# Patient Record
Sex: FEMALE | Race: WHITE | NOT HISPANIC OR LATINO | Employment: OTHER | URBAN - METROPOLITAN AREA
[De-identification: names, ages, dates, MRNs, and addresses within clinical notes are randomized per-mention and may not be internally consistent; named-entity substitution may affect disease eponyms.]

---

## 2017-05-02 ENCOUNTER — HOSPITAL ENCOUNTER (OUTPATIENT)
Dept: CT IMAGING | Facility: CLINIC | Age: 72
Discharge: HOME | End: 2017-05-02
Attending: FAMILY MEDICINE | Admitting: FAMILY MEDICINE
Payer: MEDICARE

## 2017-05-02 DIAGNOSIS — R91.8 LUNG NODULES: ICD-10-CM

## 2017-05-02 PROCEDURE — 71250 CT THORAX DX C-: CPT

## 2020-07-24 ENCOUNTER — OFFICE VISIT (OUTPATIENT)
Dept: FAMILY MEDICINE | Facility: CLINIC | Age: 75
End: 2020-07-24
Payer: COMMERCIAL

## 2020-07-24 VITALS
SYSTOLIC BLOOD PRESSURE: 120 MMHG | TEMPERATURE: 99.1 F | HEART RATE: 109 BPM | BODY MASS INDEX: 30.18 KG/M2 | HEIGHT: 62 IN | WEIGHT: 164 LBS | RESPIRATION RATE: 28 BRPM | DIASTOLIC BLOOD PRESSURE: 74 MMHG | OXYGEN SATURATION: 98 %

## 2020-07-24 DIAGNOSIS — I48.20 CHRONIC ATRIAL FIBRILLATION (H): Primary | ICD-10-CM

## 2020-07-24 DIAGNOSIS — I49.9 IRREGULAR HEART BEAT: ICD-10-CM

## 2020-07-24 DIAGNOSIS — R53.83 FATIGUE, UNSPECIFIED TYPE: ICD-10-CM

## 2020-07-24 PROCEDURE — 99203 OFFICE O/P NEW LOW 30 MIN: CPT | Performed by: NURSE PRACTITIONER

## 2020-07-24 PROCEDURE — 93000 ELECTROCARDIOGRAM COMPLETE: CPT | Performed by: NURSE PRACTITIONER

## 2020-07-24 RX ORDER — NICOTINE POLACRILEX 4 MG/1
20 GUM, CHEWING ORAL DAILY
Status: ON HOLD | COMMUNITY
Start: 2020-05-26 | End: 2020-10-07

## 2020-07-24 ASSESSMENT — MIFFLIN-ST. JEOR: SCORE: 1187.15

## 2020-07-24 NOTE — PATIENT INSTRUCTIONS
EKG looked ok, without acute changes.  Increase the Atenolol to taking that twice daily.  Continue to monitor symptoms.  Push fluids.  Consider cardiology consult and continue with your primary care provider at [facility] to help with that.  If symptoms worsen at all, you NEED to seek emergent care.      Thank you for choosing [facility].  You may be receiving an email and/or telephone survey request from [facility] Customer Experience regarding your visit today.  Please take a few minutes to respond to the survey to let us know how we are doing.      If you have questions or concerns, please contact us via [employer] or you can contact your care team at [phone].    Our Clinic hours are:  Monday 6:40 am  to 7:00 pm  Tuesday -Friday 6:40 am to 5:00 pm    The [place] outpatient lab hours are:  Monday - Friday 6:10 am to 4:45 pm  Saturdays 7:00 am to 11:00 am  Appointments are required, call [phone]    If you have clinical questions after hours or would like to schedule an appointment,  call the clinic at [phone].

## 2020-07-24 NOTE — PROGRESS NOTES
"Subjective     Shaila Triana is a 74 year old female who presents to clinic today for the following health issues:    HPI       Dizziness      Duration: over 1 week    Description   Feeling faint:  YES  Feeling like the surroundings are moving: no   Loss of consciousness or falls: no     Intensity:  moderate    Accompanying signs and symptoms:   Nausea/vomitting: no   Palpitations: YES  Weakness in arms or legs: YES  Vision or speech changes: no   Ringing in ears (Tinnitus): no   Hearing loss related to dizziness: no   Other (fevers/chills/sweating/dyspnea): YES- dyspnea    History (similar episodes/head trauma/previous evaluation/recent bleeding): has had an irregular heart beat for years and is on Atenolol.    Precipitating or alleviating factors (new meds/chemicals): None  Worse with activity/head movement: YES    Therapies tried and outcome: None    She has never been to this clinic. Usually goes to [facility] clinic in [place]. She called there today with her concerns and was told to go to ED, she is refusing to go to ED, states that is way too expensive.  She is vague with her concerns as to what brought her in but basically she shows me the name of a new medication she saw on TV that she would like to try for her chronic atrial fibrillation, she doesn't think Atenolol, which she's been on for \"years\" is working anymore.  She has vague feelings of being tired, feels it's from the heat. Denies chest pain or difficulty breathing. Weight is stable, no increase in edema.  No other acute URI concerns voiced.  Is compliant with all her medications.  Last labs were done in June 2020 and were stable, thyroid was ok.      There is no problem list on file for this patient.    History reviewed. No pertinent surgical history.    Social History     Tobacco Use     Smoking status: Never Smoker     Smokeless tobacco: Never Used   Substance Use Topics     Alcohol use: Yes     Comment: occ     History reviewed. No pertinent " "family history.      Current Outpatient Medications   Medication Sig Dispense Refill     aspirin 81 MG tablet Take by mouth daily       ATENOLOL 50 MG OR TABS 1 TABLET BY MOUTH DAILY       calcium 600 MG tablet Take 1 tablet by mouth 2 times daily       fluticasone (VERAMYST) 27.5 MCG/SPRAY nasal spray Spray 2 sprays into both nostrils daily       Levothyroxine Sodium 100 MCG CAPS        omeprazole 20 MG tablet Take 20 mg by mouth daily       sertraline (ZOLOFT) 100 MG tablet Take by mouth daily       sertraline (ZOLOFT) 50 MG tablet Take by mouth daily       simvastatin (ZOCOR) 80 MG tablet Take by mouth At Bedtime       tamoxifen (NOLVADEX) 10 MG tablet Take 10 mg by mouth 2 times daily       Allergies   Allergen Reactions     Codeine Nausea     Dust Mite Extract Other (See Comments)     Per allergy test     Erythromycin      Penicillin [Penicillins]      No lab results found.   BP Readings from Last 3 Encounters:   07/24/20 120/74   04/01/14 125/80   03/18/14 121/69    Wt Readings from Last 3 Encounters:   07/24/20 74.4 kg (164 lb)   04/01/14 66.2 kg (146 lb)   03/18/14 66.2 kg (146 lb)                    Reviewed and updated as needed this visit by Provider         Review of Systems   Constitutional, HEENT, cardiovascular, pulmonary, gi and gu systems are negative, except as otherwise noted.      Objective    /74 (BP Location: Right arm, Patient Position: Chair, Cuff Size: Adult Regular)   Pulse 109   Temp 99.1  F (37.3  C) (Tympanic)   Resp 28   Ht 1.575 m (5' 2\")   Wt 74.4 kg (164 lb)   LMP  (LMP Unknown)   SpO2 98%   BMI 30.00 kg/m    Body mass index is 30 kg/m .  Physical Exam   GENERAL: healthy, alert and no distress  NECK: no adenopathy, no asymmetry  RESP: lungs clear to auscultation - no rales, rhonchi or wheezes  CV: irregular rhythm, rate controlled, normal S1 S2, no S3 or S4, no murmur  ABDOMEN: soft, nontender, no hepatosplenomegaly, no masses and bowel sounds normal  MS: no gross " "musculoskeletal defects noted      Diagnostic Test Results:  Labs reviewed in Epic  none         Assessment & Plan     1. Chronic atrial fibrillation (H)    I am not comfortable ordering the new medication she requested. EKG was without obvious acute changes, exam was stable.  I suggested she could try and increase Atenolol to twice daily.  Strongly encouraged her to seek emergent care for thorough evaluation if symptoms persist or any new or concerning symptoms arise.  Follow up with PCP for further evaluation as well.      2. Irregular heart beat    - EKG 12-lead complete w/read - Clinics    3. Fatigue, unspecified type         BMI:   Estimated body mass index is 30 kg/m  as calculated from the following:    Height as of this encounter: 1.575 m (5' 2\").    Weight as of this encounter: 74.4 kg (164 lb).           See Patient Instructions  Patient Instructions   EKG looked ok, without acute changes.  Increase the Atenolol to taking that twice daily.  Continue to monitor symptoms.  Push fluids.  Consider cardiology consult and continue with your primary care provider at [facility] to help with that.  If symptoms worsen at all, you NEED to seek emergent care.      Thank you for choosing [facility].  You may be receiving an email and/or telephone survey request from [facility] Customer Experience regarding your visit today.  Please take a few minutes to respond to the survey to let us know how we are doing.      If you have questions or concerns, please contact us via [employer] or you can contact your care team at [phone].    Our Clinic hours are:  Monday 6:40 am  to 7:00 pm  Tuesday -Friday 6:40 am to 5:00 pm    The [place] outpatient lab hours are:  Monday - Friday 6:10 am to 4:45 pm  Saturdays 7:00 am to 11:00 am  Appointments are required, call [phone]    If you have clinical questions after hours or would like to schedule an appointment,  call the clinic at [phone].      Return in about 2 weeks " (around 8/7/2020) for or sooner if symptoms persist or worsen.    NADIR Hernandez [facility]

## 2020-10-05 ENCOUNTER — APPOINTMENT (OUTPATIENT)
Dept: GENERAL RADIOLOGY | Facility: CLINIC | Age: 75
DRG: 812 | End: 2020-10-05
Attending: EMERGENCY MEDICINE
Payer: COMMERCIAL

## 2020-10-05 ENCOUNTER — HOSPITAL ENCOUNTER (INPATIENT)
Facility: CLINIC | Age: 75
LOS: 2 days | Discharge: HOME | DRG: 812 | End: 2020-10-07
Attending: EMERGENCY MEDICINE | Admitting: INTERNAL MEDICINE
Payer: COMMERCIAL

## 2020-10-05 ENCOUNTER — APPOINTMENT (OUTPATIENT)
Dept: CT IMAGING | Facility: CLINIC | Age: 75
DRG: 812 | End: 2020-10-05
Attending: EMERGENCY MEDICINE
Payer: COMMERCIAL

## 2020-10-05 DIAGNOSIS — R42 DIZZINESS: ICD-10-CM

## 2020-10-05 DIAGNOSIS — Z20.828 VIRAL DISEASE EXPOSURE: ICD-10-CM

## 2020-10-05 DIAGNOSIS — D64.9 ANEMIA, UNSPECIFIED TYPE: ICD-10-CM

## 2020-10-05 DIAGNOSIS — D64.9 ANEMIA: ICD-10-CM

## 2020-10-05 DIAGNOSIS — R07.89 OTHER CHEST PAIN: ICD-10-CM

## 2020-10-05 DIAGNOSIS — Q45.3 PANCREATIC ABNORMALITY: ICD-10-CM

## 2020-10-05 DIAGNOSIS — I48.20 CHRONIC ATRIAL FIBRILLATION (H): ICD-10-CM

## 2020-10-05 DIAGNOSIS — R91.8 LUNG NODULES: ICD-10-CM

## 2020-10-05 DIAGNOSIS — D50.0 IRON DEFICIENCY ANEMIA DUE TO CHRONIC BLOOD LOSS: ICD-10-CM

## 2020-10-05 DIAGNOSIS — R07.9 CHEST PAIN, UNSPECIFIED TYPE: ICD-10-CM

## 2020-10-05 DIAGNOSIS — K27.9 PEPTIC ULCER DISEASE: Primary | ICD-10-CM

## 2020-10-05 PROBLEM — I77.810 ASCENDING AORTA DILATATION (H): Status: ACTIVE | Noted: 2020-10-05

## 2020-10-05 PROBLEM — R74.8 ELEVATED LIPASE: Status: ACTIVE | Noted: 2020-10-05

## 2020-10-05 PROBLEM — I48.91 NEW ONSET ATRIAL FIBRILLATION (H): Status: ACTIVE | Noted: 2020-07-31

## 2020-10-05 LAB
ALBUMIN SERPL-MCNC: 3.4 G/DL (ref 3.4–5)
ALP SERPL-CCNC: 60 U/L (ref 40–150)
ALT SERPL W P-5'-P-CCNC: 13 U/L (ref 0–50)
ANION GAP SERPL CALCULATED.3IONS-SCNC: 4 MMOL/L (ref 3–14)
AST SERPL W P-5'-P-CCNC: 13 U/L (ref 0–45)
BASOPHILS # BLD AUTO: 0.1 10E9/L (ref 0–0.2)
BASOPHILS NFR BLD AUTO: 0.8 %
BILIRUB SERPL-MCNC: 0.5 MG/DL (ref 0.2–1.3)
BLD PROD TYP BPU: NORMAL
BLD UNIT ID BPU: 0
BLOOD PRODUCT CODE: NORMAL
BPU ID: NORMAL
BUN SERPL-MCNC: 27 MG/DL (ref 7–30)
CALCIUM SERPL-MCNC: 8.7 MG/DL (ref 8.5–10.1)
CHLORIDE SERPL-SCNC: 104 MMOL/L (ref 94–109)
CO2 SERPL-SCNC: 31 MMOL/L (ref 20–32)
CREAT SERPL-MCNC: 1.16 MG/DL (ref 0.52–1.04)
DIFFERENTIAL METHOD BLD: ABNORMAL
EOSINOPHIL # BLD AUTO: 0.2 10E9/L (ref 0–0.7)
EOSINOPHIL NFR BLD AUTO: 2.5 %
ERYTHROCYTE [DISTWIDTH] IN BLOOD BY AUTOMATED COUNT: 20.2 % (ref 10–15)
FERRITIN SERPL-MCNC: 7 NG/ML (ref 8–252)
GFR SERPL CREATININE-BSD FRML MDRD: 45 ML/MIN/{1.73_M2}
GLUCOSE SERPL-MCNC: 102 MG/DL (ref 70–99)
HCT VFR BLD AUTO: 26.4 % (ref 35–47)
HGB BLD-MCNC: 6.9 G/DL (ref 11.7–15.7)
HGB BLD-MCNC: 7.1 G/DL (ref 11.7–15.7)
HGB BLD-MCNC: 7.6 G/DL (ref 11.7–15.7)
IMM GRANULOCYTES # BLD: 0 10E9/L (ref 0–0.4)
IMM GRANULOCYTES NFR BLD: 0.3 %
IRON SATN MFR SERPL: 4 % (ref 15–46)
IRON SERPL-MCNC: 20 UG/DL (ref 35–180)
LIPASE SERPL-CCNC: 640 U/L (ref 73–393)
LYMPHOCYTES # BLD AUTO: 1 10E9/L (ref 0.8–5.3)
LYMPHOCYTES NFR BLD AUTO: 17.1 %
MCH RBC QN AUTO: 17.5 PG (ref 26.5–33)
MCHC RBC AUTO-ENTMCNC: 26.9 G/DL (ref 31.5–36.5)
MCV RBC AUTO: 65 FL (ref 78–100)
MONOCYTES # BLD AUTO: 0.7 10E9/L (ref 0–1.3)
MONOCYTES NFR BLD AUTO: 12.4 %
NEUTROPHILS # BLD AUTO: 4 10E9/L (ref 1.6–8.3)
NEUTROPHILS NFR BLD AUTO: 66.9 %
NRBC # BLD AUTO: 0 10*3/UL
NRBC BLD AUTO-RTO: 0 /100
NT-PROBNP SERPL-MCNC: 2938 PG/ML (ref 0–1800)
PLATELET # BLD AUTO: 236 10E9/L (ref 150–450)
POTASSIUM SERPL-SCNC: 3 MMOL/L (ref 3.4–5.3)
PROT SERPL-MCNC: 7.5 G/DL (ref 6.8–8.8)
RBC # BLD AUTO: 4.06 10E12/L (ref 3.8–5.2)
RETICS # AUTO: 49.9 10E9/L (ref 25–95)
RETICS/RBC NFR AUTO: 1.2 % (ref 0.5–2)
SODIUM SERPL-SCNC: 139 MMOL/L (ref 133–144)
TIBC SERPL-MCNC: 445 UG/DL (ref 240–430)
TRANSFUSION STATUS PATIENT QL: NORMAL
TRANSFUSION STATUS PATIENT QL: NORMAL
TROPONIN I SERPL-MCNC: <0.015 UG/L (ref 0–0.04)
TROPONIN I SERPL-MCNC: <0.015 UG/L (ref 0–0.04)
WBC # BLD AUTO: 6 10E9/L (ref 4–11)

## 2020-10-05 PROCEDURE — 86850 RBC ANTIBODY SCREEN: CPT | Performed by: EMERGENCY MEDICINE

## 2020-10-05 PROCEDURE — 36430 TRANSFUSION BLD/BLD COMPNT: CPT | Performed by: EMERGENCY MEDICINE

## 2020-10-05 PROCEDURE — 83540 ASSAY OF IRON: CPT | Performed by: PHYSICIAN ASSISTANT

## 2020-10-05 PROCEDURE — 86900 BLOOD TYPING SEROLOGIC ABO: CPT | Performed by: EMERGENCY MEDICINE

## 2020-10-05 PROCEDURE — 85045 AUTOMATED RETICULOCYTE COUNT: CPT | Performed by: PHYSICIAN ASSISTANT

## 2020-10-05 PROCEDURE — 83690 ASSAY OF LIPASE: CPT | Performed by: EMERGENCY MEDICINE

## 2020-10-05 PROCEDURE — C9803 HOPD COVID-19 SPEC COLLECT: HCPCS

## 2020-10-05 PROCEDURE — 258N000003 HC RX IP 258 OP 636: Performed by: PHYSICIAN ASSISTANT

## 2020-10-05 PROCEDURE — 250N000011 HC RX IP 250 OP 636: Performed by: EMERGENCY MEDICINE

## 2020-10-05 PROCEDURE — 80053 COMPREHEN METABOLIC PANEL: CPT | Performed by: EMERGENCY MEDICINE

## 2020-10-05 PROCEDURE — P9016 RBC LEUKOCYTES REDUCED: HCPCS | Performed by: EMERGENCY MEDICINE

## 2020-10-05 PROCEDURE — 83880 ASSAY OF NATRIURETIC PEPTIDE: CPT | Performed by: EMERGENCY MEDICINE

## 2020-10-05 PROCEDURE — 250N000013 HC RX MED GY IP 250 OP 250 PS 637: Performed by: INTERNAL MEDICINE

## 2020-10-05 PROCEDURE — 99285 EMERGENCY DEPT VISIT HI MDM: CPT | Mod: 25 | Performed by: EMERGENCY MEDICINE

## 2020-10-05 PROCEDURE — 84484 ASSAY OF TROPONIN QUANT: CPT | Performed by: EMERGENCY MEDICINE

## 2020-10-05 PROCEDURE — 82728 ASSAY OF FERRITIN: CPT | Performed by: PHYSICIAN ASSISTANT

## 2020-10-05 PROCEDURE — 85018 HEMOGLOBIN: CPT | Performed by: EMERGENCY MEDICINE

## 2020-10-05 PROCEDURE — 71045 X-RAY EXAM CHEST 1 VIEW: CPT

## 2020-10-05 PROCEDURE — 99291 CRITICAL CARE FIRST HOUR: CPT | Mod: 25 | Performed by: EMERGENCY MEDICINE

## 2020-10-05 PROCEDURE — 250N000009 HC RX 250: Performed by: EMERGENCY MEDICINE

## 2020-10-05 PROCEDURE — 86923 COMPATIBILITY TEST ELECTRIC: CPT | Performed by: EMERGENCY MEDICINE

## 2020-10-05 PROCEDURE — 36415 COLL VENOUS BLD VENIPUNCTURE: CPT | Performed by: PHYSICIAN ASSISTANT

## 2020-10-05 PROCEDURE — 250N000013 HC RX MED GY IP 250 OP 250 PS 637: Performed by: PHYSICIAN ASSISTANT

## 2020-10-05 PROCEDURE — 86901 BLOOD TYPING SEROLOGIC RH(D): CPT | Performed by: EMERGENCY MEDICINE

## 2020-10-05 PROCEDURE — 85025 COMPLETE CBC W/AUTO DIFF WBC: CPT | Performed by: EMERGENCY MEDICINE

## 2020-10-05 PROCEDURE — 84484 ASSAY OF TROPONIN QUANT: CPT | Performed by: PHYSICIAN ASSISTANT

## 2020-10-05 PROCEDURE — G0378 HOSPITAL OBSERVATION PER HR: HCPCS

## 2020-10-05 PROCEDURE — 93005 ELECTROCARDIOGRAM TRACING: CPT | Performed by: EMERGENCY MEDICINE

## 2020-10-05 PROCEDURE — 93010 ELECTROCARDIOGRAM REPORT: CPT | Performed by: EMERGENCY MEDICINE

## 2020-10-05 PROCEDURE — U0003 INFECTIOUS AGENT DETECTION BY NUCLEIC ACID (DNA OR RNA); SEVERE ACUTE RESPIRATORY SYNDROME CORONAVIRUS 2 (SARS-COV-2) (CORONAVIRUS DISEASE [COVID-19]), AMPLIFIED PROBE TECHNIQUE, MAKING USE OF HIGH THROUGHPUT TECHNOLOGIES AS DESCRIBED BY CMS-2020-01-R: HCPCS | Performed by: EMERGENCY MEDICINE

## 2020-10-05 PROCEDURE — 96365 THER/PROPH/DIAG IV INF INIT: CPT | Performed by: EMERGENCY MEDICINE

## 2020-10-05 PROCEDURE — C9113 INJ PANTOPRAZOLE SODIUM, VIA: HCPCS | Performed by: PHYSICIAN ASSISTANT

## 2020-10-05 PROCEDURE — 250N000011 HC RX IP 250 OP 636: Performed by: PHYSICIAN ASSISTANT

## 2020-10-05 PROCEDURE — 96375 TX/PRO/DX INJ NEW DRUG ADDON: CPT | Performed by: EMERGENCY MEDICINE

## 2020-10-05 PROCEDURE — 74177 CT ABD & PELVIS W/CONTRAST: CPT

## 2020-10-05 PROCEDURE — 99223 1ST HOSP IP/OBS HIGH 75: CPT | Performed by: PHYSICIAN ASSISTANT

## 2020-10-05 PROCEDURE — 83550 IRON BINDING TEST: CPT | Performed by: PHYSICIAN ASSISTANT

## 2020-10-05 PROCEDURE — 120N000001 HC R&B MED SURG/OB

## 2020-10-05 PROCEDURE — 85018 HEMOGLOBIN: CPT | Performed by: PHYSICIAN ASSISTANT

## 2020-10-05 RX ORDER — DILTIAZEM HYDROCHLORIDE 5 MG/ML
10 INJECTION INTRAVENOUS ONCE
Status: DISCONTINUED | OUTPATIENT
Start: 2020-10-05 | End: 2020-10-05

## 2020-10-05 RX ORDER — HYDROMORPHONE HYDROCHLORIDE 1 MG/ML
.2-.5 INJECTION, SOLUTION INTRAMUSCULAR; INTRAVENOUS; SUBCUTANEOUS
Status: DISCONTINUED | OUTPATIENT
Start: 2020-10-05 | End: 2020-10-07 | Stop reason: HOSPADM

## 2020-10-05 RX ORDER — FUROSEMIDE 40 MG
40 TABLET ORAL DAILY
Status: DISCONTINUED | OUTPATIENT
Start: 2020-10-06 | End: 2020-10-07 | Stop reason: HOSPADM

## 2020-10-05 RX ORDER — POTASSIUM CHLORIDE 7.45 MG/ML
10 INJECTION INTRAVENOUS
Status: DISCONTINUED | OUTPATIENT
Start: 2020-10-05 | End: 2020-10-07 | Stop reason: HOSPADM

## 2020-10-05 RX ORDER — ROSUVASTATIN CALCIUM 20 MG/1
20 TABLET, COATED ORAL AT BEDTIME
Status: DISCONTINUED | OUTPATIENT
Start: 2020-10-05 | End: 2020-10-05 | Stop reason: CLARIF

## 2020-10-05 RX ORDER — ROSUVASTATIN CALCIUM 20 MG/1
20 TABLET, COATED ORAL EVERY EVENING
Status: DISCONTINUED | OUTPATIENT
Start: 2020-10-05 | End: 2020-10-07 | Stop reason: HOSPADM

## 2020-10-05 RX ORDER — OFLOXACIN 3 MG/ML
SOLUTION/ DROPS OPHTHALMIC
COMMUNITY
Start: 2020-09-17 | End: 2023-01-01

## 2020-10-05 RX ORDER — IOPAMIDOL 755 MG/ML
71 INJECTION, SOLUTION INTRAVASCULAR ONCE
Status: COMPLETED | OUTPATIENT
Start: 2020-10-05 | End: 2020-10-05

## 2020-10-05 RX ORDER — ROSUVASTATIN CALCIUM 20 MG/1
20 TABLET, COATED ORAL EVERY EVENING
Status: ON HOLD | COMMUNITY
Start: 2020-08-10 | End: 2023-01-01

## 2020-10-05 RX ORDER — METOPROLOL TARTRATE 1 MG/ML
5 INJECTION, SOLUTION INTRAVENOUS EVERY 5 MIN PRN
Status: DISCONTINUED | OUTPATIENT
Start: 2020-10-05 | End: 2020-10-07 | Stop reason: HOSPADM

## 2020-10-05 RX ORDER — ACETAMINOPHEN 325 MG/1
650 TABLET ORAL EVERY 4 HOURS PRN
Status: DISCONTINUED | OUTPATIENT
Start: 2020-10-05 | End: 2020-10-07 | Stop reason: HOSPADM

## 2020-10-05 RX ORDER — POTASSIUM CHLORIDE 1.5 G/1.58G
20-40 POWDER, FOR SOLUTION ORAL
Status: DISCONTINUED | OUTPATIENT
Start: 2020-10-05 | End: 2020-10-07 | Stop reason: HOSPADM

## 2020-10-05 RX ORDER — ONDANSETRON 4 MG/1
4 TABLET, ORALLY DISINTEGRATING ORAL EVERY 6 HOURS PRN
Status: DISCONTINUED | OUTPATIENT
Start: 2020-10-05 | End: 2020-10-05

## 2020-10-05 RX ORDER — ONDANSETRON 2 MG/ML
4 INJECTION INTRAMUSCULAR; INTRAVENOUS EVERY 6 HOURS PRN
Status: DISCONTINUED | OUTPATIENT
Start: 2020-10-05 | End: 2020-10-07 | Stop reason: HOSPADM

## 2020-10-05 RX ORDER — ONDANSETRON 4 MG/1
4 TABLET, ORALLY DISINTEGRATING ORAL EVERY 6 HOURS PRN
Status: DISCONTINUED | OUTPATIENT
Start: 2020-10-05 | End: 2020-10-07 | Stop reason: HOSPADM

## 2020-10-05 RX ORDER — SERTRALINE HYDROCHLORIDE 100 MG/1
200 TABLET, FILM COATED ORAL EVERY EVENING
Status: DISCONTINUED | OUTPATIENT
Start: 2020-10-05 | End: 2020-10-07 | Stop reason: HOSPADM

## 2020-10-05 RX ORDER — SERTRALINE HYDROCHLORIDE 100 MG/1
200 TABLET, FILM COATED ORAL AT BEDTIME
Status: DISCONTINUED | OUTPATIENT
Start: 2020-10-05 | End: 2020-10-05 | Stop reason: CLARIF

## 2020-10-05 RX ORDER — OXYCODONE HYDROCHLORIDE 5 MG/1
5-10 TABLET ORAL
Status: DISCONTINUED | OUTPATIENT
Start: 2020-10-05 | End: 2020-10-07 | Stop reason: HOSPADM

## 2020-10-05 RX ORDER — PROCHLORPERAZINE MALEATE 5 MG
5 TABLET ORAL EVERY 6 HOURS PRN
Status: DISCONTINUED | OUTPATIENT
Start: 2020-10-05 | End: 2020-10-07 | Stop reason: HOSPADM

## 2020-10-05 RX ORDER — SODIUM CHLORIDE, SODIUM LACTATE, POTASSIUM CHLORIDE, CALCIUM CHLORIDE 600; 310; 30; 20 MG/100ML; MG/100ML; MG/100ML; MG/100ML
INJECTION, SOLUTION INTRAVENOUS CONTINUOUS
Status: DISCONTINUED | OUTPATIENT
Start: 2020-10-05 | End: 2020-10-06

## 2020-10-05 RX ORDER — AMOXICILLIN 250 MG
2 CAPSULE ORAL 2 TIMES DAILY PRN
Status: DISCONTINUED | OUTPATIENT
Start: 2020-10-05 | End: 2020-10-07 | Stop reason: HOSPADM

## 2020-10-05 RX ORDER — NALOXONE HYDROCHLORIDE 0.4 MG/ML
.1-.4 INJECTION, SOLUTION INTRAMUSCULAR; INTRAVENOUS; SUBCUTANEOUS
Status: DISCONTINUED | OUTPATIENT
Start: 2020-10-05 | End: 2020-10-07 | Stop reason: HOSPADM

## 2020-10-05 RX ORDER — POTASSIUM CHLORIDE 29.8 MG/ML
20 INJECTION INTRAVENOUS
Status: DISCONTINUED | OUTPATIENT
Start: 2020-10-05 | End: 2020-10-05 | Stop reason: CLARIF

## 2020-10-05 RX ORDER — OXYCODONE AND ACETAMINOPHEN 5; 325 MG/1; MG/1
1 TABLET ORAL EVERY 4 HOURS PRN
Status: DISCONTINUED | OUTPATIENT
Start: 2020-10-05 | End: 2020-10-05

## 2020-10-05 RX ORDER — FUROSEMIDE 40 MG
40 TABLET ORAL DAILY
Status: ON HOLD | COMMUNITY
Start: 2020-08-10 | End: 2024-01-01

## 2020-10-05 RX ORDER — AMOXICILLIN 250 MG
1 CAPSULE ORAL 2 TIMES DAILY PRN
Status: DISCONTINUED | OUTPATIENT
Start: 2020-10-05 | End: 2020-10-07 | Stop reason: HOSPADM

## 2020-10-05 RX ORDER — NALOXONE HYDROCHLORIDE 0.4 MG/ML
.1-.4 INJECTION, SOLUTION INTRAMUSCULAR; INTRAVENOUS; SUBCUTANEOUS
Status: DISCONTINUED | OUTPATIENT
Start: 2020-10-05 | End: 2020-10-05

## 2020-10-05 RX ORDER — METOPROLOL TARTRATE 1 MG/ML
5 INJECTION, SOLUTION INTRAVENOUS ONCE
Status: COMPLETED | OUTPATIENT
Start: 2020-10-05 | End: 2020-10-05

## 2020-10-05 RX ORDER — ONDANSETRON 2 MG/ML
4 INJECTION INTRAMUSCULAR; INTRAVENOUS EVERY 6 HOURS PRN
Status: DISCONTINUED | OUTPATIENT
Start: 2020-10-05 | End: 2020-10-05

## 2020-10-05 RX ORDER — FERROUS GLUCONATE 324(38)MG
324 TABLET ORAL
Status: DISCONTINUED | OUTPATIENT
Start: 2020-10-06 | End: 2020-10-07 | Stop reason: HOSPADM

## 2020-10-05 RX ORDER — POTASSIUM CL/LIDO/0.9 % NACL 10MEQ/0.1L
10 INTRAVENOUS SOLUTION, PIGGYBACK (ML) INTRAVENOUS ONCE
Status: COMPLETED | OUTPATIENT
Start: 2020-10-05 | End: 2020-10-05

## 2020-10-05 RX ORDER — POTASSIUM CHLORIDE 1500 MG/1
20-40 TABLET, EXTENDED RELEASE ORAL
Status: DISCONTINUED | OUTPATIENT
Start: 2020-10-05 | End: 2020-10-07 | Stop reason: HOSPADM

## 2020-10-05 RX ORDER — PROCHLORPERAZINE 25 MG
12.5 SUPPOSITORY, RECTAL RECTAL EVERY 12 HOURS PRN
Status: DISCONTINUED | OUTPATIENT
Start: 2020-10-05 | End: 2020-10-07 | Stop reason: HOSPADM

## 2020-10-05 RX ORDER — ACETAMINOPHEN 325 MG/1
650 TABLET ORAL EVERY 4 HOURS PRN
Status: DISCONTINUED | OUTPATIENT
Start: 2020-10-05 | End: 2020-10-05

## 2020-10-05 RX ORDER — KETOROLAC TROMETHAMINE 5 MG/ML
SOLUTION OPHTHALMIC
COMMUNITY
Start: 2020-09-17 | End: 2023-01-01

## 2020-10-05 RX ORDER — POTASSIUM CL/LIDO/0.9 % NACL 10MEQ/0.1L
10 INTRAVENOUS SOLUTION, PIGGYBACK (ML) INTRAVENOUS
Status: DISCONTINUED | OUTPATIENT
Start: 2020-10-05 | End: 2020-10-07 | Stop reason: HOSPADM

## 2020-10-05 RX ORDER — LEVOTHYROXINE SODIUM 100 UG/1
100 TABLET ORAL EVERY MORNING
Status: DISCONTINUED | OUTPATIENT
Start: 2020-10-06 | End: 2020-10-07 | Stop reason: HOSPADM

## 2020-10-05 RX ORDER — SODIUM CHLORIDE 9 MG/ML
INJECTION, SOLUTION INTRAVENOUS CONTINUOUS
Status: DISCONTINUED | OUTPATIENT
Start: 2020-10-05 | End: 2020-10-05

## 2020-10-05 RX ORDER — POLYETHYLENE GLYCOL 3350 17 G/17G
17 POWDER, FOR SOLUTION ORAL DAILY PRN
Status: DISCONTINUED | OUTPATIENT
Start: 2020-10-05 | End: 2020-10-07 | Stop reason: HOSPADM

## 2020-10-05 RX ORDER — PREDNISOLONE ACETATE 10 MG/ML
SUSPENSION/ DROPS OPHTHALMIC
COMMUNITY
Start: 2020-09-17 | End: 2023-01-01

## 2020-10-05 RX ORDER — ACETAMINOPHEN 650 MG/1
650 SUPPOSITORY RECTAL EVERY 4 HOURS PRN
Status: DISCONTINUED | OUTPATIENT
Start: 2020-10-05 | End: 2020-10-07 | Stop reason: HOSPADM

## 2020-10-05 RX ORDER — METOPROLOL SUCCINATE 100 MG/1
150 TABLET, EXTENDED RELEASE ORAL 2 TIMES DAILY
Status: ON HOLD | COMMUNITY
Start: 2020-09-14 | End: 2024-01-01

## 2020-10-05 RX ADMIN — POTASSIUM CHLORIDE 40 MEQ: 1500 TABLET, EXTENDED RELEASE ORAL at 21:05

## 2020-10-05 RX ADMIN — IOPAMIDOL 71 ML: 755 INJECTION, SOLUTION INTRAVENOUS at 12:12

## 2020-10-05 RX ADMIN — SERTRALINE HYDROCHLORIDE 200 MG: 100 TABLET ORAL at 21:05

## 2020-10-05 RX ADMIN — SODIUM CHLORIDE, POTASSIUM CHLORIDE, SODIUM LACTATE AND CALCIUM CHLORIDE: 600; 310; 30; 20 INJECTION, SOLUTION INTRAVENOUS at 21:04

## 2020-10-05 RX ADMIN — METOPROLOL SUCCINATE 150 MG: 100 TABLET, EXTENDED RELEASE ORAL at 21:06

## 2020-10-05 RX ADMIN — POTASSIUM CHLORIDE 20 MEQ: 1500 TABLET, EXTENDED RELEASE ORAL at 22:58

## 2020-10-05 RX ADMIN — ROSUVASTATIN CALCIUM 20 MG: 20 TABLET, FILM COATED ORAL at 21:05

## 2020-10-05 RX ADMIN — PANTOPRAZOLE SODIUM 40 MG: 40 INJECTION, POWDER, LYOPHILIZED, FOR SOLUTION INTRAVENOUS at 21:07

## 2020-10-05 RX ADMIN — SODIUM CHLORIDE 97 ML: 9 INJECTION, SOLUTION INTRAVENOUS at 12:12

## 2020-10-05 RX ADMIN — Medication 10 MEQ: at 11:35

## 2020-10-05 RX ADMIN — METOPROLOL TARTRATE 5 MG: 5 INJECTION INTRAVENOUS at 14:19

## 2020-10-05 ASSESSMENT — ACTIVITIES OF DAILY LIVING (ADL)
DIFFICULTY_EATING/SWALLOWING: NO
TOILETING_ISSUES: NO
WEAR_GLASSES_OR_BLIND: YES
DIFFICULTY_COMMUNICATING: NO
DRESSING/BATHING_DIFFICULTY: NO

## 2020-10-05 ASSESSMENT — MIFFLIN-ST. JEOR
SCORE: 1067.25
SCORE: 1159.93

## 2020-10-05 NOTE — ED PROVIDER NOTES
History     Chief Complaint   Patient presents with     Chest Pain     soa with intermittent CP     Shortness of Breath     HPI  Shaila Triana is a 74 year old female with past medical history significant for new onset atrial fibrillation hypertension hypothyroidism sleep apnea patient is on Eliquis who presents the emergency department complaining of left-sided chest pain shortness of breath.  Patient states the chest pain has been present over the past few days.  It is a sharp pain that occurs mostly when she is laying down or sleep on her left side.  It is a stabbing pain that does not radiate.  It lasts about 5 to 10 minutes.  Patient also states she feels a bit short of air but this is been an ongoing problem for a fairly long time.  Patient has not had any recent fevers or chills denies headache or visual changes.  She denies neck pain.  She has a mild chronic cough.  She denies any current chest pain.  She is not having any abdominal pain or back pain.  She denies any focal numbness weakness in extremities she denies any bowel or bladder dysfunction.    Allergies:  Allergies   Allergen Reactions     Codeine Nausea     Dust Mite Extract Other (See Comments)     Per allergy test     Erythromycin      Penicillin [Penicillins]        Problem List:    There are no active problems to display for this patient.       Past Medical History:    No past medical history on file.    Past Surgical History:    No past surgical history on file.    Family History:    No family history on file.    Social History:  Marital Status:   [4]  Social History     Tobacco Use     Smoking status: Never Smoker     Smokeless tobacco: Never Used   Substance Use Topics     Alcohol use: Yes     Comment: occ     Drug use: Never        Medications:         aspirin 81 MG tablet       ATENOLOL 50 MG OR TABS       calcium 600 MG tablet       fluticasone (VERAMYST) 27.5 MCG/SPRAY nasal spray       Levothyroxine Sodium 100 MCG CAPS       " omeprazole 20 MG tablet       sertraline (ZOLOFT) 100 MG tablet       sertraline (ZOLOFT) 50 MG tablet       simvastatin (ZOCOR) 80 MG tablet       tamoxifen (NOLVADEX) 10 MG tablet          Review of Systems  All systems reviewed and other than pertinent positives negatives in HPI all other systems are negative.  Physical Exam   BP: 115/83  Pulse: 88  Temp: 98.7  F (37.1  C)  Resp: 18  Height: 157.5 cm (5' 2\")  Weight: 71.7 kg (158 lb)  SpO2: 98 %      Physical Exam  Vitals signs and nursing note reviewed.   Constitutional:       General: She is not in acute distress.     Appearance: Normal appearance. She is well-developed. She is not ill-appearing or toxic-appearing.   HENT:      Head: Normocephalic.      Nose: Nose normal. No congestion.      Mouth/Throat:      Mouth: Mucous membranes are moist.      Pharynx: Oropharynx is clear.   Eyes:      Conjunctiva/sclera: Conjunctivae normal.   Neck:      Musculoskeletal: Normal range of motion and neck supple.      Comments: No JVD noted.  Cardiovascular:      Rate and Rhythm: Normal rate. Rhythm irregular.      Pulses: Normal pulses.      Heart sounds: Normal heart sounds. No murmur.   Pulmonary:      Effort: Pulmonary effort is normal.      Breath sounds: Normal breath sounds. No wheezing, rhonchi or rales.   Chest:      Chest wall: No tenderness.   Abdominal:      General: Abdomen is flat.      Palpations: Abdomen is soft.      Tenderness: There is no abdominal tenderness. There is no right CVA tenderness or left CVA tenderness.   Genitourinary:     Comments: There are no external lesions of the rectum.  Normal tone.  No significant amount of stool in vault.  Heme-negative.  No masses palpated.  Musculoskeletal: Normal range of motion.         General: No deformity.      Right lower leg: No edema.      Left lower leg: No edema.   Skin:     General: Skin is warm and dry.      Capillary Refill: Capillary refill takes less than 2 seconds.      Coloration: Skin is pale. "      Findings: No bruising or erythema.   Neurological:      General: No focal deficit present.      Mental Status: She is alert and oriented to person, place, and time.      Motor: No weakness.      Coordination: Coordination normal.   Psychiatric:         Mood and Affect: Mood normal.         ED Course     ED Course as of Oct 05 1633   Mon Oct 05, 2020   1632 Iron(!): 20     Procedures               EKG Interpretation:      Interpreted by Jorge Owusu MD  Rhythm: atrial fibrillation - controlled  Rate: Normal  Axis: Normal  Ectopy: premature ventricular contractions (unifocal)  Conduction: normal  ST Segments/ T Waves: Non-specific ST-T wave changes  Q Waves: none  Comparison to prior: Unchanged from 7/24/20    Clinical Impression: atrial fibrillation (chronic)      Critical Care time:  none             Results for orders placed or performed during the hospital encounter of 10/05/20 (from the past 24 hour(s))   CBC with platelets differential   Result Value Ref Range    WBC 6.0 4.0 - 11.0 10e9/L    RBC Count 4.06 3.8 - 5.2 10e12/L    Hemoglobin 7.1 (L) 11.7 - 15.7 g/dL    Hematocrit 26.4 (L) 35.0 - 47.0 %    MCV 65 (L) 78 - 100 fl    MCH 17.5 (L) 26.5 - 33.0 pg    MCHC 26.9 (L) 31.5 - 36.5 g/dL    RDW 20.2 (H) 10.0 - 15.0 %    Platelet Count 236 150 - 450 10e9/L    Diff Method Automated Method     % Neutrophils 66.9 %    % Lymphocytes 17.1 %    % Monocytes 12.4 %    % Eosinophils 2.5 %    % Basophils 0.8 %    % Immature Granulocytes 0.3 %    Nucleated RBCs 0 0 /100    Absolute Neutrophil 4.0 1.6 - 8.3 10e9/L    Absolute Lymphocytes 1.0 0.8 - 5.3 10e9/L    Absolute Monocytes 0.7 0.0 - 1.3 10e9/L    Absolute Eosinophils 0.2 0.0 - 0.7 10e9/L    Absolute Basophils 0.1 0.0 - 0.2 10e9/L    Abs Immature Granulocytes 0.0 0 - 0.4 10e9/L    Absolute Nucleated RBC 0.0    Comprehensive metabolic panel   Result Value Ref Range    Sodium 139 133 - 144 mmol/L    Potassium 3.0 (L) 3.4 - 5.3 mmol/L    Chloride 104 94 - 109  mmol/L    Carbon Dioxide 31 20 - 32 mmol/L    Anion Gap 4 3 - 14 mmol/L    Glucose 102 (H) 70 - 99 mg/dL    Urea Nitrogen 27 7 - 30 mg/dL    Creatinine 1.16 (H) 0.52 - 1.04 mg/dL    GFR Estimate 45 (L) >60 mL/min/[1.73_m2]    GFR Estimate If Black 53 (L) >60 mL/min/[1.73_m2]    Calcium 8.7 8.5 - 10.1 mg/dL    Bilirubin Total 0.5 0.2 - 1.3 mg/dL    Albumin 3.4 3.4 - 5.0 g/dL    Protein Total 7.5 6.8 - 8.8 g/dL    Alkaline Phosphatase 60 40 - 150 U/L    ALT 13 0 - 50 U/L    AST 13 0 - 45 U/L   Lipase   Result Value Ref Range    Lipase 640 (H) 73 - 393 U/L   Troponin I   Result Value Ref Range    Troponin I ES <0.015 0.000 - 0.045 ug/L   Nt probnp inpatient (BNP)   Result Value Ref Range    N-Terminal Pro BNP Inpatient 2,938 (H) 0 - 1,800 pg/mL   Reticulocyte count   Result Value Ref Range    % Retic 1.2 0.5 - 2.0 %    Absolute Retic 49.9 25 - 95 10e9/L   Ferritin   Result Value Ref Range    Ferritin 7 (L) 8 - 252 ng/mL   Iron and iron binding capacity   Result Value Ref Range    Iron 20 (L) 35 - 180 ug/dL    Iron Binding Cap 445 (H) 240 - 430 ug/dL    Iron Saturation Index 4 (L) 15 - 46 %   XR Chest Port 1 View    Narrative    XR CHEST PORT 1 VW 10/5/2020 12:23 PM    HISTORY: Chest pain.    COMPARISON: None.      Impression    IMPRESSION: A single view the chest shows no acute cardiopulmonary  disease. Borderline cardiomegaly is present.     FRANCISCO CATHERINE MD   CT Chest (PE) Abdomen Pelvis w Contrast    Narrative    CT CHEST PE, ABDOMEN AND PELVIS WITH CONTRAST 10/5/2020 12:48 PM    HISTORY: Chest pain and shortness of breath with elevated lipase.    TECHNIQUE: Helical axial scans from the lung apices through pubic  symphysis with 71 mL Isovue 370 IV contrast. Radiation dose for this  scan was reduced using automated exposure control, adjustment of the  mA and/or kV according to patient size, or iterative reconstruction  technique.    COMPARISON: CT chest 5/2/2017 and CT abdomen and pelvis  4/12/2016.    FINDINGS:  Chest: There is no CT evidence for pulmonary embolism or other acute  vascular abnormality of the chest. Mild ectasia ascending thoracic  aorta without change. Vascular calcifications, including coronary  artery calcifications. The thyroid gland is asymmetric with either  removal/hypoplasia of the right lobe or enlargement of the left lobe.  Regardless, this finding is unchanged since 2017. There are a few  borderline size mediastinal lymph nodes which appear unchanged. A  borderline size right hilar lymph node is also present. There is a  large hiatal hernia. Mediastinal and hilar structures are otherwise  normal. An enlarged right axillary lymph node is unchanged since the  prior exam.    Interval development of small bilateral pleural effusions. Two small  pleural-based nodules in the left lower lobe laterally are unchanged  and require no additional follow-up. Interval development of a 1 cm  slightly irregular nodule in the right middle lobe lateral segment  (image 139 of series 7 and image 34 of series 9). The remainder of the  lungs are clear.    Abdomen and pelvis: A few scattered small benign liver cysts are again  noted. The liver is otherwise unremarkable. The spleen is normal.  There is an 8 mm sharply circumscribed lucency at the junction of the  body and tail of the pancreas, new since the prior exam. This is of  uncertain etiology and significance. Since it is new, a follow-up CT  exam in 3 months is recommended. The remainder of the pancreas appears  normal. 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 A few  small benign cysts are seen in the upper pole of the left kidney. No  hydronephrosis bilaterally. The bowel and mesentery in the upper  abdomen are unremarkable.    Scans through the pelvis show no acute abnormality or free fluid. The  appendix is not definitely identified. Multilevel advanced  degenerative disc disease in the lumbar spine.      Impression     IMPRESSION:  1. No evidence for pulmonary embolism.  2. Stable ectasia ascending thoracic aorta.  3. Vascular calcifications, including coronary artery calcifications.  4. Enlarged right axillary lymph node is stable.  5. Interval development of small bilateral pleural effusions.  6. Two small pleural-based nodules left lower lobe laterally are  stable and require no additional follow-up.  7. New indeterminate 1 cm nodule right middle lobe.  Recommendations for an incidental lung nodule > 8mm:    Low risk patients: Consider follow-up CT in 3 months, PET/CT, and/or  tissue sampling.    High risk patients: Same as for low risk patients.    *Low Risk: Minimal or absent history of smoking or other known risk  factors.  *Nonsolid (ground-glass) or partly solid nodules may require longer  follow-up to exclude indolent adenocarcinoma.  *Recommendations based on Guidelines for the Management of Incidental  Pulmonary Nodules Detected at CT: From the Fleischner Society 2017,  Radiology 2017.    8. New 8 mm lucency in the pancreas which is probably benign but  follow-up is recommended. See above discussion.  9. Stable large hiatal hernia.    Hemaglobin   Result Value Ref Range    Hemoglobin 6.9 (LL) 11.7 - 15.7 g/dL   ABO/Rh type and screen   Result Value Ref Range    ABO A     RH(D) Pos     Antibody Screen Neg     Test Valid Only At [facility]        Specimen Expires 10/08/2020    Blood component   Result Value Ref Range    Unit Number [id number]     Blood Component Type Red Blood Cells Leukocyte Reduced     Division Number 00     Status of Unit Released to care unit 10/05/2020 1557     Blood Product Code X1355K93     Unit Status ISS    Blood component   Result Value Ref Range    Unit Number [id number]     Blood Component Type Red Blood Cells LeukoReduced (Part 2)     Division Number 00     Status of Unit Ready for patient 10/05/2020 1547     Blood Product Code [id number]     Unit Status ALESHA    Troponin I    Result Value Ref Range    Troponin I ES <0.015 0.000 - 0.045 ug/L       Medications   sodium chloride 0.9% infusion (has no administration in time range)   acetaminophen (TYLENOL) tablet 650 mg (has no administration in time range)   naloxone (NARCAN) injection 0.1-0.4 mg (has no administration in time range)   oxyCODONE-acetaminophen (PERCOCET) 5-325 MG per tablet 1 tablet (has no administration in time range)   ondansetron (ZOFRAN-ODT) ODT tab 4 mg (has no administration in time range)     Or   ondansetron (ZOFRAN) injection 4 mg (has no administration in time range)   potassium chloride 10 mEq in 100 mL intermittent infusion with 10 mg lidocaine (0 mEq Intravenous Stopped 10/5/20 1250)   iopamidol (ISOVUE-370) solution 71 mL (71 mLs Intravenous Given 10/5/20 1212)   sodium chloride 0.9 % bag 500mL for CT scan flush use (97 mLs Intravenous Given 10/5/20 1212)   metoprolol (LOPRESSOR) injection 5 mg (5 mg Intravenous Given 10/5/20 1419)       Assessments & Plan (with Medical Decision Making) records were reviewed.  Labs were obtained.  EKG was obtained and revealed atrial fibrillation at a controlled rate.  Nonspecific ST-T wave changes.  His hemoglobin was noted to be 7.1.  Repeat hemoglobin was sent.  Platelet count and white count were within normal limits.  Patient was typed and screened.  Comprehensive metabolic panel significant for potassium 3.0 creatinine slightly elevated 1.16.  IV potassium was given to the patient.  Patient's lipase was also elevated at 640.  She is not having epigastric tenderness.  proBNP was elevated at 2938 chest x-ray revealed no obvious infiltrate.  With mild borderline cardiomegaly.  Due to patient's symptoms a CT PE protocol was obtained with abdomen pelvis.  No PE was noted.  There was small bilateral pleural effusions.  No intra-abdominal significant abnormalities noted at this time.  The pancreas is not identified is having inflammation although there is a cyst in it.  On  reexamination patient is not tenderness to the epigastric region.  Patient's heart rate increased into the 120s and I gave her a dose of IV metoprolol which did bring her rate down.  Rectal exam was without lesions the vault was empty of significant stool and was heme-negative no gross blood present.  At this time I felt patient need to be observed with work-up for her anemia superiors by her red blood cell counts to be a possible low iron situation but will need further lab work.  I discussed the case with Rebeca BLAKE with hospitalist service and she is in agreement the admission.  We are going to transfuse her with a unit of blood as she is symptomatic and continue work-up.  Patient is on Eliquis.  Findings and plan discussed throughout the patient's case and she is in agreement with admission.     I have reviewed the nursing notes.    I have reviewed the findings, diagnosis, plan and need for follow up with the patient.       New Prescriptions    No medications on file       Final diagnoses:   Chest pain, unspecified type   Anemia, unspecified type   Dizziness       10/5/2020   [facility] EMERGENCY DEPT     Jorge Owusu MD  10/07/20 1251

## 2020-10-05 NOTE — LETTER
Transition Communication Hand-off for Care Transitions to Next Level of Care Provider    Name: Shaila Triana  : 1945  MRN #: [id number]  Primary Care Provider: Mervin Ibarra     Primary Clinic: [facility] [address] [address]     Reason for Hospitalization:  Dizziness [R42]  Anemia, unspecified type [D64.9]  Chest pain, unspecified type [R07.9]  Admit Date/Time: 10/5/2020 10:28 AM  Discharge Date: 10/7  Payor Source: Payor: [employer] / Plan: [employer] MEDICARE ADVANTAGE / Product Type: Medicare /                Reason for Communication Hand-off Referral: Fragility    Discharge Plan: home w/ family       Concern for non-adherence with plan of care:   Y/N no  Discharge Needs Assessment:  Needs      Most Recent Value   Equipment Currently Used at Home  none   # of Referrals Placed by CTS  External Care Coordination          Follow-up specialty is recommended: Yes    Follow-up plan:  No future appointments.            Key Recommendations:   Home w/ family, will follow up with cardiology, plans to make pcp appointment for follow up  Hayley Clemente RN    AVS/Discharge Summary is the source of truth; this is a helpful guide for improved communication of patient story

## 2020-10-05 NOTE — H&P
[facility]     History and Physical - Hospitalist Service       Date of Admission:  10/5/2020    Assessment & Plan   Shaila Triana is a 74 year old female admitted on 10/5/2020. She presented to the emergency department for evaluation of dizziness and shortness of breath, was found to be anemic and in atrial fibrillation RVR for which she is being admitted for further evaluation and treatment.    Anemia, microcytic - unclear if acute vs chronic  Presented with hemoglobin 7.1, MCV 65, most recent baseline hemoglobin 11-12 (although from 2016). Symptomatic as noted below. Blood pressure stable, but tachycardic on presentation. Patient is on anticoagulation prior to admission. Guaiac negative in the emergency department. No known history GI bleed, no history of gastric bypass. Iron studies suggest deficiency (ferritin 7 / iron 20 /  / iron saturation 4). Transfusion 1U PRBC initiated in the emergency department.   - Conditional transfusion orders for hemoglobin < 7.0  - Initiate oral iron supplementation  - No planned scope at this time, but will make NPO at midnight just in case; have not discussed with surgery  - Hold Eliquis   - Hgb checks again at midnight, 6 am, then day team to determine frequency of additional lab draws    Chronic atrial fibrillation, now with mild RVR  Chronic anticoagulation  Known chronic atrial fibrillation rate controlled prior to admission with metoprolol  mg bid. Anticoagulated with Eliquis 5 mg bid. Admit EKG with atrial fibrillation, rates ranging between 105 - 120. Was given IV metoprolol 5 mg x 1 in the emergency department with improvement in rate. Increased heart rate likely exacerbated by anemia, expect to improve with volume resuscitation.  - Continue metoprolol with holding parameters  - IV metoprolol available for sustained heart rate > 110  - Hold Eliquis  - Monitor on telemetry     SHORTNESS OF BREATH   Chest pain, unspecified  type  Dizziness  Presenting symptoms. Initial troponin negative, no evidence of acute ST changes on EKG. CT PE was negative for PE, no evidence of infection although small bilateral pleural effusions present. No hypoxia. Symptoms are most likely attributed to anemia, expect to improve with blood transfusions.   - Address anemia as above  - Trend troponin     COVID status - symptomatic  Tested as symptomatic screen based on shortness of breath. Afebrile, no hypoxia. Lung CT was negative for groundglass opacities or other evidence of infection. Low suspicion for active COVID infection on admission, but cannot rule out.  - COVID PCR pending  - Contact and aerosol precautions, may remove if negative  - No repeat test indicated    CKD (chronic kidney disease) stage 3, GFR 30-59 ml/min  Admit creatinine 1.16 (baseline 1.05 - 1.44), GFR 45 (baseline 43 - 60+). Stable.  - BMP in am    Elevated lipase  Pancreatic abnormality on CT  Admit lipase 650. CT PE/abdomen with new 8 mm lucency of the pancreas - likely benign but radiology is recommending follow-up.  Non-tender abdomen on admission exam. Patient denies any regular or heavy alcohol use.   - Repeat lipase in am  - Radiology recommending follow-up CT in 3 months to evaluate pancreas abnormality - to be ordered on discharge    Elevated BNP  Admit BNP 2938. Appears euvolemic on exam. Most recent echo Aug 2020 with EF 55%, severe left and right atrial enlargement. Does not appear to carry a formal diagnosis of CHF, although is on lasix 40 mg q am and beta blocker.  - Continue oral lasix, no aggressive IV diuresis at this time  - Continue beta blocker    Lung nodules  Previously noted per [facility] notes, prior nodules stable. However, there is a new 1 cm right middle lobe nodule noted on CT PE.  - Radiology recommends follow-up in 3 months, either PET/CT vs tissue biopsy - needs to be ordered    Essential hypertension  Blood pressure stable on admission. Managed prior to  "admission with metoprolol  mg bid and lasix 40 mg daily.  - Continue metoprolol and lasix with holding parameters    Hypothyroidism (acquired)  Managed prior to admission with levothyroxine 100 mcg daily, continue.    Pure hypercholesterolemia  Managed prior to admission with Crestor 20 mg q hs, continue.    Depressive disorder  Stable mood. Managed prior to admission with Zoloft 200 mg daily, continue.     Malignant neoplasm of female breast  Invasive ductal carcinoma of breast  S/p lumpectomy, biopsy, radiation, and tamoxifen therapy, now in remission.  - Not an active problem    Sleep apnea  Does not tolerate a CPAP     Diet: Clear Liquid Diet Clear liquid, NPO at midnight  DVT Prophylaxis: Pneumatic Compression Devices  Boucher Catheter: not present  Code Status: Full Code Full - discussed with patient on admission  Rule Out COVID-19 Handoff:  Shaila is a LOW SUSPICION PUI.  Follow these instructions:    If COVID test positive -> continue isolation precautions    If COVID test negative -> discontinue COVID-specific isolation precautions       Disposition Plan   Expected discharge: 2 days, recommended to prior living arrangement once work-up completed, stable hemoglobin, stable vitals.  Entered: Domenica Rome PA-C 10/05/2020, 11:55 PM     The patient's care was discussed with the Attending Physician, Dr. Steven Navarro and Patient.    Domenica Rome PA-C  [facility]   Contact information available via [facility] Paging/Directory      ______________________________________________________________________    Chief Complaint   \"Sarah had chest pain for the past 3 days that feels like a knife in my chest.\"    History is obtained from the patient, review of EMR, and emergency department sign out from Dr. Zak Owusu.    History of Present Illness   Shaila Triana is a 74 year old female who presented to the emergency department for evaluation of chest pain.    Intermittent chest " "pain has been occurring the past 3 days, pain present in chest and not epigastrium.   Feels like a \"knife in the chest,\" occurs and resolves at random. Lasts less than 1 hour when present. Pain is worse when she lays on her left side. Cannot identify any aggravating or alleviating factors. No known history of coronary artery disease.     Patient notes 3 month history of progressively worsening shortness of breath / dyspnea on exertion and dizziness. Evaluations in clinic thought it was likely her atrial fibrillation, and her beta blocker dose was recently increased. No cough or wheeze.    She has not been sleeping well lately. She has been noticing an increase in atrial fibrillation palpitations over past 3 months.     Denies abdominal pain or epigastric pain. No nausea and vomiting (except when she takes her pills in the morning she has some nausea). No diarrhea, constipation. No melena or hematochezia. She has a known hemorrhoid but denies any recent problems.    On review of systems she notes:  Denies recent illness, fever, chills, myalgias.   She notes some left lower extremity edema over the past 2 days, no cramping / pain.  A few weeks ago her left great toenail fell off, denies pain or injury; thought to be fungal related.  Occasionally has a headache, none currently.  No numbness / tingling. Is feeling generally weaker than usual, lower energy.   No dysuria.  Is lightheaded but no syncope or falls.  Denies numbness, tingling, focal weakness.  No confusion or slurred speech.      Review of Systems    The 10 point Review of Systems is negative other than noted in the HPI or here.     Past Medical History    I have reviewed this patient's medical history and updated it with pertinent information if needed.   Past Medical History:   Diagnosis Date     Depressive disorder 12/4/2006     Essential hypertension 12/4/2006     Hypothyroidism (acquired) 12/4/2006     Invasive ductal carcinoma of breast (H) 8/6/2013    " Left.  ER(+) PA(+) her-2-hina (-).  Lumpectomy and sentinel biopsy followed by radiation.  Adjuvant therapy with tamoxifen completed.     Lung nodules 2016    LLL on abd/pelvis CT 16.  No change on chest CT May 2017.  No further imaging needed.     Malignant neoplasm of female breast (H) 3/18/2008    Epic     New onset atrial fibrillation (H) 2020     Pure hypercholesterolemia 2006     Sleep apnea 2006       Past Surgical History   I have reviewed this patient's surgical history and updated it with pertinent information if needed.  Past Surgical History:   Procedure Laterality Date     APPENDECTOMY           BLEPHAROPLASTY Bilateral      COLONOSCOPY       HERNIA REPAIR  2013     HYSTERECTOMY, JOSE  1975     LUMPECTOMY BREAST Left 2008     THYROIDECTOMY  Right     Partial     TUBAL LIGATION         Social History   I have reviewed this patient's social history and updated it with pertinent information if needed.  Social History     Tobacco Use     Smoking status: Never Smoker     Smokeless tobacco: Never Used   Substance Use Topics     Alcohol use: Yes     Comment: occ     Drug use: Never       Family History   I have reviewed this patient's family history and updated it with pertinent information if needed.  Family History   Problem Relation Age of Onset     No Known Problems Mother          age 89 after complications from a fall     Alzheimer Disease Father      Other - See Comments Sister         Polio     Colon Cancer Maternal Aunt      Breast Cancer No family hx of      Ovarian Cancer No family hx of      Prostate Cancer No family hx of        Prior to Admission Medications   Prior to Admission Medications   Prescriptions Last Dose Informant Patient Reported? Taking?   Levothyroxine Sodium 100 MCG CAPS 10/5/2020 at am Self Yes Yes   Sig: Take 100 mcg by mouth every morning    apixaban ANTICOAGULANT (ELIQUIS) 5 MG tablet 10/5/2020 at am Self Yes Yes   Sig: Take 5 mg by mouth  2 times daily    calcium 600 MG tablet 10/5/2020 at Unknown time Self Yes Yes   Sig: Take 1 tablet by mouth 2 times daily   fluticasone (VERAMYST) 27.5 MCG/SPRAY nasal spray Past Month at Unknown time Self Yes Yes   Sig: Spray 2 sprays into both nostrils daily   furosemide (LASIX) 40 MG tablet 10/5/2020 at am Self Yes Yes   Sig: Take 40 mg by mouth   ketorolac (ACULAR) 0.5 % ophthalmic solution 10/5/2020 at am Self Yes Yes   metoprolol succinate ER (TOPROL-XL) 100 MG 24 hr tablet 10/5/2020 at am Self Yes Yes   Sig: Take 150 mg by mouth 2 times daily    ofloxacin (OCUFLOX) 0.3 % ophthalmic solution 10/5/2020 at am Self Yes Yes   omeprazole 20 MG tablet 10/5/2020 at am Self Yes Yes   Sig: Take 20 mg by mouth daily   prednisoLONE acetate (PRED FORTE) 1 % ophthalmic suspension 10/5/2020 at am Self Yes Yes   rosuvastatin (CRESTOR) 20 MG tablet 10/4/2020 at hs Self Yes Yes   Sig: Take 20 mg by mouth every evening   sertraline (ZOLOFT) 100 MG tablet 10/4/2020 at hs Self Yes Yes   Sig: Take 200 mg by mouth daily       Facility-Administered Medications: None     Allergies   Allergies   Allergen Reactions     Codeine Nausea     Dust Mite Extract Other (See Comments)     Per allergy test     Erythromycin      Penicillin [Penicillins]        Physical Exam   Vital Signs: Temp: 97  F (36.1  C) Temp src: Oral BP: (!) 147/97 Pulse: 114   Resp: 16 SpO2: 97 % O2 Device: None (Room air)    Weight: 137 lbs 9.07 oz    Constitutional: Alert, oriented, cooperative. No apparent distress, appears nontoxic. Speaking in full sentences.     Eyes: Eyes are clear, pupils are reactive. No scleral icterus. Pale conjunctival palpebrae.    HEENT: Oropharynx is clear and moist, no lesions. Normocephalic, no evidence of cranial trauma.      Cardiovascular: Irregularly irregular rhythm, normal S1 and S2. No murmur, rubs, or gallops. Peripheral pulses intact bilaterally. Trace non-pitting bilateral lower extremity edema.    Respiratory: Lung sounds are  clear to auscultation bilaterally without wheezes, rhonchi, or crackles.    GI: Soft, non-distended. Non-tender, no rebound or guarding. No hepatosplenomegaly or masses appreciated. Normal bowel sounds.     Musculoskeletal: Without obvious deformity, normal range of motion. Normal muscle bulk and tone. Distal CMS intact.      Skin: Warm and dry, no rashes or ecchymoses. No mottling of skin.      Neurologic: Patient moves all extremities.  is symmetric. Gross strength and sensation are equal bilaterally.    Genitourinary: Deferred      Data   Data reviewed today: I reviewed all medications, new labs and imaging results over the last 24 hours. I personally reviewed the chest CT image(s) showing no PE, no groundglass opacities, small bilateral pleural effusions, left > right.    Recent Labs   Lab 10/05/20  2340 10/05/20  1454 10/05/20  1339 10/05/20  1050   WBC  --   --   --  6.0   HGB 7.6*  --  6.9* 7.1*   MCV  --   --   --  65*   PLT  --   --   --  236   NA  --   --   --  139   POTASSIUM  --   --   --  3.0*   CHLORIDE  --   --   --  104   CO2  --   --   --  31   BUN  --   --   --  27   CR  --   --   --  1.16*   ANIONGAP  --   --   --  4   ANIKA  --   --   --  8.7   GLC  --   --   --  102*   ALBUMIN  --   --   --  3.4   PROTTOTAL  --   --   --  7.5   BILITOTAL  --   --   --  0.5   ALKPHOS  --   --   --  60   ALT  --   --   --  13   AST  --   --   --  13   LIPASE  --   --   --  640*   TROPI  --  <0.015  --  <0.015     Recent Results (from the past 24 hour(s))   XR Chest Port 1 View    Narrative    XR CHEST PORT 1 VW 10/5/2020 12:23 PM    HISTORY: Chest pain.    COMPARISON: None.      Impression    IMPRESSION: A single view the chest shows no acute cardiopulmonary  disease. Borderline cardiomegaly is present.     FRANCISCO CATHERINE MD   CT Chest (PE) Abdomen Pelvis w Contrast    Narrative    CT CHEST PE, ABDOMEN AND PELVIS WITH CONTRAST 10/5/2020 12:48 PM    HISTORY: Chest pain and shortness of breath with elevated  lipase.    TECHNIQUE: Helical axial scans from the lung apices through pubic  symphysis with 71 mL Isovue 370 IV contrast. Radiation dose for this  scan was reduced using automated exposure control, adjustment of the  mA and/or kV according to patient size, or iterative reconstruction  technique.    COMPARISON: CT chest 5/2/2017 and CT abdomen and pelvis 4/12/2016.    FINDINGS:  Chest: There is no CT evidence for pulmonary embolism or other acute  vascular abnormality of the chest. Mild ectasia ascending thoracic  aorta without change. Vascular calcifications, including coronary  artery calcifications. The thyroid gland is asymmetric with either  removal/hypoplasia of the right lobe or enlargement of the left lobe.  Regardless, this finding is unchanged since 2017. There are a few  borderline size mediastinal lymph nodes which appear unchanged. A  borderline size right hilar lymph node is also present. There is a  large hiatal hernia. Mediastinal and hilar structures are otherwise  normal. An enlarged right axillary lymph node is unchanged since the  prior exam.    Interval development of small bilateral pleural effusions. Two small  pleural-based nodules in the left lower lobe laterally are unchanged  and require no additional follow-up. Interval development of a 1 cm  slightly irregular nodule in the right middle lobe lateral segment  (image 139 of series 7 and image 34 of series 9). The remainder of the  lungs are clear.    Abdomen and pelvis: A few scattered small benign liver cysts are again  noted. The liver is otherwise unremarkable. The spleen is normal.  There is an 8 mm sharply circumscribed lucency at the junction of the  body and tail of the pancreas, new since the prior exam. This is of  uncertain etiology and significance. Since it is new, a follow-up CT  exam in 3 months is recommended. The remainder of the pancreas appears  normal. 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 A few  small  benign cysts are seen in the upper pole of the left kidney. No  hydronephrosis bilaterally. The bowel and mesentery in the upper  abdomen are unremarkable.    Scans through the pelvis show no acute abnormality or free fluid. The  appendix is not definitely identified. Multilevel advanced  degenerative disc disease in the lumbar spine.      Impression    IMPRESSION:  1. No evidence for pulmonary embolism.  2. Stable ectasia ascending thoracic aorta.  3. Vascular calcifications, including coronary artery calcifications.  4. Enlarged right axillary lymph node is stable.  5. Interval development of small bilateral pleural effusions.  6. Two small pleural-based nodules left lower lobe laterally are  stable and require no additional follow-up.  7. New indeterminate 1 cm nodule right middle lobe.  Recommendations for an incidental lung nodule > 8mm:    Low risk patients: Consider follow-up CT in 3 months, PET/CT, and/or  tissue sampling.    High risk patients: Same as for low risk patients.    *Low Risk: Minimal or absent history of smoking or other known risk  factors.  *Nonsolid (ground-glass) or partly solid nodules may require longer  follow-up to exclude indolent adenocarcinoma.  *Recommendations based on Guidelines for the Management of Incidental  Pulmonary Nodules Detected at CT: From the Fleischner Society 2017,  Radiology 2017.    8. New 8 mm lucency in the pancreas which is probably benign but  follow-up is recommended. See above discussion.  9. Stable large hiatal hernia.     FABRICE ALVARENGA MD

## 2020-10-05 NOTE — ED NOTES
Patient here with complaints of intermittent, sharp, stabbing chest pain that takes her breath away. The patient has recently, over the last 2 months, has been doctoring for her a-fib. She states the pain wakes her up at night, and that there is no pattern to the pain.

## 2020-10-05 NOTE — PLAN OF CARE
"Patient alert and orientated X 3. Up to bathroom with X1 assist with dizziness, weakness, and SOB with exertion. Patient eating and drinking well. Denies chest pain. Tolerated blood transfusion. K 3.0 patient on replacement protocol- replaced per orders. Telemetry on patient. hgb re-check at 2300  Makes needs known. Continue to monitor and plan of care.    BP (!) 142/90   Pulse 103   Temp 97.7  F (36.5  C) (Oral)   Resp 18   Ht 1.575 m (5' 2\")   Wt 62.4 kg (137 lb 9.1 oz)   LMP  (LMP Unknown)   SpO2 94%   BMI 25.16 kg/m         Alejandra Travis RN on 10/5/2020 at 10:23 PM        "

## 2020-10-05 NOTE — PLAN OF CARE
"[facility] [facility] [place]MISS[place]    Patient admitted to room 2315 at approximately 1648 via cart from emergency room. Patient was accompanied by transport tech.     Verbal SBAR report received from Nemesio LUCERO prior to patient arrival.     Patient ambulated to bed with stand-by assist. Patient alert and oriented X 3. The patient is not having any pain. 0-10 Pain Scale: 0. Admission vital signs: Blood pressure (!) 152/105, pulse 115, temperature 98.4  F (36.9  C), temperature source Oral, resp. rate 16, height 1.575 m (5' 2\"), weight 62.4 kg (137 lb 9.1 oz), SpO2 99 %. Patient was oriented to plan of care, call light, bed controls, tv, telephone, bathroom, and visiting hours.     Risk Assessment    The following safety risks were identified during admission: fall and isolation. Yellow risk band applied: YES.     Skin Initial Assessment    This writer admitted this patient and completed a full skin assessment and Fabricio score in the Adult PCS flowsheet. Appropriate interventions initiated as needed.     Secondary skin check completed by was not completed as patient is a PUI.         Education    Patient has a [place] to Observation order: Yes  Observation education completed and documented: Yes in the emergency room.      Alejandra Travis RN      "

## 2020-10-05 NOTE — ED NOTES
Patient has  [place] to Observation  order. Patient has been given Patient Bill of Rights, Observation brochure and  What does Observation mean to me  forms.  Patient has been given the opportunity to ask questions about observation status and their plan of care.      Adelina De Luna RN

## 2020-10-05 NOTE — ED NOTES
DATE:  10/5/2020   TIME OF RECEIPT FROM LAB:  7375  LAB TEST:  hemoglobin  LAB VALUE:  6.89  RESULTS GIVEN WITH READ-BACK TO (PROVIDER):  [place]  TIME LAB VALUE REPORTED TO PROVIDER:   7182

## 2020-10-06 ENCOUNTER — ANESTHESIA (OUTPATIENT)
Dept: GASTROENTEROLOGY | Facility: CLINIC | Age: 75
DRG: 812 | End: 2020-10-06
Payer: COMMERCIAL

## 2020-10-06 ENCOUNTER — ANESTHESIA EVENT (OUTPATIENT)
Dept: GASTROENTEROLOGY | Facility: CLINIC | Age: 75
DRG: 812 | End: 2020-10-06
Payer: COMMERCIAL

## 2020-10-06 LAB
ANION GAP SERPL CALCULATED.3IONS-SCNC: 2 MMOL/L (ref 3–14)
BASOPHILS # BLD AUTO: 0.1 10E9/L (ref 0–0.2)
BASOPHILS NFR BLD AUTO: 1.1 %
BLD PROD TYP BPU: NORMAL
BLD UNIT ID BPU: 0
BLOOD PRODUCT CODE: NORMAL
BPU ID: NORMAL
BUN SERPL-MCNC: 22 MG/DL (ref 7–30)
CALCIUM SERPL-MCNC: 8.2 MG/DL (ref 8.5–10.1)
CHLORIDE SERPL-SCNC: 105 MMOL/L (ref 94–109)
CO2 SERPL-SCNC: 30 MMOL/L (ref 20–32)
CREAT SERPL-MCNC: 1.12 MG/DL (ref 0.52–1.04)
DIFFERENTIAL METHOD BLD: ABNORMAL
EOSINOPHIL # BLD AUTO: 0.2 10E9/L (ref 0–0.7)
EOSINOPHIL NFR BLD AUTO: 3.4 %
ERYTHROCYTE [DISTWIDTH] IN BLOOD BY AUTOMATED COUNT: 20.9 % (ref 10–15)
ERYTHROCYTE [DISTWIDTH] IN BLOOD BY AUTOMATED COUNT: 21.1 % (ref 10–15)
GFR SERPL CREATININE-BSD FRML MDRD: 47 ML/MIN/{1.73_M2}
GLUCOSE SERPL-MCNC: 88 MG/DL (ref 70–99)
HCT VFR BLD AUTO: 27.3 % (ref 35–47)
HCT VFR BLD AUTO: 27.6 % (ref 35–47)
HGB BLD-MCNC: 7.6 G/DL (ref 11.7–15.7)
HGB BLD-MCNC: 7.7 G/DL (ref 11.7–15.7)
IMM GRANULOCYTES # BLD: 0 10E9/L (ref 0–0.4)
IMM GRANULOCYTES NFR BLD: 0.3 %
LIPASE SERPL-CCNC: 533 U/L (ref 73–393)
LYMPHOCYTES # BLD AUTO: 1.3 10E9/L (ref 0.8–5.3)
LYMPHOCYTES NFR BLD AUTO: 21.1 %
MCH RBC QN AUTO: 18.3 PG (ref 26.5–33)
MCH RBC QN AUTO: 18.7 PG (ref 26.5–33)
MCHC RBC AUTO-ENTMCNC: 27.5 G/DL (ref 31.5–36.5)
MCHC RBC AUTO-ENTMCNC: 28.2 G/DL (ref 31.5–36.5)
MCV RBC AUTO: 66 FL (ref 78–100)
MCV RBC AUTO: 67 FL (ref 78–100)
MONOCYTES # BLD AUTO: 0.7 10E9/L (ref 0–1.3)
MONOCYTES NFR BLD AUTO: 11.5 %
NEUTROPHILS # BLD AUTO: 3.8 10E9/L (ref 1.6–8.3)
NEUTROPHILS NFR BLD AUTO: 62.6 %
NRBC # BLD AUTO: 0 10*3/UL
NRBC BLD AUTO-RTO: 0 /100
PLATELET # BLD AUTO: 211 10E9/L (ref 150–450)
PLATELET # BLD AUTO: 212 10E9/L (ref 150–450)
POTASSIUM SERPL-SCNC: 3.8 MMOL/L (ref 3.4–5.3)
RBC # BLD AUTO: 4.11 10E12/L (ref 3.8–5.2)
RBC # BLD AUTO: 4.15 10E12/L (ref 3.8–5.2)
SARS-COV-2 RNA SPEC QL NAA+PROBE: NOT DETECTED
SODIUM SERPL-SCNC: 137 MMOL/L (ref 133–144)
SPECIMEN SOURCE: NORMAL
TRANSFUSION STATUS PATIENT QL: NORMAL
TRANSFUSION STATUS PATIENT QL: NORMAL
TROPONIN I SERPL-MCNC: <0.015 UG/L (ref 0–0.04)
UPPER GI ENDOSCOPY: NORMAL
WBC # BLD AUTO: 5.9 10E9/L (ref 4–11)
WBC # BLD AUTO: 6.2 10E9/L (ref 4–11)

## 2020-10-06 PROCEDURE — 0DB68ZX EXCISION OF STOMACH, VIA NATURAL OR ARTIFICIAL OPENING ENDOSCOPIC, DIAGNOSTIC: ICD-10-PCS | Performed by: SURGERY

## 2020-10-06 PROCEDURE — 83690 ASSAY OF LIPASE: CPT | Performed by: PHYSICIAN ASSISTANT

## 2020-10-06 PROCEDURE — 258N000003 HC RX IP 258 OP 636: Performed by: NURSE ANESTHETIST, CERTIFIED REGISTERED

## 2020-10-06 PROCEDURE — 85025 COMPLETE CBC W/AUTO DIFF WBC: CPT | Performed by: PHYSICIAN ASSISTANT

## 2020-10-06 PROCEDURE — 88305 TISSUE EXAM BY PATHOLOGIST: CPT | Mod: TC | Performed by: SURGERY

## 2020-10-06 PROCEDURE — 43251 EGD REMOVE LESION SNARE: CPT | Performed by: SURGERY

## 2020-10-06 PROCEDURE — 0DB68ZZ EXCISION OF STOMACH, VIA NATURAL OR ARTIFICIAL OPENING ENDOSCOPIC: ICD-10-PCS | Performed by: SURGERY

## 2020-10-06 PROCEDURE — 250N000009 HC RX 250: Performed by: NURSE ANESTHETIST, CERTIFIED REGISTERED

## 2020-10-06 PROCEDURE — 250N000013 HC RX MED GY IP 250 OP 250 PS 637: Performed by: PHYSICIAN ASSISTANT

## 2020-10-06 PROCEDURE — C9113 INJ PANTOPRAZOLE SODIUM, VIA: HCPCS | Performed by: PHYSICIAN ASSISTANT

## 2020-10-06 PROCEDURE — 99233 SBSQ HOSP IP/OBS HIGH 50: CPT | Performed by: INTERNAL MEDICINE

## 2020-10-06 PROCEDURE — 250N000011 HC RX IP 250 OP 636: Performed by: PHYSICIAN ASSISTANT

## 2020-10-06 PROCEDURE — 43239 EGD BIOPSY SINGLE/MULTIPLE: CPT | Performed by: SURGERY

## 2020-10-06 PROCEDURE — 43239 EGD BIOPSY SINGLE/MULTIPLE: CPT | Mod: 59 | Performed by: SURGERY

## 2020-10-06 PROCEDURE — P9016 RBC LEUKOCYTES REDUCED: HCPCS | Performed by: EMERGENCY MEDICINE

## 2020-10-06 PROCEDURE — 88342 IMHCHEM/IMCYTCHM 1ST ANTB: CPT | Mod: TC | Performed by: SURGERY

## 2020-10-06 PROCEDURE — 80048 BASIC METABOLIC PNL TOTAL CA: CPT | Performed by: PHYSICIAN ASSISTANT

## 2020-10-06 PROCEDURE — 88342 IMHCHEM/IMCYTCHM 1ST ANTB: CPT | Mod: 26 | Performed by: PATHOLOGY

## 2020-10-06 PROCEDURE — 85027 COMPLETE CBC AUTOMATED: CPT | Performed by: PHYSICIAN ASSISTANT

## 2020-10-06 PROCEDURE — 258N000003 HC RX IP 258 OP 636: Performed by: PHYSICIAN ASSISTANT

## 2020-10-06 PROCEDURE — 88341 IMHCHEM/IMCYTCHM EA ADD ANTB: CPT | Mod: TC | Performed by: SURGERY

## 2020-10-06 PROCEDURE — 250N000013 HC RX MED GY IP 250 OP 250 PS 637: Performed by: INTERNAL MEDICINE

## 2020-10-06 PROCEDURE — 250N000011 HC RX IP 250 OP 636: Performed by: NURSE ANESTHETIST, CERTIFIED REGISTERED

## 2020-10-06 PROCEDURE — 370N000001 HC ANESTHESIA TECHNICAL FEE, 1ST 30 MIN: Performed by: SURGERY

## 2020-10-06 PROCEDURE — 120N000001 HC R&B MED SURG/OB

## 2020-10-06 PROCEDURE — 36415 COLL VENOUS BLD VENIPUNCTURE: CPT | Performed by: PHYSICIAN ASSISTANT

## 2020-10-06 PROCEDURE — 88305 TISSUE EXAM BY PATHOLOGIST: CPT | Mod: 26 | Performed by: PATHOLOGY

## 2020-10-06 RX ORDER — SODIUM CHLORIDE, SODIUM LACTATE, POTASSIUM CHLORIDE, CALCIUM CHLORIDE 600; 310; 30; 20 MG/100ML; MG/100ML; MG/100ML; MG/100ML
INJECTION, SOLUTION INTRAVENOUS CONTINUOUS
Status: DISCONTINUED | OUTPATIENT
Start: 2020-10-06 | End: 2020-10-06

## 2020-10-06 RX ORDER — LIDOCAINE 40 MG/G
CREAM TOPICAL
Status: DISCONTINUED | OUTPATIENT
Start: 2020-10-06 | End: 2020-10-07 | Stop reason: HOSPADM

## 2020-10-06 RX ORDER — LIDOCAINE HYDROCHLORIDE 10 MG/ML
INJECTION, SOLUTION INFILTRATION; PERINEURAL PRN
Status: DISCONTINUED | OUTPATIENT
Start: 2020-10-06 | End: 2020-10-06

## 2020-10-06 RX ORDER — LIDOCAINE 40 MG/G
CREAM TOPICAL
Status: DISCONTINUED | OUTPATIENT
Start: 2020-10-06 | End: 2020-10-06 | Stop reason: HOSPADM

## 2020-10-06 RX ORDER — PANTOPRAZOLE SODIUM 20 MG/1
40 TABLET, DELAYED RELEASE ORAL
Status: DISCONTINUED | OUTPATIENT
Start: 2020-10-07 | End: 2020-10-07 | Stop reason: HOSPADM

## 2020-10-06 RX ORDER — PROPOFOL 10 MG/ML
INJECTION, EMULSION INTRAVENOUS PRN
Status: DISCONTINUED | OUTPATIENT
Start: 2020-10-06 | End: 2020-10-06

## 2020-10-06 RX ORDER — SODIUM CHLORIDE, SODIUM LACTATE, POTASSIUM CHLORIDE, CALCIUM CHLORIDE 600; 310; 30; 20 MG/100ML; MG/100ML; MG/100ML; MG/100ML
INJECTION, SOLUTION INTRAVENOUS CONTINUOUS PRN
Status: DISCONTINUED | OUTPATIENT
Start: 2020-10-06 | End: 2020-10-06

## 2020-10-06 RX ADMIN — SERTRALINE HYDROCHLORIDE 200 MG: 100 TABLET ORAL at 21:06

## 2020-10-06 RX ADMIN — OXYCODONE HYDROCHLORIDE 5 MG: 5 TABLET ORAL at 21:15

## 2020-10-06 RX ADMIN — ACETAMINOPHEN 650 MG: 325 TABLET, FILM COATED ORAL at 21:15

## 2020-10-06 RX ADMIN — PROPOFOL 50 MG: 10 INJECTION, EMULSION INTRAVENOUS at 15:01

## 2020-10-06 RX ADMIN — LEVOTHYROXINE SODIUM 100 MCG: 100 TABLET ORAL at 07:00

## 2020-10-06 RX ADMIN — OXYCODONE HYDROCHLORIDE 5 MG: 5 TABLET ORAL at 06:59

## 2020-10-06 RX ADMIN — ROSUVASTATIN CALCIUM 20 MG: 20 TABLET, FILM COATED ORAL at 21:06

## 2020-10-06 RX ADMIN — PANTOPRAZOLE SODIUM 40 MG: 40 INJECTION, POWDER, LYOPHILIZED, FOR SOLUTION INTRAVENOUS at 10:09

## 2020-10-06 RX ADMIN — PROPOFOL 50 MG: 10 INJECTION, EMULSION INTRAVENOUS at 14:56

## 2020-10-06 RX ADMIN — SODIUM CHLORIDE, POTASSIUM CHLORIDE, SODIUM LACTATE AND CALCIUM CHLORIDE: 600; 310; 30; 20 INJECTION, SOLUTION INTRAVENOUS at 10:08

## 2020-10-06 RX ADMIN — METOPROLOL SUCCINATE 150 MG: 100 TABLET, EXTENDED RELEASE ORAL at 21:06

## 2020-10-06 RX ADMIN — FERROUS GLUCONATE 324 MG: 324 TABLET ORAL at 07:00

## 2020-10-06 RX ADMIN — FUROSEMIDE 40 MG: 40 TABLET ORAL at 07:00

## 2020-10-06 RX ADMIN — LIDOCAINE HYDROCHLORIDE 50 MG: 10 INJECTION, SOLUTION INFILTRATION; PERINEURAL at 14:50

## 2020-10-06 RX ADMIN — PROPOFOL 100 MG: 10 INJECTION, EMULSION INTRAVENOUS at 14:50

## 2020-10-06 RX ADMIN — TOPICAL ANESTHETIC 1 EACH: 200 SPRAY DENTAL; PERIODONTAL at 14:48

## 2020-10-06 RX ADMIN — METOPROLOL SUCCINATE 150 MG: 100 TABLET, EXTENDED RELEASE ORAL at 07:00

## 2020-10-06 RX ADMIN — PANTOPRAZOLE SODIUM 40 MG: 40 INJECTION, POWDER, LYOPHILIZED, FOR SOLUTION INTRAVENOUS at 21:07

## 2020-10-06 RX ADMIN — SODIUM CHLORIDE, POTASSIUM CHLORIDE, SODIUM LACTATE AND CALCIUM CHLORIDE: 600; 310; 30; 20 INJECTION, SOLUTION INTRAVENOUS at 14:45

## 2020-10-06 ASSESSMENT — ACTIVITIES OF DAILY LIVING (ADL)
ADLS_ACUITY_SCORE: 16
ADLS_ACUITY_SCORE: 18
ADLS_ACUITY_SCORE: 16
ADLS_ACUITY_SCORE: 16

## 2020-10-06 ASSESSMENT — ENCOUNTER SYMPTOMS: DYSRHYTHMIAS: 1

## 2020-10-06 ASSESSMENT — MIFFLIN-ST. JEOR: SCORE: 1094.25

## 2020-10-06 NOTE — PROGRESS NOTES
[facility] [facility] TRANSPORT NOTE  Data:   Reason for Transport:  Return to [facility]/surg    Shaila Triana was transported to [facility]/surg via wheel chair at 1550.  Patient was accompanied by Nursing Assistant. Equipment used for transport: IV pump. Family was aware of reason for transport: yes    Action:  Report: received from [facility] nurse    Response:  Patient's condition upon return was stable.    Naina Araiza RN

## 2020-10-06 NOTE — PROGRESS NOTES
[facility] [facility] TRANSPORT NOTE  Data:   Reason for Transport:  Endo     Shaila Triana was transported to [facility] via wheel chair at 1410.  Patient was accompanied by Nursing Assistant. Equipment used for transport: IV pump. Family was aware of reason for transport: yes    Action:  Report: given to Fany    Response:  Patient's condition when transferred off unit was stable.    Naina Araiza RN

## 2020-10-06 NOTE — BRIEF OP NOTE
[facility]    Brief Operative Note    Pre-operative diagnosis: Anemia, unspecified type [D64.9]   Post-operative diagnosis 5 cm hiatal hernia, pre-pyloric polyps and ulcer     Procedure: Procedure(s):  ESOPHAGOGASTRODUODENOSCOPY (EGD)   Surgeon(s): Surgeon(s) and Role:     * Jorge Ramirez MD - Primary   Estimated blood loss: * No values recorded between 10/6/2020  2:52 PM and 10/6/2020  3:09 PM *    Specimens: ID Type Source Tests Collected by Time Destination   A :  Polyp Stomach, Antrum SURGICAL PATHOLOGY EXAM Jorge Ramirez MD 10/6/2020  3:01 PM    B : eval for H. Pylori Biopsy Stomach, Antrum SURGICAL PATHOLOGY EXAM Jorge Ramirez MD 10/6/2020  3:05 PM       Findings: 1. Five cm sliding hiatal hernia  2. Multiple antral and pre-pyloric polyps of unclear etiology (two removed for pathology).  3. Single shallow ulcer (pre-pyloric)  4. Normal esophagus and duodenum  5. Antrum biopsied for h. pylori

## 2020-10-06 NOTE — ANESTHESIA CARE TRANSFER NOTE
Patient: Shaila Triana    Procedure(s):  ESOPHAGOGASTRODUODENOSCOPY (EGD)    Diagnosis: Anemia, unspecified type [D64.9]  Diagnosis Additional Information: No value filed.    Anesthesia Type:   General, MAC     Note:  Airway :Nasal Cannula  Patient transferred to:Phase II  Handoff Report: Identifed the Patient, Identified the Reponsible Provider, Reviewed the pertinent medical history, Discussed the surgical course, Reviewed Intra-OP anesthesia mangement and issues during anesthesia, Set expectations for post-procedure period and Allowed opportunity for questions and acknowledgement of understanding      Vitals: (Last set prior to Anesthesia Care Transfer)    CRNA VITALS  10/6/2020 1437 - 10/6/2020 1508      10/6/2020             Pulse:  113    SpO2:  96 %                Electronically Signed By: NADIR Barrios CRNA  October 6, 2020  3:08 PM

## 2020-10-06 NOTE — ANESTHESIA POSTPROCEDURE EVALUATION
Patient: Shaila Triana    Procedure(s):  ESOPHAGOGASTRODUODENOSCOPY, WITH BIOPSY and polypectomy    Diagnosis:Anemia, unspecified type [D64.9]  Diagnosis Additional Information: No value filed.    Anesthesia Type:  General, MAC    Note:  Anesthesia Post Evaluation    Patient location during evaluation: Floor  Patient participation: Able to fully participate in evaluation  Level of consciousness: awake and alert  Pain management: adequate  Airway patency: patent  Cardiovascular status: acceptable and hemodynamically stable  Respiratory status: acceptable, room air and spontaneous ventilation  Hydration status: acceptable  PONV: none     Anesthetic complications: None          Last vitals:  Vitals:    10/06/20 1520 10/06/20 1530 10/06/20 1556   BP: 104/85  (!) 129/92   Pulse: 114  121   Resp: 16 16 18   Temp:   36.8  C (98.2  F)   SpO2: 94% 98%          Electronically Signed By: NADIR Lee CRNA  October 6, 2020  4:23 PM

## 2020-10-06 NOTE — ANESTHESIA PREPROCEDURE EVALUATION
Anesthesia Pre-Procedure Evaluation    Patient: Shaila Triana   MRN: [id number] : 1945          Preoperative Diagnosis: Anemia, unspecified type [D64.9]    Procedure(s):  ESOPHAGOGASTRODUODENOSCOPY (EGD)    Past Medical History:   Diagnosis Date     Depressive disorder 2006     Essential hypertension 2006     Hypothyroidism (acquired) 2006     Invasive ductal carcinoma of breast (H) 2013    Left.  ER(+) GA(+) her-2-hina (-).  Lumpectomy and sentinel biopsy followed by radiation.  Adjuvant therapy with tamoxifen completed.     Lung nodules 2016    LLL on abd/pelvis CT 16.  No change on chest CT May 2017.  No further imaging needed.     Malignant neoplasm of female breast (H) 3/18/2008    Epic     New onset atrial fibrillation (H) 2020     Pure hypercholesterolemia 2006     Sleep apnea 2006     Past Surgical History:   Procedure Laterality Date     APPENDECTOMY           BLEPHAROPLASTY Bilateral      COLONOSCOPY       HERNIA REPAIR  2013     HYSTERECTOMY, JOSE  1975     LUMPECTOMY BREAST Left 2008     THYROIDECTOMY  Right     Partial     TUBAL LIGATION         Anesthesia Evaluation     . Pt has had prior anesthetic. Type: General           ROS/MED HX    ENT/Pulmonary:     (+)sleep apnea, , . Other pulmonary disease Lung nodules.    Neurologic:       Cardiovascular: Comment: Ascending aorta dilatation    (+) Dyslipidemia, hypertension--CAD, angina--. Taking blood thinners : . . . :. dysrhythmias a-fib, .       METS/Exercise Tolerance:     Hematologic:     (+) Anemia, -      Musculoskeletal:         GI/Hepatic:         Renal/Genitourinary:     (+) chronic renal disease, type: CRI,       Endo:     (+) thyroid problem hypothyroidism, .      Psychiatric:     (+) psychiatric history depression and anxiety      Infectious Disease:         Malignancy:   (+) Malignancy History of Breast  Breast CA status post Surgery.         Other:                       "    Physical Exam  Normal systems: pulmonary and dental    Airway   Mallampati: III  TM distance: >3 FB  Neck ROM: full    Dental     Cardiovascular       Pulmonary             Lab Results   Component Value Date    WBC 6.2 10/06/2020    HGB 7.6 (L) 10/06/2020    HCT 27.6 (L) 10/06/2020     10/06/2020     10/06/2020    POTASSIUM 3.8 10/06/2020    CHLORIDE 105 10/06/2020    CO2 30 10/06/2020    BUN 22 10/06/2020    CR 1.12 (H) 10/06/2020    GLC 88 10/06/2020    ANIKA 8.2 (L) 10/06/2020    ALBUMIN 3.4 10/05/2020    PROTTOTAL 7.5 10/05/2020    ALT 13 10/05/2020    AST 13 10/05/2020    ALKPHOS 60 10/05/2020    BILITOTAL 0.5 10/05/2020    LIPASE 533 (H) 10/06/2020       Preop Vitals  BP Readings from Last 3 Encounters:   10/06/20 119/84   07/24/20 120/74   04/01/14 125/80    Pulse Readings from Last 3 Encounters:   10/06/20 111   07/24/20 109   04/01/14 61      Resp Readings from Last 3 Encounters:   10/06/20 16   07/24/20 28    SpO2 Readings from Last 3 Encounters:   10/06/20 94%   07/24/20 98%   04/01/14 97%      Temp Readings from Last 1 Encounters:   10/06/20 36.9  C (98.4  F) (Oral)    Ht Readings from Last 1 Encounters:   10/05/20 1.575 m (5' 2\")      Wt Readings from Last 1 Encounters:   10/06/20 65.1 kg (143 lb 8.3 oz)    Estimated body mass index is 26.25 kg/m  as calculated from the following:    Height as of this encounter: 1.575 m (5' 2\").    Weight as of this encounter: 65.1 kg (143 lb 8.3 oz).       Anesthesia Plan      History & Physical Review      ASA Status:  3 .    NPO Status:  > 6 hours    Plan for General and MAC with Propofol and Intravenous induction. Maintenance will be TIVA.  Reason for MAC:  Deep or markedly invasive procedure (G8)  PONV prophylaxis:  Ondansetron (or other 5HT-3) and Dexamethasone or Solumedrol         Postoperative Care  Postoperative pain management:  IV analgesics, Oral pain medications and Multi-modal analgesia.      Consents  Anesthetic plan, risks, benefits and " alternatives discussed with:  Patient..                 NADIR Barrios CRNA

## 2020-10-06 NOTE — PLAN OF CARE
Tolerated regular food after endoscopy, up as desired in room.  Vitals stable, no pain and no signs of bleeding.  Blood transfusion completed around 1600, no further rechecks ordered at this time, but patient reports she thinks the doctor was going to check back in with her this evening.  She is resting comfortably and reports she has no unmet needs.

## 2020-10-06 NOTE — PROGRESS NOTES
"Patient reports \"okay\" sleep overnight.  HBG was greater than 7 so no need transfuse  aditional PRBC's  "

## 2020-10-06 NOTE — PROGRESS NOTES
Discussed with Dr. Fernandez, patient to receive one unit.  One unit of blood started at this time.

## 2020-10-06 NOTE — PROGRESS NOTES
COVID negative, patient sent to Dr. Fernandez to see if precautions can be stopped.     She is requesting a regular diet, request also sent in page.       Verbal order from Dr. Fernandez to make patient NPO for possible EGD.  If unable to complete EGD tonight, patient may go back to clears and then NPO at midnight.     Received call from Dr. Ramirez and patient will be added onto EGD schedule for today.

## 2020-10-07 VITALS
OXYGEN SATURATION: 96 % | TEMPERATURE: 98.3 F | BODY MASS INDEX: 26.41 KG/M2 | HEART RATE: 116 BPM | WEIGHT: 143.52 LBS | SYSTOLIC BLOOD PRESSURE: 122 MMHG | DIASTOLIC BLOOD PRESSURE: 94 MMHG | RESPIRATION RATE: 18 BRPM | HEIGHT: 62 IN

## 2020-10-07 LAB
ANION GAP SERPL CALCULATED.3IONS-SCNC: 4 MMOL/L (ref 3–14)
BUN SERPL-MCNC: 20 MG/DL (ref 7–30)
CALCIUM SERPL-MCNC: 8.3 MG/DL (ref 8.5–10.1)
CHLORIDE SERPL-SCNC: 104 MMOL/L (ref 94–109)
CO2 SERPL-SCNC: 29 MMOL/L (ref 20–32)
CREAT SERPL-MCNC: 1.15 MG/DL (ref 0.52–1.04)
ERYTHROCYTE [DISTWIDTH] IN BLOOD BY AUTOMATED COUNT: 23 % (ref 10–15)
GFR SERPL CREATININE-BSD FRML MDRD: 46 ML/MIN/{1.73_M2}
GLUCOSE SERPL-MCNC: 101 MG/DL (ref 70–99)
HCT VFR BLD AUTO: 31.4 % (ref 35–47)
HGB BLD-MCNC: 8.9 G/DL (ref 11.7–15.7)
MCH RBC QN AUTO: 19.3 PG (ref 26.5–33)
MCHC RBC AUTO-ENTMCNC: 28.3 G/DL (ref 31.5–36.5)
MCV RBC AUTO: 68 FL (ref 78–100)
PLATELET # BLD AUTO: 221 10E9/L (ref 150–450)
POTASSIUM SERPL-SCNC: 3.9 MMOL/L (ref 3.4–5.3)
RBC # BLD AUTO: 4.6 10E12/L (ref 3.8–5.2)
SODIUM SERPL-SCNC: 137 MMOL/L (ref 133–144)
WBC # BLD AUTO: 7.8 10E9/L (ref 4–11)

## 2020-10-07 PROCEDURE — 250N000013 HC RX MED GY IP 250 OP 250 PS 637: Performed by: INTERNAL MEDICINE

## 2020-10-07 PROCEDURE — 99239 HOSP IP/OBS DSCHRG MGMT >30: CPT | Performed by: INTERNAL MEDICINE

## 2020-10-07 PROCEDURE — 85027 COMPLETE CBC AUTOMATED: CPT | Performed by: INTERNAL MEDICINE

## 2020-10-07 PROCEDURE — 80048 BASIC METABOLIC PNL TOTAL CA: CPT | Performed by: INTERNAL MEDICINE

## 2020-10-07 PROCEDURE — 36415 COLL VENOUS BLD VENIPUNCTURE: CPT | Performed by: INTERNAL MEDICINE

## 2020-10-07 PROCEDURE — 250N000013 HC RX MED GY IP 250 OP 250 PS 637: Performed by: PHYSICIAN ASSISTANT

## 2020-10-07 RX ORDER — FERROUS GLUCONATE 324(38)MG
648 TABLET ORAL EVERY OTHER DAY
Qty: 180 TABLET | Refills: 0 | Status: SHIPPED | OUTPATIENT
Start: 2020-10-07 | End: 2023-01-01

## 2020-10-07 RX ORDER — NICOTINE POLACRILEX 4 MG/1
40 GUM, CHEWING ORAL DAILY
Qty: 180 TABLET | Refills: 0 | Status: SHIPPED | OUTPATIENT
Start: 2020-10-07 | End: 2023-01-01

## 2020-10-07 RX ADMIN — FERROUS GLUCONATE 324 MG: 324 TABLET ORAL at 08:16

## 2020-10-07 RX ADMIN — METOPROLOL SUCCINATE 150 MG: 100 TABLET, EXTENDED RELEASE ORAL at 08:15

## 2020-10-07 RX ADMIN — LEVOTHYROXINE SODIUM 100 MCG: 100 TABLET ORAL at 08:16

## 2020-10-07 RX ADMIN — FUROSEMIDE 40 MG: 40 TABLET ORAL at 08:16

## 2020-10-07 RX ADMIN — PANTOPRAZOLE SODIUM 40 MG: 20 TABLET, DELAYED RELEASE ORAL at 05:32

## 2020-10-07 ASSESSMENT — ACTIVITIES OF DAILY LIVING (ADL)
ADLS_ACUITY_SCORE: 16

## 2020-10-07 NOTE — CONSULTS
Care Management Assessment and Discharge Consult    General Information:  Patient's communication style: spoken language (English or Bilingual)  Hearing Difficulty or Deaf: yes  Wear Glasses or Blind: yes  Cognitive/Neuro/Behavioral: WDL                 Advance Care Planning: Advance Care Planning Reviewed: no concerns identified       Living Environment:   People in home: child(no), adult, significant other  Current living Arrangements: house          Family/Social Support:  Care provided by: self  Provides care for: no one  Description of Support System: Supportive, Involved  Description of Support System: Supportive, Involved  Support Assessment: Adequate family and caregiver support    Lifestyle & Psychosocial Needs:        Socioeconomic History     Marital status:      Spouse name: Not on file     Number of children: Not on file     Years of education: Not on file     Highest education level: Not on file     Tobacco Use     Smoking status: Never Smoker     Smokeless tobacco: Never Used   Substance and Sexual Activity     Alcohol use: Yes     Comment: occ     Drug use: Never     Sexual activity: Yes     Partners: Male     Birth control/protection: Female Surgical       Functional Status:  Prior to admission patient needed assistance: NA     Current Resources:   Skilled Home Care Services: (none)  Community Resources:  none  Equipment currently used at home: none  Supplies currently used at home:  none    Employment:  Employment Status: retired,    Financial/Environmental Concerns: (NA),      Values/Beliefs:  Spiritual, Cultural Beliefs, Cheondoism Practices, Values that affect care: no             Discharge Planning:  Expected Discharge Date: 10/07/20  Concerns to be Addressed: all concerns addressed in this encounter       Anticipated Discharge Disposition:  Home w/ family    Referrals Placed by CM/SW: External Care Coordination  Education Provided on the Discharge Plan:  yes  Patient/Family in  Agreement with the Plan: yes      Additional Information:  Spoke with patient via phone, introduced self and role.  Patient currently lives with her significant other, son and son's girlfriend in a home in [place].  She is independent with ADLs at baseline.  She does not use assistive devices at baseline.  She continues to drive and is active in the community.      Discussed discharge plans.  Patient plans to return home with family.  She declines need for home care services.  She is concerned about the intermittent periods of dizziness/shortness of breath that may continue.  She plans to follow up with PCP and cardiologist as outpatient.  Patient will make follow up appointment with PCP for follow up.      Patient plans to drive herself home upon discharge.      Hayley LOPEZ RN  Inpatient Care Coordinator  [facility] [phone]  [facility] [phone]

## 2020-10-07 NOTE — PLAN OF CARE
[facility] DISCHARGE NOTE    Patient discharged to home at 12:34 AM via wheel chair. Accompanied by significant other and staff. Discharge instructions reviewed with patient, opportunity offered to ask questions. Prescriptions sent to patients preferred pharmacy. All belongings sent with patient.    Naina Araiza RN

## 2020-10-07 NOTE — DISCHARGE SUMMARY
[facility]   Discharge Summary  Hospital Medicine       Date of Admission:  10/5/2020  Date of Discharge:  10/7/2020 12:34 PM  Discharging Provider: Mario Fernandez MD      Identification and Chief Compaint: Shaila Triana is a 74 year old female who presented on 10/5/2020 with complaint of dizziness and shortness of breath.    Discharge Diagnoses   Anemia, iron deficiency  Suspect chronic upper GI bleeding  Gastric ulcer without acute bleeding  Gastric polyps  Chronic atrial fibrillation   Chronic anticoagulation  Chronic kidney disease   Elevated lipase, mild, undetermined etiology  Pancreatic abnormality on CT, needs follow up imaging (ordered)  Lung nodule, new, right middle lobe, needs 3-month follow up CT (ordered)  Hypertension   Hypokalemia, mild     Follow-ups Needed After Discharge   Follow-up Appointments     Follow-up and recommended labs and tests       Follow up with primary care provider, Mervin Ibarra, within 7 days for   hospital follow- up.  The following labs/tests are recommended: recheck   iron and hemoglobin in 4-6 weeks with Dr. Ibarra.             Unresulted Labs Ordered in the Past 30 Days of this Admission     Date and Time Order Name Status Description    10/6/2020 1502 Surgical pathology exam In process       These results will be followed up by PCP    Hospital Course   Shaila Triana is a 74 year old female admitted on 10/5/2020. She presented to the emergency department for evaluation of dizziness and shortness of breath, was found to be anemic and in atrial fibrillation RVR for which she is being admitted for further evaluation and treatment.     Anemia, iron deficiency, likely chronic GI bleeding    Presented with dizziness and shortness of breath and was found to have hemoglobin 7.1, MCV 65, most recent baseline hemoglobin 11-12 (although from 2016). Blood pressure stable, but tachycardic on presentation. Patient is on anticoagulation prior to  admission. Guaiac negative in the emergency department. No known history GI bleed, no history of gastric bypass. Iron studies suggest deficiency (ferritin 7 / iron 20 /  / iron saturation 4). MCV in 2016 was drifting down to 80 at that time, so suspect anemia is from chronic blood loss over last few years. She was symptomatic as noted below.     Transfusion 1U PRBC initiated in the emergency department. Hemoglobin 7.6 and stable post transfusion. However, still symptomatic with lightheadedness, dyspnea and tachycardia. Transfused a second nit PRBC and follow up hemoglobin now 8.9. Dyspnea has improved.     Colonoscopy 2018 showed only some diverticulosis. Doubt repeat colonoscopy likely to show source of blood loss. EGD this admission showed multiple gastric polyps and one non-bleeding superficial gastric ulcer with no stigmata of bleeding in the prepyloric region. Suspect iron deficiency is due to chronic, indolent upper GI bleeding, and her hemoglobin should recover with iron replacement.     Patient is started on iron replacement with 2 tabs ferrous gluconate every other day taken with small glass of orange juice. She has been on omeprazole 20 mg and given the superficial ulcer, this is increased to 40 mg daily. Her apixaban is held for another week due to gastric polypectomies and biopsy but then can resume. Follow up hemoglobin and iron studies in a few weeks.     Chronic atrial fibrillation, with mild RVR  Chronic anticoagulation    Known chronic atrial fibrillation rate controlled prior to admission with metoprolol  mg bid. Anticoagulated with Eliquis 5 mg bid. Admit EKG with atrial fibrillation, rates ranging between 105 - 120. Was given IV metoprolol 5 mg x 1 in the emergency department with improvement in rate. Increased heart rate likely exacerbated by anemia, expect to improve with volume resuscitation.    On day of discharge, telemetry reviewed and heart rate 's at rest with increase  to 110's-120's with activity. Patient is being followed by cardiology as outpatient, and metoprolol XL had been increased about 3 weeks ago. No new changes for rate control are made this admission, and she should continue to follow up with cardiology for further management.     As noted above, we are holding her apixaban another week following her gastric polypectomies and can resume next week.     Shortness of breath   Chest pain, unspecified type  Dizziness    Presenting symptoms. Initial troponin negative, no evidence of acute ST changes on EKG. CT PE was negative for PE, no evidence of infection although small bilateral pleural effusions present. No hypoxemia. Symptoms are most likely attributed to anemia, expect to improve with blood transfusions. Troponin I negative x 3.    - Address anemia as above    COVID status - negative 10/5/2020    Tested as symptomatic screen based on shortness of breath. Afebrile, no hypoxia. Lung CT was negative for groundglass opacities or other evidence of infection. Low suspicion for active COVID infection on admission, but cannot rule out.    SARS-CoV-2 PCR negative. Discontinue precautions.      CKD (chronic kidney disease) stage 3, GFR 30-59 ml/min    Admit creatinine 1.16 (baseline 1.05 - 1.44), GFR 45 (baseline 43 - 60+). Stable.     Elevated lipase  Pancreatic abnormality on CT    Admit lipase 650. CT PE/abdomen with new 8 mm lucency of the pancreas - likely benign but radiology is recommending follow-up.  Non-tender abdomen on admission exam. Patient denies any regular or heavy alcohol use.     Repeat lipase 533. Mild elevation. No symptoms specific for pancreatitis. Etiology of the elevation is not clear but does not appear to be pancreatitis.     The CT abdomen was reviewed with radiology today, day of discharge. The ACRI guidelines for follow up of this incidental finding suggest an MRI to follow up the CT and repeat MRI every 2 years x 5 or until age 80. These lesions  are most often benign, and in practice are sometimes followed up with repeat CT scan in 3 months as was suggested by the original reading radiologist. However, given she also has an unexplained small elevation in lipase, I think it would be best to follow up with a MRI pancreas to be done in 3 months. This was discussed with patient and she expressed understanding.     Lung nodules    Previously noted per [facility] notes, prior nodules stable. However, there is a new 1 cm right middle lobe nodule noted on CT PE. Given the size of the nodule, follow-up CT chest in 3 months is recommended despite her low risk (non-smoking history). She requests to schedule at [facility] due to convenience from her home, and this has been ordered. She expressed understanding that while incidental nodules like this are most often benign, they could be a small cancer, and so the follow up CT is to evaluate for that.      Elevated BNP    Admit BNP 2938. Appears euvolemic on exam. Most recent echo Aug 2020 with EF 55%, severe left and right atrial enlargement. Does not appear to carry a formal diagnosis of CHF, although is on lasix 40 mg q am and beta blocker.   - Continue oral lasix, no aggressive IV diuresis at this time   - Continue beta blocker   - Continue follow up with cardiology     Essential hypertension    Blood pressure stable on admission. Managed prior to admission with metoprolol  mg bid and lasix 40 mg daily.  - Continue metoprolol and lasix with holding parameters     Hypothyroidism (acquired)    Managed prior to admission with levothyroxine 100 mcg daily, continue.     Pure hypercholesterolemia    Managed prior to admission with Crestor 20 mg q hs, continue.     Depressive disorder    Stable mood. Managed prior to admission with Zoloft 200 mg daily, continue.      Malignant neoplasm of female breast  Invasive ductal carcinoma of breast    S/p lumpectomy, biopsy, radiation, and tamoxifen therapy, now in remission.  - Not an  active problem     Sleep apnea    Does not tolerate CPAP    Hypokalemia, mild    Presented with potassium of 3.0 and was given potassium replacement in the ED. No further replacement was necessary this admission.       Consultations This Hospital Stay   CARE MANAGEMENT / SOCIAL WORK IP CONSULT    Code Status   Full Code    The discharge plan was discussed with the patient at length, and she expressed understanding.     Time Spent on this Encounter   Total time on this discharge was 45 minutes.       Mario Fernandez MD  [facility]   ______________________________________________________________________    Physical Exam   Vital Signs: Temp: 98.3  F (36.8  C) Temp src: Oral BP: (!) 122/94 Pulse: 116   Resp: 18 SpO2: 96 % O2 Device: None (Room air)    Weight: 143 lbs 8.31 oz  Constitutional: alert and oriented x4, NAD, nontoxic  CV: Irregular, no edema, good wrist pulses, rate about 120 during exam  Respiratory: CTA bilaterally  GI: Soft, non-tender   Skin: Warm and dry       Primary Care Physician   Mervin Ibarra  [facility] [address] [address]     Discharge Disposition   Discharged to home  Condition at discharge: Stable    Significant Results and Procedures   Results for orders placed or performed during the hospital encounter of 10/05/20   XR Chest Port 1 View    Narrative    XR CHEST PORT 1 VW 10/5/2020 12:23 PM    HISTORY: Chest pain.    COMPARISON: None.      Impression    IMPRESSION: A single view the chest shows no acute cardiopulmonary  disease. Borderline cardiomegaly is present.     FRANCISCO CATHERINE MD   CT Chest (PE) Abdomen Pelvis w Contrast    Narrative    CT CHEST PE, ABDOMEN AND PELVIS WITH CONTRAST 10/5/2020 12:48 PM    HISTORY: Chest pain and shortness of breath with elevated lipase.    TECHNIQUE: Helical axial scans from the lung apices through pubic  symphysis with 71 mL Isovue 370 IV contrast. Radiation dose for this  scan was  reduced using automated exposure control, adjustment of the  mA and/or kV according to patient size, or iterative reconstruction  technique.    COMPARISON: CT chest 5/2/2017 and CT abdomen and pelvis 4/12/2016.    FINDINGS:  Chest: There is no CT evidence for pulmonary embolism or other acute  vascular abnormality of the chest. Mild ectasia ascending thoracic  aorta without change. Vascular calcifications, including coronary  artery calcifications. The thyroid gland is asymmetric with either  removal/hypoplasia of the right lobe or enlargement of the left lobe.  Regardless, this finding is unchanged since 2017. There are a few  borderline size mediastinal lymph nodes which appear unchanged. A  borderline size right hilar lymph node is also present. There is a  large hiatal hernia. Mediastinal and hilar structures are otherwise  normal. An enlarged right axillary lymph node is unchanged since the  prior exam.    Interval development of small bilateral pleural effusions. Two small  pleural-based nodules in the left lower lobe laterally are unchanged  and require no additional follow-up. Interval development of a 1 cm  slightly irregular nodule in the right middle lobe lateral segment  (image 139 of series 7 and image 34 of series 9). The remainder of the  lungs are clear.    Abdomen and pelvis: A few scattered small benign liver cysts are again  noted. The liver is otherwise unremarkable. The spleen is normal.  There is an 8 mm sharply circumscribed lucency at the junction of the  body and tail of the pancreas, new since the prior exam. This is of  uncertain etiology and significance. Since it is new, a follow-up CT  exam in 3 months is recommended. The remainder of the pancreas appears  normal. 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 A few  small benign cysts are seen in the upper pole of the left kidney. No  hydronephrosis bilaterally. The bowel and mesentery in the upper  abdomen are  unremarkable.    Scans through the pelvis show no acute abnormality or free fluid. The  appendix is not definitely identified. Multilevel advanced  degenerative disc disease in the lumbar spine.      Impression    IMPRESSION:  1. No evidence for pulmonary embolism.  2. Stable ectasia ascending thoracic aorta.  3. Vascular calcifications, including coronary artery calcifications.  4. Enlarged right axillary lymph node is stable.  5. Interval development of small bilateral pleural effusions.  6. Two small pleural-based nodules left lower lobe laterally are  stable and require no additional follow-up.  7. New indeterminate 1 cm nodule right middle lobe.  Recommendations for an incidental lung nodule > 8mm:    Low risk patients: Consider follow-up CT in 3 months, PET/CT, and/or  tissue sampling.    High risk patients: Same as for low risk patients.    *Low Risk: Minimal or absent history of smoking or other known risk  factors.  *Nonsolid (ground-glass) or partly solid nodules may require longer  follow-up to exclude indolent adenocarcinoma.  *Recommendations based on Guidelines for the Management of Incidental  Pulmonary Nodules Detected at CT: From the Fleischner Society 2017,  Radiology 2017.    8. New 8 mm lucency in the pancreas which is probably benign but  follow-up is recommended. See above discussion.  9. Stable large hiatal hernia.     FABRICE ALVARENGA MD     Procedures    EGD:    Discharge Orders      MR Pancreas wo & w Contrast     CT Chest w/o Contrast     Reason for your hospital stay    Severe iron deficiency anemia, gastric ulcer disease, gastric polyps. HOLD the Eliquis at least a week and follow up with Dr. Ibarra regarding restarting this.     Follow-up and recommended labs and tests     Follow up with primary care provider, Mervin Ibarra, within 7 days for hospital follow- up.  The following labs/tests are recommended: recheck iron and hemoglobin in 4-6 weeks with Dr. Ibarra.     Activity    Your  activity upon discharge: activity as tolerated     Diet    Follow this diet upon discharge:       Regular Diet Adult     Discharge Medications   Current Discharge Medication List      START taking these medications    Details   ferrous gluconate (FERGON) 324 (38 Fe) MG tablet Take 2 tablets (648 mg) by mouth every other day Take with small glass of orange juice  Qty: 180 tablet, Refills: 0    Associated Diagnoses: Iron deficiency anemia due to chronic blood loss         CONTINUE these medications which have CHANGED    Details   apixaban ANTICOAGULANT (ELIQUIS) 5 MG tablet Take 1 tablet (5 mg) by mouth 2 times daily HOLD and follow up with Dr. Ibarra whether and when to restart  Qty:      Associated Diagnoses: Chronic atrial fibrillation (H)      omeprazole 20 MG tablet Take 2 tablets (40 mg) by mouth daily  Qty: 180 tablet, Refills: 0    Associated Diagnoses: Peptic ulcer disease         CONTINUE these medications which have NOT CHANGED    Details   calcium 600 MG tablet Take 1 tablet by mouth 2 times daily      fluticasone (VERAMYST) 27.5 MCG/SPRAY nasal spray Spray 2 sprays into both nostrils daily      furosemide (LASIX) 40 MG tablet Take 40 mg by mouth      ketorolac (ACULAR) 0.5 % ophthalmic solution       Levothyroxine Sodium 100 MCG CAPS Take 100 mcg by mouth every morning       metoprolol succinate ER (TOPROL-XL) 100 MG 24 hr tablet Take 150 mg by mouth 2 times daily       ofloxacin (OCUFLOX) 0.3 % ophthalmic solution       prednisoLONE acetate (PRED FORTE) 1 % ophthalmic suspension       rosuvastatin (CRESTOR) 20 MG tablet Take 20 mg by mouth every evening      sertraline (ZOLOFT) 100 MG tablet Take 200 mg by mouth daily            Allergies   Allergies   Allergen Reactions     Codeine Nausea     Dust Mite Extract Other (See Comments)     Per allergy test     Erythromycin      Penicillin [Penicillins]

## 2020-10-07 NOTE — PROGRESS NOTES
[facility] Medicine Progress Note  Date of Service: 10/06/2020    Assessment & Plan   Shaila Triana is a 74 year old female admitted on 10/5/2020. She presented to the emergency department for evaluation of dizziness and shortness of breath, was found to be anemic and in atrial fibrillation RVR for which she is being admitted for further evaluation and treatment.     Anemia, microcytic, likely chronic    Presented with hemoglobin 7.1, MCV 65, most recent baseline hemoglobin 11-12 (although from 2016). MCV in 2016 was drifting down to 80 at that time, so suspect anemia is from chronic blood loss. Symptomatic as noted below. Blood pressure stable, but tachycardic on presentation. Patient is on anticoagulation prior to admission. Guaiac negative in the emergency department. No known history GI bleed, no history of gastric bypass. Iron studies suggest deficiency (ferritin 7 / iron 20 /  / iron saturation 4). Transfusion 1U PRBC initiated in the emergency department.     Colonoscopy 2018 showed only some diverticulosis. Doubt repeat colonoscopy likely to show source of blood loss.     Hemoglobin 7.6 and stable post transfusion. However, still symptomatic with lightheadedness, dyspnea and tachycardia.     - transfuse second unit PRBC today, recheck in the morning   - Initiate oral iron supplementation   - EGD today or tomorrow pending scheduling, discussed with Dr. Ramirez (appreciated)   - Holding Eliquis, last dose morning 10/5   - PPI, change to oral in the morning  ADDENDUM: EGD showed multiple gastric polyps and 1 small shallow ulcer without bleeding.      Chronic atrial fibrillation, now with mild RVR  Chronic anticoagulation    Known chronic atrial fibrillation rate controlled prior to admission with metoprolol  mg bid. Anticoagulated with Eliquis 5 mg bid. Admit EKG with atrial fibrillation, rates ranging between 105 - 120. Was given IV metoprolol 5 mg x 1 in  the emergency department with improvement in rate. Increased heart rate likely exacerbated by anemia, expect to improve with volume resuscitation.    HR still 110's.   - Continue metoprolol with holding parameters  - IV metoprolol available for sustained heart rate > 110  - Holding Eliquis, likely resume in few days  - Monitor on telemetry    - transfuse second unit PRBC     Shortness of breath   Chest pain, unspecified type  Dizziness    Presenting symptoms. Initial troponin negative, no evidence of acute ST changes on EKG. CT PE was negative for PE, no evidence of infection although small bilateral pleural effusions present. No hypoxia. Symptoms are most likely attributed to anemia, expect to improve with blood transfusions. Troponin I negative x 3.    - Address anemia as above    COVID status - negative 10/5    Tested as symptomatic screen based on shortness of breath. Afebrile, no hypoxia. Lung CT was negative for groundglass opacities or other evidence of infection. Low suspicion for active COVID infection on admission, but cannot rule out.    SARS-CoV-2 PCR negative. Discontinue precautions.      CKD (chronic kidney disease) stage 3, GFR 30-59 ml/min    Admit creatinine 1.16 (baseline 1.05 - 1.44), GFR 45 (baseline 43 - 60+). Stable.     Elevated lipase  Pancreatic abnormality on CT    Admit lipase 650. CT PE/abdomen with new 8 mm lucency of the pancreas - likely benign but radiology is recommending follow-up.  Non-tender abdomen on admission exam. Patient denies any regular or heavy alcohol use.     Repeat lipase 533. Mild elevation. No symptoms specific for pancreatitis.    - outpatient follow-up of lipase and also CT in 3 months to evaluate pancreas abnormality    Lung nodules    Previously noted per [facility] notes, prior nodules stable. However, there is a new 1 cm right middle lobe nodule noted on CT PE.    Follow-up in 3 months repeat CT     Elevated BNP    Admit BNP 2938. Appears euvolemic on exam. Most  recent echo Aug 2020 with EF 55%, severe left and right atrial enlargement. Does not appear to carry a formal diagnosis of CHF, although is on lasix 40 mg q am and beta blocker.   - Continue oral lasix, no aggressive IV diuresis at this time   - Continue beta blocker     Essential hypertension    Blood pressure stable on admission. Managed prior to admission with metoprolol  mg bid and lasix 40 mg daily.  - Continue metoprolol and lasix with holding parameters     Hypothyroidism (acquired)    Managed prior to admission with levothyroxine 100 mcg daily, continue.     Pure hypercholesterolemia    Managed prior to admission with Crestor 20 mg q hs, continue.     Depressive disorder    Stable mood. Managed prior to admission with Zoloft 200 mg daily, continue.      Malignant neoplasm of female breast  Invasive ductal carcinoma of breast    S/p lumpectomy, biopsy, radiation, and tamoxifen therapy, now in remission.  - Not an active problem     Sleep apnea    Does not tolerate a CPAP    DVT Prophylaxis: Low Risk/Ambulatory with no VTE prophylaxis indicated  Code Status: Full Code    Lines: PIV   Boucher catheter: None    Discussion: Still symptomatic. Source of     Disposition: Anticipate discharge tomorrow if stable     Attestation:  Total time: 40 minutes    Mario Fernandez MD       Interval History   Complains of dyspnea on exertion still today.  No chest pain.  No palpitations.  Has intermittent lightheadedness still today.  Feels fatigued.  Feels a little better than when she first came in.  No abdominal pain.    Physical Exam   Temp:  [97  F (36.1  C)-98.9  F (37.2  C)] 97.7  F (36.5  C)  Pulse:  [] 107  Resp:  [16-20] 18  BP: ()/() 100/68  SpO2:  [93 %-98 %] 96 %    Weights:   Vitals:    10/05/20 1025 10/05/20 1649 10/06/20 0631   Weight: 71.7 kg (158 lb) 62.4 kg (137 lb 9.1 oz) 65.1 kg (143 lb 8.3 oz)    Body mass index is 26.25 kg/m .    Constitutional: Alert and oriented x4, mild increased  work of breathing when she is talking, pale complexion otherwise nontoxic  CV: Irregular, no edema  Respiratory: CTA bilaterally  GI: Soft, non-tender, bowel sounds normal  Skin: Warm and dry    Data   Recent Labs   Lab 10/06/20  0516 10/06/20  0322 10/05/20  2340 10/05/20  1454 10/05/20  1050 10/05/20  1050   WBC 6.2 5.9  --   --   --  6.0   HGB 7.6* 7.7* 7.6*  --    < > 7.1*   MCV 67* 66*  --   --   --  65*    211  --   --   --  236   NA  --  137  --   --   --  139   POTASSIUM  --  3.8  --   --   --  3.0*   CHLORIDE  --  105  --   --   --  104   CO2  --  30  --   --   --  31   BUN  --  22  --   --   --  27   CR  --  1.12*  --   --   --  1.16*   ANIONGAP  --  2*  --   --   --  4   ANIKA  --  8.2*  --   --   --  8.7   GLC  --  88  --   --   --  102*   ALBUMIN  --   --   --   --   --  3.4   PROTTOTAL  --   --   --   --   --  7.5   BILITOTAL  --   --   --   --   --  0.5   ALKPHOS  --   --   --   --   --  60   ALT  --   --   --   --   --  13   AST  --   --   --   --   --  13   LIPASE  --  533*  --   --   --  640*   TROPI  --   --  <0.015 <0.015  --  <0.015    < > = values in this interval not displayed.       Recent Labs   Lab 10/06/20  0322 10/05/20  1050   GLC 88 102*        Unresulted Labs Ordered in the Past 30 Days of this Admission     Date and Time Order Name Status Description    10/6/2020 1502 Surgical pathology exam In process            Imaging  No results found for this or any previous visit (from the past 24 hour(s)).     I reviewed all new labs and imaging results over the last 24 hours. I personally reviewed no images or EKG's today.    Medications     lactated ringers         [Held by provider] apixaban ANTICOAGULANT  5 mg Oral BID     ferrous gluconate  324 mg Oral Daily with breakfast     furosemide  40 mg Oral Daily     levothyroxine  100 mcg Oral QAM     metoprolol succinate ER  150 mg Oral BID     pantoprazole (PROTONIX) IV  40 mg Intravenous Q12H     rosuvastatin  20 mg Oral QPM      sertraline  200 mg Oral QPM     sodium chloride (PF)  3 mL Intracatheter Q8H   Reviewed    Mario Fernandez MD

## 2020-10-09 LAB — COPATH REPORT: NORMAL

## 2020-10-15 LAB
ABO + RH BLD: NORMAL
ABO + RH BLD: NORMAL
BLD GP AB SCN SERPL QL: NORMAL
BLD PROD TYP BPU: NORMAL
BLOOD BANK CMNT PATIENT-IMP: NORMAL
NUM BPU REQUESTED: 2
SPECIMEN EXP DATE BLD: NORMAL

## 2021-01-12 ENCOUNTER — HOSPITAL ENCOUNTER (OUTPATIENT)
Dept: MRI IMAGING | Facility: CLINIC | Age: 76
End: 2021-01-12
Attending: INTERNAL MEDICINE
Payer: COMMERCIAL

## 2021-01-12 ENCOUNTER — HOSPITAL ENCOUNTER (OUTPATIENT)
Dept: CT IMAGING | Facility: CLINIC | Age: 76
End: 2021-01-12
Attending: INTERNAL MEDICINE
Payer: COMMERCIAL

## 2021-01-12 DIAGNOSIS — Q45.3 PANCREATIC ABNORMALITY: ICD-10-CM

## 2021-01-12 DIAGNOSIS — R91.8 LUNG NODULES: ICD-10-CM

## 2021-01-12 LAB
CREAT BLD-MCNC: 1 MG/DL (ref 0.52–1.04)
GFR SERPL CREATININE-BSD FRML MDRD: 54 ML/MIN/{1.73_M2}

## 2021-01-12 PROCEDURE — 71250 CT THORAX DX C-: CPT

## 2021-01-12 PROCEDURE — A9585 GADOBUTROL INJECTION: HCPCS | Performed by: INTERNAL MEDICINE

## 2021-01-12 PROCEDURE — 82565 ASSAY OF CREATININE: CPT

## 2021-01-12 PROCEDURE — 74183 MRI ABD W/O CNTR FLWD CNTR: CPT

## 2021-01-12 PROCEDURE — 255N000002 HC RX 255 OP 636: Performed by: INTERNAL MEDICINE

## 2021-01-12 RX ORDER — GADOBUTROL 604.72 MG/ML
6.5 INJECTION INTRAVENOUS ONCE
Status: COMPLETED | OUTPATIENT
Start: 2021-01-12 | End: 2021-01-12

## 2021-01-12 RX ADMIN — GADOBUTROL 6.5 ML: 604.72 INJECTION INTRAVENOUS at 11:13

## 2021-04-17 ENCOUNTER — HOSPITAL ENCOUNTER (EMERGENCY)
Facility: CLINIC | Age: 76
Discharge: HOME | End: 2021-04-17
Attending: EMERGENCY MEDICINE | Admitting: EMERGENCY MEDICINE
Payer: COMMERCIAL

## 2021-04-17 VITALS
SYSTOLIC BLOOD PRESSURE: 142 MMHG | BODY MASS INDEX: 27.44 KG/M2 | HEART RATE: 91 BPM | OXYGEN SATURATION: 96 % | RESPIRATION RATE: 15 BRPM | DIASTOLIC BLOOD PRESSURE: 91 MMHG | TEMPERATURE: 98 F | WEIGHT: 150 LBS

## 2021-04-17 DIAGNOSIS — I16.0 HYPERTENSIVE URGENCY: ICD-10-CM

## 2021-04-17 DIAGNOSIS — R42 DIZZINESS: ICD-10-CM

## 2021-04-17 DIAGNOSIS — Z72.820 SLEEP DEPRIVATION: ICD-10-CM

## 2021-04-17 LAB
ANION GAP SERPL CALCULATED.3IONS-SCNC: 5 MMOL/L (ref 3–14)
BASOPHILS # BLD AUTO: 0.1 10E9/L (ref 0–0.2)
BASOPHILS NFR BLD AUTO: 1 %
BUN SERPL-MCNC: 21 MG/DL (ref 7–30)
CALCIUM SERPL-MCNC: 8.6 MG/DL (ref 8.5–10.1)
CHLORIDE SERPL-SCNC: 105 MMOL/L (ref 94–109)
CO2 SERPL-SCNC: 28 MMOL/L (ref 20–32)
CREAT SERPL-MCNC: 1.07 MG/DL (ref 0.52–1.04)
DIFFERENTIAL METHOD BLD: NORMAL
EOSINOPHIL # BLD AUTO: 0.4 10E9/L (ref 0–0.7)
EOSINOPHIL NFR BLD AUTO: 5.5 %
ERYTHROCYTE [DISTWIDTH] IN BLOOD BY AUTOMATED COUNT: 14.7 % (ref 10–15)
GFR SERPL CREATININE-BSD FRML MDRD: 50 ML/MIN/{1.73_M2}
GLUCOSE SERPL-MCNC: 87 MG/DL (ref 70–99)
HCT VFR BLD AUTO: 37.6 % (ref 35–47)
HGB BLD-MCNC: 12 G/DL (ref 11.7–15.7)
IMM GRANULOCYTES # BLD: 0.1 10E9/L (ref 0–0.4)
IMM GRANULOCYTES NFR BLD: 0.8 %
LYMPHOCYTES # BLD AUTO: 1.5 10E9/L (ref 0.8–5.3)
LYMPHOCYTES NFR BLD AUTO: 21.5 %
MCH RBC QN AUTO: 27.1 PG (ref 26.5–33)
MCHC RBC AUTO-ENTMCNC: 31.9 G/DL (ref 31.5–36.5)
MCV RBC AUTO: 85 FL (ref 78–100)
MONOCYTES # BLD AUTO: 0.8 10E9/L (ref 0–1.3)
MONOCYTES NFR BLD AUTO: 10.7 %
NEUTROPHILS # BLD AUTO: 4.3 10E9/L (ref 1.6–8.3)
NEUTROPHILS NFR BLD AUTO: 60.5 %
NRBC # BLD AUTO: 0 10*3/UL
NRBC BLD AUTO-RTO: 0 /100
PLATELET # BLD AUTO: 186 10E9/L (ref 150–450)
POTASSIUM SERPL-SCNC: 3.8 MMOL/L (ref 3.4–5.3)
RBC # BLD AUTO: 4.42 10E12/L (ref 3.8–5.2)
SODIUM SERPL-SCNC: 138 MMOL/L (ref 133–144)
WBC # BLD AUTO: 7.1 10E9/L (ref 4–11)

## 2021-04-17 PROCEDURE — 93010 ELECTROCARDIOGRAM REPORT: CPT | Performed by: EMERGENCY MEDICINE

## 2021-04-17 PROCEDURE — 85025 COMPLETE CBC W/AUTO DIFF WBC: CPT | Performed by: EMERGENCY MEDICINE

## 2021-04-17 PROCEDURE — 99284 EMERGENCY DEPT VISIT MOD MDM: CPT | Performed by: EMERGENCY MEDICINE

## 2021-04-17 PROCEDURE — 99284 EMERGENCY DEPT VISIT MOD MDM: CPT | Mod: 25 | Performed by: EMERGENCY MEDICINE

## 2021-04-17 PROCEDURE — 93005 ELECTROCARDIOGRAM TRACING: CPT | Performed by: EMERGENCY MEDICINE

## 2021-04-17 PROCEDURE — 80048 BASIC METABOLIC PNL TOTAL CA: CPT | Performed by: EMERGENCY MEDICINE

## 2021-04-17 ASSESSMENT — ENCOUNTER SYMPTOMS
TROUBLE SWALLOWING: 0
SEIZURES: 0
LIGHT-HEADEDNESS: 1
DIARRHEA: 0
FEVER: 0
APPETITE CHANGE: 0
NUMBNESS: 0
BACK PAIN: 0
WEAKNESS: 0
CHEST TIGHTNESS: 0
PHOTOPHOBIA: 0
VOMITING: 0
CONFUSION: 0
FATIGUE: 1
HEADACHES: 0
SORE THROAT: 0
DECREASED CONCENTRATION: 0
NERVOUS/ANXIOUS: 0
SHORTNESS OF BREATH: 0
ABDOMINAL PAIN: 0
SPEECH DIFFICULTY: 0
NAUSEA: 0
COUGH: 0
CHILLS: 0

## 2021-04-17 NOTE — ED TRIAGE NOTES
"hx of vertigo with low hemoglobin in October. Driving home from [facility] and \"driving was off\" pulled over by PD. Admits to one ETOH earlier in the evening.   "

## 2021-04-17 NOTE — ED PROVIDER NOTES
"  History     Chief Complaint   Patient presents with     Dizziness     hx of vertigo with low hemoglobin in October. Driving [facility] and \"driving was off\" pulled over by PD.      HPI  Shaila Triana is a 75 year old female with history of A. fib on Eliquis, hypertension, dizziness, and chronic kidney disease presenting for evaluation of dizziness.  Patient reports she has spent the last few days at the [facility].  She reports she arrived at this [facility] Wednesday around noon and just left this evening driving home around midnight (2-1/2 days at the [facility]).  She reports that while she was at the [facility] she has been awake continuously drinking Pepsi but not eating much food.  She was driving home tonight when she was pulled over due to erratic driving and was brought here due to concern for possible stroke.  Patient ports she had similar symptoms in the past when her hemoglobin became low however she has been taking iron supplements.  She reports that she has not taken any of her medications over the past 2 and half days since being at the [facility].  She admits to feeling unsteady on her feet but denies any numbness, tingling, or weakness.  Denies any vision changes.  Denies any vertigo symptoms.  Patient does admit to significant fatigue and states she would like to go home and sleep.  Patient reports no fevers or chills.  Denies cough.  Denies abdominal pain, nausea, vomiting, or diarrhea.  Reports normal urination.      Allergies:  Allergies   Allergen Reactions     Codeine Nausea     Dust Mite Extract Other (See Comments)     Per allergy test     Erythromycin      Penicillin [Penicillins]        Problem List:    Patient Active Problem List    Diagnosis Date Noted     Anemia 10/05/2020     Priority: Medium     Dizziness 10/05/2020     Priority: Medium     Anemia, unspecified type 10/05/2020     Priority: Medium     Chest pain, unspecified type 10/05/2020     Priority: Medium     CKD (chronic kidney disease) " stage 3, GFR 30-59 ml/min 10/05/2020     Priority: Medium     Elevated lipase 10/05/2020     Priority: Medium     Pancreatic abnormality on CT 10/05/2020     Priority: Medium     Incidental finding on CT 10/5/2020 -  New 8 mm lucency of the pancreas. Likely benign but needs follow-up.       Chronic anticoagulation 10/05/2020     Priority: Medium     Due to atrial fibrillation        Ascending aorta dilatation (H) 10/05/2020     Priority: Medium     Noted on echo August 2020, stable on CT 10/5/2020.       Chronic atrial fibrillation (H) 07/31/2020     Priority: Medium     Lung nodules 04/19/2016     Priority: Medium     LLL on abd/pelvis CT 4/12/16.  No change on chest CT May 2017.  No further imaging needed.       Invasive ductal carcinoma of breast (H) 08/06/2013     Priority: Medium     Left.  ER(+) ND(+) her-2-hina (-).  Lumpectomy and sentinel biopsy followed by radiation.  Adjuvant therapy with tamoxifen completed.       Malignant neoplasm of female breast (H) 03/18/2008     Priority: Medium     Epic       Depressive disorder 12/04/2006     Priority: Medium     Essential hypertension 12/04/2006     Priority: Medium     Hypothyroidism (acquired) 12/04/2006     Priority: Medium     Pure hypercholesterolemia 12/04/2006     Priority: Medium     Sleep apnea 12/04/2006     Priority: Medium        Past Medical History:    Past Medical History:   Diagnosis Date     Depressive disorder 12/4/2006     Essential hypertension 12/4/2006     Hypothyroidism (acquired) 12/4/2006     Invasive ductal carcinoma of breast (H) 8/6/2013     Lung nodules 4/19/2016     Malignant neoplasm of female breast (H) 3/18/2008     New onset atrial fibrillation (H) 7/31/2020     Pure hypercholesterolemia 12/4/2006     Sleep apnea 12/4/2006       Past Surgical History:    Past Surgical History:   Procedure Laterality Date     APPENDECTOMY      1957     BLEPHAROPLASTY Bilateral 2010     COLONOSCOPY  2005     ESOPHAGOSCOPY, GASTROSCOPY, DUODENOSCOPY  (EGD), COMBINED N/A 10/6/2020    Procedure: ESOPHAGOGASTRODUODENOSCOPY, WITH BIOPSY and polypectomy;  Surgeon: Jorge Ramirez MD;  Location: [facility]     HERNIA REPAIR  2013     HYSTERECTOMY, JOSE  1975     LUMPECTOMY BREAST Left 2008     THYROIDECTOMY  Right     Partial     TUBAL LIGATION         Family History:    Family History   Problem Relation Age of Onset     No Known Problems Mother          age 89 after complications from a fall     Alzheimer Disease Father      Other - See Comments Sister         Polio     Colon Cancer Maternal Aunt      Breast Cancer No family hx of      Ovarian Cancer No family hx of      Prostate Cancer No family hx of        Social History:  Marital Status:   [4]  Social History     Tobacco Use     Smoking status: Never Smoker     Smokeless tobacco: Never Used   Substance Use Topics     Alcohol use: Yes     Comment: occ     Drug use: Never        Medications:    apixaban ANTICOAGULANT (ELIQUIS) 5 MG tablet  calcium 600 MG tablet  ferrous gluconate (FERGON) 324 (38 Fe) MG tablet  fluticasone (VERAMYST) 27.5 MCG/SPRAY nasal spray  furosemide (LASIX) 40 MG tablet  ketorolac (ACULAR) 0.5 % ophthalmic solution  Levothyroxine Sodium 100 MCG CAPS  metoprolol succinate ER (TOPROL-XL) 100 MG 24 hr tablet  ofloxacin (OCUFLOX) 0.3 % ophthalmic solution  omeprazole 20 MG tablet  prednisoLONE acetate (PRED FORTE) 1 % ophthalmic suspension  rosuvastatin (CRESTOR) 20 MG tablet  sertraline (ZOLOFT) 100 MG tablet          Review of Systems   Constitutional: Positive for fatigue. Negative for appetite change, chills and fever.   HENT: Negative for congestion, sore throat and trouble swallowing.    Eyes: Negative for photophobia and visual disturbance.   Respiratory: Negative for cough, chest tightness and shortness of breath.    Cardiovascular: Negative for chest pain.   Gastrointestinal: Negative for abdominal pain, diarrhea, nausea and vomiting.   Genitourinary: Negative for decreased urine  volume.   Musculoskeletal: Positive for gait problem (Slightly unsteady). Negative for back pain.   Skin: Negative for rash.   Neurological: Positive for light-headedness. Negative for seizures, syncope, speech difficulty, weakness, numbness and headaches.   Psychiatric/Behavioral: Negative for confusion and decreased concentration. The patient is not nervous/anxious.    All other systems reviewed and are negative.      Physical Exam   BP: (!) 146/113  Pulse: 84  Temp: 98  F (36.7  C)  Resp: 18  Weight: 68 kg (150 lb)  SpO2: 96 %      Physical Exam  Vitals signs and nursing note reviewed.   Constitutional:       Appearance: She is obese. She is not ill-appearing or diaphoretic.   HENT:      Head: Normocephalic and atraumatic.      Nose: Nose normal.      Mouth/Throat:      Mouth: Mucous membranes are moist.   Eyes:      Extraocular Movements: Extraocular movements intact.      Conjunctiva/sclera: Conjunctivae normal.      Pupils: Pupils are equal, round, and reactive to light.   Neck:      Musculoskeletal: Normal range of motion.   Cardiovascular:      Rate and Rhythm: Normal rate. Rhythm irregular.      Pulses: Normal pulses.      Heart sounds: Normal heart sounds.   Pulmonary:      Effort: Pulmonary effort is normal.      Breath sounds: Normal breath sounds.   Abdominal:      General: Bowel sounds are normal.      Palpations: Abdomen is soft.      Tenderness: There is no abdominal tenderness.   Musculoskeletal:      Right lower leg: Edema present.      Left lower leg: Edema present.      Comments: 1+ pitting edema bilaterally   Skin:     General: Skin is warm and dry.      Capillary Refill: Capillary refill takes less than 2 seconds.   Neurological:      Mental Status: She is alert and oriented to person, place, and time.      Cranial Nerves: No cranial nerve deficit.      Sensory: No sensory deficit.      Motor: No weakness.      Coordination: Coordination normal (Normal finger-to-nose and heel-to-shin).       Gait: Gait abnormal (Slightly unsteady gait but able to walk unassisted.  ).      Comments: Able to stand with eyes closed without falling over.   Psychiatric:         Mood and Affect: Mood normal.         Behavior: Behavior normal.         ED Course        Procedures               EKG Interpretation:      Interpreted by Maulik Taylor MD  Time reviewed: 0050  Symptoms at time of EKG: fatigue, unsteady gait   Rhythm: atrial fibrillation - controlled  Rate: 100  Axis: Normal  Ectopy: none  Conduction: normal  ST Segments/ T Waves: No acute ischemic changes  Q Waves: none  Comparison to prior: Grossly similar to previous EKG 10/5/2020    Clinical Impression: Atrial fibrillation without acute ischemic abnormality.  Not significantly change from previous                 Results for orders placed or performed during the hospital encounter of 04/17/21 (from the past 24 hour(s))   CBC with platelets differential   Result Value Ref Range    WBC 7.1 4.0 - 11.0 10e9/L    RBC Count 4.42 3.8 - 5.2 10e12/L    Hemoglobin 12.0 11.7 - 15.7 g/dL    Hematocrit 37.6 35.0 - 47.0 %    MCV 85 78 - 100 fl    MCH 27.1 26.5 - 33.0 pg    MCHC 31.9 31.5 - 36.5 g/dL    RDW 14.7 10.0 - 15.0 %    Platelet Count 186 150 - 450 10e9/L    Diff Method Automated Method     % Neutrophils 60.5 %    % Lymphocytes 21.5 %    % Monocytes 10.7 %    % Eosinophils 5.5 %    % Basophils 1.0 %    % Immature Granulocytes 0.8 %    Nucleated RBCs 0 0 /100    Absolute Neutrophil 4.3 1.6 - 8.3 10e9/L    Absolute Lymphocytes 1.5 0.8 - 5.3 10e9/L    Absolute Monocytes 0.8 0.0 - 1.3 10e9/L    Absolute Eosinophils 0.4 0.0 - 0.7 10e9/L    Absolute Basophils 0.1 0.0 - 0.2 10e9/L    Abs Immature Granulocytes 0.1 0 - 0.4 10e9/L    Absolute Nucleated RBC 0.0    Basic metabolic panel   Result Value Ref Range    Sodium 138 133 - 144 mmol/L    Potassium 3.8 3.4 - 5.3 mmol/L    Chloride 105 94 - 109 mmol/L    Carbon Dioxide 28 20 - 32 mmol/L    Anion Gap 5 3 - 14 mmol/L     Glucose 87 70 - 99 mg/dL    Urea Nitrogen 21 7 - 30 mg/dL    Creatinine 1.07 (H) 0.52 - 1.04 mg/dL    GFR Estimate 50 (L) >60 mL/min/[1.73_m2]    GFR Estimate If Black 58 (L) >60 mL/min/[1.73_m2]    Calcium 8.6 8.5 - 10.1 mg/dL       Medications - No data to display    Assessments & Plan (with Medical Decision Making)  75-year-old female with hypertension, A. fib, hypertension and a previous episode of dizziness presenting for evaluation of dizziness.  Patient has been playing games at the [employer] over the past 2 and half days without any sleep.  Has been staying awake with Pepsi and playing slots at the [employer].  She reports that she has been there continuously since noon on Wednesday and finally decided to drive home tonight to get some rest.  She reports feeling unsteady and having hard time driving and was pulled over by police.  She was brought in here for evaluation due to her unsteadiness.  Patient is alert and oriented in no distress.  Moderately hypertensive in the ED but admits to not taking her medications over the past 2 and half days.  Patient slightly unsteady on her feet but is able to walk unassisted and has no other objective neurologic findings.  Patient admits to being very tired and having been up for 2 and half days straight I think her symptoms are most consistent with sleep deprivation.  There is a small chance of stroke and I did discuss this with patient and son.  We discussed consideration for further testing and work-up.  Although she is slightly unsteady on her feet, her finger-to-nose and heel-to-shin exams are normal and I do not believe she has had a stroke although given that she has stopped her apixaban for the past few days she does have a slightly increased risk of stroke.  Patient would like to go home and does not want any further work-up.  Encouraged her to take all her prescribed medications when she gets home and continue them tomorrow.  Advised if symptoms persist, she  should return for repeat evaluation.     I have reviewed the nursing notes.    I have reviewed the findings, diagnosis, plan and need for follow up with the patient.       New Prescriptions    No medications on file       Final diagnoses:   Dizziness   Sleep deprivation   Hypertensive urgency       4/16/2021   [facility] EMERGENCY DEPT     Taylor, Maulik Florence MD  04/17/21 0146

## 2021-07-09 ENCOUNTER — HOSPITAL ENCOUNTER (OUTPATIENT)
Dept: CT IMAGING | Facility: CLINIC | Age: 76
Discharge: HOME | End: 2021-07-09
Attending: FAMILY MEDICINE | Admitting: FAMILY MEDICINE
Payer: COMMERCIAL

## 2021-07-09 DIAGNOSIS — R91.8 LUNG NODULES: ICD-10-CM

## 2021-07-09 PROCEDURE — 71250 CT THORAX DX C-: CPT

## 2022-10-17 ENCOUNTER — APPOINTMENT (OUTPATIENT)
Dept: CT IMAGING | Facility: CLINIC | Age: 77
End: 2022-10-17
Attending: NURSE PRACTITIONER
Payer: COMMERCIAL

## 2022-10-17 ENCOUNTER — APPOINTMENT (OUTPATIENT)
Dept: GENERAL RADIOLOGY | Facility: CLINIC | Age: 77
End: 2022-10-17
Attending: NURSE PRACTITIONER
Payer: COMMERCIAL

## 2022-10-17 ENCOUNTER — HOSPITAL ENCOUNTER (EMERGENCY)
Facility: CLINIC | Age: 77
Discharge: HOME | End: 2022-10-17
Attending: NURSE PRACTITIONER | Admitting: NURSE PRACTITIONER
Payer: COMMERCIAL

## 2022-10-17 VITALS
WEIGHT: 161 LBS | RESPIRATION RATE: 16 BRPM | HEIGHT: 62 IN | OXYGEN SATURATION: 97 % | DIASTOLIC BLOOD PRESSURE: 99 MMHG | TEMPERATURE: 97.8 F | HEART RATE: 102 BPM | SYSTOLIC BLOOD PRESSURE: 128 MMHG | BODY MASS INDEX: 29.63 KG/M2

## 2022-10-17 DIAGNOSIS — S06.0X0A CONCUSSION WITHOUT LOSS OF CONSCIOUSNESS, INITIAL ENCOUNTER: ICD-10-CM
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
## 2022-10-17 DIAGNOSIS — S80.12XA LEG HEMATOMA, LEFT, INITIAL ENCOUNTER: ICD-10-CM

## 2022-10-17 DIAGNOSIS — S20.212A CHEST WALL CONTUSION, LEFT, INITIAL ENCOUNTER: ICD-10-CM

## 2022-10-17 DIAGNOSIS — S00.03XA SCALP HEMATOMA, INITIAL ENCOUNTER: ICD-10-CM

## 2022-10-17 LAB
ANION GAP SERPL CALCULATED.3IONS-SCNC: 9 MMOL/L (ref 7–15)
BUN SERPL-MCNC: 29.4 MG/DL (ref 8–23)
CALCIUM SERPL-MCNC: 8.7 MG/DL (ref 8.8–10.2)
CHLORIDE SERPL-SCNC: 107 MMOL/L (ref 98–107)
CREAT SERPL-MCNC: 2.26 MG/DL (ref 0.51–0.95)
DEPRECATED HCO3 PLAS-SCNC: 27 MMOL/L (ref 22–29)
GFR SERPL CREATININE-BSD FRML MDRD: 22 ML/MIN/1.73M2
GLUCOSE SERPL-MCNC: 105 MG/DL (ref 70–99)
POTASSIUM SERPL-SCNC: 3 MMOL/L (ref 3.4–5.3)
SODIUM SERPL-SCNC: 143 MMOL/L (ref 136–145)

## 2022-10-17 PROCEDURE — 71250 CT THORAX DX C-: CPT

## 2022-10-17 PROCEDURE — 99284 EMERGENCY DEPT VISIT MOD MDM: CPT | Performed by: NURSE PRACTITIONER

## 2022-10-17 PROCEDURE — 70450 CT HEAD/BRAIN W/O DYE: CPT

## 2022-10-17 PROCEDURE — 80048 BASIC METABOLIC PNL TOTAL CA: CPT | Performed by: NURSE PRACTITIONER

## 2022-10-17 PROCEDURE — 36415 COLL VENOUS BLD VENIPUNCTURE: CPT | Performed by: NURSE PRACTITIONER

## 2022-10-17 PROCEDURE — 99285 EMERGENCY DEPT VISIT HI MDM: CPT | Mod: 25 | Performed by: NURSE PRACTITIONER

## 2022-10-17 PROCEDURE — 72125 CT NECK SPINE W/O DYE: CPT

## 2022-10-17 PROCEDURE — 73552 X-RAY EXAM OF FEMUR 2/>: CPT | Mod: LT

## 2022-10-17 RX ORDER — OXYCODONE HYDROCHLORIDE 5 MG/1
2.5-5 TABLET ORAL EVERY 6 HOURS PRN
Qty: 10 TABLET | Refills: 0 | Status: SHIPPED | OUTPATIENT
Start: 2022-10-17 | End: 2022-10-20

## 2022-10-17 RX ORDER — IOPAMIDOL 755 MG/ML
71 INJECTION, SOLUTION INTRAVASCULAR ONCE
Status: DISCONTINUED | OUTPATIENT
Start: 2022-10-17 | End: 2022-10-17

## 2022-10-17 RX ORDER — CLOPIDOGREL BISULFATE 75 MG/1
75 TABLET ORAL
COMMUNITY
Start: 2022-09-06 | End: 2023-01-01

## 2022-10-17 RX ORDER — DILTIAZEM HYDROCHLORIDE 180 MG/1
180 CAPSULE, EXTENDED RELEASE ORAL DAILY
COMMUNITY
Start: 2022-07-26 | End: 2023-01-01

## 2022-10-17 ASSESSMENT — ACTIVITIES OF DAILY LIVING (ADL)
ADLS_ACUITY_SCORE: 35
ADLS_ACUITY_SCORE: 35

## 2022-10-17 NOTE — ED TRIAGE NOTES
Pt reports she fell and hit back of head on door frame, pt did not have a LOC, pt reports she was taken off eliquis is now only taking the plavix. Pt reports pain to left shoulder and left hip. Pt unsure if she slipped on something or what happened, pt reports happened so fast.      Triage Assessment     Row Name 10/17/22 1012       Cognitive/Neuro/Behavioral WDL    Cognitive/Neuro/Behavioral WDL WDL

## 2022-10-17 NOTE — ED PROVIDER NOTES
History     Chief Complaint   Patient presents with     Fall     Pt reports she fell and hit back of head on door frame, pt did not have a LOC, pt reports she was taken off eliquis is now only taking the plavix. Pt reports pain to left shoulder and left hip. Pt unsure if she slipped on something or what happened, pt reports happened so fast.      HPI  Shaila Triana is a 76 year old female with past medical history of ductal carcinoma currently in remission, depression, hypertension, atrial fibrillation, chronic anticoagulation who presents to the emergency department with fall.  Patient states she got up this morning and went to the bedroom door and must of slipped on something on the floor or unsure of what happened but she fell backwards hitting her head on the door and her back, left arm, left hip.  Patient denies any loss of consciousness, subsequent confusion, vision changes, hearing changes, shortness of breath, nausea, vomiting, abdominal pain.  Patient reports everything is achy including her head, her neck, her left side of her chest, her left arm, her left hip area.  She states her pain level currently is 4 out of 10.  Patient states she is taking all of her medications as prescribed and currently is on Plavix and baby aspirin daily.  Patient has not taken anything for the pain.    Allergies:  Allergies   Allergen Reactions     Codeine Nausea     Dust Mite Extract Other (See Comments)     Per allergy test     Erythromycin      Penicillin [Penicillins]        Problem List:    Patient Active Problem List    Diagnosis Date Noted     Anemia 10/05/2020     Priority: Medium     Dizziness 10/05/2020     Priority: Medium     Anemia, unspecified type 10/05/2020     Priority: Medium     Chest pain, unspecified type 10/05/2020     Priority: Medium     CKD (chronic kidney disease) stage 3, GFR 30-59 ml/min (H) 10/05/2020     Priority: Medium     Elevated lipase 10/05/2020     Priority: Medium     Pancreatic  abnormality on CT 10/05/2020     Priority: Medium     Incidental finding on CT 10/5/2020 -  New 8 mm lucency of the pancreas. Likely benign but needs follow-up.       Chronic anticoagulation 10/05/2020     Priority: Medium     Due to atrial fibrillation        Ascending aorta dilatation (H) 10/05/2020     Priority: Medium     Noted on echo August 2020, stable on CT 10/5/2020.       Chronic atrial fibrillation (H) 07/31/2020     Priority: Medium     Lung nodules 04/19/2016     Priority: Medium     LLL on abd/pelvis CT 4/12/16.  No change on chest CT May 2017.  No further imaging needed.       Invasive ductal carcinoma of breast (H) 08/06/2013     Priority: Medium     Left.  ER(+) AK(+) her-2-hina (-).  Lumpectomy and sentinel biopsy followed by radiation.  Adjuvant therapy with tamoxifen completed.       Malignant neoplasm of female breast (H) 03/18/2008     Priority: Medium     Epic       Depressive disorder 12/04/2006     Priority: Medium     Essential hypertension 12/04/2006     Priority: Medium     Hypothyroidism (acquired) 12/04/2006     Priority: Medium     Pure hypercholesterolemia 12/04/2006     Priority: Medium     Sleep apnea 12/04/2006     Priority: Medium        Past Medical History:    Past Medical History:   Diagnosis Date     Depressive disorder 12/4/2006     Essential hypertension 12/4/2006     Hypothyroidism (acquired) 12/4/2006     Invasive ductal carcinoma of breast (H) 8/6/2013     Lung nodules 4/19/2016     Malignant neoplasm of female breast (H) 3/18/2008     New onset atrial fibrillation (H) 7/31/2020     Pure hypercholesterolemia 12/4/2006     Sleep apnea 12/4/2006       Past Surgical History:    Past Surgical History:   Procedure Laterality Date     APPENDECTOMY      1957     BLEPHAROPLASTY Bilateral 2010     COLONOSCOPY  2005     ESOPHAGOSCOPY, GASTROSCOPY, DUODENOSCOPY (EGD), COMBINED N/A 10/6/2020    Procedure: ESOPHAGOGASTRODUODENOSCOPY, WITH BIOPSY and polypectomy;  Surgeon: Jorge Ramirez  "MD RAUL;  Location: [facility]     HERNIA REPAIR  2013     HYSTERECTOMY, JOSE  1975     LUMPECTOMY BREAST Left 2008     THYROIDECTOMY  Right     Partial     TUBAL LIGATION         Family History:    Family History   Problem Relation Age of Onset     No Known Problems Mother          age 89 after complications from a fall     Alzheimer Disease Father      Other - See Comments Sister         Polio     Colon Cancer Maternal Aunt      Breast Cancer No family hx of      Ovarian Cancer No family hx of      Prostate Cancer No family hx of        Social History:  Marital Status:   [4]  Social History     Tobacco Use     Smoking status: Never     Smokeless tobacco: Never   Substance Use Topics     Alcohol use: Yes     Comment: occ     Drug use: Never        Medications:    aspirin (ASA) 81 MG EC tablet  calcium carbonate (OS-ANIKA) 1500 (600 Ca) MG tablet  clopidogrel (PLAVIX) 75 MG tablet  oxyCODONE (ROXICODONE) 5 MG tablet  calcium 600 MG tablet  diltiazem ER (TIAZAC) 180 MG 24 hr ER beaded capsule  ferrous gluconate (FERGON) 324 (38 Fe) MG tablet  fluticasone (VERAMYST) 27.5 MCG/SPRAY nasal spray  furosemide (LASIX) 40 MG tablet  ketorolac (ACULAR) 0.5 % ophthalmic solution  Levothyroxine Sodium 100 MCG CAPS  metoprolol succinate ER (TOPROL-XL) 100 MG 24 hr tablet  ofloxacin (OCUFLOX) 0.3 % ophthalmic solution  omeprazole 20 MG tablet  prednisoLONE acetate (PRED FORTE) 1 % ophthalmic suspension  rosuvastatin (CRESTOR) 20 MG tablet  sertraline (ZOLOFT) 100 MG tablet      Review of Systems  As mentioned above in the history present illness. All other systems were reviewed and are negative.    Physical Exam   BP: 134/84  Pulse: 92  Temp: 97.8  F (36.6  C)  Resp: 16  Height: 157.5 cm (5' 2\")  Weight: 73 kg (161 lb)  SpO2: 97 %      Physical Exam  Vitals and nursing note reviewed.   Constitutional:       General: She is in acute distress. Vital signs are normal.      Appearance: She is well-developed and well-nourished. She " is not ill-appearing, toxic-appearing or diaphoretic.   HENT:      Head: Normocephalic and atraumatic. No raccoon eyes, Romo's sign or contusion.      Right Ear: Hearing, tympanic membrane, ear canal and external ear normal. No drainage.      Left Ear: Hearing, tympanic membrane, ear canal and external ear normal. No drainage.      Nose: Nose normal. No rhinorrhea or epistaxis.      Mouth/Throat:      Mouth: Oropharynx is clear and moist and mucous membranes are normal.      Pharynx: Uvula midline. No oropharyngeal exudate, posterior oropharyngeal edema, posterior oropharyngeal erythema or uvula swelling.      Tonsils: No tonsillar exudate. 0 on the right. 0 on the left.   Eyes:      General:         Right eye: No discharge.         Left eye: No discharge.      Extraocular Movements:      Right eye: Normal extraocular motion and no nystagmus.      Left eye: Normal extraocular motion and no nystagmus.      Conjunctiva/sclera: Conjunctivae normal.      Right eye: Right conjunctiva is not injected. No exudate or hemorrhage.     Left eye: Left conjunctiva is not injected. No exudate or hemorrhage.     Pupils: Pupils are equal, round, and reactive to light.   Neck:      Trachea: No tracheal deviation.   Cardiovascular:      Rate and Rhythm: Normal rate and regular rhythm.      Pulses: Intact distal pulses.      Heart sounds: Normal heart sounds. No murmur heard.    No friction rub. No gallop.   Pulmonary:      Effort: Pulmonary effort is normal. No respiratory distress.      Breath sounds: Normal breath sounds. No stridor. No wheezing or rales.   Chest:      Chest wall: Tenderness (to left lateral chest wall without crepitus, bruising, soft tissue swelling) present.   Abdominal:      General: Bowel sounds are normal. There is no distension.      Palpations: Abdomen is soft. There is no mass.      Tenderness: There is no abdominal tenderness. There is no guarding.   Musculoskeletal:         General: Normal range of  motion.      Cervical back: Normal range of motion and neck supple. Tenderness (spinous process and paraspinous process) present. No rigidity.      Right hip: Tenderness (left lateral thigh and femoral head without shortening of leg) present. No deformity or lacerations. Normal range of motion.   Lymphadenopathy:      Cervical: No cervical adenopathy.   Skin:     General: Skin is warm and dry.      Capillary Refill: Capillary refill takes less than 2 seconds.      Coloration: Skin is not pale.      Findings: No erythema or rash.   Neurological:      Mental Status: She is alert and oriented to person, place, and time.      GCS: GCS eye subscore is 4. GCS verbal subscore is 5. GCS motor subscore is 6.      Cranial Nerves: No cranial nerve deficit.      Sensory: No sensory deficit.      Motor: No abnormal muscle tone.      Coordination: Coordination normal.      Deep Tendon Reflexes: Reflexes are normal and symmetric. Reflexes normal.   Psychiatric:         Mood and Affect: Mood and affect and mood normal.         Behavior: Behavior normal. Behavior is cooperative.         ED Course                 Procedures    Results for orders placed or performed during the hospital encounter of 10/17/22 (from the past 24 hour(s))   Basic metabolic panel   Result Value Ref Range    Sodium 143 136 - 145 mmol/L    Potassium 3.0 (L) 3.4 - 5.3 mmol/L    Chloride 107 98 - 107 mmol/L    Carbon Dioxide (CO2) 27 22 - 29 mmol/L    Anion Gap 9 7 - 15 mmol/L    Urea Nitrogen 29.4 (H) 8.0 - 23.0 mg/dL    Creatinine 2.26 (H) 0.51 - 0.95 mg/dL    Calcium 8.7 (L) 8.8 - 10.2 mg/dL    Glucose 105 (H) 70 - 99 mg/dL    GFR Estimate 22 (L) >60 mL/min/1.73m2   CT Head w/o Contrast    Narrative    CT SCAN OF THE HEAD WITHOUT CONTRAST   10/17/2022 1:51 PM     HISTORY: Fell and hit head this am, on Plavix and baby ASA    TECHNIQUE:  Axial images of the head and coronal reformations without  IV contrast material. Radiation dose for this scan was reduced  using  automated exposure control, adjustment of the mA and/or kV according  to patient size, or iterative reconstruction technique.    COMPARISON: None.    FINDINGS: There is no evidence of intracranial hemorrhage, mass, acute  infarct or anomaly. The ventricles are normal in size, shape and  configuration. Mild diffuse parenchymal volume loss. Mild patchy  periventricular white matter hypodensities which are nonspecific, but  likely related to chronic microvascular ischemic disease.  Atherosclerotic calcifications involving the carotid siphons.    Bilateral lens implants. The visualized orbits otherwise appear  normal. Visualized aspects of the paranasal sinuses are clear. Minimal  fluid/membrane thickening in the bilateral mastoid tips. Bilateral  temporomandibular joint degenerative changes noted. Small left  parietal region scalp hematoma. The bony calvarium and bones of the  skull base appear intact.       Impression    IMPRESSION:     1. No evidence of acute intracranial hemorrhage, mass, or herniation.  2. Mild diffuse parenchymal volume loss and white matter changes  likely due to chronic microvascular ischemic disease.   3. Left parietal scalp hematoma without underlying calvarial fracture.     CT Chest Abdomen Pelvis w/o Contrast    Narrative    CT CHEST ABDOMEN PELVIS W/O CONTRAST 10/17/2022 1:52 PM    CLINICAL HISTORY: fall this am on plavix, chest wall tenderness, left  lateral side    TECHNIQUE: CT scan of the chest, abdomen, and pelvis was performed  without IV contrast. Multiplanar reformats were obtained. Dose  reduction techniques were used.   CONTRAST: None.    COMPARISON: CT 7/9/2021, abdominal MRI 1/12/2021    FINDINGS:   LUNGS AND PLEURA: No pulmonary laceration or contusion. No significant  pleural effusion or pneumothorax. Stable size of 0.9 cm right middle  lobe pulmonary nodule since at least October 2020. Additional small  subpleural left lower lobe pulmonary nodules are also stable. No  new  or enlarging nodules visualized.    MEDIASTINUM/AXILLAE: No evidence of acute injury. Enlarged heart.  Stable aneurysmal dilation of the ascending thoracic aorta measuring  up to 4.3 cm. Interval placement of left atrial appendage exclusion  device. Stable borderline enlargement of right axillary lymph nodes,  largest measuring 1.1 cm in short axis (series 3 image 46). No new  lymphadenopathy. Large sliding hiatal hernia again noted.    CORONARY ARTERY CALCIFICATION: Mild.    HEPATOBILIARY: Unchanged hepatic cysts, no specific follow-up  recommended. No evidence of acute injury.    PANCREAS: Tiny pancreatic cysts better seen on prior MRI.    SPLEEN: Normal.    ADRENAL GLANDS: Normal.    KIDNEYS/BLADDER: No hydronephrosis. Urinary bladder is unremarkable in  noncontrast appearance.    BOWEL: No obstruction or inflammatory change. No mesenteric hematoma  or pneumoperitoneum.    LYMPH NODES: Interval enlargement of inguinal and retroperitoneal  lymph nodes with index left inguinal lymph node measuring 1.9 cm in  short axis, previously 1.2 cm (series 3 image 294).    VASCULATURE: Scattered calcified atherosclerosis.    PELVIC ORGANS: Hysterectomy.    OTHER: Moderate sized subcutaneous hematoma overlying the left hip and  extending into the left flank. Unable to evaluate for active bleeding  without intravenous contrast.    MUSCULOSKELETAL: No acute bony abnormality.      Impression    IMPRESSION:  1.  Moderate subcutaneous hematoma overlying the left hip and  extending into the left flank.  2.  No evidence of acute intraperitoneal trauma.  3.  No evidence of acute trauma in the chest.  4.  Stable size of multiple pulmonary nodules, all demonstrating  greater than two-year stability, no specific follow-up recommended.  5.  Slow enlargement of multiple retroperitoneal and inguinal lymph  nodes. Consider imaging follow-up and/or tissue sampling.  6.  Stable aneurysmal dilation of the ascending thoracic aorta.    RITA  MD SINA         SYSTEM ID:  [id number]   CT Cervical Spine w/o Contrast    Narrative    CT CERVICAL SPINE WITHOUT CONTRAST October 17, 2022 1:54 PM     HISTORY: Fell and hit head this morning, cervical spine tenderness.     TECHNIQUE: Axial images of the cervical spine were obtained without  intravenous contrast. Multiplanar reformations were performed.  Radiation dose for this scan was reduced using automated exposure  control, adjustment of the mA and/or kV according to patient size, or  iterative reconstruction technique.    COMPARISON: None available. Correlation is made with prior chest CT  exams 7/9/2021 and 1/12/2021 as well as CTA chest and CT abdomen and  pelvis 10/5/2020. Correlation also made with CT chest 5/2/2017.    FINDINGS: No acute cervical spine fracture is identified. Normal  vertebral body heights in the cervical region. Multilevel degenerative  endplate irregularity/shallow Schmorl's node/subcortical cystic  changes are noted, likely chronic. Straightening of the normal  cervical lordosis. Minimal anterolisthesis of C4 on C5, C6 on C7, and  C7 on T1 measuring 1-2 mm at each of those levels, probably on a  degenerative basis.    Moderate to severe disc height loss at C5-C6 and lesser degrees of  disc height loss elsewhere. Marginal endplate and uncovertebral  osteophytes at multiple levels. Multilevel degenerative facet disease.  Degenerative arthropathy of the median atlantoaxial joint. Multilevel  disc osteophyte complexes. There are varying degrees of mild/mild to  moderate multilevel spinal canal stenosis. No definite high-grade  spinal canal narrowing. There is moderate to severe degenerative left  neural foraminal stenosis at C3-C4 with varying degrees of mild and  moderate neural foraminal narrowing elsewhere.    Bilateral carotid bifurcation atherosclerotic disease is present.  Diffuse asymmetric enlargement of the left thyroid lobe has been  present on multiple prior CT exams of the  chest. Mildly prominent  scattered nonspecific cervical lymph nodes are present. For example,  there is a right level 4 lymph node that measures 9-10 mm (series 4  image 70) and bilateral level 5 lymph nodes that measure up to  approximately 11 mm (series 4 images 64 and 65). There is a left L3  lymph node that measures up to 16-17 mm in axial plane (series 4 image  52). These could be reactive, but they are ultimately  indeterminate/nonspecific at this time; a neoplastic process cannot be  completely excluded. Otherwise, the visualized paravertebral soft  tissues are unremarkable.      Impression    IMPRESSION:  1. No acute fracture or posttraumatic malalignment of cervical spine.  2. Multilevel degenerative changes, as described.  3. Unchanged asymmetric enlargement of the left thyroid lobe.  4. Multiple prominent/borderline enlarged bilateral cervical lymph  nodes. These are nonspecific and could be reactive, but are  technically indeterminate at this time. Consider short interval  follow-up soft tissue neck CT.   XR Femur Left 2 Views    Narrative    FEMUR LEFT TWO VIEWS October 17, 2022 2:03 PM    HISTORY: Fall, hip left femur.    COMPARISON: None.    FINDINGS: There is edema and prominence of the subcutaneous adipose  tissues around the left hip concerning for bruising. No focal density  to suggest hematoma. No acute osseous fracture seen. Visualized joint  spaces are grossly well-maintained. Mild enthesopathic changes are  seen in the far subcentimeter insertion into the left patella.  Probable bone infarct versus enchondroma distal femoral diametaphysis  is noted.      Impression    IMPRESSION:  1. No acute fracture or malalignment.  2. Prominent soft tissues along the outer proximal left thigh  overlying the area of the greater trochanter of the proximal left  femur likely represents hemorrhage into the subcutaneous adipose  tissues and bruising.       Medications - No data to display    Assessments & Plan  (with Medical Decision Making)     I have reviewed the nursing notes.    I have reviewed the findings, diagnosis, plan and need for follow up with the patient.  Shaila Triana is a 76 year old female with past medical history of ductal carcinoma currently in remission, depression, hypertension, atrial fibrillation, chronic anticoagulation who presents to the emergency department with fall.  Patient states she got up this morning and went to the bedroom door and must of slipped on something on the floor or unsure of what happened but she fell backwards hitting her head on the door and her back, left arm, left hip.  Patient denies any loss of consciousness, subsequent confusion, vision changes, hearing changes, shortness of breath, nausea, vomiting, abdominal pain.  Patient reports everything is achy including her head, her neck, her left side of her chest, her left arm, her left hip area.  She states her pain level currently is 4 out of 10.  Consider diagnosis of subarachnoid hemorrhage, subdural hematoma, skull fracture unlikely scalp hematoma is appreciated.  Consider diagnosis of rib fracture, pulmonary contusion, cardiac contusion, pneumothorax, hemothorax, cervical fracture versus neck sprain.  No acute fractures noted.  No acute bleed in the head noted.  No femoral fracture noted or hip fracture.  We will treat hematoma noted on the femur, parietal hematoma noted in left humerus hematoma noted recommend ice and pressure and discussed pain management with Tylenol and minimal oxycodone.  Recommend follow-up in 2 to 3 days.  Patient discharged in stable condition.    Discharge Medication List as of 10/17/2022  2:47 PM      START taking these medications    Details   oxyCODONE (ROXICODONE) 5 MG tablet Take 0.5-1 tablets (2.5-5 mg) by mouth every 6 hours as needed for severe pain, Disp-10 tablet, R-0, E-Prescribe             Final diagnoses:   Fall, initial encounter   Scalp hematoma, initial encounter    Concussion without loss of consciousness, initial encounter   Chest wall contusion, left, initial encounter   Leg hematoma, left, initial encounter       10/17/2022   [facility] EMERGENCY DEPT     Megan Weathers, APRN CNP  10/17/22 1523

## 2022-10-17 NOTE — DISCHARGE INSTRUCTIONS
It is important that you could somebody check in on you frequently over the next 2 to 3 days.  It would be best if someone was able to be with you at all times.  You may take Tylenol 1000 mg by mouth 3 times daily.  When pain is more severe you may add 1/2 tablet to 1 full tablet of oxycodone twice daily for additional pain relief.  This is a narcotic and highly addictive will and can cause severe constipation and drowsiness.  You should not take this medication and drive.  You should also be on a stool softener if you are taking this medication.    I recommend ice to the hematoma on the head and the left leg and the ribs area.  Ice 15 minutes on every hour would be helpful.  Recommend follow-up visit with primary care in 2 to 3 days.  Return to the emergency room if you develop mental confusion, nausea, uncontrolled vomiting, acute worsening headache.     V-Y Plasty Text: The defect edges were debeveled with a #15 scalpel blade.  Given the location of the defect, shape of the defect and the proximity to free margins an V-Y advancement flap was deemed most appropriate.  Using a sterile surgical marker, an appropriate advancement flap was drawn incorporating the defect and placing the expected incisions within the relaxed skin tension lines where possible.    The area thus outlined was incised deep to adipose tissue with a #15 scalpel blade.  The skin margins were undermined to an appropriate distance in all directions utilizing iris scissors.

## 2022-11-28 ENCOUNTER — HOSPITAL ENCOUNTER (EMERGENCY)
Facility: CLINIC | Age: 77
Discharge: HOME | End: 2022-11-28
Attending: NURSE PRACTITIONER | Admitting: NURSE PRACTITIONER
Payer: COMMERCIAL

## 2022-11-28 VITALS
DIASTOLIC BLOOD PRESSURE: 108 MMHG | TEMPERATURE: 98.6 F | SYSTOLIC BLOOD PRESSURE: 153 MMHG | HEART RATE: 96 BPM | OXYGEN SATURATION: 97 % | RESPIRATION RATE: 28 BRPM

## 2022-11-28 DIAGNOSIS — J40 BRONCHITIS: ICD-10-CM

## 2022-11-28 LAB
FLUAV RNA SPEC QL NAA+PROBE: NEGATIVE
FLUBV RNA RESP QL NAA+PROBE: NEGATIVE
SARS-COV-2 RNA RESP QL NAA+PROBE: NEGATIVE

## 2022-11-28 PROCEDURE — 87636 SARSCOV2 & INF A&B AMP PRB: CPT | Performed by: NURSE PRACTITIONER

## 2022-11-28 PROCEDURE — G0463 HOSPITAL OUTPT CLINIC VISIT: HCPCS | Mod: CS | Performed by: NURSE PRACTITIONER

## 2022-11-28 PROCEDURE — C9803 HOPD COVID-19 SPEC COLLECT: HCPCS | Performed by: NURSE PRACTITIONER

## 2022-11-28 PROCEDURE — 99213 OFFICE O/P EST LOW 20 MIN: CPT | Mod: CS | Performed by: NURSE PRACTITIONER

## 2022-11-28 RX ORDER — ALBUTEROL SULFATE 90 UG/1
2 AEROSOL, METERED RESPIRATORY (INHALATION) EVERY 6 HOURS PRN
Qty: 18 G | Refills: 0 | Status: SHIPPED | OUTPATIENT
Start: 2022-11-28

## 2022-11-28 RX ORDER — GUAIFENESIN AND DEXTROMETHORPHAN HYDROBROMIDE 600; 30 MG/1; MG/1
1 TABLET, EXTENDED RELEASE ORAL EVERY 12 HOURS
Qty: 30 TABLET | Refills: 0 | Status: SHIPPED | OUTPATIENT
Start: 2022-11-28 | End: 2023-01-01

## 2022-11-28 RX ORDER — BENZONATATE 200 MG/1
200 CAPSULE ORAL 3 TIMES DAILY PRN
Qty: 30 CAPSULE | Refills: 0 | Status: SHIPPED | OUTPATIENT
Start: 2022-11-28 | End: 2023-01-01

## 2022-11-28 ASSESSMENT — ENCOUNTER SYMPTOMS
RHINORRHEA: 0
MYALGIAS: 0
SINUS PAIN: 0
ARTHRALGIAS: 0
ABDOMINAL PAIN: 0
WEAKNESS: 0
SINUS PRESSURE: 0
NUMBNESS: 0
COUGH: 1
VOMITING: 0
HEADACHES: 1
LIGHT-HEADEDNESS: 0
EYE DISCHARGE: 0
FATIGUE: 0
DIZZINESS: 0
FEVER: 0
CHILLS: 0
JOINT SWELLING: 0
NAUSEA: 1
TROUBLE SWALLOWING: 0
SHORTNESS OF BREATH: 0
SORE THROAT: 1
EYE REDNESS: 0

## 2022-11-29 NOTE — ED PROVIDER NOTES
History     Chief Complaint   Patient presents with     Nausea     Cough     Pt has been feeling sick since Wednesday.      Headache     HPI  Shaila Triana is a 77 year old female who presents to the urgent care for evaluation of headache, cough, sore throat, nausea without vomiting for 5 days. Denies fever, dizziness, congestion, shortness of breath, chest pain, abdominal pain, nausea, vomiting, diarrhea, dysuria, hematuria and rash. Not using OTC medications for symptoms. No known sick contacts.       Allergies:  Allergies   Allergen Reactions     Codeine Nausea     Dust Mite Extract Other (See Comments)     Per allergy test     Erythromycin      Penicillin [Penicillins]        Problem List:    Patient Active Problem List    Diagnosis Date Noted     Anemia 10/05/2020     Priority: Medium     Dizziness 10/05/2020     Priority: Medium     Anemia, unspecified type 10/05/2020     Priority: Medium     Chest pain, unspecified type 10/05/2020     Priority: Medium     CKD (chronic kidney disease) stage 3, GFR 30-59 ml/min (H) 10/05/2020     Priority: Medium     Elevated lipase 10/05/2020     Priority: Medium     Pancreatic abnormality on CT 10/05/2020     Priority: Medium     Incidental finding on CT 10/5/2020 -  New 8 mm lucency of the pancreas. Likely benign but needs follow-up.       Chronic anticoagulation 10/05/2020     Priority: Medium     Due to atrial fibrillation        Ascending aorta dilatation (H) 10/05/2020     Priority: Medium     Noted on echo August 2020, stable on CT 10/5/2020.       Chronic atrial fibrillation (H) 07/31/2020     Priority: Medium     Lung nodules 04/19/2016     Priority: Medium     LLL on abd/pelvis CT 4/12/16.  No change on chest CT May 2017.  No further imaging needed.       Invasive ductal carcinoma of breast (H) 08/06/2013     Priority: Medium     Left.  ER(+) NC(+) her-2-hina (-).  Lumpectomy and sentinel biopsy followed by radiation.  Adjuvant therapy with tamoxifen completed.        Malignant neoplasm of female breast (H) 2008     Priority: Medium     Epic       Depressive disorder 2006     Priority: Medium     Essential hypertension 2006     Priority: Medium     Hypothyroidism (acquired) 2006     Priority: Medium     Pure hypercholesterolemia 2006     Priority: Medium     Sleep apnea 2006     Priority: Medium        Past Medical History:    Past Medical History:   Diagnosis Date     Depressive disorder 2006     Essential hypertension 2006     Hypothyroidism (acquired) 2006     Invasive ductal carcinoma of breast (H) 2013     Lung nodules 2016     Malignant neoplasm of female breast (H) 3/18/2008     New onset atrial fibrillation (H) 2020     Pure hypercholesterolemia 2006     Sleep apnea 2006       Past Surgical History:    Past Surgical History:   Procedure Laterality Date     APPENDECTOMY           BLEPHAROPLASTY Bilateral 2010     COLONOSCOPY       ESOPHAGOSCOPY, GASTROSCOPY, DUODENOSCOPY (EGD), COMBINED N/A 10/6/2020    Procedure: ESOPHAGOGASTRODUODENOSCOPY, WITH BIOPSY and polypectomy;  Surgeon: Jorge Ramirez MD;  Location: [facility]     HERNIA REPAIR  2013     HYSTERECTOMY, JOSE  1975     LUMPECTOMY BREAST Left 2008     THYROIDECTOMY  Right     Partial     TUBAL LIGATION         Family History:    Family History   Problem Relation Age of Onset     No Known Problems Mother          age 89 after complications from a fall     Alzheimer Disease Father      Other - See Comments Sister         Polio     Colon Cancer Maternal Aunt      Breast Cancer No family hx of      Ovarian Cancer No family hx of      Prostate Cancer No family hx of        Social History:  Marital Status:   [4]  Social History     Tobacco Use     Smoking status: Never     Smokeless tobacco: Never   Substance Use Topics     Alcohol use: Yes     Comment: occ     Drug use: Never        Medications:    albuterol (PROAIR HFA/PROVENTIL  HFA/VENTOLIN HFA) 108 (90 Base) MCG/ACT inhaler  benzonatate (TESSALON) 200 MG capsule  dextromethorphan-guaiFENesin (MUCINEX DM)  MG 12 hr tablet  aspirin (ASA) 81 MG EC tablet  calcium 600 MG tablet  calcium carbonate (OS-ANIKA) 1500 (600 Ca) MG tablet  clopidogrel (PLAVIX) 75 MG tablet  diltiazem ER (TIAZAC) 180 MG 24 hr ER beaded capsule  ferrous gluconate (FERGON) 324 (38 Fe) MG tablet  fluticasone (VERAMYST) 27.5 MCG/SPRAY nasal spray  furosemide (LASIX) 40 MG tablet  ketorolac (ACULAR) 0.5 % ophthalmic solution  Levothyroxine Sodium 100 MCG CAPS  metoprolol succinate ER (TOPROL-XL) 100 MG 24 hr tablet  ofloxacin (OCUFLOX) 0.3 % ophthalmic solution  omeprazole 20 MG tablet  prednisoLONE acetate (PRED FORTE) 1 % ophthalmic suspension  rosuvastatin (CRESTOR) 20 MG tablet  sertraline (ZOLOFT) 100 MG tablet          Review of Systems   Constitutional: Negative for chills, fatigue and fever.   HENT: Positive for sore throat. Negative for congestion, ear discharge, ear pain, rhinorrhea, sinus pressure, sinus pain and trouble swallowing.    Eyes: Negative for discharge and redness.   Respiratory: Positive for cough. Negative for shortness of breath.    Cardiovascular: Negative for chest pain.   Gastrointestinal: Positive for nausea. Negative for abdominal pain and vomiting.   Musculoskeletal: Negative for arthralgias, joint swelling and myalgias.   Skin: Negative for rash.   Neurological: Positive for headaches. Negative for dizziness, weakness, light-headedness and numbness.   All other systems reviewed and are negative.      Physical Exam   BP: (!) 153/108  Pulse: 96  Temp: 98.6  F (37  C)  Resp: 28  SpO2: 97 %      Physical Exam  Constitutional:       General: She is not in acute distress.     Appearance: She is well-developed. She is not diaphoretic.   Eyes:      Conjunctiva/sclera: Conjunctivae normal.      Pupils: Pupils are equal, round, and reactive to light.   Cardiovascular:      Rate and Rhythm: Normal  rate and regular rhythm.      Pulses: Normal pulses.   Pulmonary:      Effort: Pulmonary effort is normal. No respiratory distress.      Breath sounds: Normal air entry. No decreased air movement. Wheezing present. No decreased breath sounds or rhonchi.   Abdominal:      General: There is no distension.      Palpations: Abdomen is soft.      Tenderness: There is no abdominal tenderness.   Musculoskeletal:         General: Normal range of motion.      Cervical back: Normal range of motion and neck supple.   Skin:     General: Skin is warm.      Capillary Refill: Capillary refill takes less than 2 seconds.   Neurological:      General: No focal deficit present.      Mental Status: She is alert and oriented to person, place, and time.   Psychiatric:         Mood and Affect: Mood normal.         ED Course                 Procedures      Results for orders placed or performed during the hospital encounter of 11/28/22 (from the past 24 hour(s))   Symptomatic; Yes; 11/22/2022 Influenza A/B & SARS-CoV2 (COVID-19) Virus PCR Multiplex Nasopharyngeal    Specimen: Nasopharyngeal; Swab   Result Value Ref Range    Influenza A PCR Negative Negative    Influenza B PCR Negative Negative    SARS CoV2 PCR Negative Negative    Narrative    Testing was performed using the [employer] SARS-CoV-2 & Influenza A/B Assay on the [employer] System. This test should be ordered for the detection of SARS-CoV-2 and influenza viruses in individuals who meet clinical and/or epidemiological criteria. Test performance is unknown in asymptomatic patients. This test is for in vitro diagnostic use under the FDA EUA for laboratories certified under CLIA to perform moderate and/or high complexity testing. This test has not been FDA cleared or approved. A negative result does not rule out the presence of PCR inhibitors in the specimen or target RNA in concentration below the limit of detection for the assay. If only one viral target is positive but coinfection  with multiple targets is suspected, the sample should be re-tested with another FDA cleared, approved or authorized test, if coinfection would change clinical management. [facility] are certified under the Clinical Laboratory Improvement Amendments of 1988 (CLIA-88) as qualified to perform moderate and/or high complexity laboratory testing.       Medications - No data to display    Assessments & Plan (with Medical Decision Making)   Shaila Triana is a 77 year old female who presents to the urgent care for evaluation of headache, cough, sore throat, nausea without vomiting for 5 days. Hypertensive, remaining vitals normal, reports 'white coat syndrome' and will continue to monitor BP at home. Physical exam as described above. Exam consistent with viral bronchitis, low concern for pneumonia, pneumothorax, PE or pleural effusion. Discussed viral etiology of symptoms and average course of viral illness. Would like to try albuterol and benzonatate. Imaging and antibiotics not indicated at this time. May use over the counter medications as needed and appropriate. Increase rest and hydration. Return precautions reviewed, all questions answered. Patient is agreeable to plan of care and discharged in good condition.    I have reviewed the nursing notes.    I have reviewed the findings, diagnosis, plan and need for follow up with the patient.      Discharge Medication List as of 11/28/2022  6:17 PM      START taking these medications    Details   albuterol (PROAIR HFA/PROVENTIL HFA/VENTOLIN HFA) 108 (90 Base) MCG/ACT inhaler Inhale 2 puffs into the lungs every 6 hours as needed for shortness of breath / dyspnea or wheezing, Disp-18 g, R-0, E-Prescribe      benzonatate (TESSALON) 200 MG capsule Take 1 capsule (200 mg) by mouth 3 times daily as needed for cough, Disp-30 capsule, R-0, E-Prescribe      dextromethorphan-guaiFENesin (MUCINEX DM)  MG 12 hr tablet Take 1 tablet by mouth every 12 hours,  Disp-30 tablet, R-0, E-Prescribe             Final diagnoses:   Bronchitis       11/28/2022   [facility] EMERGENCY DEPT     Lorena Hsu, APRN CNP  11/28/22 1928

## 2022-11-29 NOTE — RESULT ENCOUNTER NOTE
Negative for Influenza A, Influenza B, and Covid19.  Patient will receive the Covid19 result via [employer] and a letter will be sent via [employer] (if active) or via the mail

## 2022-12-29 ENCOUNTER — HOSPITAL ENCOUNTER (EMERGENCY)
Facility: CLINIC | Age: 77
Discharge: HOME | End: 2022-12-29
Attending: FAMILY MEDICINE | Admitting: FAMILY MEDICINE
Payer: COMMERCIAL

## 2022-12-29 ENCOUNTER — APPOINTMENT (OUTPATIENT)
Dept: CT IMAGING | Facility: CLINIC | Age: 77
End: 2022-12-29
Attending: FAMILY MEDICINE
Payer: COMMERCIAL

## 2022-12-29 VITALS
HEART RATE: 103 BPM | SYSTOLIC BLOOD PRESSURE: 162 MMHG | HEIGHT: 62 IN | TEMPERATURE: 97.8 F | DIASTOLIC BLOOD PRESSURE: 124 MMHG | WEIGHT: 160 LBS | BODY MASS INDEX: 29.44 KG/M2 | OXYGEN SATURATION: 93 % | RESPIRATION RATE: 16 BRPM

## 2022-12-29 DIAGNOSIS — N28.9 RENAL INSUFFICIENCY: ICD-10-CM

## 2022-12-29 DIAGNOSIS — R10.9 ABDOMINAL PAIN, UNSPECIFIED ABDOMINAL LOCATION: ICD-10-CM

## 2022-12-29 DIAGNOSIS — I50.9 CONGESTIVE HEART FAILURE, UNSPECIFIED HF CHRONICITY, UNSPECIFIED HEART FAILURE TYPE (H): ICD-10-CM

## 2022-12-29 LAB
ALBUMIN SERPL BCG-MCNC: 3.7 G/DL (ref 3.5–5.2)
ALBUMIN UR-MCNC: 30 MG/DL
ALP SERPL-CCNC: 84 U/L (ref 35–104)
ALT SERPL W P-5'-P-CCNC: 35 U/L (ref 10–35)
ANION GAP SERPL CALCULATED.3IONS-SCNC: 11 MMOL/L (ref 7–15)
APPEARANCE UR: CLEAR
AST SERPL W P-5'-P-CCNC: 40 U/L (ref 10–35)
BASOPHILS # BLD AUTO: 0.1 10E3/UL (ref 0–0.2)
BASOPHILS NFR BLD AUTO: 1 %
BILIRUB SERPL-MCNC: 0.5 MG/DL
BILIRUB UR QL STRIP: NEGATIVE
BUN SERPL-MCNC: 21.2 MG/DL (ref 8–23)
CALCIUM SERPL-MCNC: 8.6 MG/DL (ref 8.8–10.2)
CHLORIDE SERPL-SCNC: 106 MMOL/L (ref 98–107)
COLOR UR AUTO: ABNORMAL
CREAT SERPL-MCNC: 2.4 MG/DL (ref 0.51–0.95)
DEPRECATED HCO3 PLAS-SCNC: 23 MMOL/L (ref 22–29)
EOSINOPHIL # BLD AUTO: 0.1 10E3/UL (ref 0–0.7)
EOSINOPHIL NFR BLD AUTO: 2 %
ERYTHROCYTE [DISTWIDTH] IN BLOOD BY AUTOMATED COUNT: 16.4 % (ref 10–15)
GFR SERPL CREATININE-BSD FRML MDRD: 20 ML/MIN/1.73M2
GLUCOSE SERPL-MCNC: 111 MG/DL (ref 70–99)
GLUCOSE UR STRIP-MCNC: NEGATIVE MG/DL
HCT VFR BLD AUTO: 28.3 % (ref 35–47)
HGB BLD-MCNC: 8.9 G/DL (ref 11.7–15.7)
HGB UR QL STRIP: ABNORMAL
HYALINE CASTS: 3 /LPF
IMM GRANULOCYTES # BLD: 0.1 10E3/UL
IMM GRANULOCYTES NFR BLD: 1 %
KETONES UR STRIP-MCNC: NEGATIVE MG/DL
LEUKOCYTE ESTERASE UR QL STRIP: NEGATIVE
LYMPHOCYTES # BLD AUTO: 0.6 10E3/UL (ref 0.8–5.3)
LYMPHOCYTES NFR BLD AUTO: 9 %
MCH RBC QN AUTO: 28.1 PG (ref 26.5–33)
MCHC RBC AUTO-ENTMCNC: 31.4 G/DL (ref 31.5–36.5)
MCV RBC AUTO: 89 FL (ref 78–100)
MONOCYTES # BLD AUTO: 0.8 10E3/UL (ref 0–1.3)
MONOCYTES NFR BLD AUTO: 11 %
MUCOUS THREADS #/AREA URNS LPF: PRESENT /LPF
NEUTROPHILS # BLD AUTO: 5.5 10E3/UL (ref 1.6–8.3)
NEUTROPHILS NFR BLD AUTO: 76 %
NITRATE UR QL: NEGATIVE
NRBC # BLD AUTO: 0 10E3/UL
NRBC BLD AUTO-RTO: 0 /100
NT-PROBNP SERPL-MCNC: ABNORMAL PG/ML (ref 0–1800)
PH UR STRIP: 5 [PH] (ref 5–7)
PLATELET # BLD AUTO: 176 10E3/UL (ref 150–450)
POTASSIUM SERPL-SCNC: 3.9 MMOL/L (ref 3.4–5.3)
PROT SERPL-MCNC: 7 G/DL (ref 6.4–8.3)
RBC # BLD AUTO: 3.17 10E6/UL (ref 3.8–5.2)
RBC URINE: 21 /HPF
SODIUM SERPL-SCNC: 140 MMOL/L (ref 136–145)
SP GR UR STRIP: 1.01 (ref 1–1.03)
SQUAMOUS EPITHELIAL: 2 /HPF
UROBILINOGEN UR STRIP-MCNC: NORMAL MG/DL
WBC # BLD AUTO: 7.2 10E3/UL (ref 4–11)
WBC URINE: 3 /HPF

## 2022-12-29 PROCEDURE — 36415 COLL VENOUS BLD VENIPUNCTURE: CPT | Performed by: FAMILY MEDICINE

## 2022-12-29 PROCEDURE — 93010 ELECTROCARDIOGRAM REPORT: CPT | Performed by: FAMILY MEDICINE

## 2022-12-29 PROCEDURE — 96374 THER/PROPH/DIAG INJ IV PUSH: CPT | Performed by: FAMILY MEDICINE

## 2022-12-29 PROCEDURE — 99285 EMERGENCY DEPT VISIT HI MDM: CPT | Mod: 25 | Performed by: FAMILY MEDICINE

## 2022-12-29 PROCEDURE — 71250 CT THORAX DX C-: CPT

## 2022-12-29 PROCEDURE — 85025 COMPLETE CBC W/AUTO DIFF WBC: CPT | Performed by: FAMILY MEDICINE

## 2022-12-29 PROCEDURE — 80053 COMPREHEN METABOLIC PANEL: CPT | Performed by: FAMILY MEDICINE

## 2022-12-29 PROCEDURE — 250N000013 HC RX MED GY IP 250 OP 250 PS 637: Performed by: FAMILY MEDICINE

## 2022-12-29 PROCEDURE — 250N000011 HC RX IP 250 OP 636: Performed by: FAMILY MEDICINE

## 2022-12-29 PROCEDURE — 83880 ASSAY OF NATRIURETIC PEPTIDE: CPT | Performed by: FAMILY MEDICINE

## 2022-12-29 PROCEDURE — 81001 URINALYSIS AUTO W/SCOPE: CPT | Performed by: FAMILY MEDICINE

## 2022-12-29 PROCEDURE — 93005 ELECTROCARDIOGRAM TRACING: CPT | Performed by: FAMILY MEDICINE

## 2022-12-29 RX ORDER — FUROSEMIDE 40 MG
40 TABLET ORAL DAILY
Qty: 10 TABLET | Refills: 0 | Status: SHIPPED | OUTPATIENT
Start: 2022-12-29 | End: 2023-01-01

## 2022-12-29 RX ORDER — FUROSEMIDE 10 MG/ML
40 INJECTION INTRAMUSCULAR; INTRAVENOUS ONCE
Status: COMPLETED | OUTPATIENT
Start: 2022-12-29 | End: 2022-12-29

## 2022-12-29 RX ADMIN — FUROSEMIDE 40 MG: 10 INJECTION, SOLUTION INTRAMUSCULAR; INTRAVENOUS at 22:08

## 2022-12-29 RX ADMIN — NITROGLYCERIN 15 MG: 20 OINTMENT TOPICAL at 22:09

## 2022-12-29 ASSESSMENT — ACTIVITIES OF DAILY LIVING (ADL)
ADLS_ACUITY_SCORE: 35
ADLS_ACUITY_SCORE: 35

## 2022-12-29 NOTE — ED TRIAGE NOTES
Also on plavix and has fallen a few times. Bruising to right upper arm.denies falling and hitting her head.

## 2022-12-30 NOTE — DISCHARGE INSTRUCTIONS
Restart Lasix.  Lasix 40 mg p.o. daily every morning x10 days.  There is considerable evidence that your heart failure is exacerbated today.  We will need to at least briefly use Lasix to get some fluid off for you.  Unfortunately her kidneys are also not working well.  You will need follow-up in clinic with your primary care provider in the next 10 to 14 days with a recheck of your BMP at that point.

## 2022-12-30 NOTE — ED PROVIDER NOTES
History     Chief Complaint   Patient presents with     Shortness of Breath     Abdominal Pain     HPI  Shaila Triana is a 77 year old female, past medical history is significant for anemia, dizziness, chest pain, stage III renal disease, chronic anticoagulation due to atrial fibrillation, ascending aortic dilatation, chronic atrial fibrillation, lung nodules, breast cancer, depression, hypertension, hypothyroidism, hypercholesterolemia, obstructive sleep apnea, presents to the emergency department with concerns of shortness of breath, exertional dyspnea, abdominal pain.  History is obtained from the patient who identifies that she has been short of breath on exertion for about the last year and a half, its not getting any better and she feels that her doctor is not listening to her and does not know why she is short of breath.  She came in today because she was walking in [facility] this afternoon and found that she was perhaps slightly more short of breath than usual and felt it was time to do something about it.  There is no associated chest pain although she does suffer from intermittent nausea which was a feature of the shortness of breath this time as it has been occasionally in the past.  She identifies intermittent nausea for about the last year and a half.  She denied any fever chills or sweats.  No URI type symptoms other than baseline chronic cough and clear rhinorrhea.  She has been eating and drinking she thinks normally.  I reviewed notes in the EHR from her primary care provider including the most recent telephone encounter from 12/21/2022.  She additionally identifies abdominal pain on intermittent basis, she states that she struggles with constipation, last bowel movement was a small and hard difficult to pass yesterday.  The abdominal pain is perhaps slightly worse today in the epigastric area.        Telephone Encounter - Mervin Ibarra MD - 12/21/2022 1:20 PM CST  Formatting of this note  might be different from the original.  Please let the patient know that her kidney function is still abnormal but improved, and her potassium level went back into the normal range since we stopped her furosemide. I recommend she remain off that for now and follow-up for another lab visit in about 2 weeks and then see me a couple of days afterwards to go over the results. From there we can decide whether we can restart her water pill or if she should remain off of it.     Allergies:  Allergies   Allergen Reactions     Codeine Nausea     Dust Mite Extract Other (See Comments)     Per allergy test     Erythromycin      Penicillin [Penicillins]        Problem List:    Patient Active Problem List    Diagnosis Date Noted     Anemia 10/05/2020     Priority: Medium     Dizziness 10/05/2020     Priority: Medium     Anemia, unspecified type 10/05/2020     Priority: Medium     Chest pain, unspecified type 10/05/2020     Priority: Medium     CKD (chronic kidney disease) stage 3, GFR 30-59 ml/min (H) 10/05/2020     Priority: Medium     Elevated lipase 10/05/2020     Priority: Medium     Pancreatic abnormality on CT 10/05/2020     Priority: Medium     Incidental finding on CT 10/5/2020 -  New 8 mm lucency of the pancreas. Likely benign but needs follow-up.       Chronic anticoagulation 10/05/2020     Priority: Medium     Due to atrial fibrillation        Ascending aorta dilatation (H) 10/05/2020     Priority: Medium     Noted on echo August 2020, stable on CT 10/5/2020.       Chronic atrial fibrillation (H) 07/31/2020     Priority: Medium     Lung nodules 04/19/2016     Priority: Medium     LLL on abd/pelvis CT 4/12/16.  No change on chest CT May 2017.  No further imaging needed.       Invasive ductal carcinoma of breast (H) 08/06/2013     Priority: Medium     Left.  ER(+) [place](+) her-2-hina (-).  Lumpectomy and sentinel biopsy followed by radiation.  Adjuvant therapy with tamoxifen completed.       Malignant neoplasm of female  breast (H) 2008     Priority: Medium     Epic       Depressive disorder 2006     Priority: Medium     Essential hypertension 2006     Priority: Medium     Hypothyroidism (acquired) 2006     Priority: Medium     Pure hypercholesterolemia 2006     Priority: Medium     Sleep apnea 2006     Priority: Medium        Past Medical History:    Past Medical History:   Diagnosis Date     Depressive disorder 2006     Essential hypertension 2006     Hypothyroidism (acquired) 2006     Invasive ductal carcinoma of breast (H) 2013     Lung nodules 2016     Malignant neoplasm of female breast (H) 3/18/2008     New onset atrial fibrillation (H) 2020     Pure hypercholesterolemia 2006     Sleep apnea 2006       Past Surgical History:    Past Surgical History:   Procedure Laterality Date     APPENDECTOMY           BLEPHAROPLASTY Bilateral 2010     COLONOSCOPY       ESOPHAGOSCOPY, GASTROSCOPY, DUODENOSCOPY (EGD), COMBINED N/A 10/6/2020    Procedure: ESOPHAGOGASTRODUODENOSCOPY, WITH BIOPSY and polypectomy;  Surgeon: Jorge Ramirez MD;  Location: [facility]     HERNIA REPAIR  2013     HYSTERECTOMY, JOSE  1975     LUMPECTOMY BREAST Left 2008     THYROIDECTOMY  Right     Partial     TUBAL LIGATION         Family History:    Family History   Problem Relation Age of Onset     No Known Problems Mother          age 89 after complications from a fall     Alzheimer Disease Father      Other - See Comments Sister         Polio     Colon Cancer Maternal Aunt      Breast Cancer No family hx of      Ovarian Cancer No family hx of      Prostate Cancer No family hx of        Social History:  Marital Status:   [4]  Social History     Tobacco Use     Smoking status: Never     Smokeless tobacco: Never   Substance Use Topics     Alcohol use: Yes     Comment: occ     Drug use: Never        Medications:    albuterol (PROAIR HFA/PROVENTIL HFA/VENTOLIN HFA) 108 (90 Base)  "MCG/ACT inhaler  aspirin (ASA) 81 MG EC tablet  benzonatate (TESSALON) 200 MG capsule  calcium 600 MG tablet  calcium carbonate (OS-ANIKA) 1500 (600 Ca) MG tablet  clopidogrel (PLAVIX) 75 MG tablet  dextromethorphan-guaiFENesin (MUCINEX DM)  MG 12 hr tablet  diltiazem ER (TIAZAC) 180 MG 24 hr ER beaded capsule  ferrous gluconate (FERGON) 324 (38 Fe) MG tablet  fluticasone (VERAMYST) 27.5 MCG/SPRAY nasal spray  furosemide (LASIX) 40 MG tablet  ketorolac (ACULAR) 0.5 % ophthalmic solution  Levothyroxine Sodium 100 MCG CAPS  metoprolol succinate ER (TOPROL-XL) 100 MG 24 hr tablet  ofloxacin (OCUFLOX) 0.3 % ophthalmic solution  omeprazole 20 MG tablet  prednisoLONE acetate (PRED FORTE) 1 % ophthalmic suspension  rosuvastatin (CRESTOR) 20 MG tablet  sertraline (ZOLOFT) 100 MG tablet          Review of Systems   All other systems reviewed and are negative.      Physical Exam   BP: (!) 143/91  Pulse: 115  Temp: 97.8  F (36.6  C)  Resp: 16  Height: 157.5 cm (5' 2\")  Weight: 72.6 kg (160 lb)  SpO2: 96 %      Physical Exam  Vitals and nursing note reviewed.   Constitutional:       General: She is not in acute distress.     Appearance: She is well-developed. She is not ill-appearing.   HENT:      Head: Normocephalic and atraumatic.      Mouth/Throat:      Mouth: Mucous membranes are moist.      Pharynx: Oropharynx is clear.   Eyes:      Extraocular Movements: Extraocular movements intact.      Pupils: Pupils are equal, round, and reactive to light.   Cardiovascular:      Rate and Rhythm: Normal rate and regular rhythm.   Pulmonary:      Effort: Pulmonary effort is normal.      Comments: Inspiratory crackles bibasilar  Abdominal:      General: Bowel sounds are normal.      Palpations: Abdomen is soft.   Musculoskeletal:         General: Normal range of motion.      Cervical back: Normal range of motion and neck supple.      Right lower leg: Edema present.      Left lower leg: Edema present.   Skin:     General: Skin is warm " and dry.      Capillary Refill: Capillary refill takes less than 2 seconds.   Neurological:      General: No focal deficit present.      Mental Status: She is alert and oriented to person, place, and time.   Psychiatric:         Mood and Affect: Mood normal.         Behavior: Behavior normal.         ED Course                 Procedures              EKG Interpretation:      Interpreted by Dale Ortez MD  Time reviewed: Time obtained 2105 time interpreted same 81 bpm rate controlled atrial fibrillation no acute ST-T wave changes.      Critical Care time:  none               Results for orders placed or performed during the hospital encounter of 12/29/22 (from the past 24 hour(s))   CBC with platelets, differential    Narrative    The following orders were created for panel order CBC with platelets, differential.  Procedure                               Abnormality         Status                     ---------                               -----------         ------                     CBC with platelets and d...[[id number]]  Abnormal            Final result                 Please view results for these tests on the individual orders.   Comprehensive metabolic panel   Result Value Ref Range    Sodium 140 136 - 145 mmol/L    Potassium 3.9 3.4 - 5.3 mmol/L    Chloride 106 98 - 107 mmol/L    Carbon Dioxide (CO2) 23 22 - 29 mmol/L    Anion Gap 11 7 - 15 mmol/L    Urea Nitrogen 21.2 8.0 - 23.0 mg/dL    Creatinine 2.40 (H) 0.51 - 0.95 mg/dL    Calcium 8.6 (L) 8.8 - 10.2 mg/dL    Glucose 111 (H) 70 - 99 mg/dL    Alkaline Phosphatase 84 35 - 104 U/L    AST 40 (H) 10 - 35 U/L    ALT 35 10 - 35 U/L    Protein Total 7.0 6.4 - 8.3 g/dL    Albumin 3.7 3.5 - 5.2 g/dL    Bilirubin Total 0.5 <=1.2 mg/dL    GFR Estimate 20 (L) >60 mL/min/1.73m2   NT pro BNP   Result Value Ref Range    N terminal Pro BNP Inpatient 15,299 (H) 0 - 1,800 pg/mL   CBC with platelets and differential   Result Value Ref Range    WBC Count 7.2 4.0 -  11.0 10e3/uL    RBC Count 3.17 (L) 3.80 - 5.20 10e6/uL    Hemoglobin 8.9 (L) 11.7 - 15.7 g/dL    Hematocrit 28.3 (L) 35.0 - 47.0 %    MCV 89 78 - 100 fL    MCH 28.1 26.5 - 33.0 pg    MCHC 31.4 (L) 31.5 - 36.5 g/dL    RDW 16.4 (H) 10.0 - 15.0 %    Platelet Count 176 150 - 450 10e3/uL    % Neutrophils 76 %    % Lymphocytes 9 %    % Monocytes 11 %    % Eosinophils 2 %    % Basophils 1 %    % Immature Granulocytes 1 %    NRBCs per 100 WBC 0 <1 /100    Absolute Neutrophils 5.5 1.6 - 8.3 10e3/uL    Absolute Lymphocytes 0.6 (L) 0.8 - 5.3 10e3/uL    Absolute Monocytes 0.8 0.0 - 1.3 10e3/uL    Absolute Eosinophils 0.1 0.0 - 0.7 10e3/uL    Absolute Basophils 0.1 0.0 - 0.2 10e3/uL    Absolute Immature Granulocytes 0.1 <=0.4 10e3/uL    Absolute NRBCs 0.0 10e3/uL   CT Chest Abdomen Pelvis w/o Contrast    Narrative    EXAM: CT CHEST ABDOMEN PELVIS W/O CONTRAST  LOCATION: [facility]  DATE/TIME: 12/29/2022 10:30 PM    INDICATION: Shortness of breath, chest tightness, abdominal pain, acute kidney injury  COMPARISON: 10/17/2022, 10/05/2020  TECHNIQUE: CT scan of the chest, abdomen, and pelvis was performed without IV contrast. Multiplanar reformats were obtained. Dose reduction techniques were used.   CONTRAST: None.    FINDINGS:   LOWER NECK: Unchanged asymmetrically enlarged left thyroid lobe.    LUNGS AND PLEURA: Patchy bilateral airspace opacities, predominantly central in distribution, although slightly asymmetric in the right upper lobe. There is mild interlobular septal thickening at the lung apices. Small bilateral pleural effusions   adjacent compressive atelectasis. No pneumothorax. Unchanged right middle lobe nodule measuring 0.9 cm, stable since 2020, benign; no follow-up indicated.    MEDIASTINUM/AXILLAE: Unchanged enlarged right axillary lymph node measuring 1.3 cm in short axis. New enlarged right paratracheal lymph nodes measuring up to 1.3 cm in short axis. New enlarged precarinal lymph  node measuring 1.5 cm in short axis.   Unchanged ascending thoracic aortic aneurysm measuring up to 4.3 cm. Moderate aortic calcifications. Mild cardiomegaly. Redemonstrated left atrial appendage occluder device. No pericardial effusion.    CORONARY ARTERY CALCIFICATION: Moderate.    HEPATOBILIARY: Unchanged 1.0 cm low-attenuation lesion within hepatic segment 4B/5, stable since 2020, benign. Liver otherwise appears normal. Gallbladder appears normal.    PANCREAS: Normal.    SPLEEN: There is respiratory motion artifact exaggerating the spleen size. No definite splenomegaly.    ADRENAL GLANDS: Normal.    KIDNEYS/BLADDER: Normal.    BOWEL: Large hiatal hernia. Mild colonic diverticulosis. No obstruction or inflammatory change. Appendix not visualized, although no secondary signs of acute appendicitis.    PERITONEUM/RETROPERITONEUM: Trace pelvic free fluid. No focal fluid collection. No intraperitoneal free air.    LYMPH NODES: Unchanged markedly enlarged bilateral inguinal lymph nodes, measuring 2.0 cm in short axis on the left and 1.9 cm on the right. Unchanged enlarged left pelvic and lower retroperitoneal lymph nodes. For example, a left common iliac lymph node   measures 1.2 cm in short axis. A left external iliac lymph node measures 1.6 cm in short axis.    VASCULATURE: Mild atherosclerotic calcifications.    PELVIC ORGANS: Status post hysterectomy.    MUSCULOSKELETAL: Redemonstrated intramuscular lipoma in the right erector spinae muscles at the level of the lower thoracic spine. Multilevel degenerative changes of the spine.      Impression    IMPRESSION:  1.  Patchy bilateral opacities with interlobular septal thickening, likely mild pulmonary edema. Small bilateral pleural effusions with adjacent compressive atelectasis.    2.  Trace pelvic ascites. Otherwise, no acute findings in the abdomen or pelvis.    3.  Unchanged bilateral inguinal, left pelvic, and retroperitoneal lymphadenopathy. Unchanged enlarged  right axillary lymph node. New enlarged mediastinal lymph nodes. Consider PET/CT, tissue sampling, or continued imaging follow-up.    4.  Unchanged ascending thoracic aortic aneurysm measuring 4.3 cm.       Medications   nitroGLYcerin (NITRO-BID) 2 % ointment 15 mg (15 mg Transdermal Patch/Med Applied 12/29/22 2209)   furosemide (LASIX) injection 40 mg (40 mg Intravenous Given 12/29/22 2208)       Assessments & Plan (with Medical Decision Making)   77-year-old female past medical history reviewed as above who presents to the emergency department with apparently acute worsening of chronic dyspnea by her account as well as abdominal pain of episodic intermittent ill-defined nature as described in the HPI.  Evidence of congestive failure on exam.  Extensive review of the EHR as discussed in the HPI.  Diuresis initiated in the emergency department with the patient having been off diuretics for some period of time.  Unfortunately her renal function is not very good and he is in fact slightly worsened from baseline.  Diagnostic evaluation includes CBC which demonstrated a normal white cell count, baseline anemia with hemoglobin of 8.9 and normal platelets.  The BNP is markedly elevated at 15,000-99.  Her renal function is also suffered from baseline with a creatinine of 2.40 at presentation to our BUN of 21.2.  CT is consistent with patchy bilateral opacities with interlobular septal thickening likely mild pulmonary edema small bilateral pleural effusions.  No evidence for acute infectious process nor is one suspected.  Trace pelvic ascites.  Enlarged bilateral inguinal left pelvic and retroperitoneal lymphadenopathy as discussed in the radiologist report.  This will need to be followed up in clinic it was discussed specifically with the patient for follow-up.  Unchanged ascending thoracic aortic aneurysm.  A Will restart the patient on her Lasix at her previous dose as she is not in gross congestive failure does not  require admission at this point.  She will however require diligent follow-up and I have recommended follow-up in 7 to 10 days with her primary care provider.  Return to the emergency department if worse or changes.      Disclaimer: This note consists of symbols derived from keyboarding, dictation and/or voice recognition software. As a result, there may be errors in the script that have gone undetected. Please consider this when interpreting information found in this chart.      I have reviewed the nursing notes.    I have reviewed the findings, diagnosis, plan and need for follow up with the patient.         New Prescriptions    No medications on file       Final diagnoses:   Congestive heart failure, unspecified HF chronicity, unspecified heart failure type (H)   Abdominal pain, unspecified abdominal location - Etiology unclear   Renal insufficiency - Acute on chronic       12/29/2022   [facility] EMERGENCY DEPT     Dale Ortez MD  12/29/22 1747

## 2023-01-01 ENCOUNTER — APPOINTMENT (OUTPATIENT)
Dept: GENERAL RADIOLOGY | Facility: CLINIC | Age: 78
DRG: 202 | End: 2023-01-01
Attending: EMERGENCY MEDICINE
Payer: COMMERCIAL

## 2023-01-01 ENCOUNTER — PATIENT OUTREACH (OUTPATIENT)
Dept: CARE COORDINATION | Facility: CLINIC | Age: 78
End: 2023-01-01
Payer: COMMERCIAL

## 2023-01-01 ENCOUNTER — HOSPITAL ENCOUNTER (INPATIENT)
Facility: CLINIC | Age: 78
LOS: 3 days | Discharge: HOME | DRG: 202 | End: 2023-12-28
Attending: EMERGENCY MEDICINE | Admitting: STUDENT IN AN ORGANIZED HEALTH CARE EDUCATION/TRAINING PROGRAM
Payer: COMMERCIAL

## 2023-01-01 VITALS
HEART RATE: 94 BPM | BODY MASS INDEX: 26.5 KG/M2 | DIASTOLIC BLOOD PRESSURE: 97 MMHG | HEIGHT: 62 IN | OXYGEN SATURATION: 93 % | SYSTOLIC BLOOD PRESSURE: 155 MMHG | TEMPERATURE: 97.8 F | RESPIRATION RATE: 18 BRPM | WEIGHT: 144 LBS

## 2023-01-01 DIAGNOSIS — N18.30 STAGE 3 CHRONIC KIDNEY DISEASE, UNSPECIFIED WHETHER STAGE 3A OR 3B CKD (H): ICD-10-CM

## 2023-01-01 DIAGNOSIS — J96.01 ACUTE RESPIRATORY FAILURE WITH HYPOXIA AND HYPERCARBIA (H): ICD-10-CM

## 2023-01-01 DIAGNOSIS — I48.20 CHRONIC ATRIAL FIBRILLATION (H): ICD-10-CM

## 2023-01-01 DIAGNOSIS — J20.5 ACUTE BRONCHITIS DUE TO RESPIRATORY SYNCYTIAL VIRUS (RSV): ICD-10-CM

## 2023-01-01 DIAGNOSIS — J96.02 ACUTE RESPIRATORY FAILURE WITH HYPOXIA AND HYPERCARBIA (H): ICD-10-CM

## 2023-01-01 DIAGNOSIS — J18.9 COMMUNITY ACQUIRED PNEUMONIA, UNSPECIFIED LATERALITY: ICD-10-CM

## 2023-01-01 DIAGNOSIS — E78.00 PURE HYPERCHOLESTEROLEMIA: Primary | ICD-10-CM

## 2023-01-01 LAB
ANION GAP SERPL CALCULATED.3IONS-SCNC: 11 MMOL/L (ref 7–15)
ANION GAP SERPL CALCULATED.3IONS-SCNC: 12 MMOL/L (ref 7–15)
ANION GAP SERPL CALCULATED.3IONS-SCNC: 12 MMOL/L (ref 7–15)
ANION GAP SERPL CALCULATED.3IONS-SCNC: 14 MMOL/L (ref 7–15)
BASE EXCESS BLDV CALC-SCNC: 1.4 MMOL/L (ref -7.7–1.9)
BASOPHILS # BLD AUTO: 0 10E3/UL (ref 0–0.2)
BASOPHILS NFR BLD AUTO: 0 %
BUN SERPL-MCNC: 28.7 MG/DL (ref 8–23)
BUN SERPL-MCNC: 29.8 MG/DL (ref 8–23)
BUN SERPL-MCNC: 36.2 MG/DL (ref 8–23)
BUN SERPL-MCNC: 37.9 MG/DL (ref 8–23)
CALCIUM SERPL-MCNC: 7.6 MG/DL (ref 8.8–10.2)
CALCIUM SERPL-MCNC: 7.9 MG/DL (ref 8.8–10.2)
CALCIUM SERPL-MCNC: 8.2 MG/DL (ref 8.8–10.2)
CALCIUM SERPL-MCNC: 9.2 MG/DL (ref 8.8–10.2)
CHLORIDE SERPL-SCNC: 102 MMOL/L (ref 98–107)
CHLORIDE SERPL-SCNC: 103 MMOL/L (ref 98–107)
CHLORIDE SERPL-SCNC: 104 MMOL/L (ref 98–107)
CHLORIDE SERPL-SCNC: 104 MMOL/L (ref 98–107)
CREAT SERPL-MCNC: 1.75 MG/DL (ref 0.51–0.95)
CREAT SERPL-MCNC: 1.91 MG/DL (ref 0.51–0.95)
CREAT SERPL-MCNC: 1.91 MG/DL (ref 0.51–0.95)
CREAT SERPL-MCNC: 2.18 MG/DL (ref 0.51–0.95)
DEPRECATED HCO3 PLAS-SCNC: 22 MMOL/L (ref 22–29)
DEPRECATED HCO3 PLAS-SCNC: 23 MMOL/L (ref 22–29)
DEPRECATED HCO3 PLAS-SCNC: 24 MMOL/L (ref 22–29)
DEPRECATED HCO3 PLAS-SCNC: 24 MMOL/L (ref 22–29)
EGFRCR SERPLBLD CKD-EPI 2021: 22 ML/MIN/1.73M2
EGFRCR SERPLBLD CKD-EPI 2021: 26 ML/MIN/1.73M2
EGFRCR SERPLBLD CKD-EPI 2021: 26 ML/MIN/1.73M2
EGFRCR SERPLBLD CKD-EPI 2021: 29 ML/MIN/1.73M2
EOSINOPHIL # BLD AUTO: 0.3 10E3/UL (ref 0–0.7)
EOSINOPHIL NFR BLD AUTO: 3 %
ERYTHROCYTE [DISTWIDTH] IN BLOOD BY AUTOMATED COUNT: 14.1 % (ref 10–15)
FLUAV RNA SPEC QL NAA+PROBE: NEGATIVE
FLUBV RNA RESP QL NAA+PROBE: NEGATIVE
GLUCOSE SERPL-MCNC: 105 MG/DL (ref 70–99)
GLUCOSE SERPL-MCNC: 111 MG/DL (ref 70–99)
GLUCOSE SERPL-MCNC: 113 MG/DL (ref 70–99)
GLUCOSE SERPL-MCNC: 98 MG/DL (ref 70–99)
HCO3 BLDV-SCNC: 28 MMOL/L (ref 21–28)
HCT VFR BLD AUTO: 27.6 % (ref 35–47)
HCT VFR BLD AUTO: 28.6 % (ref 35–47)
HCT VFR BLD AUTO: 29.2 % (ref 35–47)
HCT VFR BLD AUTO: 33.7 % (ref 35–47)
HGB BLD-MCNC: 10.8 G/DL (ref 11.7–15.7)
HGB BLD-MCNC: 8.8 G/DL (ref 11.7–15.7)
HGB BLD-MCNC: 9.3 G/DL (ref 11.7–15.7)
HGB BLD-MCNC: 9.3 G/DL (ref 11.7–15.7)
HOLD SPECIMEN: NORMAL
HOLD SPECIMEN: NORMAL
IMM GRANULOCYTES # BLD: 0 10E3/UL
IMM GRANULOCYTES NFR BLD: 0 %
LACTATE SERPL-SCNC: 0.7 MMOL/L (ref 0.7–2)
LYMPHOCYTES # BLD AUTO: 0.7 10E3/UL (ref 0.8–5.3)
LYMPHOCYTES NFR BLD AUTO: 9 %
MAGNESIUM SERPL-MCNC: 1.8 MG/DL (ref 1.7–2.3)
MAGNESIUM SERPL-MCNC: 1.8 MG/DL (ref 1.7–2.3)
MCH RBC QN AUTO: 28.3 PG (ref 26.5–33)
MCH RBC QN AUTO: 28.7 PG (ref 26.5–33)
MCH RBC QN AUTO: 29 PG (ref 26.5–33)
MCH RBC QN AUTO: 29.1 PG (ref 26.5–33)
MCHC RBC AUTO-ENTMCNC: 31.8 G/DL (ref 31.5–36.5)
MCHC RBC AUTO-ENTMCNC: 31.9 G/DL (ref 31.5–36.5)
MCHC RBC AUTO-ENTMCNC: 32 G/DL (ref 31.5–36.5)
MCHC RBC AUTO-ENTMCNC: 32.5 G/DL (ref 31.5–36.5)
MCV RBC AUTO: 89 FL (ref 78–100)
MCV RBC AUTO: 89 FL (ref 78–100)
MCV RBC AUTO: 90 FL (ref 78–100)
MCV RBC AUTO: 91 FL (ref 78–100)
MONOCYTES # BLD AUTO: 0.6 10E3/UL (ref 0–1.3)
MONOCYTES NFR BLD AUTO: 8 %
NEUTROPHILS # BLD AUTO: 6.1 10E3/UL (ref 1.6–8.3)
NEUTROPHILS NFR BLD AUTO: 80 %
NRBC # BLD AUTO: 0 10E3/UL
NRBC BLD AUTO-RTO: 0 /100
NT-PROBNP SERPL-MCNC: 9070 PG/ML (ref 0–1800)
O2/TOTAL GAS SETTING VFR VENT: 4 %
PCO2 BLDV: 55 MM HG (ref 40–50)
PH BLDV: 7.32 [PH] (ref 7.32–7.43)
PLATELET # BLD AUTO: 119 10E3/UL (ref 150–450)
PLATELET # BLD AUTO: 127 10E3/UL (ref 150–450)
PLATELET # BLD AUTO: 137 10E3/UL (ref 150–450)
PLATELET # BLD AUTO: 141 10E3/UL (ref 150–450)
PO2 BLDV: 19 MM HG (ref 25–47)
POTASSIUM SERPL-SCNC: 3.5 MMOL/L (ref 3.4–5.3)
POTASSIUM SERPL-SCNC: 3.5 MMOL/L (ref 3.4–5.3)
POTASSIUM SERPL-SCNC: 3.7 MMOL/L (ref 3.4–5.3)
POTASSIUM SERPL-SCNC: 3.7 MMOL/L (ref 3.4–5.3)
RBC # BLD AUTO: 3.11 10E6/UL (ref 3.8–5.2)
RBC # BLD AUTO: 3.2 10E6/UL (ref 3.8–5.2)
RBC # BLD AUTO: 3.24 10E6/UL (ref 3.8–5.2)
RBC # BLD AUTO: 3.72 10E6/UL (ref 3.8–5.2)
RSV RNA SPEC NAA+PROBE: POSITIVE
SARS-COV-2 RNA RESP QL NAA+PROBE: NEGATIVE
SODIUM SERPL-SCNC: 137 MMOL/L (ref 135–145)
SODIUM SERPL-SCNC: 139 MMOL/L (ref 135–145)
SODIUM SERPL-SCNC: 139 MMOL/L (ref 135–145)
SODIUM SERPL-SCNC: 140 MMOL/L (ref 135–145)
WBC # BLD AUTO: 7.7 10E3/UL (ref 4–11)
WBC # BLD AUTO: 7.9 10E3/UL (ref 4–11)
WBC # BLD AUTO: 8 10E3/UL (ref 4–11)
WBC # BLD AUTO: 8.1 10E3/UL (ref 4–11)

## 2023-01-01 PROCEDURE — 83735 ASSAY OF MAGNESIUM: CPT | Performed by: STUDENT IN AN ORGANIZED HEALTH CARE EDUCATION/TRAINING PROGRAM

## 2023-01-01 PROCEDURE — 999N000157 HC STATISTIC RCP TIME EA 10 MIN

## 2023-01-01 PROCEDURE — 94640 AIRWAY INHALATION TREATMENT: CPT

## 2023-01-01 PROCEDURE — 36415 COLL VENOUS BLD VENIPUNCTURE: CPT | Performed by: EMERGENCY MEDICINE

## 2023-01-01 PROCEDURE — 250N000012 HC RX MED GY IP 250 OP 636 PS 637: Performed by: STUDENT IN AN ORGANIZED HEALTH CARE EDUCATION/TRAINING PROGRAM

## 2023-01-01 PROCEDURE — 250N000013 HC RX MED GY IP 250 OP 250 PS 637: Performed by: STUDENT IN AN ORGANIZED HEALTH CARE EDUCATION/TRAINING PROGRAM

## 2023-01-01 PROCEDURE — 99285 EMERGENCY DEPT VISIT HI MDM: CPT | Mod: 25 | Performed by: EMERGENCY MEDICINE

## 2023-01-01 PROCEDURE — 83880 ASSAY OF NATRIURETIC PEPTIDE: CPT | Performed by: EMERGENCY MEDICINE

## 2023-01-01 PROCEDURE — 80048 BASIC METABOLIC PNL TOTAL CA: CPT | Performed by: STUDENT IN AN ORGANIZED HEALTH CARE EDUCATION/TRAINING PROGRAM

## 2023-01-01 PROCEDURE — 250N000011 HC RX IP 250 OP 636: Performed by: INTERNAL MEDICINE

## 2023-01-01 PROCEDURE — 250N000011 HC RX IP 250 OP 636: Performed by: STUDENT IN AN ORGANIZED HEALTH CARE EDUCATION/TRAINING PROGRAM

## 2023-01-01 PROCEDURE — 87637 SARSCOV2&INF A&B&RSV AMP PRB: CPT | Performed by: EMERGENCY MEDICINE

## 2023-01-01 PROCEDURE — 82803 BLOOD GASES ANY COMBINATION: CPT | Performed by: EMERGENCY MEDICINE

## 2023-01-01 PROCEDURE — 120N000001 HC R&B MED SURG/OB

## 2023-01-01 PROCEDURE — 250N000009 HC RX 250: Performed by: EMERGENCY MEDICINE

## 2023-01-01 PROCEDURE — 71046 X-RAY EXAM CHEST 2 VIEWS: CPT

## 2023-01-01 PROCEDURE — 96375 TX/PRO/DX INJ NEW DRUG ADDON: CPT | Performed by: EMERGENCY MEDICINE

## 2023-01-01 PROCEDURE — 83605 ASSAY OF LACTIC ACID: CPT | Performed by: EMERGENCY MEDICINE

## 2023-01-01 PROCEDURE — 85025 COMPLETE CBC W/AUTO DIFF WBC: CPT | Performed by: EMERGENCY MEDICINE

## 2023-01-01 PROCEDURE — 80048 BASIC METABOLIC PNL TOTAL CA: CPT | Performed by: EMERGENCY MEDICINE

## 2023-01-01 PROCEDURE — 250N000009 HC RX 250: Performed by: STUDENT IN AN ORGANIZED HEALTH CARE EDUCATION/TRAINING PROGRAM

## 2023-01-01 PROCEDURE — 85027 COMPLETE CBC AUTOMATED: CPT | Performed by: STUDENT IN AN ORGANIZED HEALTH CARE EDUCATION/TRAINING PROGRAM

## 2023-01-01 PROCEDURE — 99238 HOSP IP/OBS DSCHRG MGMT 30/<: CPT | Performed by: STUDENT IN AN ORGANIZED HEALTH CARE EDUCATION/TRAINING PROGRAM

## 2023-01-01 PROCEDURE — 96365 THER/PROPH/DIAG IV INF INIT: CPT | Performed by: EMERGENCY MEDICINE

## 2023-01-01 PROCEDURE — 250N000011 HC RX IP 250 OP 636: Mod: JZ | Performed by: STUDENT IN AN ORGANIZED HEALTH CARE EDUCATION/TRAINING PROGRAM

## 2023-01-01 PROCEDURE — 36415 COLL VENOUS BLD VENIPUNCTURE: CPT | Performed by: STUDENT IN AN ORGANIZED HEALTH CARE EDUCATION/TRAINING PROGRAM

## 2023-01-01 PROCEDURE — 93010 ELECTROCARDIOGRAM REPORT: CPT | Performed by: EMERGENCY MEDICINE

## 2023-01-01 PROCEDURE — 99232 SBSQ HOSP IP/OBS MODERATE 35: CPT | Performed by: STUDENT IN AN ORGANIZED HEALTH CARE EDUCATION/TRAINING PROGRAM

## 2023-01-01 PROCEDURE — 99222 1ST HOSP IP/OBS MODERATE 55: CPT | Performed by: STUDENT IN AN ORGANIZED HEALTH CARE EDUCATION/TRAINING PROGRAM

## 2023-01-01 PROCEDURE — 94762 N-INVAS EAR/PLS OXIMTRY CONT: CPT

## 2023-01-01 PROCEDURE — 250N000013 HC RX MED GY IP 250 OP 250 PS 637: Performed by: INTERNAL MEDICINE

## 2023-01-01 PROCEDURE — 93005 ELECTROCARDIOGRAM TRACING: CPT | Performed by: EMERGENCY MEDICINE

## 2023-01-01 PROCEDURE — 250N000011 HC RX IP 250 OP 636: Performed by: EMERGENCY MEDICINE

## 2023-01-01 PROCEDURE — 250N000012 HC RX MED GY IP 250 OP 636 PS 637: Performed by: EMERGENCY MEDICINE

## 2023-01-01 PROCEDURE — 258N000003 HC RX IP 258 OP 636: Performed by: EMERGENCY MEDICINE

## 2023-01-01 RX ORDER — BENZOCAINE/MENTHOL 6 MG-10 MG
1 LOZENGE MUCOUS MEMBRANE
Qty: 30 LOZENGE | Refills: 0 | Status: SHIPPED | OUTPATIENT
Start: 2023-01-01 | End: 2024-01-01

## 2023-01-01 RX ORDER — HYDRALAZINE HYDROCHLORIDE 20 MG/ML
10 INJECTION INTRAMUSCULAR; INTRAVENOUS EVERY 4 HOURS PRN
Status: DISCONTINUED | OUTPATIENT
Start: 2023-01-01 | End: 2023-01-01 | Stop reason: HOSPADM

## 2023-01-01 RX ORDER — DILTIAZEM HYDROCHLORIDE 180 MG/1
180 CAPSULE, EXTENDED RELEASE ORAL DAILY
Status: ON HOLD | COMMUNITY
Start: 2023-10-14 | End: 2024-01-01

## 2023-01-01 RX ORDER — CALCIUM CARBONATE 500 MG/1
1000 TABLET, CHEWABLE ORAL 4 TIMES DAILY PRN
Status: DISCONTINUED | OUTPATIENT
Start: 2023-01-01 | End: 2023-01-01 | Stop reason: HOSPADM

## 2023-01-01 RX ORDER — HEPARIN SODIUM 5000 [USP'U]/.5ML
5000 INJECTION, SOLUTION INTRAVENOUS; SUBCUTANEOUS EVERY 8 HOURS
Status: DISCONTINUED | OUTPATIENT
Start: 2023-01-01 | End: 2023-01-01 | Stop reason: HOSPADM

## 2023-01-01 RX ORDER — PREDNISONE 20 MG/1
40 TABLET ORAL DAILY
Qty: 2 TABLET | Refills: 0 | Status: SHIPPED | OUTPATIENT
Start: 2023-01-01 | End: 2023-01-01

## 2023-01-01 RX ORDER — FAMOTIDINE 20 MG/1
20 TABLET, FILM COATED ORAL DAILY
Status: ON HOLD | COMMUNITY
Start: 2023-10-26 | End: 2023-01-01

## 2023-01-01 RX ORDER — GUAIFENESIN 600 MG/1
1200 TABLET, EXTENDED RELEASE ORAL 2 TIMES DAILY
Qty: 28 TABLET | Refills: 0 | Status: SHIPPED | OUTPATIENT
Start: 2023-01-01 | End: 2024-01-01

## 2023-01-01 RX ORDER — ROSUVASTATIN CALCIUM 20 MG/1
20 TABLET, COATED ORAL EVERY EVENING
Status: DISCONTINUED | OUTPATIENT
Start: 2023-01-01 | End: 2023-01-01

## 2023-01-01 RX ORDER — SERTRALINE HYDROCHLORIDE 100 MG/1
200 TABLET, FILM COATED ORAL DAILY
Status: DISCONTINUED | OUTPATIENT
Start: 2023-01-01 | End: 2023-01-01 | Stop reason: HOSPADM

## 2023-01-01 RX ORDER — ONDANSETRON 2 MG/ML
4 INJECTION INTRAMUSCULAR; INTRAVENOUS EVERY 6 HOURS PRN
Status: DISCONTINUED | OUTPATIENT
Start: 2023-01-01 | End: 2023-01-01 | Stop reason: HOSPADM

## 2023-01-01 RX ORDER — IPRATROPIUM BROMIDE AND ALBUTEROL SULFATE 2.5; .5 MG/3ML; MG/3ML
3 SOLUTION RESPIRATORY (INHALATION) EVERY 4 HOURS PRN
Status: DISCONTINUED | OUTPATIENT
Start: 2023-01-01 | End: 2023-01-01 | Stop reason: HOSPADM

## 2023-01-01 RX ORDER — IPRATROPIUM BROMIDE AND ALBUTEROL SULFATE 2.5; .5 MG/3ML; MG/3ML
3 SOLUTION RESPIRATORY (INHALATION) ONCE
Status: COMPLETED | OUTPATIENT
Start: 2023-01-01 | End: 2023-01-01

## 2023-01-01 RX ORDER — CALCIUM CARBONATE 500 MG/1
1000 TABLET, CHEWABLE ORAL 4 TIMES DAILY PRN
Status: DISCONTINUED | OUTPATIENT
Start: 2023-01-01 | End: 2023-01-01

## 2023-01-01 RX ORDER — AMOXICILLIN 250 MG
2 CAPSULE ORAL 2 TIMES DAILY PRN
Status: DISCONTINUED | OUTPATIENT
Start: 2023-01-01 | End: 2023-01-01 | Stop reason: HOSPADM

## 2023-01-01 RX ORDER — LEVOTHYROXINE SODIUM 112 UG/1
112 TABLET ORAL DAILY
Status: ON HOLD | COMMUNITY
Start: 2023-10-14 | End: 2024-01-01

## 2023-01-01 RX ORDER — DILTIAZEM HYDROCHLORIDE 180 MG/1
180 CAPSULE, EXTENDED RELEASE ORAL DAILY
Status: DISCONTINUED | OUTPATIENT
Start: 2023-01-01 | End: 2023-01-01

## 2023-01-01 RX ORDER — CEFTRIAXONE 2 G/1
2 INJECTION, POWDER, FOR SOLUTION INTRAMUSCULAR; INTRAVENOUS ONCE
Status: DISCONTINUED | OUTPATIENT
Start: 2023-01-01 | End: 2023-01-01

## 2023-01-01 RX ORDER — ASPIRIN 81 MG/1
81 TABLET ORAL DAILY
Status: DISCONTINUED | OUTPATIENT
Start: 2023-01-01 | End: 2023-01-01 | Stop reason: HOSPADM

## 2023-01-01 RX ORDER — LIDOCAINE 40 MG/G
CREAM TOPICAL
Status: DISCONTINUED | OUTPATIENT
Start: 2023-01-01 | End: 2023-01-01 | Stop reason: HOSPADM

## 2023-01-01 RX ORDER — LIDOCAINE 40 MG/G
CREAM TOPICAL
Status: DISCONTINUED | OUTPATIENT
Start: 2023-01-01 | End: 2023-01-01

## 2023-01-01 RX ORDER — GUAIFENESIN 600 MG/1
1200 TABLET, EXTENDED RELEASE ORAL 2 TIMES DAILY
Status: DISCONTINUED | OUTPATIENT
Start: 2023-01-01 | End: 2023-01-01 | Stop reason: HOSPADM

## 2023-01-01 RX ORDER — CEFTRIAXONE 2 G/1
2 INJECTION, POWDER, FOR SOLUTION INTRAMUSCULAR; INTRAVENOUS EVERY 24 HOURS
Status: DISCONTINUED | OUTPATIENT
Start: 2023-01-01 | End: 2023-01-01

## 2023-01-01 RX ORDER — AZITHROMYCIN 250 MG/1
500 TABLET, FILM COATED ORAL EVERY EVENING
Qty: 4 TABLET | Refills: 0 | Status: DISCONTINUED | OUTPATIENT
Start: 2023-01-01 | End: 2023-01-01

## 2023-01-01 RX ORDER — IPRATROPIUM BROMIDE AND ALBUTEROL SULFATE 2.5; .5 MG/3ML; MG/3ML
3 SOLUTION RESPIRATORY (INHALATION) EVERY 4 HOURS PRN
Status: DISCONTINUED | OUTPATIENT
Start: 2023-01-01 | End: 2023-01-01

## 2023-01-01 RX ORDER — FUROSEMIDE 40 MG
40 TABLET ORAL DAILY
Status: DISCONTINUED | OUTPATIENT
Start: 2023-01-01 | End: 2023-01-01 | Stop reason: HOSPADM

## 2023-01-01 RX ORDER — DILTIAZEM HYDROCHLORIDE 180 MG/1
180 CAPSULE, COATED, EXTENDED RELEASE ORAL DAILY
Status: DISCONTINUED | OUTPATIENT
Start: 2023-01-01 | End: 2023-01-01 | Stop reason: HOSPADM

## 2023-01-01 RX ORDER — ROSUVASTATIN CALCIUM 5 MG/1
10 TABLET, COATED ORAL EVERY EVENING
Status: DISCONTINUED | OUTPATIENT
Start: 2023-01-01 | End: 2023-01-01 | Stop reason: HOSPADM

## 2023-01-01 RX ORDER — FAMOTIDINE 20 MG/1
20 TABLET, FILM COATED ORAL DAILY
Status: DISCONTINUED | OUTPATIENT
Start: 2023-01-01 | End: 2023-01-01

## 2023-01-01 RX ORDER — POTASSIUM CHLORIDE 750 MG/1
2 CAPSULE, EXTENDED RELEASE ORAL DAILY
COMMUNITY
End: 2024-01-01

## 2023-01-01 RX ORDER — ROSUVASTATIN CALCIUM 10 MG/1
10 TABLET, COATED ORAL EVERY EVENING
Qty: 30 TABLET | Refills: 0 | Status: ON HOLD | OUTPATIENT
Start: 2023-01-01 | End: 2024-01-01

## 2023-01-01 RX ORDER — ACETAMINOPHEN 500 MG
500 TABLET ORAL EVERY 6 HOURS PRN
Qty: 30 TABLET | Refills: 0 | Status: SHIPPED | OUTPATIENT
Start: 2023-01-01 | End: 2024-01-01

## 2023-01-01 RX ORDER — PREDNISONE 20 MG/1
40 TABLET ORAL DAILY
Status: DISCONTINUED | OUTPATIENT
Start: 2023-01-01 | End: 2023-01-01 | Stop reason: HOSPADM

## 2023-01-01 RX ORDER — AMOXICILLIN 250 MG
1 CAPSULE ORAL 2 TIMES DAILY PRN
Status: DISCONTINUED | OUTPATIENT
Start: 2023-01-01 | End: 2023-01-01 | Stop reason: HOSPADM

## 2023-01-01 RX ORDER — FAMOTIDINE 20 MG/1
20 TABLET, FILM COATED ORAL EVERY OTHER DAY
Status: ON HOLD
Start: 2023-01-01 | End: 2024-01-01

## 2023-01-01 RX ORDER — PREDNISONE 20 MG/1
40 TABLET ORAL ONCE
Status: COMPLETED | OUTPATIENT
Start: 2023-01-01 | End: 2023-01-01

## 2023-01-01 RX ORDER — LEVOTHYROXINE SODIUM 112 UG/1
112 TABLET ORAL DAILY
Status: DISCONTINUED | OUTPATIENT
Start: 2023-01-01 | End: 2023-01-01 | Stop reason: HOSPADM

## 2023-01-01 RX ORDER — ONDANSETRON 2 MG/ML
4 INJECTION INTRAMUSCULAR; INTRAVENOUS
Status: COMPLETED | OUTPATIENT
Start: 2023-01-01 | End: 2023-01-01

## 2023-01-01 RX ORDER — BENZONATATE 100 MG/1
100 CAPSULE ORAL 3 TIMES DAILY PRN
Qty: 30 CAPSULE | Refills: 0 | Status: SHIPPED | OUTPATIENT
Start: 2023-01-01 | End: 2024-01-01

## 2023-01-01 RX ORDER — CEFTRIAXONE 2 G/1
2 INJECTION, POWDER, FOR SOLUTION INTRAMUSCULAR; INTRAVENOUS ONCE
Status: COMPLETED | OUTPATIENT
Start: 2023-01-01 | End: 2023-01-01

## 2023-01-01 RX ORDER — ASPIRIN 81 MG/1
81 TABLET ORAL DAILY
Status: DISCONTINUED | OUTPATIENT
Start: 2023-01-01 | End: 2023-01-01

## 2023-01-01 RX ORDER — PROCHLORPERAZINE 25 MG
12.5 SUPPOSITORY, RECTAL RECTAL EVERY 12 HOURS PRN
Status: DISCONTINUED | OUTPATIENT
Start: 2023-01-01 | End: 2023-01-01 | Stop reason: HOSPADM

## 2023-01-01 RX ORDER — ONDANSETRON 4 MG/1
4 TABLET, ORALLY DISINTEGRATING ORAL EVERY 6 HOURS PRN
Status: DISCONTINUED | OUTPATIENT
Start: 2023-01-01 | End: 2023-01-01 | Stop reason: HOSPADM

## 2023-01-01 RX ORDER — PROCHLORPERAZINE MALEATE 5 MG
5 TABLET ORAL EVERY 6 HOURS PRN
Status: DISCONTINUED | OUTPATIENT
Start: 2023-01-01 | End: 2023-01-01 | Stop reason: HOSPADM

## 2023-01-01 RX ORDER — FAMOTIDINE 20 MG/1
20 TABLET, FILM COATED ORAL
Status: DISCONTINUED | OUTPATIENT
Start: 2023-01-01 | End: 2023-01-01 | Stop reason: HOSPADM

## 2023-01-01 RX ADMIN — HEPARIN SODIUM 5000 UNITS: 10000 INJECTION, SOLUTION INTRAVENOUS; SUBCUTANEOUS at 01:44

## 2023-01-01 RX ADMIN — BENZOCAINE 6 MG-MENTHOL 10 MG LOZENGES 1 LOZENGE: at 17:15

## 2023-01-01 RX ADMIN — ONDANSETRON 4 MG: 2 INJECTION INTRAMUSCULAR; INTRAVENOUS at 13:13

## 2023-01-01 RX ADMIN — LEVOTHYROXINE SODIUM 112 MCG: 112 TABLET ORAL at 10:35

## 2023-01-01 RX ADMIN — ASPIRIN 81 MG: 81 TABLET, COATED ORAL at 08:57

## 2023-01-01 RX ADMIN — PREDNISONE 40 MG: 20 TABLET ORAL at 08:57

## 2023-01-01 RX ADMIN — CEFTRIAXONE SODIUM 2 G: 2 INJECTION, POWDER, FOR SOLUTION INTRAMUSCULAR; INTRAVENOUS at 17:14

## 2023-01-01 RX ADMIN — METOPROLOL SUCCINATE 150 MG: 100 TABLET, EXTENDED RELEASE ORAL at 08:57

## 2023-01-01 RX ADMIN — BENZOCAINE 6 MG-MENTHOL 10 MG LOZENGES 1 LOZENGE: at 12:54

## 2023-01-01 RX ADMIN — FAMOTIDINE 20 MG: 20 TABLET, FILM COATED ORAL at 10:35

## 2023-01-01 RX ADMIN — AZITHROMYCIN DIHYDRATE 500 MG: 250 TABLET, FILM COATED ORAL at 17:54

## 2023-01-01 RX ADMIN — PREDNISONE 40 MG: 20 TABLET ORAL at 07:47

## 2023-01-01 RX ADMIN — METOPROLOL SUCCINATE 150 MG: 100 TABLET, EXTENDED RELEASE ORAL at 07:47

## 2023-01-01 RX ADMIN — CEFTRIAXONE SODIUM 2 G: 2 INJECTION, POWDER, FOR SOLUTION INTRAMUSCULAR; INTRAVENOUS at 16:24

## 2023-01-01 RX ADMIN — ROSUVASTATIN CALCIUM 20 MG: 20 TABLET, FILM COATED ORAL at 17:54

## 2023-01-01 RX ADMIN — IPRATROPIUM BROMIDE AND ALBUTEROL SULFATE 3 ML: 2.5; .5 SOLUTION RESPIRATORY (INHALATION) at 16:14

## 2023-01-01 RX ADMIN — FUROSEMIDE 40 MG: 40 TABLET ORAL at 08:57

## 2023-01-01 RX ADMIN — LEVOTHYROXINE SODIUM 112 MCG: 112 TABLET ORAL at 08:57

## 2023-01-01 RX ADMIN — ROSUVASTATIN CALCIUM 20 MG: 20 TABLET, FILM COATED ORAL at 20:16

## 2023-01-01 RX ADMIN — HEPARIN SODIUM 5000 UNITS: 10000 INJECTION, SOLUTION INTRAVENOUS; SUBCUTANEOUS at 02:14

## 2023-01-01 RX ADMIN — GUAIFENESIN 1200 MG: 600 TABLET ORAL at 07:47

## 2023-01-01 RX ADMIN — GUAIFENESIN 1200 MG: 600 TABLET ORAL at 20:59

## 2023-01-01 RX ADMIN — DILTIAZEM HYDROCHLORIDE 180 MG: 180 CAPSULE, COATED, EXTENDED RELEASE ORAL at 07:47

## 2023-01-01 RX ADMIN — ASPIRIN 81 MG: 81 TABLET, COATED ORAL at 10:35

## 2023-01-01 RX ADMIN — ASPIRIN 81 MG: 81 TABLET, COATED ORAL at 07:47

## 2023-01-01 RX ADMIN — SENNOSIDES AND DOCUSATE SODIUM 1 TABLET: 8.6; 5 TABLET ORAL at 20:32

## 2023-01-01 RX ADMIN — SERTRALINE HYDROCHLORIDE 200 MG: 100 TABLET ORAL at 08:57

## 2023-01-01 RX ADMIN — AZITHROMYCIN MONOHYDRATE 500 MG: 500 INJECTION, POWDER, LYOPHILIZED, FOR SOLUTION INTRAVENOUS at 17:07

## 2023-01-01 RX ADMIN — HEPARIN SODIUM 5000 UNITS: 10000 INJECTION, SOLUTION INTRAVENOUS; SUBCUTANEOUS at 18:11

## 2023-01-01 RX ADMIN — HEPARIN SODIUM 5000 UNITS: 10000 INJECTION, SOLUTION INTRAVENOUS; SUBCUTANEOUS at 20:06

## 2023-01-01 RX ADMIN — SENNOSIDES AND DOCUSATE SODIUM 2 TABLET: 8.6; 5 TABLET ORAL at 10:37

## 2023-01-01 RX ADMIN — HEPARIN SODIUM 5000 UNITS: 10000 INJECTION, SOLUTION INTRAVENOUS; SUBCUTANEOUS at 10:37

## 2023-01-01 RX ADMIN — Medication 3 MG: at 02:30

## 2023-01-01 RX ADMIN — FUROSEMIDE 40 MG: 40 TABLET ORAL at 07:48

## 2023-01-01 RX ADMIN — ONDANSETRON 4 MG: 2 INJECTION INTRAMUSCULAR; INTRAVENOUS at 17:20

## 2023-01-01 RX ADMIN — HEPARIN SODIUM 5000 UNITS: 10000 INJECTION, SOLUTION INTRAVENOUS; SUBCUTANEOUS at 02:26

## 2023-01-01 RX ADMIN — HYDRALAZINE HYDROCHLORIDE 10 MG: 20 INJECTION, SOLUTION INTRAMUSCULAR; INTRAVENOUS at 23:34

## 2023-01-01 RX ADMIN — DILTIAZEM HYDROCHLORIDE 180 MG: 180 CAPSULE, COATED, EXTENDED RELEASE ORAL at 10:35

## 2023-01-01 RX ADMIN — PREDNISONE 40 MG: 20 TABLET ORAL at 16:23

## 2023-01-01 RX ADMIN — DILTIAZEM HYDROCHLORIDE 180 MG: 180 CAPSULE, COATED, EXTENDED RELEASE ORAL at 08:57

## 2023-01-01 RX ADMIN — HEPARIN SODIUM 5000 UNITS: 10000 INJECTION, SOLUTION INTRAVENOUS; SUBCUTANEOUS at 19:41

## 2023-01-01 RX ADMIN — LEVOTHYROXINE SODIUM 112 MCG: 112 TABLET ORAL at 06:09

## 2023-01-01 RX ADMIN — ROSUVASTATIN CALCIUM 10 MG: 5 TABLET, FILM COATED ORAL at 18:14

## 2023-01-01 RX ADMIN — METOPROLOL SUCCINATE 150 MG: 100 TABLET, EXTENDED RELEASE ORAL at 20:59

## 2023-01-01 RX ADMIN — SERTRALINE HYDROCHLORIDE 200 MG: 100 TABLET ORAL at 19:41

## 2023-01-01 RX ADMIN — FAMOTIDINE 20 MG: 20 TABLET, FILM COATED ORAL at 07:47

## 2023-01-01 RX ADMIN — METOPROLOL SUCCINATE 150 MG: 100 TABLET, EXTENDED RELEASE ORAL at 19:41

## 2023-01-01 RX ADMIN — HEPARIN SODIUM 5000 UNITS: 10000 INJECTION, SOLUTION INTRAVENOUS; SUBCUTANEOUS at 09:55

## 2023-01-01 RX ADMIN — METOPROLOL SUCCINATE 150 MG: 100 TABLET, EXTENDED RELEASE ORAL at 10:35

## 2023-01-01 RX ADMIN — PREDNISONE 40 MG: 20 TABLET ORAL at 10:35

## 2023-01-01 RX ADMIN — SERTRALINE HYDROCHLORIDE 200 MG: 100 TABLET ORAL at 07:47

## 2023-01-01 RX ADMIN — SERTRALINE HYDROCHLORIDE 200 MG: 100 TABLET ORAL at 10:35

## 2023-01-01 RX ADMIN — FUROSEMIDE 40 MG: 40 TABLET ORAL at 10:35

## 2023-01-01 RX ADMIN — METOPROLOL SUCCINATE 150 MG: 100 TABLET, EXTENDED RELEASE ORAL at 20:07

## 2023-01-01 RX ADMIN — HEPARIN SODIUM 5000 UNITS: 10000 INJECTION, SOLUTION INTRAVENOUS; SUBCUTANEOUS at 10:36

## 2023-01-01 RX ADMIN — IPRATROPIUM BROMIDE AND ALBUTEROL SULFATE 3 ML: 2.5; .5 SOLUTION RESPIRATORY (INHALATION) at 20:32

## 2023-01-01 RX ADMIN — BENZOCAINE 6 MG-MENTHOL 10 MG LOZENGES 1 LOZENGE: at 11:03

## 2023-01-01 ASSESSMENT — ACTIVITIES OF DAILY LIVING (ADL)
ADLS_ACUITY_SCORE: 23
ADLS_ACUITY_SCORE: 35
ADLS_ACUITY_SCORE: 21
ADLS_ACUITY_SCORE: 23
ADLS_ACUITY_SCORE: 21
ADLS_ACUITY_SCORE: 23
ADLS_ACUITY_SCORE: 21
ADLS_ACUITY_SCORE: 23
ADLS_ACUITY_SCORE: 23
ADLS_ACUITY_SCORE: 33
ADLS_ACUITY_SCORE: 35
ADLS_ACUITY_SCORE: 21
ADLS_ACUITY_SCORE: 23
ADLS_ACUITY_SCORE: 23
ADLS_ACUITY_SCORE: 21
ADLS_ACUITY_SCORE: 23
ADLS_ACUITY_SCORE: 23
ADLS_ACUITY_SCORE: 21
ADLS_ACUITY_SCORE: 21
ADLS_ACUITY_SCORE: 23
ADLS_ACUITY_SCORE: 21
ADLS_ACUITY_SCORE: 23

## 2023-08-31 ENCOUNTER — HOSPITAL ENCOUNTER (EMERGENCY)
Facility: CLINIC | Age: 78
Discharge: HOME | End: 2023-08-31
Attending: PHYSICIAN ASSISTANT | Admitting: PHYSICIAN ASSISTANT
Payer: COMMERCIAL

## 2023-08-31 VITALS
RESPIRATION RATE: 16 BRPM | SYSTOLIC BLOOD PRESSURE: 143 MMHG | OXYGEN SATURATION: 97 % | TEMPERATURE: 98.3 F | HEART RATE: 89 BPM | DIASTOLIC BLOOD PRESSURE: 83 MMHG

## 2023-08-31 DIAGNOSIS — H66.91 RIGHT ACUTE OTITIS MEDIA: ICD-10-CM

## 2023-08-31 DIAGNOSIS — H10.31 ACUTE CONJUNCTIVITIS OF RIGHT EYE, UNSPECIFIED ACUTE CONJUNCTIVITIS TYPE: ICD-10-CM

## 2023-08-31 PROCEDURE — 99213 OFFICE O/P EST LOW 20 MIN: CPT | Performed by: PHYSICIAN ASSISTANT

## 2023-08-31 PROCEDURE — G0463 HOSPITAL OUTPT CLINIC VISIT: HCPCS | Performed by: PHYSICIAN ASSISTANT

## 2023-08-31 RX ORDER — POLYMYXIN B SULFATE AND TRIMETHOPRIM 1; 10000 MG/ML; [USP'U]/ML
1-2 SOLUTION OPHTHALMIC 4 TIMES DAILY
Qty: 3 ML | Refills: 0 | Status: SHIPPED | OUTPATIENT
Start: 2023-08-31 | End: 2023-09-07

## 2023-08-31 RX ORDER — CEFDINIR 300 MG/1
300 CAPSULE ORAL DAILY
Qty: 7 CAPSULE | Refills: 0 | Status: SHIPPED | OUTPATIENT
Start: 2023-08-31 | End: 2023-09-07

## 2023-08-31 ASSESSMENT — ENCOUNTER SYMPTOMS
CONSTITUTIONAL NEGATIVE: 1
RESPIRATORY NEGATIVE: 1
EYE DISCHARGE: 1
FEVER: 0

## 2023-08-31 NOTE — LETTER
August 31, 2023      To Whom It May Concern:      Shaila Shoshana Triana was seen in our clinic today, 08/31/23.  Please excuse her from work.  Thank you.      Sincerely,        Kia Mirza PA-C

## 2023-09-01 NOTE — ED PROVIDER NOTES
History     Chief Complaint   Patient presents with    Otalgia     HPI  Shaila Triana is a 77 year old female who presents to Urgent Care with complaints of right ear pain for the past 2 weeks.  Patient also complains of muffled hearing in this ear.  Patient's right eyes started draining thick drainage while in the waiting room.  Denies fevers, chills, nasal drainage, rhinorrhea, cough, sore throat, sinus pressure, nasal congestion, nausea, vomiting, diarrhea, abdominal pain, chest pain, or shortness of breath.    Patient was evaluated in outside clinic on 8/17/2023 and was placed on Cipro HC otic drops for diagnosis of otitis externa.  She has been using the drops without any improvement.       Allergies:  Allergies   Allergen Reactions    Codeine Nausea    Dust Mite Extract Other (See Comments)     Per allergy test    Erythromycin     Penicillin [Penicillins]        Problem List:    Patient Active Problem List    Diagnosis Date Noted    Anemia 10/05/2020     Priority: Medium    Dizziness 10/05/2020     Priority: Medium    Anemia, unspecified type 10/05/2020     Priority: Medium    Chest pain, unspecified type 10/05/2020     Priority: Medium    CKD (chronic kidney disease) stage 3, GFR 30-59 ml/min (H) 10/05/2020     Priority: Medium    Elevated lipase 10/05/2020     Priority: Medium    Pancreatic abnormality on CT 10/05/2020     Priority: Medium     Incidental finding on CT 10/5/2020 -  New 8 mm lucency of the pancreas. Likely benign but needs follow-up.      Chronic anticoagulation 10/05/2020     Priority: Medium     Due to atrial fibrillation       Ascending aorta dilatation (H) 10/05/2020     Priority: Medium     Noted on echo August 2020, stable on CT 10/5/2020.      Chronic atrial fibrillation (H) 07/31/2020     Priority: Medium    Lung nodules 04/19/2016     Priority: Medium     LLL on abd/pelvis CT 4/12/16.  No change on chest CT May 2017.  No further imaging needed.      Invasive ductal carcinoma of  breast (H) 2013     Priority: Medium     Left.  ER(+) AL(+) her-2-hina (-).  Lumpectomy and sentinel biopsy followed by radiation.  Adjuvant therapy with tamoxifen completed.      Malignant neoplasm of female breast (H) 2008     Priority: Medium     Epic      Depressive disorder 2006     Priority: Medium    Essential hypertension 2006     Priority: Medium    Hypothyroidism (acquired) 2006     Priority: Medium    Pure hypercholesterolemia 2006     Priority: Medium    Sleep apnea 2006     Priority: Medium        Past Medical History:    Past Medical History:   Diagnosis Date    Depressive disorder 2006    Essential hypertension 2006    Hypothyroidism (acquired) 2006    Invasive ductal carcinoma of breast (H) 2013    Lung nodules 2016    Malignant neoplasm of female breast (H) 3/18/2008    New onset atrial fibrillation (H) 2020    Pure hypercholesterolemia 2006    Sleep apnea 2006       Past Surgical History:    Past Surgical History:   Procedure Laterality Date    APPENDECTOMY          BLEPHAROPLASTY Bilateral 2010    COLONOSCOPY  2005    ESOPHAGOSCOPY, GASTROSCOPY, DUODENOSCOPY (EGD), COMBINED N/A 10/6/2020    Procedure: ESOPHAGOGASTRODUODENOSCOPY, WITH BIOPSY and polypectomy;  Surgeon: Jorge Ramirez MD;  Location: [facility]    HERNIA REPAIR  2013    HYSTERECTOMY, JOSE  1975    LUMPECTOMY BREAST Left     THYROIDECTOMY  Right     Partial    TUBAL LIGATION         Family History:    Family History   Problem Relation Age of Onset    No Known Problems Mother          age 89 after complications from a fall    Alzheimer Disease Father     Other - See Comments Sister         Polio    Colon Cancer Maternal Aunt     Breast Cancer No family hx of     Ovarian Cancer No family hx of     Prostate Cancer No family hx of        Social History:  Marital Status:   [4]  Social History     Tobacco Use    Smoking status: Never    Smokeless  tobacco: Never   Substance Use Topics    Alcohol use: Yes     Comment: occ    Drug use: Never        Medications:    cefdinir (OMNICEF) 300 MG capsule  trimethoprim-polymyxin b (POLYTRIM) 56593-3.1 UNIT/ML-% ophthalmic solution  albuterol (PROAIR HFA/PROVENTIL HFA/VENTOLIN HFA) 108 (90 Base) MCG/ACT inhaler  aspirin (ASA) 81 MG EC tablet  benzonatate (TESSALON) 200 MG capsule  calcium 600 MG tablet  calcium carbonate (OS-ANIKA) 1500 (600 Ca) MG tablet  clopidogrel (PLAVIX) 75 MG tablet  dextromethorphan-guaiFENesin (MUCINEX DM)  MG 12 hr tablet  diltiazem ER (TIAZAC) 180 MG 24 hr ER beaded capsule  ferrous gluconate (FERGON) 324 (38 Fe) MG tablet  fluticasone (VERAMYST) 27.5 MCG/SPRAY nasal spray  furosemide (LASIX) 40 MG tablet  furosemide (LASIX) 40 MG tablet  ketorolac (ACULAR) 0.5 % ophthalmic solution  Levothyroxine Sodium 100 MCG CAPS  metoprolol succinate ER (TOPROL-XL) 100 MG 24 hr tablet  ofloxacin (OCUFLOX) 0.3 % ophthalmic solution  omeprazole 20 MG tablet  prednisoLONE acetate (PRED FORTE) 1 % ophthalmic suspension  rosuvastatin (CRESTOR) 20 MG tablet  sertraline (ZOLOFT) 100 MG tablet          Review of Systems   Constitutional: Negative.  Negative for fever.   HENT:  Positive for ear pain. Negative for ear discharge.    Eyes:  Positive for discharge.   Respiratory: Negative.     All other systems reviewed and are negative.      Physical Exam   BP: (!) 143/83  Pulse: 89  Temp: 98.3  F (36.8  C)  Resp: 16  SpO2: 97 %      Physical Exam  Constitutional:       General: She is not in acute distress.     Appearance: Normal appearance. She is well-developed. She is not ill-appearing, toxic-appearing or diaphoretic.   HENT:      Head: Normocephalic and atraumatic.      Right Ear: Ear canal and external ear normal. No drainage, swelling or tenderness. A middle ear effusion is present. Tympanic membrane is erythematous and bulging.      Left Ear: Tympanic membrane, ear canal and external ear normal.       Nose: Nose normal. No congestion or rhinorrhea.      Mouth/Throat:      Lips: Pink.      Mouth: Mucous membranes are moist.      Pharynx: Oropharynx is clear. Uvula midline. No pharyngeal swelling, oropharyngeal exudate, posterior oropharyngeal erythema or uvula swelling.      Tonsils: No tonsillar exudate or tonsillar abscesses.   Eyes:      General:         Right eye: Discharge (thick, yellow) present. No foreign body or hordeolum.      Extraocular Movements: Extraocular movements intact.      Conjunctiva/sclera:      Right eye: Right conjunctiva is not injected. No chemosis, exudate or hemorrhage.     Pupils: Pupils are equal, round, and reactive to light.   Cardiovascular:      Rate and Rhythm: Normal rate and regular rhythm.      Heart sounds: Normal heart sounds.   Pulmonary:      Effort: Pulmonary effort is normal. No respiratory distress.      Breath sounds: Normal breath sounds. No stridor. No wheezing.   Musculoskeletal:      Cervical back: Normal range of motion and neck supple. No rigidity.   Lymphadenopathy:      Cervical: No cervical adenopathy.   Skin:     General: Skin is warm and dry.   Neurological:      Mental Status: She is alert and oriented to person, place, and time.   Psychiatric:         Behavior: Behavior is cooperative.         ED Course                 Procedures    No results found for this or any previous visit (from the past 24 hour(s)).    Medications - No data to display    Assessments & Plan (with Medical Decision Making)     Pt is a 77 year old female who presents to Urgent Care with complaints of right ear pain for the past 2 weeks.  Patient also complains of muffled hearing in this ear.  Patient's right eyes started draining thick drainage while in the waiting room.      Patient was evaluated in outside clinic on 8/17/2023 and was placed on Cipro HC otic drops for diagnosis of otitis externa.  She has been using the drops without any improvement.     Pt is afebrile on arrival.   Exam as above.  Otitis media on exam.  Return precautions were reviewed.  Hand-outs were provided.    Patient was sent with Cefdinir and Polytrim ophthalmic drops and was instructed to follow-up with PCP and [facility] for continued care and management.  She is to return to the ED for persistent and/or worsening symptoms.  Patient expressed understanding of the diagnosis and plan and was discharged home in good condition.    I have reviewed the nursing notes.    I have reviewed the findings, diagnosis, plan and need for follow up with the patient.      Discharge Medication List as of 8/31/2023  7:48 PM        START taking these medications    Details   cefdinir (OMNICEF) 300 MG capsule Take 1 capsule (300 mg) by mouth daily for 7 days, Disp-7 capsule, R-0, E-Prescribe      trimethoprim-polymyxin b (POLYTRIM) 15644-2.1 UNIT/ML-% ophthalmic solution Place 1-2 drops into the right eye 4 times daily for 7 days, Disp-3 mL, R-0, E-Prescribe             Final diagnoses:   Right acute otitis media   Acute conjunctivitis of right eye, unspecified acute conjunctivitis type       8/31/2023   [facility] EMERGENCY DEPT      Disclaimer:  This note consists of symbols derived from keyboarding, dictation and/or voice recognition software.  As a result, there may be errors in the script that have gone undetected.  Please consider this when interpreting information found in this chart.       Kia Mirza PA-C  08/31/23 2026

## 2023-12-25 PROBLEM — J96.01 ACUTE RESPIRATORY FAILURE WITH HYPOXIA AND HYPERCARBIA (H): Status: ACTIVE | Noted: 2023-01-01

## 2023-12-25 PROBLEM — J96.02 ACUTE RESPIRATORY FAILURE WITH HYPOXIA AND HYPERCARBIA (H): Status: ACTIVE | Noted: 2023-01-01

## 2023-12-25 PROBLEM — J18.9 COMMUNITY ACQUIRED PNEUMONIA, UNSPECIFIED LATERALITY: Status: ACTIVE | Noted: 2023-01-01

## 2023-12-25 NOTE — PROGRESS NOTES
Skin affirmation note    Admitting nurse completed full skin assessment, Fabricio score and Fabricio interventions. This writer agrees with the initial skin assessment findings.

## 2023-12-25 NOTE — ED PROVIDER NOTES
History     Chief Complaint   Patient presents with    Sinusitis    Tachycardia    Shortness of Breath     HPI  History per patient, review of [facility] EMR and Care Everywhere EMR, including extensive chart review of multiple prior imaging studies, multiple clinic notes and multiple prior laboratory evaluations.   Shaila Triana is a 78 year old female with chronic atrial fibrillation with 2 weeks of URI symptoms with worsening SOA, GARCIA and wheezing in the past 4 days. Non-productive cough, malaise, no F/C, CP, hemoptysis or leg pain or leg swelling. No orthopnea or PND. No recent travel or known infectious exposures. O2 sat to as low as 80% on RA in triage/shortly after arrival and placed on 4l/NC. Not on O2 and no hx of COPD, asthma or CHF.  She lives with her son.     Previous Records Reviewed:  12/20/23 Echo - [facility] Rhythm: Atrial Fibrillation    Procedure Components: 2D imaging, Color Doppler, Spectral Doppler    Indications: Aneurysm of ascending aorta without rupture (HC)    Final Conclusion Previous Study: 06/30/2021    1. Small left ventricular chamber size. Left ventricular end-diastolic dimension is 3.8 cm.   Normal left ventricular wall thickness. Normal left ventricular systolic function. Calculated left ventricular ejection fraction (modified Carroll technique) is 59%. No regional wall motion abnormalities.    2. Normal right ventricular chamber size. Normal right ventricular systolic function. Estimated right ventricular systolic pressure is 32 mmHg.    3.  Severe biatrial enlargement    4. Mild-moderate tricuspid valve regurgitation.    5. Mildly dilated indexed ascending aorta dimension (4.1 cm, 2.4 cm/m ).    6. Normal inferior vena cava with normal inspiratory collapse.    7.  Compared to TTE 6/30/2021, similar ascending aortic dimension    Estimated EF: 55-60%     Allergies:  Allergies   Allergen Reactions    Codeine Nausea    Dust Mite Extract Other (See Comments)      Per allergy test    Erythromycin Dizziness     Almost passed out    Penicillin [Penicillins] Itching       Problem List:    Patient Active Problem List    Diagnosis Date Noted    Acute respiratory failure with hypoxia and hypercarbia (H) 12/25/2023     Priority: Medium    Community acquired pneumonia, unspecified laterality 12/25/2023     Priority: Medium    Anemia 10/05/2020     Priority: Medium    Dizziness 10/05/2020     Priority: Medium    Anemia, unspecified type 10/05/2020     Priority: Medium    Chest pain, unspecified type 10/05/2020     Priority: Medium    CKD (chronic kidney disease) stage 3, GFR 30-59 ml/min (H) 10/05/2020     Priority: Medium    Elevated lipase 10/05/2020     Priority: Medium    Pancreatic abnormality on CT 10/05/2020     Priority: Medium     Incidental finding on CT 10/5/2020 -  New 8 mm lucency of the pancreas. Likely benign but needs follow-up.      Chronic anticoagulation 10/05/2020     Priority: Medium     Due to atrial fibrillation       Ascending aorta dilatation (H24) 10/05/2020     Priority: Medium     Noted on echo August 2020, stable on CT 10/5/2020.      Chronic atrial fibrillation (H) 07/31/2020     Priority: Medium    Lung nodules 04/19/2016     Priority: Medium     LLL on abd/pelvis CT 4/12/16.  No change on chest CT May 2017.  No further imaging needed.      Invasive ductal carcinoma of breast (H) 08/06/2013     Priority: Medium     Left.  ER(+) KS(+) her-2-hina (-).  Lumpectomy and sentinel biopsy followed by radiation.  Adjuvant therapy with tamoxifen completed.      Malignant neoplasm of female breast (H) 03/18/2008     Priority: Medium     Epic      Depressive disorder 12/04/2006     Priority: Medium    Essential hypertension 12/04/2006     Priority: Medium    Hypothyroidism (acquired) 12/04/2006     Priority: Medium    Pure hypercholesterolemia 12/04/2006     Priority: Medium    Sleep apnea 12/04/2006     Priority: Medium        Past Medical History:    Past Medical  "History:   Diagnosis Date    Depressive disorder 2006    Essential hypertension 2006    Hypothyroidism (acquired) 2006    Invasive ductal carcinoma of breast (H) 2013    Lung nodules 2016    Malignant neoplasm of female breast (H) 3/18/2008    New onset atrial fibrillation (H) 2020    Pure hypercholesterolemia 2006    Sleep apnea 2006       Past Surgical History:    Past Surgical History:   Procedure Laterality Date    APPENDECTOMY          BLEPHAROPLASTY Bilateral 2010    COLONOSCOPY      ESOPHAGOSCOPY, GASTROSCOPY, DUODENOSCOPY (EGD), COMBINED N/A 10/6/2020    Procedure: ESOPHAGOGASTRODUODENOSCOPY, WITH BIOPSY and polypectomy;  Surgeon: Jorge Ramirez MD;  Location: [facility]    HERNIA REPAIR  2013    HYSTERECTOMY, JOSE  1975    LUMPECTOMY BREAST Left 2008    THYROIDECTOMY  Right     Partial    TUBAL LIGATION         Family History:    Family History   Problem Relation Age of Onset    No Known Problems Mother          age 89 after complications from a fall    Alzheimer Disease Father     Other - See Comments Sister         Polio    Colon Cancer Maternal Aunt     Breast Cancer No family hx of     Ovarian Cancer No family hx of     Prostate Cancer No family hx of        Social History:  Marital Status:   [4]  Social History     Tobacco Use    Smoking status: Never    Smokeless tobacco: Never   Substance Use Topics    Alcohol use: Yes     Comment: occ    Drug use: Never        Medications:    No current outpatient medications on file.      Review of Systems  As mentioned in the HPI, in addition focused review of systems was negative.     Physical Exam   BP: (!) 176/107  Pulse: (!) 125  Temp: 97.7  F (36.5  C)  Resp: 18  Height: 157.5 cm (5' 2\")  Weight: 65.3 kg (144 lb)  SpO2: 91 % on O2/NC      Physical Exam  Vitals and nursing note reviewed.   Constitutional:       General: She is not in acute distress.     Appearance: Normal appearance. She is well-developed. " She is ill-appearing. She is not toxic-appearing or diaphoretic.   HENT:      Head: Normocephalic and atraumatic.      Right Ear: External ear normal.      Left Ear: External ear normal.      Nose: Nose normal.      Mouth/Throat:      Mouth: Mucous membranes are dry.      Pharynx: Oropharynx is clear.   Eyes:      General: No scleral icterus.     Extraocular Movements: Extraocular movements intact.      Conjunctiva/sclera: Conjunctivae normal.   Neck:      Trachea: No tracheal deviation.   Cardiovascular:      Rate and Rhythm: Regular rhythm. Tachycardia present.      Heart sounds: Normal heart sounds. No murmur heard.     No friction rub. No gallop.   Pulmonary:      Effort: Pulmonary effort is normal. Tachypnea and prolonged expiration present. No accessory muscle usage, respiratory distress or retractions.      Breath sounds: No stridor or decreased air movement. Wheezing and rhonchi present. No decreased breath sounds or rales.   Abdominal:      General: There is no distension.      Palpations: Abdomen is soft.      Tenderness: There is no abdominal tenderness.   Musculoskeletal:         General: No swelling or tenderness. Normal range of motion.      Cervical back: Normal range of motion and neck supple.      Right lower leg: No edema.      Left lower leg: No edema.   Skin:     General: Skin is warm and dry.      Coloration: Skin is not pale.      Findings: No erythema or rash.   Neurological:      General: No focal deficit present.      Mental Status: She is alert and oriented to person, place, and time.   Psychiatric:         Mood and Affect: Mood normal.         Behavior: Behavior normal.         ED Course             Procedures              EKG Interpretation:      Interpreted by John Stevens MD  Time reviewed: 2:37 PM  Symptoms at time of EKG: Shortness of breath  Rhythm: Atrial fibrillation  Rate: 110  Axis: normal  Ectopy: none  Conduction: normal  ST Segments/ T Waves: No ST-T wave changes  Q Waves:  none  Comparison to prior: Unchanged from 12/29/2022  Clinical Impression: Atrial fibrillation with , atrial fibrillation seen her last EKG           Results for orders placed or performed during the hospital encounter of 12/25/23 (from the past 24 hour(s))   CBC with platelets, differential    Narrative    The following orders were created for panel order CBC with platelets, differential.  Procedure                               Abnormality         Status                     ---------                               -----------         ------                     CBC with platelets and d...[[id number]]  Abnormal            Final result                 Please view results for these tests on the individual orders.   Basic metabolic panel   Result Value Ref Range    Sodium 139 135 - 145 mmol/L    Potassium 3.7 3.4 - 5.3 mmol/L    Chloride 103 98 - 107 mmol/L    Carbon Dioxide (CO2) 24 22 - 29 mmol/L    Anion Gap 12 7 - 15 mmol/L    Urea Nitrogen 29.8 (H) 8.0 - 23.0 mg/dL    Creatinine 2.18 (H) 0.51 - 0.95 mg/dL    GFR Estimate 22 (L) >60 mL/min/1.73m2    Calcium 9.2 8.8 - 10.2 mg/dL    Glucose 105 (H) 70 - 99 mg/dL   [facility] Draw    Narrative    The following orders were created for panel order [facility] Draw.  Procedure                               Abnormality         Status                     ---------                               -----------         ------                     Extra Blue Top Tube[[id number]]                              Final result               Extra Red Top Tube[[id number]]                               Final result                 Please view results for these tests on the individual orders.   Symptomatic Influenza A/B, RSV, & SARS-CoV2 PCR (COVID-19) Nasopharyngeal    Specimen: Nasopharyngeal; Swab   Result Value Ref Range    Influenza A PCR Negative Negative    Influenza B PCR Negative Negative    RSV PCR Positive (A) Negative    SARS CoV2 PCR Negative Negative    Narrative    Testing was  performed using the Xpert Xpress CoV2/Flu/RSV Assay on the Front Desk [employer] GeneXpert Instrument. This test should be ordered for the detection of SARS-CoV-2, influenza, and RSV viruses in individuals who meet clinical and/or epidemiological criteria. Test performance is unknown in asymptomatic patients. This test is for in vitro diagnostic use under the FDA EUA for laboratories certified under CLIA to perform high or moderate complexity testing. This test has not been FDA cleared or approved. A negative result does not rule out the presence of PCR inhibitors in the specimen or target RNA in concentration below the limit of detection for the assay. If only one viral target is positive but coinfection with multiple targets is suspected, the sample should be re-tested with another FDA cleared, approved, or authorized test, if coinfection would change clinical management. This test was validated by the [facility] [employer]. These laboratories are certified under the Clinical Laboratory Improvement Amendments of 1988 (CLIA-88) as qualified to perform high complexity laboratory testing.   CBC with platelets and differential   Result Value Ref Range    WBC Count 7.7 4.0 - 11.0 10e3/uL    RBC Count 3.72 (L) 3.80 - 5.20 10e6/uL    Hemoglobin 10.8 (L) 11.7 - 15.7 g/dL    Hematocrit 33.7 (L) 35.0 - 47.0 %    MCV 91 78 - 100 fL    MCH 29.0 26.5 - 33.0 pg    MCHC 32.0 31.5 - 36.5 g/dL    RDW 14.1 10.0 - 15.0 %    Platelet Count 127 (L) 150 - 450 10e3/uL    % Neutrophils 80 %    % Lymphocytes 9 %    % Monocytes 8 %    % Eosinophils 3 %    % Basophils 0 %    % Immature Granulocytes 0 %    NRBCs per 100 WBC 0 <1 /100    Absolute Neutrophils 6.1 1.6 - 8.3 10e3/uL    Absolute Lymphocytes 0.7 (L) 0.8 - 5.3 10e3/uL    Absolute Monocytes 0.6 0.0 - 1.3 10e3/uL    Absolute Eosinophils 0.3 0.0 - 0.7 10e3/uL    Absolute Basophils 0.0 0.0 - 0.2 10e3/uL    Absolute Immature Granulocytes 0.0 <=0.4 10e3/uL    Absolute NRBCs 0.0 10e3/uL    Extra Blue Top Tube   Result Value Ref Range    Hold Specimen JIC    Extra Red Top Tube   Result Value Ref Range    Hold Specimen JIC    NT pro BNP   Result Value Ref Range    N terminal Pro BNP Inpatient 9,070 (H) 0 - 1,800 pg/mL   Blood gas venous   Result Value Ref Range    pH Venous 7.32 7.32 - 7.43    pCO2 Venous 55 (H) 40 - 50 mm Hg    pO2 Venous 19 (L) 25 - 47 mm Hg    Bicarbonate Venous 28 21 - 28 mmol/L    Base Excess/Deficit 1.4 -7.7 - 1.9 mmol/L    FIO2 4    XR Chest 2 Views    Narrative    EXAM: XR CHEST 2 VIEWS  LOCATION: [facility]  DATE: 12/25/2023    INDICATION: Shortness of breath and hypoxia  COMPARISON: None.      Impression    IMPRESSION: Heart is normal in size. Small patchy airspace opacity within the left lung base concerning for pneumonia. Right lung clear. No large effusion.   Lactic Acid STAT   Result Value Ref Range    Lactic Acid 0.7 0.7 - 2.0 mmol/L     Previous Records Reviewed: Stable CR/GFR due to CKD and improved BNP vs previous.  Last creatinine/GFR: 2.36/20 on 12/11/23  Last BNP: 15,299 ~ 12 months ago    Medications   calcium carbonate (TUMS) chewable tablet 1,000 mg (has no administration in time range)   lidocaine 1 % 0.1-1 mL (has no administration in time range)   lidocaine (LMX4) kit (has no administration in time range)   sodium chloride (PF) 0.9% PF flush 3 mL (3 mLs Intracatheter Not Given 12/25/23 1904)   sodium chloride (PF) 0.9% PF flush 3 mL (has no administration in time range)   levothyroxine (SYNTHROID/LEVOTHROID) tablet 112 mcg (has no administration in time range)   famotidine (PEPCID) tablet 20 mg (has no administration in time range)   metoprolol succinate ER (TOPROL XL) 24 hr tablet 150 mg (150 mg Oral $Given 12/25/23 1941)   rosuvastatin (CRESTOR) tablet 20 mg (20 mg Oral $Given 12/25/23 2016)   sertraline (ZOLOFT) tablet 200 mg (200 mg Oral $Given 12/25/23 1941)   lidocaine 1 % 0.1-1 mL (has no administration in time range)    lidocaine (LMX4) kit (has no administration in time range)   sodium chloride (PF) 0.9% PF flush 3 mL (3 mLs Intracatheter Not Given 12/25/23 1905)   sodium chloride (PF) 0.9% PF flush 3 mL (has no administration in time range)   senna-docusate (SENOKOT-S/PERICOLACE) 8.6-50 MG per tablet 1 tablet (has no administration in time range)     Or   senna-docusate (SENOKOT-S/PERICOLACE) 8.6-50 MG per tablet 2 tablet (has no administration in time range)   heparin ANTICOAGULANT injection 5,000 Units (5,000 Units Subcutaneous $Given 12/25/23 1941)   ondansetron (ZOFRAN ODT) ODT tab 4 mg (has no administration in time range)     Or   ondansetron (ZOFRAN) injection 4 mg (has no administration in time range)   prochlorperazine (COMPAZINE) injection 5 mg (has no administration in time range)     Or   prochlorperazine (COMPAZINE) tablet 5 mg (has no administration in time range)     Or   prochlorperazine (COMPAZINE) suppository 12.5 mg (has no administration in time range)   aspirin EC tablet 81 mg (has no administration in time range)   diltiazem ER COATED BEADS (CARDIZEM CD/CARTIA XT) 24 hr capsule 180 mg (has no administration in time range)   furosemide (LASIX) tablet 40 mg (has no administration in time range)   ipratropium - albuterol 0.5 mg/2.5 mg/3 mL (DUONEB) neb solution 3 mL (has no administration in time range)   predniSONE (DELTASONE) tablet 40 mg (has no administration in time range)   azithromycin (ZITHROMAX) tablet 500 mg (has no administration in time range)   cefTRIAXone (ROCEPHIN) 2 g vial to attach to  ml bag for ADULTS or NS 50 ml bag for PEDS (has no administration in time range)   ondansetron (ZOFRAN) injection 4 mg (4 mg Intravenous $Given 12/25/23 1313)   cefTRIAXone (ROCEPHIN) 2 g vial to attach to  ml bag for ADULTS or NS 50 ml bag for PEDS (0 g Intravenous Stopped 12/25/23 1708)   azithromycin (ZITHROMAX) 500 mg in sodium chloride 0.9 % 250 mL intermittent infusion (500 mg Intravenous $New  Bag 12/25/23 1707)   ipratropium - albuterol 0.5 mg/2.5 mg/3 mL (DUONEB) neb solution 3 mL (3 mLs Nebulization $Given 12/25/23 1614)   predniSONE (DELTASONE) tablet 40 mg (40 mg Oral $Given 12/25/23 1623)       Assessments & Plan (with Medical Decision Making)   RSV with community acquired pneumonia/CAP with O2 requirement of 2L/NC with acute respiratory failure with hypoxia and hypercarbia with RAD/wheezing. No evidence of CHF and doubt PE/DVT. Chronic stable chronic kidney disease. She was given Ceftriaxone and Azithromycin IV and DuoNeb therapy and Prednisone. Will admit to the Hospitalist service for continued care.    I have reviewed the nursing notes.    I have reviewed the findings, diagnosis, plan and need for follow up with the patient.    Medical Decision Making: High Complexity        Current Discharge Medication List          Final diagnoses:   Community acquired pneumonia, unspecified laterality   Acute respiratory failure with hypoxia and hypercarbia (H)   Chronic atrial fibrillation (H) - with mild RVR, probably stress induced RVR       12/25/2023   [facility] EMERGENCY DEPT       John Stevens MD  12/26/23 4386

## 2023-12-25 NOTE — ED TRIAGE NOTES
Chest congestion, sneezing, cough,bilateral ear pain     Triage Assessment (Adult)       Row Name 12/25/23 1249          Triage Assessment    Airway WDL WDL        Respiratory WDL    Respiratory WDL X

## 2023-12-25 NOTE — MEDICATION SCRIBE - ADMISSION MEDICATION HISTORY
Medication Scribe Admission Medication History    Admission medication history is complete. The information provided in this note is only as accurate as the sources available at the time of the update.    Information Source(s): Patient, Clinic records, and CareEverywhere/SureScripts via  in room with patient and finished at desk.    Pertinent Information: Had a script for Ferrous Gluconate 324 mg.  She states that she is taking OTC Iron PO but doesn't know what form it is at this time.  Took her AM doses this morning.  She ran out of her Sertraline a few days ago.  It was refilled on 12/23/23 but she did not get it picked yet.    Changes made to PTA medication list:  Added: Potassium Chloride 10 meq, Diltiazem  mg, Famotidine 20 mg, OTC Iron, Levothyroxine 112 mcg.   Deleted: Benzonatate 200 mg, Calcium 600 mg, Clopidogrel 75 mg, Mucinex DM, Diltiazem ER Beads 180 mg, Furosemide 40 mg daily for 10 days, Ketorolac 0.5%, Ofloxacin 0.3%, Omeprazole 20 mg, Prednisolone 1% eye drop, Levothyroxine 100 mcg.   Changed: Aspirin 81 mg added frequency.  Fluticasone from daily to daily as needed.  Furosemide 40 mg added frequency.      Medication Affordability:  Not including over the counter (OTC) medications, was there a time in the past 3 months when you did not take your medications as prescribed because of cost?: No    Allergies reviewed with patient and updates made in EHR: yes, added itching to PCN and dizziness and almost passed out to Erythromycin.    Medication History Completed By: Yessi Henry 12/25/2023 4:41 PM    PTA Med List   Medication Sig Last Dose    albuterol (PROAIR HFA/PROVENTIL HFA/VENTOLIN HFA) 108 (90 Base) MCG/ACT inhaler Inhale 2 puffs into the lungs every 6 hours as needed for shortness of breath / dyspnea or wheezing 12/24/2023 at [place]    aspirin (ASA) 81 MG EC tablet Take 81 mg by mouth daily 12/25/2023 at am    calcium carbonate (OS-ANIKA) 1500 (600 Ca) MG tablet Take 600 mg by mouth 2  times daily (with meals) 12/25/2023 at am    DILT- MG 24 hr capsule Take 180 mg by mouth daily 12/25/2023 at am    famotidine (PEPCID) 20 MG tablet Take 20 mg by mouth daily 12/25/2023 at am    fluticasone (VERAMYST) 27.5 MCG/SPRAY nasal spray Spray 2 sprays into both nostrils daily as needed for allergies 12/25/2023 at am    furosemide (LASIX) 40 MG tablet Take 40 mg by mouth daily 12/25/2023 at am    levothyroxine (SYNTHROID/LEVOTHROID) 112 MCG tablet Take 112 mcg by mouth daily 12/25/2023 at am    metoprolol succinate ER (TOPROL-XL) 100 MG 24 hr tablet Take 150 mg by mouth 2 times daily 12/25/2023 at am    OVER-THE-COUNTER Take 2 tablets by mouth daily Iron supplement unknown formulation and dose. 12/25/2023 at am    potassium chloride ER (MICRO-K) 10 MEQ CR capsule Take 20 mEq by mouth daily 12/25/2023 at am    rosuvastatin (CRESTOR) 20 MG tablet Take 20 mg by mouth every evening 12/24/2023 at pm    sertraline (ZOLOFT) 100 MG tablet Take 200 mg by mouth daily Past Week at am ran out

## 2023-12-25 NOTE — ED NOTES
"[facility]   Admission Handoff    The patient is Shaila Triana, 78 year old who arrived in the ED by CAR from home with a complaint of Sinusitis, Tachycardia, and Shortness of Breath  . The patient's current symptoms are new and during this time the symptoms have increased. In the ED, patient was diagnosed with   Final diagnoses:   Community acquired pneumonia, unspecified laterality   Acute respiratory failure with hypoxia and hypercarbia (H)         Needed?: No    Allergies:    Allergies   Allergen Reactions    Codeine Nausea    Dust Mite Extract Other (See Comments)     Per allergy test    Erythromycin Dizziness     Almost passed out    Penicillin [Penicillins] Itching       Past Medical Hx:   Past Medical History:   Diagnosis Date    Depressive disorder 12/4/2006    Essential hypertension 12/4/2006    Hypothyroidism (acquired) 12/4/2006    Invasive ductal carcinoma of breast (H) 8/6/2013    Left.  ER(+) CT(+) her-2-hina (-).  Lumpectomy and sentinel biopsy followed by radiation.  Adjuvant therapy with tamoxifen completed.    Lung nodules 4/19/2016    LLL on abd/pelvis CT 4/12/16.  No change on chest CT May 2017.  No further imaging needed.    Malignant neoplasm of female breast (H) 3/18/2008    Epic    New onset atrial fibrillation (H) 7/31/2020    Pure hypercholesterolemia 12/4/2006    Sleep apnea 12/4/2006       Initial vitals were: BP: (!) 176/107  Pulse: (!) 125  Temp: 97.7  F (36.5  C)  Resp: 18  Height: 157.5 cm (5' 2\")  Weight: 65.3 kg (144 lb)  SpO2: 91 %   Recent vital Signs: BP (!) 167/123   Pulse 103   Temp 97.7  F (36.5  C)   Resp 19   Ht 1.575 m (5' 2\")   Wt 65.3 kg (144 lb)   LMP  (LMP Unknown)   SpO2 98%   BMI 26.34 kg/m      Elimination Status: Continent: Yes     Activity Level: Independent    Fall Status: Reason for falls risk: Other- age  nonskid shoes/slippers when out of bed    Baseline Mental status: WDL  Current Mental Status changes: at " basesline    Infection present or suspected this encounter: yes respiratory  Sepsis suspected: No    Isolation type: droplet    Bariatric equipment needed?: No    In the ED these meds were given:   Medications   azithromycin (ZITHROMAX) 500 mg in sodium chloride 0.9 % 250 mL intermittent infusion (500 mg Intravenous $New Bag 12/25/23 1707)   ondansetron (ZOFRAN) injection 4 mg (4 mg Intravenous $Given 12/25/23 1313)   cefTRIAXone (ROCEPHIN) 2 g vial to attach to  ml bag for ADULTS or NS 50 ml bag for PEDS (0 g Intravenous Stopped 12/25/23 1708)   ipratropium - albuterol 0.5 mg/2.5 mg/3 mL (DUONEB) neb solution 3 mL (3 mLs Nebulization $Given 12/25/23 1614)   predniSONE (DELTASONE) tablet 40 mg (40 mg Oral $Given 12/25/23 1623)       Drips running?  No    Home pump  No    Current LDAs: Peripheral IV: Site RAC; Gauge 20  normal saline     Results:   Labs/Imaging  Ordered and Resulted from Time of ED Arrival Up to the Time of Departure from the ED  Results for orders placed or performed during the hospital encounter of 12/25/23 (from the past 24 hour(s))   CBC with platelets, differential    Narrative    The following orders were created for panel order CBC with platelets, differential.  Procedure                               Abnormality         Status                     ---------                               -----------         ------                     CBC with platelets and d...[[id number]]  Abnormal            Final result                 Please view results for these tests on the individual orders.   Basic metabolic panel   Result Value Ref Range    Sodium 139 135 - 145 mmol/L    Potassium 3.7 3.4 - 5.3 mmol/L    Chloride 103 98 - 107 mmol/L    Carbon Dioxide (CO2) 24 22 - 29 mmol/L    Anion Gap 12 7 - 15 mmol/L    Urea Nitrogen 29.8 (H) 8.0 - 23.0 mg/dL    Creatinine 2.18 (H) 0.51 - 0.95 mg/dL    GFR Estimate 22 (L) >60 mL/min/1.73m2    Calcium 9.2 8.8 - 10.2 mg/dL    Glucose 105 (H) 70 - 99 mg/dL    [place] Draw    Narrative    The following orders were created for panel order [place] Draw.  Procedure                               Abnormality         Status                     ---------                               -----------         ------                     Extra Blue Top Tube[[id number]]                              Final result               Extra Red Top Tube[[id number]]                               Final result                 Please view results for these tests on the individual orders.   Symptomatic Influenza A/B, RSV, & SARS-CoV2 PCR (COVID-19) Nasopharyngeal    Specimen: Nasopharyngeal; Swab   Result Value Ref Range    Influenza A PCR Negative Negative    Influenza B PCR Negative Negative    RSV PCR Positive (A) Negative    SARS CoV2 PCR Negative Negative    Narrative    Testing was performed using the Xpert Xpress CoV2/Flu/RSV Assay on the [employer] Instrument. This test should be ordered for the detection of SARS-CoV-2, influenza, and RSV viruses in individuals who meet clinical and/or epidemiological criteria. Test performance is unknown in asymptomatic patients. This test is for in vitro diagnostic use under the FDA EUA for laboratories certified under CLIA to perform high or moderate complexity testing. This test has not been FDA cleared or approved. A negative result does not rule out the presence of PCR inhibitors in the specimen or target RNA in concentration below the limit of detection for the assay. If only one viral target is positive but coinfection with multiple targets is suspected, the sample should be re-tested with another FDA cleared, approved, or authorized test, if coinfection would change clinical management. This test was validated by the [facility]. These laboratories are certified under the Clinical Laboratory Improvement Amendments of 1988 (CLIA-88) as qualified to perform high complexity laboratory testing.   CBC with platelets and  differential   Result Value Ref Range    WBC Count 7.7 4.0 - 11.0 10e3/uL    RBC Count 3.72 (L) 3.80 - 5.20 10e6/uL    Hemoglobin 10.8 (L) 11.7 - 15.7 g/dL    Hematocrit 33.7 (L) 35.0 - 47.0 %    MCV 91 78 - 100 fL    MCH 29.0 26.5 - 33.0 pg    MCHC 32.0 31.5 - 36.5 g/dL    RDW 14.1 10.0 - 15.0 %    Platelet Count 127 (L) 150 - 450 10e3/uL    % Neutrophils 80 %    % Lymphocytes 9 %    % Monocytes 8 %    % Eosinophils 3 %    % Basophils 0 %    % Immature Granulocytes 0 %    NRBCs per 100 WBC 0 <1 /100    Absolute Neutrophils 6.1 1.6 - 8.3 10e3/uL    Absolute Lymphocytes 0.7 (L) 0.8 - 5.3 10e3/uL    Absolute Monocytes 0.6 0.0 - 1.3 10e3/uL    Absolute Eosinophils 0.3 0.0 - 0.7 10e3/uL    Absolute Basophils 0.0 0.0 - 0.2 10e3/uL    Absolute Immature Granulocytes 0.0 <=0.4 10e3/uL    Absolute NRBCs 0.0 10e3/uL   Extra Blue Top Tube   Result Value Ref Range    Hold Specimen JIC    Extra Red Top Tube   Result Value Ref Range    Hold Specimen JIC    NT pro BNP   Result Value Ref Range    N terminal Pro BNP Inpatient 9,070 (H) 0 - 1,800 pg/mL   Blood gas venous   Result Value Ref Range    pH Venous 7.32 7.32 - 7.43    pCO2 Venous 55 (H) 40 - 50 mm Hg    pO2 Venous 19 (L) 25 - 47 mm Hg    Bicarbonate Venous 28 21 - 28 mmol/L    Base Excess/Deficit 1.4 -7.7 - 1.9 mmol/L    FIO2 4    XR Chest 2 Views    Narrative    EXAM: XR CHEST 2 VIEWS  LOCATION: [facility]  DATE: 12/25/2023    INDICATION: Shortness of breath and hypoxia  COMPARISON: None.      Impression    IMPRESSION: Heart is normal in size. Small patchy airspace opacity within the left lung base concerning for pneumonia. Right lung clear. No large effusion.       For the majority of the shift this patient's behavior was Green     Cardiac Rhythm: Atrial rhythm  Pt needs tele? No  Skin/wound Issues: None    Code Status: Full Code    Pain control: pt had none    Nausea control: good    Abnormal labs/tests/findings requiring intervention: RSV  +    Patient tested for COVID 19 prior to admission: YES     OBS brochure/video discussed/provided to patient/family: N/A     Family present during ED course? Yes     Family Comments/Social Situation comments: lives with son    Tasks needing completion: None    Suzie Joshi, RN

## 2023-12-25 NOTE — H&P
[facility]    History and Physical - Hospitalist Service       Date of Admission:  12/25/2023    Assessment & Plan      Shaila Triana is a 78 year old female with past medical history of HTN, HLD, anemia, HFpEF, Afib s/p watchman device, CKD 3-4, Mantle cell lymphoma in situ, hiatal hernia, and hypothyroidism admitted on 12/25/2023 admitted with acute hypoxic respiratory failure after reporting to the ED with URI symptoms.     # RSV   # Concern for Bacterial coinfection  # Acute hypoxic respiratory failure   Patient reported with about 1 week of URI symptoms. She was found to be positive for RSV and required supplemental oxygen by nasal canula to maintain her saturation in the 90's. She does not use oxygen at home. She does not have a documented Hx of COPD or asthma but is prescribed an albuterol inhaler. With her being diffusely wheezy on presentation she was started on prednisone and Duo nebs. CXR showed a small left lung opacity raising concern for pneumonia and prompting start of antibiotics. Will plan to continue course of antibiotics and prednisone burst.   - Continued ceftriaxone and azithromycin  - Continued Duo-nebs PRN   - Prednisone burst og 40 mg for 5 days   - Continued supplemental O2     # Permanent atrial fibrillation s/p watchman device placement 7/2022  Patient had watchman device placed secondary to recurrent anemia requiring blood transfusions. IN review of cardiology documentation (10/4/2023) she has been difficult to rate control under 110 BPM but given that she periodically gets light headed they had refrained from intensifying her regimen.   - Continued PTA metoprolol succinate 150 mg BID  - Continued PTA diltiazem ER coated beads 180 mg every day     # Chronic HFpEF  TTE on 12/20/2023 with EF 59%. Appear euvolemic on exam.   - Continue furosemide 40 mg PO every day     # CKD stage 3-4   Patient follows with nephrology outpatient. Cr of 2.18 on presentation was  "2.36 on 12/11/2023 in outside records. Appears at baseline.   - BMP with am labs     # Large hiatal hernia   - Continued PTA famotidine     # Hypothyroidism  - Continue PTA levothyroxine     # MDD  - Continued PTA sertraline     # Dyslipidemia  -Continued PTA rosuvastatin     # Mantel cell lymphoma in situ   - Out patient oncology follow     # Pancreatic lesion  - Continue outpatient follow up     # Ascending aortic aneurism   4.3 cm on 12/29/2023 CT. 4.1 cm on TTE 12/20/2023.   - Continue outpatient follow up     # Hx breast cancer in remission         Diet: Combination Diet Regular Diet Adult  DVT Prophylaxis: Heparin SQ  Boucher Catheter: Not present  Lines: None     Cardiac Monitoring: ACTIVE order. Indication: Atrial fibrillation with poorly controlled ventricular response, acute respiratory failure with hypoxia and community acquired pneumonia  Code Status: Full Code    Clinically Significant Risk Factors Present on Admission                # Drug Induced Platelet Defect: home medication list includes an antiplatelet medication   # Hypertension: Noted on problem list      # Overweight: Estimated body mass index is 26.34 kg/m  as calculated from the following:    Height as of this encounter: 1.575 m (5' 2\").    Weight as of this encounter: 65.3 kg (144 lb).              Disposition Plan      Expected Discharge Date: 12/27/2023                  Brodie Li MD  Hospitalist Service  [facility]  Securely message with [employer] (more info)  Text page via [facility] Paging/Directory     ______________________________________________________________________    Chief Complaint   Chest congestion, sneezing, cough and ear pain     History is obtained from the patient, ED providers, and chart review.     History of Present Illness   Shaila Triana is a 78 year old female with past medical history of HTN, HLD, anemia, HFpEF, Afib s/p watchman device, CKD 3-4, Mantle cell lymphoma in situ, hiatal " hernia, and hypothyroidism that presented to the ED with symptoms of congestion, sneezing, cough and ear pain. She had these symptoms for about the last week and that she feels that they have been getting worse over the last few days. She denies having any fevers but states she gets chills at times. She uses a pill box to organize her medications but she is not sure which ones she is currently taking. She also has been using an albuterol inhaler at home about once every other day however she reports that she get minimal benefit from this. She does state she takes her water pill daily and that she has never been told to take it more frequently than that. She denies any chest pain, palpitations, of SOB.       Past Medical History    Past Medical History:   Diagnosis Date    Depressive disorder 12/4/2006    Essential hypertension 12/4/2006    Hypothyroidism (acquired) 12/4/2006    Invasive ductal carcinoma of breast (H) 8/6/2013    Left.  ER(+) AR(+) her-2-hina (-).  Lumpectomy and sentinel biopsy followed by radiation.  Adjuvant therapy with tamoxifen completed.    Lung nodules 4/19/2016    LLL on abd/pelvis CT 4/12/16.  No change on chest CT May 2017.  No further imaging needed.    Malignant neoplasm of female breast (H) 3/18/2008    Epic    New onset atrial fibrillation (H) 7/31/2020    Pure hypercholesterolemia 12/4/2006    Sleep apnea 12/4/2006       Past Surgical History   Past Surgical History:   Procedure Laterality Date    APPENDECTOMY      1957    BLEPHAROPLASTY Bilateral 2010    COLONOSCOPY  2005    ESOPHAGOSCOPY, GASTROSCOPY, DUODENOSCOPY (EGD), COMBINED N/A 10/6/2020    Procedure: ESOPHAGOGASTRODUODENOSCOPY, WITH BIOPSY and polypectomy;  Surgeon: Jorge Ramirez MD;  Location: [facility]    HERNIA REPAIR  2013    HYSTERECTOMY, JOSE  1975    LUMPECTOMY BREAST Left 2008    THYROIDECTOMY  Right     Partial    TUBAL LIGATION         Prior to Admission Medications   Prior to Admission Medications   Prescriptions Last  Dose Informant Patient Reported? Taking?   DILT- MG 24 hr capsule 12/25/2023 at am Self Yes Yes   Sig: Take 180 mg by mouth daily   OVER-THE-COUNTER 12/25/2023 at am Self Yes Yes   Sig: Take 2 tablets by mouth daily Iron supplement unknown formulation and dose.   albuterol (PROAIR HFA/PROVENTIL HFA/VENTOLIN HFA) 108 (90 Base) MCG/ACT [facility] Self No Yes   Sig: Inhale 2 puffs into the lungs every 6 hours as needed for shortness of breath / dyspnea or wheezing   aspirin (ASA) 81 MG EC tablet 12/25/2023 at am Self Yes Yes   Sig: Take 81 mg by mouth daily   calcium carbonate (OS-ANIKA) 1500 (600 Ca) MG tablet 12/25/2023 at am Self Yes Yes   Sig: Take 600 mg by mouth 2 times daily (with meals)   famotidine (PEPCID) 20 MG tablet 12/25/2023 at am Self Yes Yes   Sig: Take 20 mg by mouth daily   fluticasone (VERAMYST) 27.5 MCG/SPRAY nasal spray 12/25/2023 at am Self Yes Yes   Sig: Spray 2 sprays into both nostrils daily as needed for allergies   furosemide (LASIX) 40 MG tablet 12/25/2023 at am Self Yes Yes   Sig: Take 40 mg by mouth daily   levothyroxine (SYNTHROID/LEVOTHROID) 112 MCG tablet 12/25/2023 at am Self Yes Yes   Sig: Take 112 mcg by mouth daily   metoprolol succinate ER (TOPROL-XL) 100 MG 24 hr tablet 12/25/2023 at am Self Yes Yes   Sig: Take 150 mg by mouth 2 times daily   potassium chloride ER (MICRO-K) 10 MEQ CR capsule 12/25/2023 at am Self Yes Yes   Sig: Take 20 mEq by mouth daily   rosuvastatin (CRESTOR) 20 MG tablet 12/24/2023 at pm Self Yes Yes   Sig: Take 20 mg by mouth every evening   sertraline (ZOLOFT) 100 MG tablet Past Week at am ran out Self Yes Yes   Sig: Take 200 mg by mouth daily      Facility-Administered Medications: None      ------------------------------------------------------------------------     Physical Exam   Vital Signs: Temp: 99.6  F (37.6  C) Temp src: Oral BP: (!) 144/88 Pulse: 96   Resp: 22 SpO2: 93 % O2 Device: Nasal cannula Oxygen Delivery: 4  LPM  Weight: 144 lbs 0 oz    General Appearance: Awake and alert sitting up in bed, happily eating a sandwich   Respiratory: On nasal canula, breathing easily, lungs with wheezing in all lung fields   Cardiovascular: Tachycardic with and irregular rhythm   GI: Abdomen soft, non-tender, and non-distended   Skin: No rashes or lesions   Other: Appropriate mood and affect, no focal deficits appreciated      Medical Decision Making       60 MINUTES SPENT BY ME on the date of service doing chart review, history, exam, documentation & further activities per the note.      Data     I have personally reviewed the following data over the past 24 hrs:    7.7  \   10.8 (L)   / 127 (L)     139 103 29.8 (H) /  105 (H)   3.7 24 2.18 (H) \     Trop: N/A BNP: 9,070 (H)       Imaging results reviewed over the past 24 hrs:   Recent Results (from the past 24 hour(s))   XR Chest 2 Views    Narrative    EXAM: XR CHEST 2 VIEWS  LOCATION: [facility]  DATE: 12/25/2023    INDICATION: Shortness of breath and hypoxia  COMPARISON: None.      Impression    IMPRESSION: Heart is normal in size. Small patchy airspace opacity within the left lung base concerning for pneumonia. Right lung clear. No large effusion.

## 2023-12-25 NOTE — ED NOTES
In room to hang 2nd antibiotic, pt trying to stop pump from beeping.  Informed pt not to touch the machine but to call RN in the future.  Pt states she wants her cell phone, and I was unable to find one in her belongings.  Pt with over $300 in cash in purse, will ask security to lock up.  Pt also with 2 bottles of pills that are not labled and will send to pharmacy to identify and label.  Pt aware and agrees.  PLAN:  awaiting bedplacement

## 2023-12-26 NOTE — PROGRESS NOTES
"SPIRITUAL HEALTH SERVICES  SPIRITUAL ASSESSMENT Progress Note  [facility] - Med Surg    Referral Source: Patient request on admission    I shared a reflective conversation with Shaila which incorporated elements of illness and family narrative along with spiritual history.    - Shaila welcomed visit and stated she would probably need at least another day before feeling strong enough to return home.  She stated she lives with a son and daughter in law in the [place] area.      - He spoke of her job history working for the  for a time and also for the [employer] so she had \"good jobs that give her a good pension.\"  She retired at 63.    - She also spoke of attending a [employer] in the [facility] but since COVID-era she has not gotten back to [employer].  She wants to learn how to stream it or watch on TV so said she was planning to call the [employer] and learn how to do it.    - Shaila welcomed a follow up visit tomorrow which I hope to be able to provide.    Plan:  will monitor patient's ongoing need for support during LOS.      Steven Hanson M.A., [facility]  Staff Chaplain [facility]  Office: [phone]  Pager [phone]    "

## 2023-12-26 NOTE — PLAN OF CARE
"  Problem: Adult Inpatient Plan of Care  Goal: Optimal Comfort and Wellbeing  Outcome: Progressing  Intervention: Monitor Pain and Promote Comfort  Flowsheets (Taken 12/26/2023 0443)  Pain Management Interventions: pillow support provided     Problem: Adult Inpatient Plan of Care  Goal: Absence of Hospital-Acquired Illness or Injury  Intervention: Identify and Manage Fall Risk  Recent Flowsheet Documentation  Taken 12/26/2023 0345 by Yaritza Pendleton, RN  Safety Promotion/Fall Prevention: activity supervised  Taken 12/25/2023 2246 by Yaritza Pendleton, RN  Safety Promotion/Fall Prevention:   activity supervised   nonskid shoes/slippers when out of bed     Problem: Risk for Delirium  Goal: Improved Attention and Thought Clarity  Outcome: Progressing  Intervention: Maximize Cognitive Function  Flowsheets (Taken 12/26/2023 0443)  Sensory Stimulation Regulation: care clustered  Reorientation Measures: reorientation provided       Goal Outcome Evaluation: Pt alert and oriented. Pt on 5lpm via nasal cannula. Lung sounds diminished with wheezes. Pt denies shortness of breath at rest but states with exertion she gets a little \"winded\". Pt denies pain and slept majority of the shift with no PRN's needed.                         "

## 2023-12-26 NOTE — PROGRESS NOTES
"[facility] ADMISSION NOTE    Patient admitted to room 2403 at approximately 1745 via cart from emergency room. Patient was accompanied by transport tech.     Verbal SBAR report received from er nurse prior to patient arrival.     Patient ambulated to bed with stand-by assist. Patient alert and oriented X 3. The patient is not having any pain.  . Admission vital signs: Blood pressure (!) 142/96, pulse 96, temperature 99.6  F (37.6  C), temperature source Oral, resp. rate 22, height 1.575 m (5' 2\"), weight 65.3 kg (144 lb), SpO2 93%. Patient was oriented to plan of care, call light, bed controls, tv, telephone, bathroom, and visiting hours.     Risk Assessment    The following safety risks were identified during admission: none. Yellow risk band applied: NO. Oncoming nurse notified not on yet    Skin Initial Assessment    This writer admitted this patient and completed a full skin assessment and Fabricio score in the Adult PCS flowsheet. Appropriate interventions initiated as needed.     Secondary skin check completed by Yoon Betancourt.         Education    Patient has a [place] to Observation order: No  Observation education completed and documented: N/A    Pt has a belonging package picked up by security Katie Obrien RN      "

## 2023-12-26 NOTE — PLAN OF CARE
"  Problem: Adult Inpatient Plan of Care  Goal: Optimal Comfort and Wellbeing  Outcome: Progressing   Patient is alert and oriented x4. She has been independent in her room. Patient started the shift on 4 liters of oxygen but was able to wean off with O2 saturations staying around 92%.     Writer attempted to call daughter Dana sanchez to give an update and she did not answer.    Patient has complained of a sore throat x2 and has been using the chloraseptic lozenges to manage this.    BP (!) 142/90 (BP Location: Left arm)   Pulse 98   Temp 98  F (36.7  C) (Oral)   Resp 18   Ht 1.575 m (5' 2\")   Wt 65.3 kg (144 lb)   LMP  (LMP Unknown)   SpO2 91%   BMI 26.34 kg/m     "

## 2023-12-26 NOTE — PROGRESS NOTES
[facility]    Medicine Progress Note - Hospitalist Service    Date of Admission:  12/25/2023    Assessment & Plan      Shaila Triana is a 78 year old female with past medical history of HTN, HLD, anemia, HFpEF, Afib s/p watchman device, CKD 3-4, Mantle cell lymphoma in situ, hiatal hernia, and hypothyroidism admitted on 12/25/2023 admitted with acute hypoxic respiratory failure after reporting to the ED with URI symptoms.     RSV   Concern for superimposed bacterial PNA  Acute hypoxic respiratory failure   Patient reported with about 1 week of URI symptoms. She was found to be positive for RSV and required supplemental oxygen by nasal canula to maintain her saturation in the 90's. She does not use oxygen at home. She does not have a documented Hx of COPD or asthma but is prescribed an albuterol inhaler. With her being diffusely wheezy on presentation she was started on prednisone and Duo nebs. CXR showed a small left lung opacity raising concern for pneumonia and prompting start of antibiotics. Will plan to continue course of antibiotics and prednisone burst. May discontinue antibiotics tomorrow if continues clinical improvement.   - Continued ceftriaxone and azithromycin  - Continued Duo-nebs PRN   - Prednisone burst of 40 mg for 5 days   - Continued supplemental O2 to maintain SPO2 >92%  - Exercise oximetry    Permanent atrial fibrillation s/p watchman device placement 7/2022  Patient had watchman device placed secondary to recurrent anemia requiring blood transfusions. IN review of cardiology documentation (10/4/2023) she has been difficult to rate control under 110 BPM but given that she periodically gets light headed they had refrained from intensifying her regimen.   - Continued PTA metoprolol succinate 150 mg BID  - Continued PTA diltiazem ER coated beads 180 mg every day     Chronic HFpEF  TTE on 12/20/2023 with EF 59%. Appear euvolemic on exam.   - Continue furosemide 40 mg PO  "every day     CKD stage 3-4   Stable. Patient follows with nephrology outpatient. Cr of 2.18 on presentation was 2.36 on 12/11/2023 in outside records. Appears at baseline.   - BMP with am labs     Large hiatal hernia   - Continued PTA famotidine     Hypothyroidism  - Continue PTA levothyroxine     MDD  - Continued PTA sertraline     Dyslipidemia  -Continued PTA rosuvastatin     Mantel cell lymphoma in situ   - Out patient oncology follow     Pancreatic lesion  - Continue outpatient follow up     Ascending aortic aneurism   4.3 cm on 12/29/2023 CT. 4.1 cm on TTE 12/20/2023.   - Continue outpatient follow up     Hx breast cancer in remission           Diet: Combination Diet Regular Diet Adult    DVT Prophylaxis: Heparin SQ  Boucher Catheter: Not present  Lines: None     Cardiac Monitoring: ACTIVE order. Indication: Atrial fibrillation with poorly controlled ventricular response, acute respiratory failure with hypoxia and community acquired pneumonia  Code Status: Full Code      Clinically Significant Risk Factors Present on Admission                # Drug Induced Platelet Defect: home medication list includes an antiplatelet medication   # Hypertension: Noted on problem list      # Overweight: Estimated body mass index is 26.34 kg/m  as calculated from the following:    Height as of this encounter: 1.575 m (5' 2\").    Weight as of this encounter: 65.3 kg (144 lb).              Disposition Plan      Expected Discharge Date: 12/27/2023                    David Chicas DO  Hospitalist Service  [facility]  Securely message with [employer] (more info)  Text page via [facility] Paging/Directory   ______________________________________________________________________    Interval History   No acute events overnight. Patient was unable to tolerate CPAP and was still requiring supplemental O2 this morning to maintain oxygen saturation. She reports improvement since admission. She reports ear fullness and sore " throat that has been going on since her breathing changed, discussed viral syndrome and course of symptoms.     Physical Exam   Vital Signs: Temp: 97.8  F (36.6  C) Temp src: Oral BP: (!) 144/91 Pulse: 95   Resp: 20 SpO2: 92 % O2 Device: None (Room air) Oxygen Delivery: 4 LPM  Weight: 144 lbs 0 oz    Constitutional: awake, alert, cooperative, no apparent distress, and appears stated age  Respiratory: Faint expiratory wheezes present bilaterally, no accessory muscle usage or distress  Cardiovascular: Irregularly irregular, no murmurs  GI: No scars, normal bowel sounds, soft, non-distended, non-tender, no masses palpated, no hepatosplenomegally  Skin: normal skin color, texture, turgor  Musculoskeletal: There is no redness, warmth, or swelling of the joints.  Full range of motion noted.  Motor strength is 5 out of 5 all extremities bilaterally.  Tone is normal.    Medical Decision Making       45 MINUTES SPENT BY ME on the date of service doing chart review, history, exam, documentation & further activities per the note.      Data     I have personally reviewed the following data over the past 24 hrs:    7.9  \   9.3 (L)   / 119 (L)     140 104 28.7 (H) /  113 (H)   3.7 22 1.91 (H) \       Imaging results reviewed over the past 24 hrs:   No results found for this or any previous visit (from the past 24 hour(s)).   Anesthesia Volume In Cc: 6

## 2023-12-26 NOTE — CONSULTS
Care Management Note:     Care Management team received referral due to elevated unplanned re-admission risk score.       Per IDT rounds, EMR review, discussion with pt's hospital medical provider, and/or brief discussion with pt, it has been determined that pt will discharge to home with no identifiable discharge needs.        Care Management will close referral at this time, but please place new consult should pt develop new needs during this stay.        JACINTO Meléndez  Care Transitions   Tele: [phone]

## 2023-12-27 NOTE — PROGRESS NOTES
Patient continues on 02 @ 2 L. NC to have saturation remain 92% or >.  Patient A/O/steady gait and up independently in room.

## 2023-12-27 NOTE — PROGRESS NOTES
Exercise oximetry done in room, walked about in room for a few minutes. Patient is on 2 lpm NC. SpO2 96% on 2 lpm NC. SpO2 dipped to 88%, and recovered quickly after she sat in bed.

## 2023-12-27 NOTE — PLAN OF CARE
Goal Outcome Evaluation:    Plan of Care Reviewed With: patient    Overall Patient Progress: improving  Overall Patient Progress: improving    Outcome Evaluation: Patient alert and oriented. Vitals stable. She was on room air for most of the afternoon, but started to require 2 LPM O2 to keep sats > 92% this evening. Independent in the room. Cough and inspiratory/expiratory wheezing partially relieved with PRN neb. Mildly dyspneic with activity. Sore throat partially relieved with PRN chloraseptic lozenges.    Problem: Adult Inpatient Plan of Care  Goal: Absence of Hospital-Acquired Illness or Injury  Outcome: Progressing  Intervention: Identify and Manage Fall Risk  Recent Flowsheet Documentation  Taken 12/26/2023 1710 by Silvia Ware RN  Safety Promotion/Fall Prevention:   assistive device/personal items within reach   clutter free environment maintained   lighting adjusted   nonskid shoes/slippers when out of bed   room near nurse's station  Intervention: Prevent Skin Injury  Recent Flowsheet Documentation  Taken 12/26/2023 2000 by Silvia Ware RN  Body Position:   weight shifting   position changed independently  Taken 12/26/2023 1710 by Silvia Ware RN  Body Position: position changed independently  Device Skin Pressure Protection:   absorbent pad utilized/changed   pressure points protected  Intervention: Prevent and Manage VTE (Venous Thromboembolism) Risk  Recent Flowsheet Documentation  Taken 12/26/2023 1710 by Silvia Ware RN  VTE Prevention/Management: SCDs (sequential compression devices) off  Intervention: Prevent Infection  Recent Flowsheet Documentation  Taken 12/26/2023 1710 by Silvia Ware RN  Infection Prevention:   equipment surfaces disinfected   hand hygiene promoted   rest/sleep promoted   single patient room provided

## 2023-12-28 NOTE — PLAN OF CARE
Problem: Adult Inpatient Plan of Care  Goal: Plan of Care Review  Description: The Plan of Care Review/Shift note should be completed every shift.  The Outcome Evaluation is a brief statement about your assessment that the patient is improving, declining, or no change.  This information will be displayed automatically on your shift  note.  Flowsheets (Taken 12/28/2023 0629)  Outcome Evaluation: Patient alert and oriented, had oxygen sleep study completed overnight.  Had higher BP overnight so PRN order for hydralazine obtained and utilized.  BP still slightly elevated.  Complains of some pain in head and her ears are needing to pop she states.  She is concered with going home with Pneumonia and if she will need any oxygen at home.  Continue to monitor and possible discharge home  Plan of Care Reviewed With: patient  Overall Patient Progress: improving     Problem: Adult Inpatient Plan of Care  Goal: Absence of Hospital-Acquired Illness or Injury  Intervention: Identify and Manage Fall Risk  Recent Flowsheet Documentation  Taken 12/28/2023 0115 by Sallie Saab RN  Safety Promotion/Fall Prevention:   assistive device/personal items within reach   lighting adjusted   mobility aid in reach   nonskid shoes/slippers when out of bed   room near nurse's station   safety round/check completed     Problem: Adult Inpatient Plan of Care  Goal: Absence of Hospital-Acquired Illness or Injury  Intervention: Prevent and Manage VTE (Venous Thromboembolism) Risk  Recent Flowsheet Documentation  Taken 12/28/2023 0115 by Sallie Saab RN  VTE Prevention/Management: SCDs (sequential compression devices) off     Problem: Risk for Delirium  Goal: Improved Behavioral Control  Intervention: Minimize Safety Risk  Recent Flowsheet Documentation  Taken 12/28/2023 0115 by Sallie Saab RN  Enhanced Safety Measures: room near unit station   Goal Outcome Evaluation:      Plan of Care Reviewed With: patient    Overall Patient  Progress: improvingOverall Patient Progress: improving    Outcome Evaluation: Patient alert and oriented, had oxygen sleep study completed overnight.  Had higher BP overnight so PRN order for hydralazine obtained and utilized.  BP still slightly elevated.  Complains of some pain in head and her ears are needing to pop she states.  She is concered with going home with Pneumonia and if she will need any oxygen at home.  Continue to monitor and possible discharge home

## 2023-12-28 NOTE — DISCHARGE INSTRUCTIONS
Follow up appointment with Dr. Ibarra at the [facility]- Thursday January 4th, 2024 at 1:15 PM. Please call to reschedule if you are unable to make this appt @ [phone]

## 2023-12-28 NOTE — DISCHARGE SUMMARY
"[facility]  Hospitalist Discharge Summary      Date of Admission:  12/25/2023  Date of Discharge:  12/28/2023  Discharging Provider: David Chicas DO  Discharge Service: Hospitalist Service    Discharge Diagnoses   RSV bronchitis  Concern for superimposed bacterial PNA, ruled out  Acute hypoxic and hypercarbic respiratory failure  Obstructive sleep apnea, non-compliant on CPAP  Nocturnal Hypoxia   Permanent atrial fibrillation s/p watchman device placement (07/2022)   Chronic HFpEF  CKD stage 3-4    Large hiatal hernia   Hypothyroidism   MDD  Dyslipidemia    Mantel cell lymphoma in situ   Pancreatic lesion   Ascending aortic aneurysm   Hx breast cancer in remission     Clinically Significant Risk Factors     # Overweight: Estimated body mass index is 26.34 kg/m  as calculated from the following:    Height as of this encounter: 1.575 m (5' 2\").    Weight as of this encounter: 65.3 kg (144 lb).       Follow-ups Needed After Discharge   Follow-up Appointments     Follow-up and recommended labs and tests       Follow up with primary care provider, Mervin Ibarra, within 7 days for   hospital follow- up.  The following labs/tests are recommended: BMP.            Discharge Disposition   Discharged to home  Condition at discharge: Stable    Hospital Course   Shaila Triana is a 78 year old female with past medical history of HTN, HLD, anemia, HFpEF, Afib s/p watchman device, CKD 3-4, Mantle cell lymphoma in situ, hiatal hernia, and hypothyroidism admitted on 12/25/2023 admitted with acute hypoxic respiratory failure after reporting to the ED with URI symptoms.     RSV   Concern for superimposed bacterial PNA  Acute hypoxic respiratory failure  Obstructive sleep apnea, non-compliant on CPAP  Nocturnal Hypoxia   Patient reported with about 1 week of URI symptoms. She was found to be positive for RSV and required supplemental oxygen by nasal canula to maintain her saturation in the 90's. She does " not use oxygen at home. She does not have a documented Hx of COPD or asthma but is prescribed an albuterol inhaler. With her being diffusely wheezy on presentation she was started on prednisone and Duo nebs. CXR showed a small left lung opacity raising concern for pneumonia and prompting start of antibiotics. Will plan to continue course of antibiotics and prednisone burst. Discontinue antibiotics 12/27. Patient with borderline hypoxia with exertion and required 2L O2 overnight. Will plan nocturnal oximetry and will discharge in the morning, continues to improve clinically on steroids and nebs.   - No indication for antibiotics on discharge given low suspicion of superimposed bacterial PNA  - Discussed use of CPAP and oxygen, any nocturnal hypoxia likely secondary to PAUL rather than RSV and given oxygen would not improve SPO2 in setting of PAUL without CPAP usage, so sending her home with supplemental oxygen is not indicated.   - Mucinex 1200 mg BID given ear fullness and course cough  - Continued Duo-nebs PRN   - Prednisone burst of 40 mg for 5 days (end date 12/29)  - Benzonatate 100 mg TID for cough relief   - Benzocaine-menthol lozenges PRN for sore throat    Permanent atrial fibrillation s/p watchman device placement 7/2022  Patient had watchman device placed secondary to recurrent anemia requiring blood transfusions. IN review of cardiology documentation (10/4/2023) she has been difficult to rate control under 110 BPM but given that she periodically gets light headed they had refrained from intensifying her regimen.   - Continued PTA metoprolol succinate 150 mg BID  - Continued PTA diltiazem ER coated beads 180 mg every day     Chronic HFpEF  TTE on 12/20/2023 with EF 59%. Appear euvolemic on exam.   - Continue furosemide 40 mg PO daily     CKD stage 3-4   Stable. Patient follows with nephrology outpatient. Cr of 2.18 on presentation was 2.36 on 12/11/2023 in outside records. Appears at baseline. Discharge Cr  1.75  - Educated patient about avoiding ASA and NSAIDs given underlying CKD, discussed using Tylenol as an option for headache relief.     Large hiatal hernia   - Continued PTA famotidine     Hypothyroidism  - Continue PTA levothyroxine     MDD  - Continued PTA sertraline     Dyslipidemia  -Continued PTA rosuvastatin     Mantel cell lymphoma in situ   - Out patient oncology follow     Pancreatic lesion  - Continue outpatient follow up     Ascending aortic aneurysm   4.3 cm on 12/29/2023 CT. 4.1 cm on TTE 12/20/2023.   - Continue outpatient follow up     Hx breast cancer in remission     Consultations This Hospital Stay   SPIRITUAL HEALTH SERVICES IP CONSULT  CARE MANAGEMENT / SOCIAL WORK IP CONSULT    Code Status   Full Code    Time Spent on this Encounter   IDavid DO, personally saw the patient today and spent less than or equal to 30 minutes discharging this patient.       David Chicas DO  [facility] SURGICAL  [address] [address]  Phone: [phone]  Fax: [phone]  ______________________________________________________________________    Physical Exam   Vital Signs: Temp: 97.8  F (36.6  C) Temp src: Oral BP: (!) 155/97 Pulse: 94   Resp: 18 SpO2: 93 % O2 Device: Nasal cannula Oxygen Delivery: 1 LPM  Weight: 144 lbs 0 oz    Constitutional: awake, alert, cooperative, no apparent distress, and appears stated age  Respiratory: CTAB in bilateral lungs, no accessory muscle usage or distress  Cardiovascular: Irregularly irregular, no murmurs  GI: No scars, normal bowel sounds, soft, non-distended, non-tender, no masses palpated, no hepatosplenomegally  Skin: normal skin color, texture, turgor  Musculoskeletal: There is no redness, warmth, or swelling of the joints.  Full range of motion noted.  Motor strength is 5 out of 5 all extremities bilaterally.  Tone is normal.       Primary Care Physician   Mervin Ibarra    Discharge Orders      Reason for your hospital  stay    Acute respiratory failure with hypoxia and hypercarbia secondary to RSV infection     Activity    Your activity upon discharge: activity as tolerated     Follow-up and recommended labs and tests     Follow up with primary care provider, Mervin Ibarra, within 7 days for hospital follow- up.  The following labs/tests are recommended: BMP.     Diet    Follow this diet upon discharge:  Combination Diet Regular Diet Adult       Significant Results and Procedures   Most Recent 3 CBC's:  Recent Labs   Lab Test 12/28/23  0451 12/27/23  0552 12/26/23  0614   WBC 8.1 8.0 7.9   HGB 8.8* 9.3* 9.3*   MCV 89 89 90   * 141* 119*     Most Recent 3 BMP's:  Recent Labs   Lab Test 12/28/23 0451 12/27/23  0552 12/26/23  0614    139 140   POTASSIUM 3.5 3.5 3.7   CHLORIDE 102 104 104   CO2 24 23 22   BUN 37.9* 36.2* 28.7*   CR 1.75* 1.91* 1.91*   ANIONGAP 11 12 14   ANIKA 7.6* 7.9* 8.2*   * 98 113*   ,   Results for orders placed or performed during the hospital encounter of 12/25/23   XR Chest 2 Views    Narrative    EXAM: XR CHEST 2 VIEWS  LOCATION: [facility]  DATE: 12/25/2023    INDICATION: Shortness of breath and hypoxia  COMPARISON: None.      Impression    IMPRESSION: Heart is normal in size. Small patchy airspace opacity within the left lung base concerning for pneumonia. Right lung clear. No large effusion.       Discharge Medications   Current Discharge Medication List        START taking these medications    Details   acetaminophen (TYLENOL) 500 MG tablet Take 1 tablet (500 mg) by mouth every 6 hours as needed for mild pain  Qty: 30 tablet, Refills: 0    Associated Diagnoses: Acute bronchitis due to respiratory syncytial virus (RSV)      benzocaine-menthol (SORE THROAT LOZENGES) 6-10 MG lozenge Place 1 lozenge inside cheek every 2 hours as needed for sore throat  Qty: 30 lozenge, Refills: 0    Associated Diagnoses: Acute bronchitis due to respiratory syncytial virus (RSV)       benzonatate (TESSALON) 100 MG capsule Take 1 capsule (100 mg) by mouth 3 times daily as needed for cough  Qty: 30 capsule, Refills: 0    Associated Diagnoses: Acute bronchitis due to respiratory syncytial virus (RSV)      guaiFENesin (MUCINEX) 600 MG 12 hr tablet Take 2 tablets (1,200 mg) by mouth 2 times daily for 7 days  Qty: 28 tablet, Refills: 0    Associated Diagnoses: Acute bronchitis due to respiratory syncytial virus (RSV)      predniSONE (DELTASONE) 20 MG tablet Take 2 tablets (40 mg) by mouth daily for 1 day  Qty: 2 tablet, Refills: 0    Associated Diagnoses: Acute bronchitis due to respiratory syncytial virus (RSV)           CONTINUE these medications which have CHANGED    Details   famotidine (PEPCID) 20 MG tablet Take 1 tablet (20 mg) by mouth every other day    Associated Diagnoses: Stage 3 chronic kidney disease, unspecified whether stage 3a or 3b CKD (H)      rosuvastatin (CRESTOR) 10 MG tablet Take 1 tablet (10 mg) by mouth every evening for 30 days  Qty: 30 tablet, Refills: 0    Associated Diagnoses: Pure hypercholesterolemia           CONTINUE these medications which have NOT CHANGED    Details   albuterol (PROAIR HFA/PROVENTIL HFA/VENTOLIN HFA) 108 (90 Base) MCG/ACT inhaler Inhale 2 puffs into the lungs every 6 hours as needed for shortness of breath / dyspnea or wheezing  Qty: 18 g, Refills: 0      aspirin (ASA) 81 MG EC tablet Take 81 mg by mouth daily      calcium carbonate (OS-ANIKA) 1500 (600 Ca) MG tablet Take 600 mg by mouth 2 times daily (with meals)      DILT- MG 24 hr capsule Take 180 mg by mouth daily      fluticasone (VERAMYST) 27.5 MCG/SPRAY nasal spray Spray 2 sprays into both nostrils daily as needed for allergies      furosemide (LASIX) 40 MG tablet Take 40 mg by mouth daily      levothyroxine (SYNTHROID/LEVOTHROID) 112 MCG tablet Take 112 mcg by mouth daily      metoprolol succinate ER (TOPROL-XL) 100 MG 24 hr tablet Take 150 mg by mouth 2 times daily       OVER-THE-COUNTER Take 2 tablets by mouth daily Iron supplement unknown formulation and dose.      potassium chloride ER (MICRO-K) 10 MEQ CR capsule Take 20 mEq by mouth daily      sertraline (ZOLOFT) 100 MG tablet Take 200 mg by mouth daily           Allergies   Allergies   Allergen Reactions    Codeine Nausea    Dust Mite Extract Other (See Comments)     Per allergy test    Erythromycin Dizziness     Almost passed out    Penicillin [Penicillins] Itching

## 2023-12-28 NOTE — PROGRESS NOTES
Overnight oximetry study set up. Dr orders are for on O2. SpO2 95% on RA. Placed on 1 lpm for study.

## 2023-12-28 NOTE — PLAN OF CARE
"  Problem: Adult Inpatient Plan of Care  Goal: Plan of Care Review  Description: The Plan of Care Review/Shift note should be completed every shift.  The Outcome Evaluation is a brief statement about your assessment that the patient is improving, declining, or no change.  This information will be displayed automatically on your shift  note.  Outcome: Progressing  Goal: Patient-Specific Goal (Individualized)  Description: You can add care plan individualizations to a care plan. Examples of Individualization might be:  \"Parent requests to be called daily at 9am for status\", \"I have a hard time hearing out of my right ear\", or \"Do not touch me to wake me up as it startles  me\".  Outcome: Progressing  Goal: Absence of Hospital-Acquired Illness or Injury  Outcome: Progressing  Intervention: Identify and Manage Fall Risk  Recent Flowsheet Documentation  Taken 12/27/2023 1820 by Michael Smith RN  Safety Promotion/Fall Prevention: assistive device/personal items within reach  Taken 12/27/2023 0901 by Michael Smith RN  Safety Promotion/Fall Prevention: assistive device/personal items within reach  Intervention: Prevent Skin Injury  Recent Flowsheet Documentation  Taken 12/27/2023 1820 by Michael Smith RN  Body Position: position changed independently  Taken 12/27/2023 0901 by Michael Smith RN  Body Position: position changed independently  Goal: Optimal Comfort and Wellbeing  Outcome: Progressing  Goal: Readiness for Transition of Care  Outcome: Progressing   Patient is alert and oriented x4 and has been independent in her room. She has denied pain for the shift and has not complained of a sore throat today. Decided it was best that the patient stayed one additional day in the hospital to determine if she will require O2 overnight. Patient oxygen tested with ambulation in the room and it was noted that the O2 would dip to 88% momentarily and then would return to the 92% baseline. Patient states that her " "breathing has felt better today. Patient was able to shower today. Patient is worrying about discharging stating \"My boy friend sent me an article about a patient leaving the hospital with pneumonia and dying at home\" Patient reassured that she has been doing very well as far as her respiratory status stands.     BP (!) 158/96 (BP Location: Left arm)   Pulse 86   Temp 98  F (36.7  C) (Oral)   Resp 16   Ht 1.575 m (5' 2\")   Wt 65.3 kg (144 lb)   LMP  (LMP Unknown)   SpO2 94%   BMI 26.34 kg/m     "

## 2023-12-28 NOTE — PROGRESS NOTES
[facility] DISCHARGE NOTE    Patient discharged to home at 11:22 AM via wheel chair. Accompanied by significant other and staff. Discharge instructions reviewed with patient, opportunity offered to ask questions. Prescriptions sent to patients preferred pharmacy. All belongings sent with patient. Pt's med bottles and values were returned to the pt.    Faustina Smith RN

## 2023-12-30 NOTE — PROGRESS NOTES
Clinic Care Coordination Contact  [facility]/Voicemail    Clinical Data: Care Coordinator Outreach    Outreach Documentation Number of Outreach Attempt   12/30/2023   8:37 AM 2       Left message on patient's voicemail with call back information and requested return call.    Care Coordinator will do no further outreaches at this time.    Qian Aguirre  [phone]  Care

## 2024-01-01 ENCOUNTER — HOSPITAL ENCOUNTER (OUTPATIENT)
Dept: CT IMAGING | Facility: CLINIC | Age: 79
Discharge: HOME | End: 2024-01-19
Attending: FAMILY MEDICINE | Admitting: FAMILY MEDICINE
Payer: COMMERCIAL

## 2024-01-01 ENCOUNTER — APPOINTMENT (OUTPATIENT)
Dept: MRI IMAGING | Facility: HOSPITAL | Age: 79
DRG: 064 | End: 2024-01-01
Attending: PSYCHIATRY & NEUROLOGY
Payer: COMMERCIAL

## 2024-01-01 ENCOUNTER — APPOINTMENT (OUTPATIENT)
Dept: PHYSICAL THERAPY | Facility: HOSPITAL | Age: 79
DRG: 064 | End: 2024-01-01
Payer: COMMERCIAL

## 2024-01-01 ENCOUNTER — APPOINTMENT (OUTPATIENT)
Dept: CT IMAGING | Facility: HOSPITAL | Age: 79
DRG: 064 | End: 2024-01-01
Attending: EMERGENCY MEDICINE
Payer: COMMERCIAL

## 2024-01-01 ENCOUNTER — APPOINTMENT (OUTPATIENT)
Dept: PHYSICAL THERAPY | Facility: HOSPITAL | Age: 79
DRG: 064 | End: 2024-01-01
Attending: HOSPITALIST
Payer: COMMERCIAL

## 2024-01-01 ENCOUNTER — APPOINTMENT (OUTPATIENT)
Dept: CT IMAGING | Facility: HOSPITAL | Age: 79
DRG: 604 | End: 2024-01-01
Payer: COMMERCIAL

## 2024-01-01 ENCOUNTER — PATIENT OUTREACH (OUTPATIENT)
Dept: ONCOLOGY | Facility: CLINIC | Age: 79
End: 2024-01-01
Payer: COMMERCIAL

## 2024-01-01 ENCOUNTER — APPOINTMENT (OUTPATIENT)
Dept: SPEECH THERAPY | Facility: HOSPITAL | Age: 79
DRG: 064 | End: 2024-01-01
Payer: COMMERCIAL

## 2024-01-01 ENCOUNTER — APPOINTMENT (OUTPATIENT)
Dept: MRI IMAGING | Facility: HOSPITAL | Age: 79
DRG: 064 | End: 2024-01-01
Attending: STUDENT IN AN ORGANIZED HEALTH CARE EDUCATION/TRAINING PROGRAM
Payer: COMMERCIAL

## 2024-01-01 ENCOUNTER — APPOINTMENT (OUTPATIENT)
Dept: GENERAL RADIOLOGY | Facility: CLINIC | Age: 79
End: 2024-01-01
Attending: STUDENT IN AN ORGANIZED HEALTH CARE EDUCATION/TRAINING PROGRAM
Payer: COMMERCIAL

## 2024-01-01 ENCOUNTER — APPOINTMENT (OUTPATIENT)
Dept: OCCUPATIONAL THERAPY | Facility: HOSPITAL | Age: 79
DRG: 604 | End: 2024-01-01
Payer: COMMERCIAL

## 2024-01-01 ENCOUNTER — APPOINTMENT (OUTPATIENT)
Dept: CT IMAGING | Facility: CLINIC | Age: 79
End: 2024-01-01
Attending: EMERGENCY MEDICINE
Payer: COMMERCIAL

## 2024-01-01 ENCOUNTER — APPOINTMENT (OUTPATIENT)
Dept: PHYSICAL THERAPY | Facility: CLINIC | Age: 79
DRG: 291 | End: 2024-01-01
Payer: COMMERCIAL

## 2024-01-01 ENCOUNTER — APPOINTMENT (OUTPATIENT)
Dept: NEUROLOGY | Facility: HOSPITAL | Age: 79
DRG: 064 | End: 2024-01-01
Attending: PSYCHIATRY & NEUROLOGY
Payer: COMMERCIAL

## 2024-01-01 ENCOUNTER — APPOINTMENT (OUTPATIENT)
Dept: OCCUPATIONAL THERAPY | Facility: HOSPITAL | Age: 79
DRG: 064 | End: 2024-01-01
Payer: COMMERCIAL

## 2024-01-01 ENCOUNTER — APPOINTMENT (OUTPATIENT)
Dept: SPEECH THERAPY | Facility: HOSPITAL | Age: 79
DRG: 064 | End: 2024-01-01
Attending: HOSPITALIST
Payer: COMMERCIAL

## 2024-01-01 ENCOUNTER — HOSPITAL ENCOUNTER (INPATIENT)
Facility: HOSPITAL | Age: 79
LOS: 2 days | Discharge: HOME HEALTH | DRG: 604 | End: 2024-06-28
Attending: STUDENT IN AN ORGANIZED HEALTH CARE EDUCATION/TRAINING PROGRAM | Admitting: STUDENT IN AN ORGANIZED HEALTH CARE EDUCATION/TRAINING PROGRAM
Payer: COMMERCIAL

## 2024-01-01 ENCOUNTER — HOSPITAL ENCOUNTER (EMERGENCY)
Facility: CLINIC | Age: 79
Discharge: HOME | End: 2024-10-26
Attending: FAMILY MEDICINE | Admitting: FAMILY MEDICINE
Payer: COMMERCIAL

## 2024-01-01 ENCOUNTER — ANESTHESIA (OUTPATIENT)
Dept: GASTROENTEROLOGY | Facility: CLINIC | Age: 79
End: 2024-01-01
Payer: COMMERCIAL

## 2024-01-01 ENCOUNTER — MEDICAL CORRESPONDENCE (OUTPATIENT)
Dept: HEALTH INFORMATION MANAGEMENT | Facility: CLINIC | Age: 79
End: 2024-01-01

## 2024-01-01 ENCOUNTER — APPOINTMENT (OUTPATIENT)
Dept: CT IMAGING | Facility: HOSPITAL | Age: 79
DRG: 604 | End: 2024-01-01
Attending: STUDENT IN AN ORGANIZED HEALTH CARE EDUCATION/TRAINING PROGRAM
Payer: COMMERCIAL

## 2024-01-01 ENCOUNTER — APPOINTMENT (OUTPATIENT)
Dept: OCCUPATIONAL THERAPY | Facility: HOSPITAL | Age: 79
DRG: 064 | End: 2024-01-01
Attending: PSYCHIATRY & NEUROLOGY
Payer: COMMERCIAL

## 2024-01-01 ENCOUNTER — NURSE TRIAGE (OUTPATIENT)
Dept: NURSING | Facility: CLINIC | Age: 79
End: 2024-01-01
Payer: COMMERCIAL

## 2024-01-01 ENCOUNTER — PATIENT OUTREACH (OUTPATIENT)
Dept: CARE COORDINATION | Facility: CLINIC | Age: 79
End: 2024-01-01
Payer: COMMERCIAL

## 2024-01-01 ENCOUNTER — APPOINTMENT (OUTPATIENT)
Dept: RADIOLOGY | Facility: HOSPITAL | Age: 79
DRG: 604 | End: 2024-01-01
Attending: STUDENT IN AN ORGANIZED HEALTH CARE EDUCATION/TRAINING PROGRAM
Payer: COMMERCIAL

## 2024-01-01 ENCOUNTER — APPOINTMENT (OUTPATIENT)
Dept: CT IMAGING | Facility: HOSPITAL | Age: 79
DRG: 064 | End: 2024-01-01
Attending: INTERNAL MEDICINE
Payer: COMMERCIAL

## 2024-01-01 ENCOUNTER — APPOINTMENT (OUTPATIENT)
Dept: PHYSICAL THERAPY | Facility: CLINIC | Age: 79
End: 2024-01-01
Payer: COMMERCIAL

## 2024-01-01 ENCOUNTER — ANESTHESIA EVENT (OUTPATIENT)
Dept: GASTROENTEROLOGY | Facility: CLINIC | Age: 79
End: 2024-01-01
Payer: COMMERCIAL

## 2024-01-01 ENCOUNTER — APPOINTMENT (OUTPATIENT)
Dept: CARDIOLOGY | Facility: HOSPITAL | Age: 79
DRG: 064 | End: 2024-01-01
Attending: STUDENT IN AN ORGANIZED HEALTH CARE EDUCATION/TRAINING PROGRAM
Payer: COMMERCIAL

## 2024-01-01 ENCOUNTER — APPOINTMENT (OUTPATIENT)
Dept: CT IMAGING | Facility: CLINIC | Age: 79
End: 2024-01-01
Attending: FAMILY MEDICINE
Payer: COMMERCIAL

## 2024-01-01 ENCOUNTER — HOSPITAL ENCOUNTER (INPATIENT)
Facility: HOSPITAL | Age: 79
LOS: 4 days | Discharge: HOME | DRG: 064 | End: 2024-03-22
Attending: EMERGENCY MEDICINE | Admitting: STUDENT IN AN ORGANIZED HEALTH CARE EDUCATION/TRAINING PROGRAM
Payer: COMMERCIAL

## 2024-01-01 ENCOUNTER — APPOINTMENT (OUTPATIENT)
Dept: OCCUPATIONAL THERAPY | Facility: CLINIC | Age: 79
DRG: 291 | End: 2024-01-01
Payer: COMMERCIAL

## 2024-01-01 ENCOUNTER — HOSPITAL ENCOUNTER (INPATIENT)
Facility: CLINIC | Age: 79
LOS: 3 days | Discharge: HOME HEALTH | DRG: 291 | End: 2024-07-12
Attending: FAMILY MEDICINE | Admitting: STUDENT IN AN ORGANIZED HEALTH CARE EDUCATION/TRAINING PROGRAM
Payer: COMMERCIAL

## 2024-01-01 ENCOUNTER — HOSPITAL ENCOUNTER (EMERGENCY)
Facility: CLINIC | Age: 79
Discharge: ANOTHER HOSPITAL | End: 2024-01-16
Attending: EMERGENCY MEDICINE | Admitting: EMERGENCY MEDICINE
Payer: COMMERCIAL

## 2024-01-01 ENCOUNTER — APPOINTMENT (OUTPATIENT)
Dept: GENERAL RADIOLOGY | Facility: CLINIC | Age: 79
DRG: 291 | End: 2024-01-01
Attending: FAMILY MEDICINE
Payer: COMMERCIAL

## 2024-01-01 ENCOUNTER — APPOINTMENT (OUTPATIENT)
Dept: CT IMAGING | Facility: HOSPITAL | Age: 79
DRG: 064 | End: 2024-01-01
Payer: COMMERCIAL

## 2024-01-01 ENCOUNTER — APPOINTMENT (OUTPATIENT)
Dept: PHYSICAL THERAPY | Facility: HOSPITAL | Age: 79
DRG: 604 | End: 2024-01-01
Payer: COMMERCIAL

## 2024-01-01 ENCOUNTER — APPOINTMENT (OUTPATIENT)
Dept: OCCUPATIONAL THERAPY | Facility: HOSPITAL | Age: 79
DRG: 064 | End: 2024-01-01
Attending: INTERNAL MEDICINE
Payer: COMMERCIAL

## 2024-01-01 ENCOUNTER — HOSPITAL ENCOUNTER (OUTPATIENT)
Facility: CLINIC | Age: 79
Setting detail: OBSERVATION
Discharge: HOME | End: 2024-02-02
Attending: FAMILY MEDICINE | Admitting: STUDENT IN AN ORGANIZED HEALTH CARE EDUCATION/TRAINING PROGRAM
Payer: COMMERCIAL

## 2024-01-01 VITALS
HEIGHT: 62 IN | WEIGHT: 145.06 LBS | DIASTOLIC BLOOD PRESSURE: 89 MMHG | SYSTOLIC BLOOD PRESSURE: 137 MMHG | RESPIRATION RATE: 16 BRPM | BODY MASS INDEX: 26.69 KG/M2 | TEMPERATURE: 97.8 F | OXYGEN SATURATION: 92 % | HEART RATE: 94 BPM

## 2024-01-01 VITALS
BODY MASS INDEX: 28.08 KG/M2 | TEMPERATURE: 98 F | SYSTOLIC BLOOD PRESSURE: 168 MMHG | RESPIRATION RATE: 18 BRPM | WEIGHT: 152.6 LBS | HEART RATE: 83 BPM | HEIGHT: 62 IN | DIASTOLIC BLOOD PRESSURE: 103 MMHG | OXYGEN SATURATION: 96 %

## 2024-01-01 VITALS
SYSTOLIC BLOOD PRESSURE: 134 MMHG | DIASTOLIC BLOOD PRESSURE: 103 MMHG | BODY MASS INDEX: 26.53 KG/M2 | OXYGEN SATURATION: 92 % | HEIGHT: 62 IN | RESPIRATION RATE: 16 BRPM | HEART RATE: 100 BPM | TEMPERATURE: 98.3 F

## 2024-01-01 VITALS
WEIGHT: 148.81 LBS | HEART RATE: 84 BPM | DIASTOLIC BLOOD PRESSURE: 78 MMHG | SYSTOLIC BLOOD PRESSURE: 134 MMHG | BODY MASS INDEX: 27.38 KG/M2 | TEMPERATURE: 98.3 F | RESPIRATION RATE: 16 BRPM | OXYGEN SATURATION: 94 % | HEIGHT: 62 IN

## 2024-01-01 VITALS
TEMPERATURE: 98 F | BODY MASS INDEX: 29.74 KG/M2 | RESPIRATION RATE: 18 BRPM | WEIGHT: 161.6 LBS | HEIGHT: 62 IN | OXYGEN SATURATION: 96 % | DIASTOLIC BLOOD PRESSURE: 67 MMHG | HEART RATE: 90 BPM | SYSTOLIC BLOOD PRESSURE: 130 MMHG

## 2024-01-01 VITALS
OXYGEN SATURATION: 97 % | HEIGHT: 62 IN | SYSTOLIC BLOOD PRESSURE: 100 MMHG | RESPIRATION RATE: 16 BRPM | TEMPERATURE: 99 F | WEIGHT: 136 LBS | HEART RATE: 89 BPM | BODY MASS INDEX: 25.03 KG/M2 | DIASTOLIC BLOOD PRESSURE: 73 MMHG

## 2024-01-01 DIAGNOSIS — I10 ESSENTIAL HYPERTENSION: ICD-10-CM
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
## 2024-01-01 DIAGNOSIS — N18.30 STAGE 3 CHRONIC KIDNEY DISEASE, UNSPECIFIED WHETHER STAGE 3A OR 3B CKD (H): ICD-10-CM

## 2024-01-01 DIAGNOSIS — K92.2 LOWER GI BLEED: Primary | ICD-10-CM

## 2024-01-01 DIAGNOSIS — D62 ANEMIA DUE TO BLOOD LOSS, ACUTE: ICD-10-CM

## 2024-01-01 DIAGNOSIS — E03.8 SUBCLINICAL HYPOTHYROIDISM: ICD-10-CM

## 2024-01-01 DIAGNOSIS — N28.9 RENAL INSUFFICIENCY: ICD-10-CM

## 2024-01-01 DIAGNOSIS — I48.20 CHRONIC ATRIAL FIBRILLATION (H): ICD-10-CM

## 2024-01-01 DIAGNOSIS — R53.1 WEAKNESS: ICD-10-CM

## 2024-01-01 DIAGNOSIS — S92.334A CLOSED NONDISPLACED FRACTURE OF THIRD METATARSAL BONE OF RIGHT FOOT, INITIAL ENCOUNTER: ICD-10-CM

## 2024-01-01 DIAGNOSIS — C83.18 MANTLE CELL LYMPHOMA OF LYMPH NODES OF MULTIPLE SITES (H): ICD-10-CM

## 2024-01-01 DIAGNOSIS — K25.4 GASTROINTESTINAL HEMORRHAGE ASSOCIATED WITH GASTRIC ULCER: ICD-10-CM

## 2024-01-01 DIAGNOSIS — K86.9 LESION OF PANCREAS: ICD-10-CM

## 2024-01-01 DIAGNOSIS — R29.6 FALLS FREQUENTLY: ICD-10-CM

## 2024-01-01 DIAGNOSIS — S80.11XA LEG HEMATOMA, RIGHT, INITIAL ENCOUNTER: ICD-10-CM

## 2024-01-01 DIAGNOSIS — R29.6 RECURRENT FALLS WHILE WALKING: Primary | ICD-10-CM

## 2024-01-01 DIAGNOSIS — K92.2 UPPER GI BLEED: ICD-10-CM

## 2024-01-01 DIAGNOSIS — R47.9 SPEECH DISTURBANCE, UNSPECIFIED TYPE: ICD-10-CM

## 2024-01-01 DIAGNOSIS — G45.9 TIA (TRANSIENT ISCHEMIC ATTACK): Primary | ICD-10-CM

## 2024-01-01 DIAGNOSIS — S92.324A CLOSED NONDISPLACED FRACTURE OF SECOND METATARSAL BONE OF RIGHT FOOT, INITIAL ENCOUNTER: ICD-10-CM

## 2024-01-01 DIAGNOSIS — D64.9 ANEMIA, UNSPECIFIED TYPE: ICD-10-CM

## 2024-01-01 DIAGNOSIS — J96.01 ACUTE RESPIRATORY FAILURE WITH HYPOXIA (H): ICD-10-CM

## 2024-01-01 DIAGNOSIS — D64.9 CHRONIC ANEMIA: ICD-10-CM

## 2024-01-01 DIAGNOSIS — R51.9 ACUTE NONINTRACTABLE HEADACHE, UNSPECIFIED HEADACHE TYPE: ICD-10-CM

## 2024-01-01 DIAGNOSIS — G45.9 TIA (TRANSIENT ISCHEMIC ATTACK): ICD-10-CM

## 2024-01-01 DIAGNOSIS — I50.32 CHRONIC DIASTOLIC CONGESTIVE HEART FAILURE (H): ICD-10-CM

## 2024-01-01 DIAGNOSIS — I15.9 SECONDARY HYPERTENSION: ICD-10-CM

## 2024-01-01 DIAGNOSIS — J81.0 ACUTE PULMONARY EDEMA (H): ICD-10-CM
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
## 2024-01-01 DIAGNOSIS — I50.9 CONGESTIVE HEART FAILURE, UNSPECIFIED HF CHRONICITY, UNSPECIFIED HEART FAILURE TYPE (H): Primary | ICD-10-CM

## 2024-01-01 LAB
ABO/RH(D): ABNORMAL
ABO/RH(D): NORMAL
ABO/RH(D): NORMAL
ALBUMIN SERPL BCG-MCNC: 3.2 G/DL (ref 3.5–5.2)
ALBUMIN SERPL BCG-MCNC: 3.2 G/DL (ref 3.5–5.2)
ALBUMIN SERPL BCG-MCNC: 3.3 G/DL (ref 3.5–5.2)
ALBUMIN SERPL BCG-MCNC: 3.5 G/DL (ref 3.5–5.2)
ALBUMIN SERPL BCG-MCNC: 3.7 G/DL (ref 3.5–5.2)
ALBUMIN UR-MCNC: NEGATIVE MG/DL
ALP SERPL-CCNC: 56 U/L (ref 40–150)
ALP SERPL-CCNC: 58 U/L (ref 40–150)
ALP SERPL-CCNC: 68 U/L (ref 40–150)
ALP SERPL-CCNC: 79 U/L (ref 40–150)
ALP SERPL-CCNC: 83 U/L (ref 40–150)
ALT SERPL W P-5'-P-CCNC: 13 U/L (ref 0–50)
ALT SERPL W P-5'-P-CCNC: 16 U/L (ref 0–50)
ALT SERPL W P-5'-P-CCNC: 7 U/L (ref 0–50)
ALT SERPL W P-5'-P-CCNC: 7 U/L (ref 0–50)
ALT SERPL W P-5'-P-CCNC: 9 U/L (ref 0–50)
ANION GAP SERPL CALCULATED.3IONS-SCNC: 10 MMOL/L (ref 7–15)
ANION GAP SERPL CALCULATED.3IONS-SCNC: 11 MMOL/L (ref 7–15)
ANION GAP SERPL CALCULATED.3IONS-SCNC: 12 MMOL/L (ref 7–15)
ANION GAP SERPL CALCULATED.3IONS-SCNC: 6 MMOL/L (ref 7–15)
ANION GAP SERPL CALCULATED.3IONS-SCNC: 6 MMOL/L (ref 7–15)
ANION GAP SERPL CALCULATED.3IONS-SCNC: 7 MMOL/L (ref 7–15)
ANION GAP SERPL CALCULATED.3IONS-SCNC: 7 MMOL/L (ref 7–15)
ANION GAP SERPL CALCULATED.3IONS-SCNC: 8 MMOL/L (ref 7–15)
ANION GAP SERPL CALCULATED.3IONS-SCNC: 9 MMOL/L (ref 7–15)
ANTIBODY SCREEN: NEGATIVE
ANTIBODY SCREEN: NEGATIVE
ANTIBODY SCREEN: POSITIVE
ANTIBODY UNIDENTIFIED: NORMAL
APPEARANCE UR: ABNORMAL
APTT PPP: 32 SECONDS (ref 22–38)
AST SERPL W P-5'-P-CCNC: 14 U/L (ref 0–45)
AST SERPL W P-5'-P-CCNC: 16 U/L (ref 0–45)
AST SERPL W P-5'-P-CCNC: 18 U/L (ref 0–45)
AST SERPL W P-5'-P-CCNC: 27 U/L (ref 0–45)
AST SERPL W P-5'-P-CCNC: 29 U/L (ref 0–45)
ATRIAL RATE - MUSE: 108 BPM
BACTERIA #/AREA URNS HPF: ABNORMAL /HPF
BASOPHILS # BLD AUTO: 0.1 10E3/UL (ref 0–0.2)
BASOPHILS NFR BLD AUTO: 1 %
BASOPHILS NFR BLD AUTO: 2 %
BILIRUB DIRECT SERPL-MCNC: <0.2 MG/DL (ref 0–0.3)
BILIRUB SERPL-MCNC: 0.3 MG/DL
BILIRUB SERPL-MCNC: 0.3 MG/DL
BILIRUB SERPL-MCNC: 0.5 MG/DL
BILIRUB SERPL-MCNC: 0.7 MG/DL
BILIRUB SERPL-MCNC: 1.2 MG/DL
BILIRUB UR QL STRIP: NEGATIVE
BLD PROD TYP BPU: NORMAL
BLOOD COMPONENT TYPE: NORMAL
BUN SERPL-MCNC: 16.9 MG/DL (ref 8–23)
BUN SERPL-MCNC: 19.7 MG/DL (ref 8–23)
BUN SERPL-MCNC: 21 MG/DL (ref 8–23)
BUN SERPL-MCNC: 22.2 MG/DL (ref 8–23)
BUN SERPL-MCNC: 23.2 MG/DL (ref 8–23)
BUN SERPL-MCNC: 24 MG/DL (ref 8–23)
BUN SERPL-MCNC: 26.8 MG/DL (ref 8–23)
BUN SERPL-MCNC: 26.8 MG/DL (ref 8–23)
BUN SERPL-MCNC: 27.3 MG/DL (ref 8–23)
BUN SERPL-MCNC: 28.2 MG/DL (ref 8–23)
BUN SERPL-MCNC: 28.7 MG/DL (ref 8–23)
BUN SERPL-MCNC: 29.2 MG/DL (ref 8–23)
BUN SERPL-MCNC: 33 MG/DL (ref 8–23)
BUN SERPL-MCNC: 34.6 MG/DL (ref 8–23)
BUN SERPL-MCNC: 37.1 MG/DL (ref 8–23)
BUN SERPL-MCNC: 37.5 MG/DL (ref 8–23)
BUN SERPL-MCNC: 38.8 MG/DL (ref 8–23)
BUN SERPL-MCNC: 38.9 MG/DL (ref 8–23)
BUN SERPL-MCNC: 50.8 MG/DL (ref 8–23)
BUN SERPL-MCNC: 52.3 MG/DL (ref 8–23)
CALCIUM SERPL-MCNC: 10.8 MG/DL (ref 8.8–10.2)
CALCIUM SERPL-MCNC: 7.5 MG/DL (ref 8.8–10.2)
CALCIUM SERPL-MCNC: 7.8 MG/DL (ref 8.8–10.2)
CALCIUM SERPL-MCNC: 7.8 MG/DL (ref 8.8–10.2)
CALCIUM SERPL-MCNC: 8 MG/DL (ref 8.8–10.2)
CALCIUM SERPL-MCNC: 8.1 MG/DL (ref 8.8–10.2)
CALCIUM SERPL-MCNC: 8.2 MG/DL (ref 8.8–10.2)
CALCIUM SERPL-MCNC: 8.3 MG/DL (ref 8.8–10.2)
CALCIUM SERPL-MCNC: 8.4 MG/DL (ref 8.8–10.2)
CALCIUM SERPL-MCNC: 8.5 MG/DL (ref 8.8–10.2)
CALCIUM SERPL-MCNC: 8.5 MG/DL (ref 8.8–10.4)
CALCIUM SERPL-MCNC: 8.6 MG/DL (ref 8.8–10.2)
CALCIUM SERPL-MCNC: 9 MG/DL (ref 8.8–10.2)
CALCIUM SERPL-MCNC: 9.2 MG/DL (ref 8.8–10.2)
CHLORIDE SERPL-SCNC: 100 MMOL/L (ref 98–107)
CHLORIDE SERPL-SCNC: 100 MMOL/L (ref 98–107)
CHLORIDE SERPL-SCNC: 101 MMOL/L (ref 98–107)
CHLORIDE SERPL-SCNC: 101 MMOL/L (ref 98–107)
CHLORIDE SERPL-SCNC: 102 MMOL/L (ref 98–107)
CHLORIDE SERPL-SCNC: 104 MMOL/L (ref 98–107)
CHLORIDE SERPL-SCNC: 105 MMOL/L (ref 98–107)
CHLORIDE SERPL-SCNC: 105 MMOL/L (ref 98–107)
CHLORIDE SERPL-SCNC: 106 MMOL/L (ref 98–107)
CHLORIDE SERPL-SCNC: 107 MMOL/L (ref 98–107)
CHLORIDE SERPL-SCNC: 108 MMOL/L (ref 98–107)
CHLORIDE SERPL-SCNC: 108 MMOL/L (ref 98–107)
CHLORIDE SERPL-SCNC: 97 MMOL/L (ref 98–107)
CHLORIDE SERPL-SCNC: 97 MMOL/L (ref 98–107)
CHLORIDE SERPL-SCNC: 99 MMOL/L (ref 98–107)
CHOLEST SERPL-MCNC: 147 MG/DL
CODING SYSTEM: NORMAL
COLONOSCOPY: NORMAL
COLOR UR AUTO: YELLOW
CREAT SERPL-MCNC: 1.57 MG/DL (ref 0.51–0.95)
CREAT SERPL-MCNC: 1.67 MG/DL (ref 0.51–0.95)
CREAT SERPL-MCNC: 1.73 MG/DL (ref 0.51–0.95)
CREAT SERPL-MCNC: 1.76 MG/DL (ref 0.51–0.95)
CREAT SERPL-MCNC: 1.82 MG/DL (ref 0.51–0.95)
CREAT SERPL-MCNC: 1.84 MG/DL (ref 0.51–0.95)
CREAT SERPL-MCNC: 1.95 MG/DL (ref 0.51–0.95)
CREAT SERPL-MCNC: 1.96 MG/DL (ref 0.51–0.95)
CREAT SERPL-MCNC: 1.98 MG/DL (ref 0.51–0.95)
CREAT SERPL-MCNC: 1.99 MG/DL (ref 0.51–0.95)
CREAT SERPL-MCNC: 2.22 MG/DL (ref 0.51–0.95)
CREAT SERPL-MCNC: 2.23 MG/DL (ref 0.51–0.95)
CREAT SERPL-MCNC: 2.25 MG/DL (ref 0.51–0.95)
CREAT SERPL-MCNC: 2.32 MG/DL (ref 0.51–0.95)
CREAT SERPL-MCNC: 2.35 MG/DL (ref 0.51–0.95)
CREAT SERPL-MCNC: 2.37 MG/DL (ref 0.51–0.95)
CREAT SERPL-MCNC: 2.41 MG/DL (ref 0.51–0.95)
CREAT SERPL-MCNC: 2.41 MG/DL (ref 0.51–0.95)
CREAT SERPL-MCNC: 2.49 MG/DL (ref 0.51–0.95)
CREAT SERPL-MCNC: 2.5 MG/DL (ref 0.51–0.95)
CREAT SERPL-MCNC: 3.02 MG/DL (ref 0.51–0.95)
CROSSMATCH: NORMAL
DEPRECATED HCO3 PLAS-SCNC: 20 MMOL/L (ref 22–29)
DEPRECATED HCO3 PLAS-SCNC: 20 MMOL/L (ref 22–29)
DEPRECATED HCO3 PLAS-SCNC: 21 MMOL/L (ref 22–29)
DEPRECATED HCO3 PLAS-SCNC: 21 MMOL/L (ref 22–29)
DEPRECATED HCO3 PLAS-SCNC: 22 MMOL/L (ref 22–29)
DEPRECATED HCO3 PLAS-SCNC: 23 MMOL/L (ref 22–29)
DEPRECATED HCO3 PLAS-SCNC: 24 MMOL/L (ref 22–29)
DEPRECATED HCO3 PLAS-SCNC: 25 MMOL/L (ref 22–29)
DEPRECATED HCO3 PLAS-SCNC: 25 MMOL/L (ref 22–29)
DEPRECATED HCO3 PLAS-SCNC: 26 MMOL/L (ref 22–29)
DEPRECATED HCO3 PLAS-SCNC: 31 MMOL/L (ref 22–29)
DIASTOLIC BLOOD PRESSURE - MUSE: 97 MMHG
EGFRCR SERPLBLD CKD-EPI 2021: 15 ML/MIN/1.73M2
EGFRCR SERPLBLD CKD-EPI 2021: 19 ML/MIN/1.73M2
EGFRCR SERPLBLD CKD-EPI 2021: 19 ML/MIN/1.73M2
EGFRCR SERPLBLD CKD-EPI 2021: 20 ML/MIN/1.73M2
EGFRCR SERPLBLD CKD-EPI 2021: 21 ML/MIN/1.73M2
EGFRCR SERPLBLD CKD-EPI 2021: 21 ML/MIN/1.73M2
EGFRCR SERPLBLD CKD-EPI 2021: 22 ML/MIN/1.73M2
EGFRCR SERPLBLD CKD-EPI 2021: 22 ML/MIN/1.73M2
EGFRCR SERPLBLD CKD-EPI 2021: 25 ML/MIN/1.73M2
EGFRCR SERPLBLD CKD-EPI 2021: 27 ML/MIN/1.73M2
EGFRCR SERPLBLD CKD-EPI 2021: 28 ML/MIN/1.73M2
EGFRCR SERPLBLD CKD-EPI 2021: 29 ML/MIN/1.73M2
EGFRCR SERPLBLD CKD-EPI 2021: 29 ML/MIN/1.73M2
EGFRCR SERPLBLD CKD-EPI 2021: 31 ML/MIN/1.73M2
EGFRCR SERPLBLD CKD-EPI 2021: 33 ML/MIN/1.73M2
EOSINOPHIL # BLD AUTO: 0.2 10E3/UL (ref 0–0.7)
EOSINOPHIL # BLD AUTO: 0.3 10E3/UL (ref 0–0.7)
EOSINOPHIL # BLD AUTO: 0.4 10E3/UL (ref 0–0.7)
EOSINOPHIL # BLD AUTO: 0.4 10E3/UL (ref 0–0.7)
EOSINOPHIL # BLD AUTO: 0.6 10E3/UL (ref 0–0.7)
EOSINOPHIL NFR BLD AUTO: 2 %
EOSINOPHIL NFR BLD AUTO: 3 %
EOSINOPHIL NFR BLD AUTO: 5 %
EOSINOPHIL NFR BLD AUTO: 5 %
EOSINOPHIL NFR BLD AUTO: 6 %
EOSINOPHIL NFR BLD AUTO: 6 %
EOSINOPHIL NFR BLD AUTO: 8 %
ERYTHROCYTE [DISTWIDTH] IN BLOOD BY AUTOMATED COUNT: 13.2 % (ref 10–15)
ERYTHROCYTE [DISTWIDTH] IN BLOOD BY AUTOMATED COUNT: 13.2 % (ref 10–15)
ERYTHROCYTE [DISTWIDTH] IN BLOOD BY AUTOMATED COUNT: 13.5 % (ref 10–15)
ERYTHROCYTE [DISTWIDTH] IN BLOOD BY AUTOMATED COUNT: 13.7 % (ref 10–15)
ERYTHROCYTE [DISTWIDTH] IN BLOOD BY AUTOMATED COUNT: 14.8 % (ref 10–15)
ERYTHROCYTE [DISTWIDTH] IN BLOOD BY AUTOMATED COUNT: 14.9 % (ref 10–15)
ERYTHROCYTE [DISTWIDTH] IN BLOOD BY AUTOMATED COUNT: 15.9 % (ref 10–15)
ERYTHROCYTE [DISTWIDTH] IN BLOOD BY AUTOMATED COUNT: 16.3 % (ref 10–15)
ERYTHROCYTE [DISTWIDTH] IN BLOOD BY AUTOMATED COUNT: 17 % (ref 10–15)
ERYTHROCYTE [DISTWIDTH] IN BLOOD BY AUTOMATED COUNT: 17.1 % (ref 10–15)
ERYTHROCYTE [DISTWIDTH] IN BLOOD BY AUTOMATED COUNT: 17.3 % (ref 10–15)
ERYTHROCYTE [DISTWIDTH] IN BLOOD BY AUTOMATED COUNT: 17.3 % (ref 10–15)
ERYTHROCYTE [DISTWIDTH] IN BLOOD BY AUTOMATED COUNT: 17.4 % (ref 10–15)
ERYTHROCYTE [DISTWIDTH] IN BLOOD BY AUTOMATED COUNT: 17.8 % (ref 10–15)
ERYTHROCYTE [DISTWIDTH] IN BLOOD BY AUTOMATED COUNT: 19.4 % (ref 10–15)
ERYTHROCYTE [DISTWIDTH] IN BLOOD BY AUTOMATED COUNT: 19.4 % (ref 10–15)
ERYTHROCYTE [DISTWIDTH] IN BLOOD BY AUTOMATED COUNT: 19.5 % (ref 10–15)
ERYTHROCYTE [DISTWIDTH] IN BLOOD BY AUTOMATED COUNT: 19.9 % (ref 10–15)
FERRITIN SERPL-MCNC: 246 NG/ML (ref 11–328)
FLUAV RNA SPEC QL NAA+PROBE: NEGATIVE
FLUAV RNA SPEC QL NAA+PROBE: NEGATIVE
FLUBV RNA RESP QL NAA+PROBE: NEGATIVE
FLUBV RNA RESP QL NAA+PROBE: NEGATIVE
GLUCOSE BLDC GLUCOMTR-MCNC: 100 MG/DL (ref 70–99)
GLUCOSE BLDC GLUCOMTR-MCNC: 101 MG/DL (ref 70–99)
GLUCOSE BLDC GLUCOMTR-MCNC: 101 MG/DL (ref 70–99)
GLUCOSE BLDC GLUCOMTR-MCNC: 103 MG/DL (ref 70–99)
GLUCOSE BLDC GLUCOMTR-MCNC: 103 MG/DL (ref 70–99)
GLUCOSE BLDC GLUCOMTR-MCNC: 104 MG/DL (ref 70–99)
GLUCOSE BLDC GLUCOMTR-MCNC: 104 MG/DL (ref 70–99)
GLUCOSE BLDC GLUCOMTR-MCNC: 106 MG/DL (ref 70–99)
GLUCOSE BLDC GLUCOMTR-MCNC: 107 MG/DL (ref 70–99)
GLUCOSE BLDC GLUCOMTR-MCNC: 109 MG/DL (ref 70–99)
GLUCOSE BLDC GLUCOMTR-MCNC: 113 MG/DL (ref 70–99)
GLUCOSE BLDC GLUCOMTR-MCNC: 115 MG/DL (ref 70–99)
GLUCOSE BLDC GLUCOMTR-MCNC: 116 MG/DL (ref 70–99)
GLUCOSE BLDC GLUCOMTR-MCNC: 120 MG/DL (ref 70–99)
GLUCOSE BLDC GLUCOMTR-MCNC: 130 MG/DL (ref 70–99)
GLUCOSE BLDC GLUCOMTR-MCNC: 142 MG/DL (ref 70–99)
GLUCOSE BLDC GLUCOMTR-MCNC: 159 MG/DL (ref 70–99)
GLUCOSE BLDC GLUCOMTR-MCNC: 164 MG/DL (ref 70–99)
GLUCOSE BLDC GLUCOMTR-MCNC: 185 MG/DL (ref 70–99)
GLUCOSE BLDC GLUCOMTR-MCNC: 87 MG/DL (ref 70–99)
GLUCOSE BLDC GLUCOMTR-MCNC: 92 MG/DL (ref 70–99)
GLUCOSE BLDC GLUCOMTR-MCNC: 94 MG/DL (ref 70–99)
GLUCOSE BLDC GLUCOMTR-MCNC: 96 MG/DL (ref 70–99)
GLUCOSE BLDC GLUCOMTR-MCNC: 97 MG/DL (ref 70–99)
GLUCOSE SERPL-MCNC: 100 MG/DL (ref 70–99)
GLUCOSE SERPL-MCNC: 103 MG/DL (ref 70–99)
GLUCOSE SERPL-MCNC: 107 MG/DL (ref 70–99)
GLUCOSE SERPL-MCNC: 118 MG/DL (ref 70–99)
GLUCOSE SERPL-MCNC: 128 MG/DL (ref 70–99)
GLUCOSE SERPL-MCNC: 132 MG/DL (ref 70–99)
GLUCOSE SERPL-MCNC: 85 MG/DL (ref 70–99)
GLUCOSE SERPL-MCNC: 85 MG/DL (ref 70–99)
GLUCOSE SERPL-MCNC: 87 MG/DL (ref 70–99)
GLUCOSE SERPL-MCNC: 88 MG/DL (ref 70–99)
GLUCOSE SERPL-MCNC: 91 MG/DL (ref 70–99)
GLUCOSE SERPL-MCNC: 93 MG/DL (ref 70–99)
GLUCOSE SERPL-MCNC: 93 MG/DL (ref 70–99)
GLUCOSE SERPL-MCNC: 94 MG/DL (ref 70–99)
GLUCOSE SERPL-MCNC: 94 MG/DL (ref 70–99)
GLUCOSE SERPL-MCNC: 95 MG/DL (ref 70–99)
GLUCOSE SERPL-MCNC: 95 MG/DL (ref 70–99)
GLUCOSE SERPL-MCNC: 97 MG/DL (ref 70–99)
GLUCOSE SERPL-MCNC: 97 MG/DL (ref 70–99)
GLUCOSE SERPL-MCNC: 99 MG/DL (ref 70–99)
GLUCOSE UR STRIP-MCNC: NEGATIVE MG/DL
HBA1C MFR BLD: 4.6 %
HCO3 SERPL-SCNC: 25 MMOL/L (ref 22–29)
HCT VFR BLD AUTO: 17.3 % (ref 35–47)
HCT VFR BLD AUTO: 21.7 % (ref 35–47)
HCT VFR BLD AUTO: 22.3 % (ref 35–47)
HCT VFR BLD AUTO: 23.3 % (ref 35–47)
HCT VFR BLD AUTO: 23.6 % (ref 35–47)
HCT VFR BLD AUTO: 24.2 % (ref 35–47)
HCT VFR BLD AUTO: 24.4 % (ref 35–47)
HCT VFR BLD AUTO: 25.3 % (ref 35–47)
HCT VFR BLD AUTO: 25.9 % (ref 35–47)
HCT VFR BLD AUTO: 26 % (ref 35–47)
HCT VFR BLD AUTO: 26.1 % (ref 35–47)
HCT VFR BLD AUTO: 26.2 % (ref 35–47)
HCT VFR BLD AUTO: 26.5 % (ref 35–47)
HCT VFR BLD AUTO: 27.4 % (ref 35–47)
HCT VFR BLD AUTO: 28.3 % (ref 35–47)
HCT VFR BLD AUTO: 28.8 % (ref 35–47)
HCT VFR BLD AUTO: 28.8 % (ref 35–47)
HCT VFR BLD AUTO: 29.7 % (ref 35–47)
HCT VFR BLD AUTO: 30.2 % (ref 35–47)
HDLC SERPL-MCNC: 40 MG/DL
HEMOCCULT STL QL: POSITIVE
HGB BLD-MCNC: 5.3 G/DL (ref 11.7–15.7)
HGB BLD-MCNC: 6.3 G/DL (ref 11.7–15.7)
HGB BLD-MCNC: 6.7 G/DL (ref 11.7–15.7)
HGB BLD-MCNC: 7 G/DL (ref 11.7–15.7)
HGB BLD-MCNC: 7.1 G/DL (ref 11.7–15.7)
HGB BLD-MCNC: 7.3 G/DL (ref 11.7–15.7)
HGB BLD-MCNC: 7.6 G/DL (ref 11.7–15.7)
HGB BLD-MCNC: 7.7 G/DL (ref 11.7–15.7)
HGB BLD-MCNC: 7.8 G/DL (ref 11.7–15.7)
HGB BLD-MCNC: 7.8 G/DL (ref 11.7–15.7)
HGB BLD-MCNC: 8.1 G/DL (ref 11.7–15.7)
HGB BLD-MCNC: 8.1 G/DL (ref 11.7–15.7)
HGB BLD-MCNC: 8.2 G/DL (ref 11.7–15.7)
HGB BLD-MCNC: 8.2 G/DL (ref 11.7–15.7)
HGB BLD-MCNC: 8.3 G/DL (ref 11.7–15.7)
HGB BLD-MCNC: 8.3 G/DL (ref 11.7–15.7)
HGB BLD-MCNC: 8.4 G/DL (ref 11.7–15.7)
HGB BLD-MCNC: 8.5 G/DL (ref 11.7–15.7)
HGB BLD-MCNC: 8.6 G/DL (ref 11.7–15.7)
HGB BLD-MCNC: 8.7 G/DL (ref 11.7–15.7)
HGB BLD-MCNC: 8.7 G/DL (ref 11.7–15.7)
HGB BLD-MCNC: 8.9 G/DL (ref 11.7–15.7)
HGB BLD-MCNC: 8.9 G/DL (ref 11.7–15.7)
HGB BLD-MCNC: 9 G/DL (ref 11.7–15.7)
HGB BLD-MCNC: 9.1 G/DL (ref 11.7–15.7)
HGB BLD-MCNC: 9.2 G/DL (ref 11.7–15.7)
HGB BLD-MCNC: 9.4 G/DL (ref 11.7–15.7)
HGB BLD-MCNC: 9.6 G/DL (ref 11.7–15.7)
HGB UR QL STRIP: ABNORMAL
HOLD SPECIMEN: NORMAL
HYALINE CASTS: 3 /LPF
IMM GRANULOCYTES # BLD: 0 10E3/UL
IMM GRANULOCYTES # BLD: 0 10E3/UL
IMM GRANULOCYTES # BLD: 0.1 10E3/UL
IMM GRANULOCYTES NFR BLD: 0 %
IMM GRANULOCYTES NFR BLD: 1 %
IMM GRANULOCYTES NFR BLD: 2 %
INR PPP: 1.05 (ref 0.85–1.15)
INR PPP: 1.14 (ref 0.85–1.15)
INTERPRETATION ECG - MUSE: NORMAL
IRON BINDING CAPACITY (ROCHE): 221 UG/DL (ref 240–430)
IRON SATN MFR SERPL: 19 % (ref 15–46)
IRON SERPL-MCNC: 43 UG/DL (ref 37–145)
ISSUE DATE AND TIME: NORMAL
KETONES UR STRIP-MCNC: NEGATIVE MG/DL
LACTATE SERPL-SCNC: 0.5 MMOL/L (ref 0.7–2)
LDLC SERPL CALC-MCNC: 88 MG/DL
LEUKOCYTE ESTERASE UR QL STRIP: NEGATIVE
LYMPHOCYTES # BLD AUTO: 0.4 10E3/UL (ref 0.8–5.3)
LYMPHOCYTES # BLD AUTO: 0.5 10E3/UL (ref 0.8–5.3)
LYMPHOCYTES # BLD AUTO: 0.6 10E3/UL (ref 0.8–5.3)
LYMPHOCYTES # BLD AUTO: 0.6 10E3/UL (ref 0.8–5.3)
LYMPHOCYTES # BLD AUTO: 0.8 10E3/UL (ref 0.8–5.3)
LYMPHOCYTES # BLD AUTO: 0.9 10E3/UL (ref 0.8–5.3)
LYMPHOCYTES # BLD AUTO: 1.1 10E3/UL (ref 0.8–5.3)
LYMPHOCYTES NFR BLD AUTO: 10 %
LYMPHOCYTES NFR BLD AUTO: 12 %
LYMPHOCYTES NFR BLD AUTO: 16 %
LYMPHOCYTES NFR BLD AUTO: 18 %
LYMPHOCYTES NFR BLD AUTO: 6 %
LYMPHOCYTES NFR BLD AUTO: 8 %
LYMPHOCYTES NFR BLD AUTO: 8 %
MAGNESIUM SERPL-MCNC: 1.5 MG/DL (ref 1.7–2.3)
MAGNESIUM SERPL-MCNC: 1.7 MG/DL (ref 1.7–2.3)
MAGNESIUM SERPL-MCNC: 1.7 MG/DL (ref 1.7–2.3)
MAGNESIUM SERPL-MCNC: 2.1 MG/DL (ref 1.7–2.3)
MCH RBC QN AUTO: 26.6 PG (ref 26.5–33)
MCH RBC QN AUTO: 26.9 PG (ref 26.5–33)
MCH RBC QN AUTO: 27.3 PG (ref 26.5–33)
MCH RBC QN AUTO: 27.4 PG (ref 26.5–33)
MCH RBC QN AUTO: 28 PG (ref 26.5–33)
MCH RBC QN AUTO: 28 PG (ref 26.5–33)
MCH RBC QN AUTO: 28.2 PG (ref 26.5–33)
MCH RBC QN AUTO: 28.2 PG (ref 26.5–33)
MCH RBC QN AUTO: 28.3 PG (ref 26.5–33)
MCH RBC QN AUTO: 28.4 PG (ref 26.5–33)
MCH RBC QN AUTO: 28.5 PG (ref 26.5–33)
MCH RBC QN AUTO: 28.5 PG (ref 26.5–33)
MCH RBC QN AUTO: 28.9 PG (ref 26.5–33)
MCH RBC QN AUTO: 29.2 PG (ref 26.5–33)
MCH RBC QN AUTO: 29.3 PG (ref 26.5–33)
MCH RBC QN AUTO: 29.4 PG (ref 26.5–33)
MCH RBC QN AUTO: 29.9 PG (ref 26.5–33)
MCH RBC QN AUTO: 30.1 PG (ref 26.5–33)
MCHC RBC AUTO-ENTMCNC: 30 G/DL (ref 31.5–36.5)
MCHC RBC AUTO-ENTMCNC: 30.6 G/DL (ref 31.5–36.5)
MCHC RBC AUTO-ENTMCNC: 30.9 G/DL (ref 31.5–36.5)
MCHC RBC AUTO-ENTMCNC: 31 G/DL (ref 31.5–36.5)
MCHC RBC AUTO-ENTMCNC: 31.3 G/DL (ref 31.5–36.5)
MCHC RBC AUTO-ENTMCNC: 31.4 G/DL (ref 31.5–36.5)
MCHC RBC AUTO-ENTMCNC: 31.6 G/DL (ref 31.5–36.5)
MCHC RBC AUTO-ENTMCNC: 31.6 G/DL (ref 31.5–36.5)
MCHC RBC AUTO-ENTMCNC: 31.8 G/DL (ref 31.5–36.5)
MCHC RBC AUTO-ENTMCNC: 31.8 G/DL (ref 31.5–36.5)
MCHC RBC AUTO-ENTMCNC: 31.9 G/DL (ref 31.5–36.5)
MCHC RBC AUTO-ENTMCNC: 32.1 G/DL (ref 31.5–36.5)
MCHC RBC AUTO-ENTMCNC: 32.2 G/DL (ref 31.5–36.5)
MCHC RBC AUTO-ENTMCNC: 32.4 G/DL (ref 31.5–36.5)
MCHC RBC AUTO-ENTMCNC: 32.6 G/DL (ref 31.5–36.5)
MCHC RBC AUTO-ENTMCNC: 32.7 G/DL (ref 31.5–36.5)
MCV RBC AUTO: 85 FL (ref 78–100)
MCV RBC AUTO: 86 FL (ref 78–100)
MCV RBC AUTO: 86 FL (ref 78–100)
MCV RBC AUTO: 87 FL (ref 78–100)
MCV RBC AUTO: 87 FL (ref 78–100)
MCV RBC AUTO: 88 FL (ref 78–100)
MCV RBC AUTO: 89 FL (ref 78–100)
MCV RBC AUTO: 89 FL (ref 78–100)
MCV RBC AUTO: 90 FL (ref 78–100)
MCV RBC AUTO: 91 FL (ref 78–100)
MCV RBC AUTO: 92 FL (ref 78–100)
MCV RBC AUTO: 93 FL (ref 78–100)
MCV RBC AUTO: 94 FL (ref 78–100)
MCV RBC AUTO: 95 FL (ref 78–100)
MCV RBC AUTO: 98 FL (ref 78–100)
MONOCYTES # BLD AUTO: 0.6 10E3/UL (ref 0–1.3)
MONOCYTES # BLD AUTO: 0.7 10E3/UL (ref 0–1.3)
MONOCYTES # BLD AUTO: 0.7 10E3/UL (ref 0–1.3)
MONOCYTES # BLD AUTO: 0.8 10E3/UL (ref 0–1.3)
MONOCYTES NFR BLD AUTO: 10 %
MONOCYTES NFR BLD AUTO: 11 %
MONOCYTES NFR BLD AUTO: 15 %
MONOCYTES NFR BLD AUTO: 16 %
MONOCYTES NFR BLD AUTO: 9 %
MUCOUS THREADS #/AREA URNS LPF: PRESENT /LPF
NEUTROPHILS # BLD AUTO: 3.1 10E3/UL (ref 1.6–8.3)
NEUTROPHILS # BLD AUTO: 3.5 10E3/UL (ref 1.6–8.3)
NEUTROPHILS # BLD AUTO: 4 10E3/UL (ref 1.6–8.3)
NEUTROPHILS # BLD AUTO: 4.9 10E3/UL (ref 1.6–8.3)
NEUTROPHILS # BLD AUTO: 5.3 10E3/UL (ref 1.6–8.3)
NEUTROPHILS # BLD AUTO: 6.2 10E3/UL (ref 1.6–8.3)
NEUTROPHILS # BLD AUTO: 6.2 10E3/UL (ref 1.6–8.3)
NEUTROPHILS NFR BLD AUTO: 61 %
NEUTROPHILS NFR BLD AUTO: 63 %
NEUTROPHILS NFR BLD AUTO: 66 %
NEUTROPHILS NFR BLD AUTO: 74 %
NEUTROPHILS NFR BLD AUTO: 76 %
NEUTROPHILS NFR BLD AUTO: 76 %
NEUTROPHILS NFR BLD AUTO: 77 %
NITRATE UR QL: NEGATIVE
NONHDLC SERPL-MCNC: 107 MG/DL
NRBC # BLD AUTO: 0 10E3/UL
NRBC BLD AUTO-RTO: 0 /100
NT-PROBNP SERPL-MCNC: 4476 PG/ML (ref 0–1800)
NT-PROBNP SERPL-MCNC: ABNORMAL PG/ML (ref 0–1800)
P AXIS - MUSE: NORMAL DEGREES
PATH REPORT.COMMENTS IMP SPEC: NORMAL
PATH REPORT.COMMENTS IMP SPEC: NORMAL
PATH REPORT.FINAL DX SPEC: NORMAL
PATH REPORT.GROSS SPEC: NORMAL
PATH REPORT.MICROSCOPIC SPEC OTHER STN: NORMAL
PATH REPORT.RELEVANT HX SPEC: NORMAL
PH UR STRIP: 5 [PH] (ref 5–7)
PHOTO IMAGE: NORMAL
PLATELET # BLD AUTO: 110 10E3/UL (ref 150–450)
PLATELET # BLD AUTO: 119 10E3/UL (ref 150–450)
PLATELET # BLD AUTO: 120 10E3/UL (ref 150–450)
PLATELET # BLD AUTO: 120 10E3/UL (ref 150–450)
PLATELET # BLD AUTO: 123 10E3/UL (ref 150–450)
PLATELET # BLD AUTO: 128 10E3/UL (ref 150–450)
PLATELET # BLD AUTO: 131 10E3/UL (ref 150–450)
PLATELET # BLD AUTO: 138 10E3/UL (ref 150–450)
PLATELET # BLD AUTO: 138 10E3/UL (ref 150–450)
PLATELET # BLD AUTO: 144 10E3/UL (ref 150–450)
PLATELET # BLD AUTO: 153 10E3/UL (ref 150–450)
PLATELET # BLD AUTO: 154 10E3/UL (ref 150–450)
PLATELET # BLD AUTO: 155 10E3/UL (ref 150–450)
PLATELET # BLD AUTO: 156 10E3/UL (ref 150–450)
PLATELET # BLD AUTO: 164 10E3/UL (ref 150–450)
PLATELET # BLD AUTO: 176 10E3/UL (ref 150–450)
PLATELET # BLD AUTO: 179 10E3/UL (ref 150–450)
PLATELET # BLD AUTO: 181 10E3/UL (ref 150–450)
PLATELET # BLD AUTO: 182 10E3/UL (ref 150–450)
PLATELET # BLD AUTO: 194 10E3/UL (ref 150–450)
POTASSIUM SERPL-SCNC: 3.1 MMOL/L (ref 3.4–5.3)
POTASSIUM SERPL-SCNC: 3.3 MMOL/L (ref 3.4–5.3)
POTASSIUM SERPL-SCNC: 3.3 MMOL/L (ref 3.4–5.3)
POTASSIUM SERPL-SCNC: 3.4 MMOL/L (ref 3.4–5.3)
POTASSIUM SERPL-SCNC: 3.4 MMOL/L (ref 3.4–5.3)
POTASSIUM SERPL-SCNC: 3.5 MMOL/L (ref 3.4–5.3)
POTASSIUM SERPL-SCNC: 3.6 MMOL/L (ref 3.4–5.3)
POTASSIUM SERPL-SCNC: 3.6 MMOL/L (ref 3.4–5.3)
POTASSIUM SERPL-SCNC: 3.7 MMOL/L (ref 3.4–5.3)
POTASSIUM SERPL-SCNC: 3.8 MMOL/L (ref 3.4–5.3)
POTASSIUM SERPL-SCNC: 3.8 MMOL/L (ref 3.4–5.3)
POTASSIUM SERPL-SCNC: 3.9 MMOL/L (ref 3.4–5.3)
POTASSIUM SERPL-SCNC: 4 MMOL/L (ref 3.4–5.3)
POTASSIUM SERPL-SCNC: 4.2 MMOL/L (ref 3.4–5.3)
POTASSIUM SERPL-SCNC: 4.2 MMOL/L (ref 3.4–5.3)
POTASSIUM SERPL-SCNC: 4.3 MMOL/L (ref 3.4–5.3)
POTASSIUM SERPL-SCNC: 4.4 MMOL/L (ref 3.4–5.3)
PR INTERVAL - MUSE: NORMAL MS
PROT SERPL-MCNC: 6.3 G/DL (ref 6.4–8.3)
PROT SERPL-MCNC: 6.9 G/DL (ref 6.4–8.3)
PROT SERPL-MCNC: 6.9 G/DL (ref 6.4–8.3)
PROT SERPL-MCNC: 7 G/DL (ref 6.4–8.3)
PROT SERPL-MCNC: 7.6 G/DL (ref 6.4–8.3)
QRS DURATION - MUSE: 96 MS
QT - MUSE: 384 MS
QTC - MUSE: 507 MS
R AXIS - MUSE: 28 DEGREES
RBC # BLD AUTO: 1.99 10E6/UL (ref 3.8–5.2)
RBC # BLD AUTO: 2.28 10E6/UL (ref 3.8–5.2)
RBC # BLD AUTO: 2.52 10E6/UL (ref 3.8–5.2)
RBC # BLD AUTO: 2.56 10E6/UL (ref 3.8–5.2)
RBC # BLD AUTO: 2.6 10E6/UL (ref 3.8–5.2)
RBC # BLD AUTO: 2.72 10E6/UL (ref 3.8–5.2)
RBC # BLD AUTO: 2.76 10E6/UL (ref 3.8–5.2)
RBC # BLD AUTO: 2.76 10E6/UL (ref 3.8–5.2)
RBC # BLD AUTO: 2.84 10E6/UL (ref 3.8–5.2)
RBC # BLD AUTO: 2.85 10E6/UL (ref 3.8–5.2)
RBC # BLD AUTO: 3.05 10E6/UL (ref 3.8–5.2)
RBC # BLD AUTO: 3.11 10E6/UL (ref 3.8–5.2)
RBC # BLD AUTO: 3.2 10E6/UL (ref 3.8–5.2)
RBC # BLD AUTO: 3.21 10E6/UL (ref 3.8–5.2)
RBC # BLD AUTO: 3.26 10E6/UL (ref 3.8–5.2)
RBC # BLD AUTO: 3.4 10E6/UL (ref 3.8–5.2)
RBC URINE: 9 /HPF
RSV RNA SPEC NAA+PROBE: NEGATIVE
RSV RNA SPEC NAA+PROBE: NEGATIVE
SARS-COV-2 RNA RESP QL NAA+PROBE: NEGATIVE
SARS-COV-2 RNA RESP QL NAA+PROBE: NEGATIVE
SODIUM SERPL-SCNC: 129 MMOL/L (ref 135–145)
SODIUM SERPL-SCNC: 131 MMOL/L (ref 135–145)
SODIUM SERPL-SCNC: 131 MMOL/L (ref 135–145)
SODIUM SERPL-SCNC: 132 MMOL/L (ref 135–145)
SODIUM SERPL-SCNC: 133 MMOL/L (ref 135–145)
SODIUM SERPL-SCNC: 134 MMOL/L (ref 135–145)
SODIUM SERPL-SCNC: 135 MMOL/L (ref 135–145)
SODIUM SERPL-SCNC: 135 MMOL/L (ref 135–145)
SODIUM SERPL-SCNC: 136 MMOL/L (ref 135–145)
SODIUM SERPL-SCNC: 137 MMOL/L (ref 135–145)
SODIUM SERPL-SCNC: 138 MMOL/L (ref 135–145)
SODIUM SERPL-SCNC: 138 MMOL/L (ref 135–145)
SODIUM SERPL-SCNC: 139 MMOL/L (ref 135–145)
SODIUM SERPL-SCNC: 139 MMOL/L (ref 135–145)
SODIUM SERPL-SCNC: 140 MMOL/L (ref 135–145)
SODIUM SERPL-SCNC: 142 MMOL/L (ref 135–145)
SP GR UR STRIP: 1.01 (ref 1–1.03)
SPECIMEN EXPIRATION DATE: ABNORMAL
SPECIMEN EXPIRATION DATE: NORMAL
SQUAMOUS EPITHELIAL: 15 /HPF
SYSTOLIC BLOOD PRESSURE - MUSE: 198 MMHG
T AXIS - MUSE: 45 DEGREES
T4 FREE SERPL-MCNC: 0.85 NG/DL (ref 0.9–1.7)
TRIGL SERPL-MCNC: 97 MG/DL
TROPONIN T SERPL HS-MCNC: 20 NG/L
TROPONIN T SERPL HS-MCNC: 27 NG/L
TSH SERPL DL<=0.005 MIU/L-ACNC: 1.97 UIU/ML (ref 0.3–4.2)
TSH SERPL DL<=0.005 MIU/L-ACNC: 6.76 UIU/ML (ref 0.3–4.2)
UNIT ABO/RH: NORMAL
UNIT NUMBER: NORMAL
UNIT STATUS: NORMAL
UNIT TYPE ISBT: 6200
UPPER GI ENDOSCOPY: NORMAL
UROBILINOGEN UR STRIP-MCNC: NORMAL MG/DL
VENTRICULAR RATE- MUSE: 105 BPM
WBC # BLD AUTO: 4.2 10E3/UL (ref 4–11)
WBC # BLD AUTO: 4.4 10E3/UL (ref 4–11)
WBC # BLD AUTO: 4.8 10E3/UL (ref 4–11)
WBC # BLD AUTO: 4.9 10E3/UL (ref 4–11)
WBC # BLD AUTO: 5.3 10E3/UL (ref 4–11)
WBC # BLD AUTO: 5.5 10E3/UL (ref 4–11)
WBC # BLD AUTO: 5.7 10E3/UL (ref 4–11)
WBC # BLD AUTO: 5.7 10E3/UL (ref 4–11)
WBC # BLD AUTO: 5.9 10E3/UL (ref 4–11)
WBC # BLD AUTO: 5.9 10E3/UL (ref 4–11)
WBC # BLD AUTO: 6.2 10E3/UL (ref 4–11)
WBC # BLD AUTO: 6.5 10E3/UL (ref 4–11)
WBC # BLD AUTO: 6.9 10E3/UL (ref 4–11)
WBC # BLD AUTO: 7.3 10E3/UL (ref 4–11)
WBC # BLD AUTO: 7.3 10E3/UL (ref 4–11)
WBC # BLD AUTO: 7.8 10E3/UL (ref 4–11)
WBC # BLD AUTO: 8.2 10E3/UL (ref 4–11)
WBC # BLD AUTO: 8.4 10E3/UL (ref 4–11)
WBC URINE: 3 /HPF

## 2024-01-01 PROCEDURE — 84484 ASSAY OF TROPONIN QUANT: CPT | Performed by: EMERGENCY MEDICINE

## 2024-01-01 PROCEDURE — G0378 HOSPITAL OBSERVATION PER HR: HCPCS

## 2024-01-01 PROCEDURE — 29515 APPLICATION SHORT LEG SPLINT: CPT | Mod: LT | Performed by: STUDENT IN AN ORGANIZED HEALTH CARE EDUCATION/TRAINING PROGRAM

## 2024-01-01 PROCEDURE — 99223 1ST HOSP IP/OBS HIGH 75: CPT | Performed by: STUDENT IN AN ORGANIZED HEALTH CARE EDUCATION/TRAINING PROGRAM

## 2024-01-01 PROCEDURE — 250N000013 HC RX MED GY IP 250 OP 250 PS 637: Performed by: INTERNAL MEDICINE

## 2024-01-01 PROCEDURE — 250N000013 HC RX MED GY IP 250 OP 250 PS 637: Performed by: HOSPITALIST

## 2024-01-01 PROCEDURE — 99233 SBSQ HOSP IP/OBS HIGH 50: CPT | Performed by: FAMILY MEDICINE

## 2024-01-01 PROCEDURE — 96376 TX/PRO/DX INJ SAME DRUG ADON: CPT

## 2024-01-01 PROCEDURE — 85018 HEMOGLOBIN: CPT | Performed by: INTERNAL MEDICINE

## 2024-01-01 PROCEDURE — 97165 OT EVAL LOW COMPLEX 30 MIN: CPT | Mod: GO

## 2024-01-01 PROCEDURE — 99205 OFFICE O/P NEW HI 60 MIN: CPT | Performed by: PSYCHIATRY & NEUROLOGY

## 2024-01-01 PROCEDURE — 70553 MRI BRAIN STEM W/O & W/DYE: CPT

## 2024-01-01 PROCEDURE — 95816 EEG AWAKE AND DROWSY: CPT | Mod: 26 | Performed by: PSYCHIATRY & NEUROLOGY

## 2024-01-01 PROCEDURE — 250N000011 HC RX IP 250 OP 636: Performed by: STUDENT IN AN ORGANIZED HEALTH CARE EDUCATION/TRAINING PROGRAM

## 2024-01-01 PROCEDURE — 85027 COMPLETE CBC AUTOMATED: CPT | Performed by: STUDENT IN AN ORGANIZED HEALTH CARE EDUCATION/TRAINING PROGRAM

## 2024-01-01 PROCEDURE — P9016 RBC LEUKOCYTES REDUCED: HCPCS

## 2024-01-01 PROCEDURE — 80048 BASIC METABOLIC PNL TOTAL CA: CPT

## 2024-01-01 PROCEDURE — 36415 COLL VENOUS BLD VENIPUNCTURE: CPT | Performed by: STUDENT IN AN ORGANIZED HEALTH CARE EDUCATION/TRAINING PROGRAM

## 2024-01-01 PROCEDURE — 250N000013 HC RX MED GY IP 250 OP 250 PS 637: Performed by: EMERGENCY MEDICINE

## 2024-01-01 PROCEDURE — 85025 COMPLETE CBC W/AUTO DIFF WBC: CPT | Performed by: STUDENT IN AN ORGANIZED HEALTH CARE EDUCATION/TRAINING PROGRAM

## 2024-01-01 PROCEDURE — A9585 GADOBUTROL INJECTION: HCPCS | Performed by: HOSPITALIST

## 2024-01-01 PROCEDURE — 83605 ASSAY OF LACTIC ACID: CPT | Performed by: INTERNAL MEDICINE

## 2024-01-01 PROCEDURE — 84484 ASSAY OF TROPONIN QUANT: CPT | Performed by: STUDENT IN AN ORGANIZED HEALTH CARE EDUCATION/TRAINING PROGRAM

## 2024-01-01 PROCEDURE — 85049 AUTOMATED PLATELET COUNT: CPT | Performed by: INTERNAL MEDICINE

## 2024-01-01 PROCEDURE — 82374 ASSAY BLOOD CARBON DIOXIDE: CPT

## 2024-01-01 PROCEDURE — 99223 1ST HOSP IP/OBS HIGH 75: CPT

## 2024-01-01 PROCEDURE — 85027 COMPLETE CBC AUTOMATED: CPT | Performed by: HOSPITALIST

## 2024-01-01 PROCEDURE — 82962 GLUCOSE BLOOD TEST: CPT

## 2024-01-01 PROCEDURE — 99222 1ST HOSP IP/OBS MODERATE 55: CPT | Mod: FS | Performed by: STUDENT IN AN ORGANIZED HEALTH CARE EDUCATION/TRAINING PROGRAM

## 2024-01-01 PROCEDURE — 99232 SBSQ HOSP IP/OBS MODERATE 35: CPT | Performed by: PSYCHIATRY & NEUROLOGY

## 2024-01-01 PROCEDURE — 99284 EMERGENCY DEPT VISIT MOD MDM: CPT | Performed by: FAMILY MEDICINE

## 2024-01-01 PROCEDURE — 97530 THERAPEUTIC ACTIVITIES: CPT | Mod: GP

## 2024-01-01 PROCEDURE — 82040 ASSAY OF SERUM ALBUMIN: CPT

## 2024-01-01 PROCEDURE — 99285 EMERGENCY DEPT VISIT HI MDM: CPT | Performed by: FAMILY MEDICINE

## 2024-01-01 PROCEDURE — 82374 ASSAY BLOOD CARBON DIOXIDE: CPT | Performed by: STUDENT IN AN ORGANIZED HEALTH CARE EDUCATION/TRAINING PROGRAM

## 2024-01-01 PROCEDURE — 73630 X-RAY EXAM OF FOOT: CPT | Mod: RT

## 2024-01-01 PROCEDURE — 99285 EMERGENCY DEPT VISIT HI MDM: CPT | Mod: 25 | Performed by: FAMILY MEDICINE

## 2024-01-01 PROCEDURE — 71046 X-RAY EXAM CHEST 2 VIEWS: CPT

## 2024-01-01 PROCEDURE — 250N000013 HC RX MED GY IP 250 OP 250 PS 637: Performed by: FAMILY MEDICINE

## 2024-01-01 PROCEDURE — P9016 RBC LEUKOCYTES REDUCED: HCPCS | Performed by: EMERGENCY MEDICINE

## 2024-01-01 PROCEDURE — 120N000001 HC R&B MED SURG/OB

## 2024-01-01 PROCEDURE — 250N000013 HC RX MED GY IP 250 OP 250 PS 637

## 2024-01-01 PROCEDURE — 999N000208 ECHOCARDIOGRAM COMPLETE

## 2024-01-01 PROCEDURE — 250N000011 HC RX IP 250 OP 636

## 2024-01-01 PROCEDURE — 250N000013 HC RX MED GY IP 250 OP 250 PS 637: Performed by: STUDENT IN AN ORGANIZED HEALTH CARE EDUCATION/TRAINING PROGRAM

## 2024-01-01 PROCEDURE — 80048 BASIC METABOLIC PNL TOTAL CA: CPT | Performed by: EMERGENCY MEDICINE

## 2024-01-01 PROCEDURE — 80048 BASIC METABOLIC PNL TOTAL CA: CPT | Performed by: HOSPITALIST

## 2024-01-01 PROCEDURE — G0425 INPT/ED TELECONSULT30: HCPCS | Mod: G0 | Performed by: PSYCHIATRY & NEUROLOGY

## 2024-01-01 PROCEDURE — 97116 GAIT TRAINING THERAPY: CPT | Mod: GP

## 2024-01-01 PROCEDURE — 250N000011 HC RX IP 250 OP 636: Performed by: INTERNAL MEDICINE

## 2024-01-01 PROCEDURE — 83036 HEMOGLOBIN GLYCOSYLATED A1C: CPT | Performed by: STUDENT IN AN ORGANIZED HEALTH CARE EDUCATION/TRAINING PROGRAM

## 2024-01-01 PROCEDURE — 250N000009 HC RX 250: Performed by: NURSE ANESTHETIST, CERTIFIED REGISTERED

## 2024-01-01 PROCEDURE — 85048 AUTOMATED LEUKOCYTE COUNT: CPT

## 2024-01-01 PROCEDURE — 97161 PT EVAL LOW COMPLEX 20 MIN: CPT | Mod: GP

## 2024-01-01 PROCEDURE — 83550 IRON BINDING TEST: CPT

## 2024-01-01 PROCEDURE — 250N000011 HC RX IP 250 OP 636: Performed by: FAMILY MEDICINE

## 2024-01-01 PROCEDURE — 258N000003 HC RX IP 258 OP 636: Performed by: HOSPITALIST

## 2024-01-01 PROCEDURE — 86922 COMPATIBILITY TEST ANTIGLOB: CPT

## 2024-01-01 PROCEDURE — 250N000011 HC RX IP 250 OP 636: Mod: JZ

## 2024-01-01 PROCEDURE — 70450 CT HEAD/BRAIN W/O DYE: CPT

## 2024-01-01 PROCEDURE — 93306 TTE W/DOPPLER COMPLETE: CPT | Mod: 26 | Performed by: INTERNAL MEDICINE

## 2024-01-01 PROCEDURE — 93005 ELECTROCARDIOGRAM TRACING: CPT | Performed by: EMERGENCY MEDICINE

## 2024-01-01 PROCEDURE — 96374 THER/PROPH/DIAG INJ IV PUSH: CPT | Performed by: FAMILY MEDICINE

## 2024-01-01 PROCEDURE — 36415 COLL VENOUS BLD VENIPUNCTURE: CPT | Performed by: HOSPITALIST

## 2024-01-01 PROCEDURE — 99233 SBSQ HOSP IP/OBS HIGH 50: CPT | Mod: GC

## 2024-01-01 PROCEDURE — 36415 COLL VENOUS BLD VENIPUNCTURE: CPT

## 2024-01-01 PROCEDURE — 82565 ASSAY OF CREATININE: CPT | Performed by: INTERNAL MEDICINE

## 2024-01-01 PROCEDURE — 83735 ASSAY OF MAGNESIUM: CPT

## 2024-01-01 PROCEDURE — 85018 HEMOGLOBIN: CPT | Performed by: HOSPITALIST

## 2024-01-01 PROCEDURE — 71045 X-RAY EXAM CHEST 1 VIEW: CPT

## 2024-01-01 PROCEDURE — 258N000003 HC RX IP 258 OP 636: Performed by: NURSE ANESTHETIST, CERTIFIED REGISTERED

## 2024-01-01 PROCEDURE — 999N000111 HC STATISTIC OT IP EVAL DEFER

## 2024-01-01 PROCEDURE — 250N000013 HC RX MED GY IP 250 OP 250 PS 637: Performed by: PSYCHIATRY & NEUROLOGY

## 2024-01-01 PROCEDURE — 88342 IMHCHEM/IMCYTCHM 1ST ANTB: CPT | Mod: 26 | Performed by: PATHOLOGY

## 2024-01-01 PROCEDURE — 255N000002 HC RX 255 OP 636: Performed by: EMERGENCY MEDICINE

## 2024-01-01 PROCEDURE — 250N000011 HC RX IP 250 OP 636: Performed by: NURSE ANESTHETIST, CERTIFIED REGISTERED

## 2024-01-01 PROCEDURE — 85018 HEMOGLOBIN: CPT | Mod: 91

## 2024-01-01 PROCEDURE — 80048 BASIC METABOLIC PNL TOTAL CA: CPT | Performed by: STUDENT IN AN ORGANIZED HEALTH CARE EDUCATION/TRAINING PROGRAM

## 2024-01-01 PROCEDURE — C9113 INJ PANTOPRAZOLE SODIUM, VIA: HCPCS

## 2024-01-01 PROCEDURE — 70496 CT ANGIOGRAPHY HEAD: CPT

## 2024-01-01 PROCEDURE — 93010 ELECTROCARDIOGRAM REPORT: CPT | Performed by: EMERGENCY MEDICINE

## 2024-01-01 PROCEDURE — 36415 COLL VENOUS BLD VENIPUNCTURE: CPT | Performed by: FAMILY MEDICINE

## 2024-01-01 PROCEDURE — 96361 HYDRATE IV INFUSION ADD-ON: CPT

## 2024-01-01 PROCEDURE — 85027 COMPLETE CBC AUTOMATED: CPT

## 2024-01-01 PROCEDURE — 85018 HEMOGLOBIN: CPT

## 2024-01-01 PROCEDURE — 99239 HOSP IP/OBS DSCHRG MGMT >30: CPT | Performed by: INTERNAL MEDICINE

## 2024-01-01 PROCEDURE — 97110 THERAPEUTIC EXERCISES: CPT | Mod: GP

## 2024-01-01 PROCEDURE — 250N000011 HC RX IP 250 OP 636: Performed by: EMERGENCY MEDICINE

## 2024-01-01 PROCEDURE — 85610 PROTHROMBIN TIME: CPT

## 2024-01-01 PROCEDURE — 97129 THER IVNTJ 1ST 15 MIN: CPT | Mod: GO

## 2024-01-01 PROCEDURE — 83880 ASSAY OF NATRIURETIC PEPTIDE: CPT | Performed by: STUDENT IN AN ORGANIZED HEALTH CARE EDUCATION/TRAINING PROGRAM

## 2024-01-01 PROCEDURE — 85025 COMPLETE CBC W/AUTO DIFF WBC: CPT

## 2024-01-01 PROCEDURE — 99232 SBSQ HOSP IP/OBS MODERATE 35: CPT | Mod: GC

## 2024-01-01 PROCEDURE — 82248 BILIRUBIN DIRECT: CPT | Performed by: STUDENT IN AN ORGANIZED HEALTH CARE EDUCATION/TRAINING PROGRAM

## 2024-01-01 PROCEDURE — 84132 ASSAY OF SERUM POTASSIUM: CPT

## 2024-01-01 PROCEDURE — 85004 AUTOMATED DIFF WBC COUNT: CPT | Performed by: FAMILY MEDICINE

## 2024-01-01 PROCEDURE — 84443 ASSAY THYROID STIM HORMONE: CPT

## 2024-01-01 PROCEDURE — 93010 ELECTROCARDIOGRAM REPORT: CPT | Performed by: FAMILY MEDICINE

## 2024-01-01 PROCEDURE — 85610 PROTHROMBIN TIME: CPT | Performed by: EMERGENCY MEDICINE

## 2024-01-01 PROCEDURE — 250N000011 HC RX IP 250 OP 636: Mod: JZ | Performed by: STUDENT IN AN ORGANIZED HEALTH CARE EDUCATION/TRAINING PROGRAM

## 2024-01-01 PROCEDURE — 97535 SELF CARE MNGMENT TRAINING: CPT | Mod: GO

## 2024-01-01 PROCEDURE — 74150 CT ABDOMEN W/O CONTRAST: CPT

## 2024-01-01 PROCEDURE — 370N000017 HC ANESTHESIA TECHNICAL FEE, PER MIN: Performed by: STUDENT IN AN ORGANIZED HEALTH CARE EDUCATION/TRAINING PROGRAM

## 2024-01-01 PROCEDURE — 83735 ASSAY OF MAGNESIUM: CPT | Performed by: STUDENT IN AN ORGANIZED HEALTH CARE EDUCATION/TRAINING PROGRAM

## 2024-01-01 PROCEDURE — 85014 HEMATOCRIT: CPT | Performed by: STUDENT IN AN ORGANIZED HEALTH CARE EDUCATION/TRAINING PROGRAM

## 2024-01-01 PROCEDURE — 84439 ASSAY OF FREE THYROXINE: CPT

## 2024-01-01 PROCEDURE — 86870 RBC ANTIBODY IDENTIFICATION: CPT

## 2024-01-01 PROCEDURE — 83880 ASSAY OF NATRIURETIC PEPTIDE: CPT | Performed by: FAMILY MEDICINE

## 2024-01-01 PROCEDURE — 80053 COMPREHEN METABOLIC PANEL: CPT | Performed by: FAMILY MEDICINE

## 2024-01-01 PROCEDURE — 84443 ASSAY THYROID STIM HORMONE: CPT | Performed by: HOSPITALIST

## 2024-01-01 PROCEDURE — 43239 EGD BIOPSY SINGLE/MULTIPLE: CPT | Performed by: STUDENT IN AN ORGANIZED HEALTH CARE EDUCATION/TRAINING PROGRAM

## 2024-01-01 PROCEDURE — 36430 TRANSFUSION BLD/BLD COMPNT: CPT | Performed by: FAMILY MEDICINE

## 2024-01-01 PROCEDURE — 85025 COMPLETE CBC W/AUTO DIFF WBC: CPT | Performed by: FAMILY MEDICINE

## 2024-01-01 PROCEDURE — 86923 COMPATIBILITY TEST ELECTRIC: CPT | Performed by: EMERGENCY MEDICINE

## 2024-01-01 PROCEDURE — 99418 PROLNG IP/OBS E/M EA 15 MIN: CPT | Performed by: STUDENT IN AN ORGANIZED HEALTH CARE EDUCATION/TRAINING PROGRAM

## 2024-01-01 PROCEDURE — 82728 ASSAY OF FERRITIN: CPT

## 2024-01-01 PROCEDURE — P9016 RBC LEUKOCYTES REDUCED: HCPCS | Performed by: FAMILY MEDICINE

## 2024-01-01 PROCEDURE — 86923 COMPATIBILITY TEST ELECTRIC: CPT | Performed by: FAMILY MEDICINE

## 2024-01-01 PROCEDURE — 82272 OCCULT BLD FECES 1-3 TESTS: CPT | Performed by: FAMILY MEDICINE

## 2024-01-01 PROCEDURE — 99233 SBSQ HOSP IP/OBS HIGH 50: CPT | Performed by: STUDENT IN AN ORGANIZED HEALTH CARE EDUCATION/TRAINING PROGRAM

## 2024-01-01 PROCEDURE — 99207 PR APP CREDIT; MD BILLING SHARED VISIT: CPT

## 2024-01-01 PROCEDURE — 99233 SBSQ HOSP IP/OBS HIGH 50: CPT | Performed by: HOSPITALIST

## 2024-01-01 PROCEDURE — 99233 SBSQ HOSP IP/OBS HIGH 50: CPT | Performed by: PSYCHIATRY & NEUROLOGY

## 2024-01-01 PROCEDURE — 87637 SARSCOV2&INF A&B&RSV AMP PRB: CPT | Performed by: FAMILY MEDICINE

## 2024-01-01 PROCEDURE — 45378 DIAGNOSTIC COLONOSCOPY: CPT | Mod: 59 | Performed by: STUDENT IN AN ORGANIZED HEALTH CARE EDUCATION/TRAINING PROGRAM

## 2024-01-01 PROCEDURE — 99232 SBSQ HOSP IP/OBS MODERATE 35: CPT | Performed by: STUDENT IN AN ORGANIZED HEALTH CARE EDUCATION/TRAINING PROGRAM

## 2024-01-01 PROCEDURE — 86900 BLOOD TYPING SEROLOGIC ABO: CPT | Performed by: EMERGENCY MEDICINE

## 2024-01-01 PROCEDURE — 96372 THER/PROPH/DIAG INJ SC/IM: CPT | Performed by: STUDENT IN AN ORGANIZED HEALTH CARE EDUCATION/TRAINING PROGRAM

## 2024-01-01 PROCEDURE — 88305 TISSUE EXAM BY PATHOLOGIST: CPT | Mod: 26 | Performed by: PATHOLOGY

## 2024-01-01 PROCEDURE — 255N000002 HC RX 255 OP 636: Performed by: HOSPITALIST

## 2024-01-01 PROCEDURE — 250N000009 HC RX 250: Performed by: HOSPITALIST

## 2024-01-01 PROCEDURE — 85027 COMPLETE CBC AUTOMATED: CPT | Performed by: FAMILY MEDICINE

## 2024-01-01 PROCEDURE — 85025 COMPLETE CBC W/AUTO DIFF WBC: CPT | Performed by: EMERGENCY MEDICINE

## 2024-01-01 PROCEDURE — 36415 COLL VENOUS BLD VENIPUNCTURE: CPT | Performed by: EMERGENCY MEDICINE

## 2024-01-01 PROCEDURE — 82040 ASSAY OF SERUM ALBUMIN: CPT | Performed by: FAMILY MEDICINE

## 2024-01-01 PROCEDURE — 93005 ELECTROCARDIOGRAM TRACING: CPT | Performed by: FAMILY MEDICINE

## 2024-01-01 PROCEDURE — 99223 1ST HOSP IP/OBS HIGH 75: CPT | Mod: GC

## 2024-01-01 PROCEDURE — 80048 BASIC METABOLIC PNL TOTAL CA: CPT | Performed by: FAMILY MEDICINE

## 2024-01-01 PROCEDURE — 83880 ASSAY OF NATRIURETIC PEPTIDE: CPT | Mod: GZ | Performed by: EMERGENCY MEDICINE

## 2024-01-01 PROCEDURE — 96375 TX/PRO/DX INJ NEW DRUG ADDON: CPT

## 2024-01-01 PROCEDURE — 85730 THROMBOPLASTIN TIME PARTIAL: CPT | Performed by: EMERGENCY MEDICINE

## 2024-01-01 PROCEDURE — 99239 HOSP IP/OBS DSCHRG MGMT >30: CPT | Performed by: FAMILY MEDICINE

## 2024-01-01 PROCEDURE — 92523 SPEECH SOUND LANG COMPREHEN: CPT | Mod: GN

## 2024-01-01 PROCEDURE — 99232 SBSQ HOSP IP/OBS MODERATE 35: CPT | Performed by: FAMILY MEDICINE

## 2024-01-01 PROCEDURE — 84132 ASSAY OF SERUM POTASSIUM: CPT | Performed by: STUDENT IN AN ORGANIZED HEALTH CARE EDUCATION/TRAINING PROGRAM

## 2024-01-01 PROCEDURE — 92610 EVALUATE SWALLOWING FUNCTION: CPT | Mod: GN

## 2024-01-01 PROCEDURE — 99285 EMERGENCY DEPT VISIT HI MDM: CPT | Mod: 25 | Performed by: EMERGENCY MEDICINE

## 2024-01-01 PROCEDURE — 73552 X-RAY EXAM OF FEMUR 2/>: CPT | Mod: LT

## 2024-01-01 PROCEDURE — 97530 THERAPEUTIC ACTIVITIES: CPT | Mod: GO | Performed by: OCCUPATIONAL THERAPIST

## 2024-01-01 PROCEDURE — 36415 COLL VENOUS BLD VENIPUNCTURE: CPT | Performed by: INTERNAL MEDICINE

## 2024-01-01 PROCEDURE — 80061 LIPID PANEL: CPT | Performed by: STUDENT IN AN ORGANIZED HEALTH CARE EDUCATION/TRAINING PROGRAM

## 2024-01-01 PROCEDURE — 36430 TRANSFUSION BLD/BLD COMPNT: CPT | Performed by: EMERGENCY MEDICINE

## 2024-01-01 PROCEDURE — 71250 CT THORAX DX C-: CPT

## 2024-01-01 PROCEDURE — 97168 OT RE-EVAL EST PLAN CARE: CPT | Mod: GO

## 2024-01-01 PROCEDURE — C9113 INJ PANTOPRAZOLE SODIUM, VIA: HCPCS | Performed by: FAMILY MEDICINE

## 2024-01-01 PROCEDURE — 85018 HEMOGLOBIN: CPT | Performed by: FAMILY MEDICINE

## 2024-01-01 PROCEDURE — 99233 SBSQ HOSP IP/OBS HIGH 50: CPT | Performed by: INTERNAL MEDICINE

## 2024-01-01 PROCEDURE — 99207 PR APP CREDIT; MD BILLING SHARED VISIT: CPT | Performed by: INTERNAL MEDICINE

## 2024-01-01 PROCEDURE — 99417 PROLNG OP E/M EACH 15 MIN: CPT | Performed by: PSYCHIATRY & NEUROLOGY

## 2024-01-01 PROCEDURE — 96372 THER/PROPH/DIAG INJ SC/IM: CPT | Performed by: INTERNAL MEDICINE

## 2024-01-01 PROCEDURE — 258N000003 HC RX IP 258 OP 636: Mod: JZ | Performed by: STUDENT IN AN ORGANIZED HEALTH CARE EDUCATION/TRAINING PROGRAM

## 2024-01-01 PROCEDURE — 99239 HOSP IP/OBS DSCHRG MGMT >30: CPT | Mod: GC

## 2024-01-01 PROCEDURE — 99207 PR NO BILLABLE SERVICE THIS VISIT: CPT | Performed by: INTERNAL MEDICINE

## 2024-01-01 PROCEDURE — 97165 OT EVAL LOW COMPLEX 30 MIN: CPT | Mod: GO | Performed by: OCCUPATIONAL THERAPIST

## 2024-01-01 PROCEDURE — 73610 X-RAY EXAM OF ANKLE: CPT | Mod: RT

## 2024-01-01 PROCEDURE — 81001 URINALYSIS AUTO W/SCOPE: CPT | Performed by: EMERGENCY MEDICINE

## 2024-01-01 PROCEDURE — 88342 IMHCHEM/IMCYTCHM 1ST ANTB: CPT | Mod: TC | Performed by: STUDENT IN AN ORGANIZED HEALTH CARE EDUCATION/TRAINING PROGRAM

## 2024-01-01 PROCEDURE — 96374 THER/PROPH/DIAG INJ IV PUSH: CPT

## 2024-01-01 PROCEDURE — 99285 EMERGENCY DEPT VISIT HI MDM: CPT | Mod: 25

## 2024-01-01 PROCEDURE — 99223 1ST HOSP IP/OBS HIGH 75: CPT | Performed by: PSYCHIATRY & NEUROLOGY

## 2024-01-01 PROCEDURE — 73560 X-RAY EXAM OF KNEE 1 OR 2: CPT | Mod: RT

## 2024-01-01 PROCEDURE — 97162 PT EVAL MOD COMPLEX 30 MIN: CPT | Mod: GP

## 2024-01-01 PROCEDURE — 99239 HOSP IP/OBS DSCHRG MGMT >30: CPT | Performed by: STUDENT IN AN ORGANIZED HEALTH CARE EDUCATION/TRAINING PROGRAM

## 2024-01-01 PROCEDURE — G0463 HOSPITAL OUTPT CLINIC VISIT: HCPCS | Mod: 25

## 2024-01-01 PROCEDURE — 87637 SARSCOV2&INF A&B&RSV AMP PRB: CPT | Performed by: EMERGENCY MEDICINE

## 2024-01-01 PROCEDURE — 92507 TX SP LANG VOICE COMM INDIV: CPT | Mod: GN

## 2024-01-01 PROCEDURE — 82565 ASSAY OF CREATININE: CPT

## 2024-01-01 PROCEDURE — 99222 1ST HOSP IP/OBS MODERATE 55: CPT | Mod: FS | Performed by: FAMILY MEDICINE

## 2024-01-01 PROCEDURE — 72125 CT NECK SPINE W/O DYE: CPT

## 2024-01-01 PROCEDURE — 86900 BLOOD TYPING SEROLOGIC ABO: CPT

## 2024-01-01 PROCEDURE — 250N000011 HC RX IP 250 OP 636: Performed by: HOSPITALIST

## 2024-01-01 PROCEDURE — 96374 THER/PROPH/DIAG INJ IV PUSH: CPT | Mod: 59

## 2024-01-01 PROCEDURE — 95816 EEG AWAKE AND DROWSY: CPT

## 2024-01-01 PROCEDURE — A9585 GADOBUTROL INJECTION: HCPCS | Performed by: EMERGENCY MEDICINE

## 2024-01-01 PROCEDURE — 86900 BLOOD TYPING SEROLOGIC ABO: CPT | Performed by: FAMILY MEDICINE

## 2024-01-01 PROCEDURE — 97110 THERAPEUTIC EXERCISES: CPT | Mod: GO

## 2024-01-01 RX ORDER — PANTOPRAZOLE SODIUM 40 MG/1
40 TABLET, DELAYED RELEASE ORAL 2 TIMES DAILY
Status: DISCONTINUED | OUTPATIENT
Start: 2024-01-01 | End: 2024-01-01 | Stop reason: HOSPADM

## 2024-01-01 RX ORDER — ACETAMINOPHEN 325 MG/1
650 TABLET ORAL EVERY 4 HOURS PRN
Status: DISCONTINUED | OUTPATIENT
Start: 2024-01-01 | End: 2024-01-01 | Stop reason: HOSPADM

## 2024-01-01 RX ORDER — ATORVASTATIN CALCIUM 40 MG/1
40 TABLET, FILM COATED ORAL EVERY EVENING
Qty: 30 TABLET | Refills: 0 | Status: SHIPPED | OUTPATIENT
Start: 2024-01-01 | End: 2024-01-01

## 2024-01-01 RX ORDER — METOPROLOL TARTRATE 50 MG
50 TABLET ORAL 2 TIMES DAILY
Qty: 60 TABLET | Refills: 0 | Status: SHIPPED | OUTPATIENT
Start: 2024-01-01 | End: 2024-01-01

## 2024-01-01 RX ORDER — AMOXICILLIN 250 MG
2 CAPSULE ORAL 2 TIMES DAILY PRN
Status: DISCONTINUED | OUTPATIENT
Start: 2024-01-01 | End: 2024-01-01 | Stop reason: HOSPADM

## 2024-01-01 RX ORDER — LOSARTAN POTASSIUM 25 MG/1
25 TABLET ORAL DAILY
Qty: 30 TABLET | Refills: 0 | Status: ON HOLD | OUTPATIENT
Start: 2024-01-01 | End: 2024-01-01

## 2024-01-01 RX ORDER — FAMOTIDINE 20 MG/1
20 TABLET, FILM COATED ORAL EVERY OTHER DAY
Qty: 15 TABLET | Refills: 0 | Status: SHIPPED | OUTPATIENT
Start: 2024-01-01 | End: 2024-01-01

## 2024-01-01 RX ORDER — ASPIRIN 81 MG/1
81 TABLET ORAL DAILY
Qty: 90 TABLET | Refills: 0 | Status: SHIPPED | OUTPATIENT
Start: 2024-01-01 | End: 2024-01-01

## 2024-01-01 RX ORDER — METOPROLOL TARTRATE 25 MG/1
25 TABLET, FILM COATED ORAL 2 TIMES DAILY
Status: DISCONTINUED | OUTPATIENT
Start: 2024-01-01 | End: 2024-01-01

## 2024-01-01 RX ORDER — METOPROLOL TARTRATE 50 MG
50 TABLET ORAL ONCE
Status: COMPLETED | OUTPATIENT
Start: 2024-01-01 | End: 2024-01-01

## 2024-01-01 RX ORDER — CALCIUM CARBONATE 500 MG/1
1000 TABLET, CHEWABLE ORAL 4 TIMES DAILY PRN
Status: DISCONTINUED | OUTPATIENT
Start: 2024-01-01 | End: 2024-01-01

## 2024-01-01 RX ORDER — ONDANSETRON 2 MG/ML
4 INJECTION INTRAMUSCULAR; INTRAVENOUS EVERY 6 HOURS PRN
Status: DISCONTINUED | OUTPATIENT
Start: 2024-01-01 | End: 2024-01-01 | Stop reason: HOSPADM

## 2024-01-01 RX ORDER — ROSUVASTATIN CALCIUM 5 MG/1
10 TABLET, COATED ORAL EVERY EVENING
Status: DISCONTINUED | OUTPATIENT
Start: 2024-01-01 | End: 2024-01-01 | Stop reason: HOSPADM

## 2024-01-01 RX ORDER — SUCRALFATE 1 G/1
1 TABLET ORAL
Status: DISCONTINUED | OUTPATIENT
Start: 2024-01-01 | End: 2024-01-01 | Stop reason: HOSPADM

## 2024-01-01 RX ORDER — METOPROLOL TARTRATE 25 MG/1
50 TABLET, FILM COATED ORAL 2 TIMES DAILY
Status: DISCONTINUED | OUTPATIENT
Start: 2024-01-01 | End: 2024-01-01 | Stop reason: HOSPADM

## 2024-01-01 RX ORDER — OXYCODONE HYDROCHLORIDE 5 MG/1
5 TABLET ORAL EVERY 4 HOURS PRN
Status: DISCONTINUED | OUTPATIENT
Start: 2024-01-01 | End: 2024-01-01 | Stop reason: HOSPADM

## 2024-01-01 RX ORDER — POLYETHYLENE GLYCOL 3350 17 G/17G
POWDER, FOR SOLUTION ORAL
Qty: 238 G | Refills: 0 | Status: SHIPPED | OUTPATIENT
Start: 2024-01-01 | End: 2024-01-01

## 2024-01-01 RX ORDER — ALBUTEROL SULFATE 90 UG/1
2 AEROSOL, METERED RESPIRATORY (INHALATION) EVERY 6 HOURS PRN
Status: DISCONTINUED | OUTPATIENT
Start: 2024-01-01 | End: 2024-01-01 | Stop reason: HOSPADM

## 2024-01-01 RX ORDER — FERROUS SULFATE 325(65) MG
325 TABLET, DELAYED RELEASE (ENTERIC COATED) ORAL DAILY
COMMUNITY

## 2024-01-01 RX ORDER — NALOXONE HYDROCHLORIDE 0.4 MG/ML
0.4 INJECTION, SOLUTION INTRAMUSCULAR; INTRAVENOUS; SUBCUTANEOUS
Status: DISCONTINUED | OUTPATIENT
Start: 2024-01-01 | End: 2024-01-01 | Stop reason: HOSPADM

## 2024-01-01 RX ORDER — SERTRALINE HYDROCHLORIDE 100 MG/1
200 TABLET, FILM COATED ORAL DAILY
Status: DISCONTINUED | OUTPATIENT
Start: 2024-01-01 | End: 2024-01-01 | Stop reason: HOSPADM

## 2024-01-01 RX ORDER — CALCIUM CARBONATE 500 MG/1
1000 TABLET, CHEWABLE ORAL 4 TIMES DAILY PRN
Status: DISCONTINUED | OUTPATIENT
Start: 2024-01-01 | End: 2024-01-01 | Stop reason: HOSPADM

## 2024-01-01 RX ORDER — GLYCOPYRROLATE 0.2 MG/ML
INJECTION, SOLUTION INTRAMUSCULAR; INTRAVENOUS PRN
Status: DISCONTINUED | OUTPATIENT
Start: 2024-01-01 | End: 2024-01-01

## 2024-01-01 RX ORDER — SODIUM CHLORIDE 9 MG/ML
INJECTION, SOLUTION INTRAVENOUS CONTINUOUS
Status: DISCONTINUED | OUTPATIENT
Start: 2024-01-01 | End: 2024-01-01

## 2024-01-01 RX ORDER — SODIUM CHLORIDE, SODIUM LACTATE, POTASSIUM CHLORIDE, CALCIUM CHLORIDE 600; 310; 30; 20 MG/100ML; MG/100ML; MG/100ML; MG/100ML
INJECTION, SOLUTION INTRAVENOUS CONTINUOUS
Status: DISCONTINUED | OUTPATIENT
Start: 2024-01-01 | End: 2024-01-01 | Stop reason: HOSPADM

## 2024-01-01 RX ORDER — FUROSEMIDE 40 MG
40 TABLET ORAL DAILY
Qty: 30 TABLET | Refills: 0 | Status: SHIPPED | OUTPATIENT
Start: 2024-01-01

## 2024-01-01 RX ORDER — ONDANSETRON 4 MG/1
4 TABLET, ORALLY DISINTEGRATING ORAL EVERY 6 HOURS PRN
Status: DISCONTINUED | OUTPATIENT
Start: 2024-01-01 | End: 2024-01-01 | Stop reason: HOSPADM

## 2024-01-01 RX ORDER — ASPIRIN 81 MG/1
81 TABLET ORAL DAILY
Status: DISCONTINUED | OUTPATIENT
Start: 2024-01-01 | End: 2024-01-01 | Stop reason: HOSPADM

## 2024-01-01 RX ORDER — POLYETHYLENE GLYCOL 3350 17 G/17G
17 POWDER, FOR SOLUTION ORAL DAILY
Status: DISCONTINUED | OUTPATIENT
Start: 2024-01-01 | End: 2024-01-01

## 2024-01-01 RX ORDER — LEVOTHYROXINE SODIUM 125 UG/1
125 TABLET ORAL
Qty: 30 TABLET | Refills: 1 | Status: SHIPPED | OUTPATIENT
Start: 2024-01-01 | End: 2024-01-01 | Stop reason: DRUGHIGH

## 2024-01-01 RX ORDER — LEVOTHYROXINE SODIUM 112 UG/1
112 TABLET ORAL DAILY
Status: DISCONTINUED | OUTPATIENT
Start: 2024-01-01 | End: 2024-01-01

## 2024-01-01 RX ORDER — ASPIRIN 81 MG/1
81 TABLET, CHEWABLE ORAL DAILY
Status: DISCONTINUED | OUTPATIENT
Start: 2024-01-01 | End: 2024-01-01 | Stop reason: HOSPADM

## 2024-01-01 RX ORDER — LEVOTHYROXINE SODIUM 125 UG/1
125 TABLET ORAL
Status: DISCONTINUED | OUTPATIENT
Start: 2024-01-01 | End: 2024-01-01 | Stop reason: HOSPADM

## 2024-01-01 RX ORDER — PROCHLORPERAZINE 25 MG
12.5 SUPPOSITORY, RECTAL RECTAL EVERY 12 HOURS PRN
Status: DISCONTINUED | OUTPATIENT
Start: 2024-01-01 | End: 2024-01-01 | Stop reason: HOSPADM

## 2024-01-01 RX ORDER — ENOXAPARIN SODIUM 100 MG/ML
30 INJECTION SUBCUTANEOUS EVERY 24 HOURS
Status: DISCONTINUED | OUTPATIENT
Start: 2024-01-01 | End: 2024-01-01

## 2024-01-01 RX ORDER — GADOBUTROL 604.72 MG/ML
6.5 INJECTION INTRAVENOUS ONCE
Status: COMPLETED | OUTPATIENT
Start: 2024-01-01 | End: 2024-01-01

## 2024-01-01 RX ORDER — FAMOTIDINE 20 MG/1
20 TABLET, FILM COATED ORAL 2 TIMES DAILY
Qty: 60 TABLET | Refills: 0 | Status: SHIPPED | OUTPATIENT
Start: 2024-01-01 | End: 2024-01-01

## 2024-01-01 RX ORDER — AMOXICILLIN 250 MG
1 CAPSULE ORAL 2 TIMES DAILY PRN
Status: DISCONTINUED | OUTPATIENT
Start: 2024-01-01 | End: 2024-01-01 | Stop reason: HOSPADM

## 2024-01-01 RX ORDER — ACETAMINOPHEN 325 MG/1
975 TABLET ORAL 3 TIMES DAILY
Status: DISCONTINUED | OUTPATIENT
Start: 2024-01-01 | End: 2024-01-01 | Stop reason: HOSPADM

## 2024-01-01 RX ORDER — FUROSEMIDE 10 MG/ML
20 INJECTION INTRAMUSCULAR; INTRAVENOUS ONCE
Status: COMPLETED | OUTPATIENT
Start: 2024-01-01 | End: 2024-01-01

## 2024-01-01 RX ORDER — ENOXAPARIN SODIUM 100 MG/ML
40 INJECTION SUBCUTANEOUS EVERY 24 HOURS
Status: DISCONTINUED | OUTPATIENT
Start: 2024-01-01 | End: 2024-01-01

## 2024-01-01 RX ORDER — PROPOFOL 10 MG/ML
INJECTION, EMULSION INTRAVENOUS PRN
Status: DISCONTINUED | OUTPATIENT
Start: 2024-01-01 | End: 2024-01-01

## 2024-01-01 RX ORDER — POTASSIUM CHLORIDE 1500 MG/1
20 TABLET, EXTENDED RELEASE ORAL ONCE
Status: COMPLETED | OUTPATIENT
Start: 2024-01-01 | End: 2024-01-01

## 2024-01-01 RX ORDER — FAMOTIDINE 20 MG/1
20 TABLET, FILM COATED ORAL 2 TIMES DAILY
Status: DISCONTINUED | OUTPATIENT
Start: 2024-01-01 | End: 2024-01-01

## 2024-01-01 RX ORDER — LABETALOL HYDROCHLORIDE 5 MG/ML
10 INJECTION, SOLUTION INTRAVENOUS ONCE
Status: COMPLETED | OUTPATIENT
Start: 2024-01-01 | End: 2024-01-01

## 2024-01-01 RX ORDER — POTASSIUM CHLORIDE 1500 MG/1
20 TABLET, EXTENDED RELEASE ORAL DAILY
Status: DISCONTINUED | OUTPATIENT
Start: 2024-01-01 | End: 2024-01-01 | Stop reason: HOSPADM

## 2024-01-01 RX ORDER — SUCRALFATE 1 G/1
1 TABLET ORAL
Qty: 60 TABLET | Refills: 0 | Status: SHIPPED | OUTPATIENT
Start: 2024-01-01

## 2024-01-01 RX ORDER — ATORVASTATIN CALCIUM 20 MG/1
40 TABLET, FILM COATED ORAL DAILY
Status: DISCONTINUED | OUTPATIENT
Start: 2024-01-01 | End: 2024-01-01 | Stop reason: HOSPADM

## 2024-01-01 RX ORDER — MULTIVITAMIN/IRON/FOLIC ACID 18MG-0.4MG
1 TABLET ORAL DAILY
COMMUNITY

## 2024-01-01 RX ORDER — POTASSIUM CHLORIDE 1.5 G/1.58G
20 POWDER, FOR SOLUTION ORAL ONCE
Status: COMPLETED | OUTPATIENT
Start: 2024-01-01 | End: 2024-01-01

## 2024-01-01 RX ORDER — AMOXICILLIN 250 MG
1 CAPSULE ORAL 2 TIMES DAILY
Status: DISCONTINUED | OUTPATIENT
Start: 2024-01-01 | End: 2024-01-01 | Stop reason: HOSPADM

## 2024-01-01 RX ORDER — LEVOTHYROXINE SODIUM 112 UG/1
112 TABLET ORAL DAILY
Status: CANCELLED | OUTPATIENT
Start: 2024-01-01

## 2024-01-01 RX ORDER — FUROSEMIDE 10 MG/ML
40 INJECTION INTRAMUSCULAR; INTRAVENOUS 2 TIMES DAILY
Status: DISCONTINUED | OUTPATIENT
Start: 2024-01-01 | End: 2024-01-01

## 2024-01-01 RX ORDER — FLUTICASONE PROPIONATE 50 MCG
2 SPRAY, SUSPENSION (ML) NASAL DAILY PRN
COMMUNITY
Start: 2024-01-01

## 2024-01-01 RX ORDER — NALOXONE HYDROCHLORIDE 0.4 MG/ML
0.2 INJECTION, SOLUTION INTRAMUSCULAR; INTRAVENOUS; SUBCUTANEOUS
Status: DISCONTINUED | OUTPATIENT
Start: 2024-01-01 | End: 2024-01-01 | Stop reason: HOSPADM

## 2024-01-01 RX ORDER — KETAMINE HYDROCHLORIDE 10 MG/ML
INJECTION INTRAMUSCULAR; INTRAVENOUS PRN
Status: DISCONTINUED | OUTPATIENT
Start: 2024-01-01 | End: 2024-01-01

## 2024-01-01 RX ORDER — CLOPIDOGREL BISULFATE 75 MG/1
75 TABLET ORAL DAILY
Qty: 90 TABLET | Refills: 0 | Status: SHIPPED | OUTPATIENT
Start: 2024-01-01 | End: 2024-01-01

## 2024-01-01 RX ORDER — CLOPIDOGREL BISULFATE 75 MG/1
75 TABLET ORAL DAILY
Status: DISCONTINUED | OUTPATIENT
Start: 2024-01-01 | End: 2024-01-01 | Stop reason: HOSPADM

## 2024-01-01 RX ORDER — FERROUS SULFATE 325(65) MG
325 TABLET ORAL DAILY
Status: DISCONTINUED | OUTPATIENT
Start: 2024-01-01 | End: 2024-01-01 | Stop reason: HOSPADM

## 2024-01-01 RX ORDER — NAPHAZOLINE HCL 0.012 %
1-2 DROPS OPHTHALMIC (EYE) DAILY PRN
Status: ON HOLD | COMMUNITY
End: 2024-01-01

## 2024-01-01 RX ORDER — BISACODYL 5 MG/1
TABLET, DELAYED RELEASE ORAL
Qty: 4 TABLET | Refills: 0 | Status: SHIPPED | OUTPATIENT
Start: 2024-01-01 | End: 2024-01-01

## 2024-01-01 RX ORDER — LOSARTAN POTASSIUM 25 MG/1
25 TABLET ORAL DAILY
Status: DISCONTINUED | OUTPATIENT
Start: 2024-01-01 | End: 2024-01-01 | Stop reason: HOSPADM

## 2024-01-01 RX ORDER — HYDROMORPHONE HYDROCHLORIDE 1 MG/ML
0.5 INJECTION, SOLUTION INTRAMUSCULAR; INTRAVENOUS; SUBCUTANEOUS
Status: DISCONTINUED | OUTPATIENT
Start: 2024-01-01 | End: 2024-01-01

## 2024-01-01 RX ORDER — ATORVASTATIN CALCIUM 40 MG/1
40 TABLET, FILM COATED ORAL EVERY EVENING
Status: DISCONTINUED | OUTPATIENT
Start: 2024-01-01 | End: 2024-01-01 | Stop reason: HOSPADM

## 2024-01-01 RX ORDER — FUROSEMIDE 10 MG/ML
40 INJECTION INTRAMUSCULAR; INTRAVENOUS EVERY 12 HOURS
Status: DISCONTINUED | OUTPATIENT
Start: 2024-01-01 | End: 2024-01-01

## 2024-01-01 RX ORDER — POLYETHYLENE GLYCOL 3350 17 G/17G
17 POWDER, FOR SOLUTION ORAL 2 TIMES DAILY PRN
Status: DISCONTINUED | OUTPATIENT
Start: 2024-01-01 | End: 2024-01-01 | Stop reason: HOSPADM

## 2024-01-01 RX ORDER — LABETALOL HYDROCHLORIDE 5 MG/ML
10-20 INJECTION, SOLUTION INTRAVENOUS EVERY 10 MIN PRN
Status: DISCONTINUED | OUTPATIENT
Start: 2024-01-01 | End: 2024-01-01 | Stop reason: HOSPADM

## 2024-01-01 RX ORDER — FUROSEMIDE 10 MG/ML
40 INJECTION INTRAMUSCULAR; INTRAVENOUS ONCE
Status: COMPLETED | OUTPATIENT
Start: 2024-01-01 | End: 2024-01-01

## 2024-01-01 RX ORDER — IOPAMIDOL 755 MG/ML
67 INJECTION, SOLUTION INTRAVASCULAR ONCE
Status: DISCONTINUED | OUTPATIENT
Start: 2024-01-01 | End: 2024-01-01 | Stop reason: HOSPADM

## 2024-01-01 RX ORDER — DILTIAZEM HYDROCHLORIDE 180 MG/1
180 CAPSULE, COATED, EXTENDED RELEASE ORAL DAILY
Status: DISCONTINUED | OUTPATIENT
Start: 2024-01-01 | End: 2024-01-01 | Stop reason: HOSPADM

## 2024-01-01 RX ORDER — LOSARTAN POTASSIUM 25 MG/1
25 TABLET ORAL DAILY
Qty: 30 TABLET | Refills: 0 | Status: SHIPPED | OUTPATIENT
Start: 2024-01-01

## 2024-01-01 RX ORDER — GADOBUTROL 604.72 MG/ML
7 INJECTION INTRAVENOUS ONCE
Status: COMPLETED | OUTPATIENT
Start: 2024-01-01 | End: 2024-01-01

## 2024-01-01 RX ORDER — LEVOTHYROXINE SODIUM 137 UG/1
1 TABLET ORAL
COMMUNITY
Start: 2024-01-01

## 2024-01-01 RX ORDER — LEVOTHYROXINE SODIUM 112 UG/1
112 TABLET ORAL
Status: DISCONTINUED | OUTPATIENT
Start: 2024-01-01 | End: 2024-01-01

## 2024-01-01 RX ORDER — PANTOPRAZOLE SODIUM 40 MG/1
40 TABLET, DELAYED RELEASE ORAL 2 TIMES DAILY
Qty: 60 TABLET | Refills: 0 | Status: SHIPPED | OUTPATIENT
Start: 2024-01-01

## 2024-01-01 RX ORDER — FAMOTIDINE 20 MG/1
20 TABLET, FILM COATED ORAL 2 TIMES DAILY
COMMUNITY
End: 2024-01-01

## 2024-01-01 RX ORDER — PANTOPRAZOLE SODIUM 40 MG/1
40 TABLET, DELAYED RELEASE ORAL
Status: DISCONTINUED | OUTPATIENT
Start: 2024-01-01 | End: 2024-01-01 | Stop reason: HOSPADM

## 2024-01-01 RX ORDER — FAMOTIDINE 20 MG/1
20 TABLET, FILM COATED ORAL DAILY
Status: DISCONTINUED | OUTPATIENT
Start: 2024-01-01 | End: 2024-01-01 | Stop reason: HOSPADM

## 2024-01-01 RX ORDER — LANOLIN ALCOHOL/MO/W.PET/CERES
3 CREAM (GRAM) TOPICAL
Status: DISCONTINUED | OUTPATIENT
Start: 2024-01-01 | End: 2024-01-01 | Stop reason: HOSPADM

## 2024-01-01 RX ORDER — POTASSIUM CHLORIDE 750 MG/1
20 CAPSULE, EXTENDED RELEASE ORAL DAILY
Status: DISCONTINUED | OUTPATIENT
Start: 2024-01-01 | End: 2024-01-01

## 2024-01-01 RX ORDER — ACETAMINOPHEN 500 MG
500 TABLET ORAL EVERY 6 HOURS PRN
Status: DISCONTINUED | OUTPATIENT
Start: 2024-01-01 | End: 2024-01-01 | Stop reason: HOSPADM

## 2024-01-01 RX ORDER — HYDRALAZINE HYDROCHLORIDE 20 MG/ML
10 INJECTION INTRAMUSCULAR; INTRAVENOUS EVERY 4 HOURS PRN
Status: DISCONTINUED | OUTPATIENT
Start: 2024-01-01 | End: 2024-01-01

## 2024-01-01 RX ORDER — FUROSEMIDE 10 MG/ML
60 INJECTION INTRAMUSCULAR; INTRAVENOUS 2 TIMES DAILY
Status: DISCONTINUED | OUTPATIENT
Start: 2024-01-01 | End: 2024-01-01

## 2024-01-01 RX ORDER — FUROSEMIDE 10 MG/ML
20 INJECTION INTRAMUSCULAR; INTRAVENOUS DAILY
Status: DISCONTINUED | OUTPATIENT
Start: 2024-01-01 | End: 2024-01-01 | Stop reason: HOSPADM

## 2024-01-01 RX ORDER — DILTIAZEM HYDROCHLORIDE 180 MG/1
180 CAPSULE, EXTENDED RELEASE ORAL DAILY
Qty: 30 CAPSULE | Refills: 1 | Status: ON HOLD | OUTPATIENT
Start: 2024-01-01 | End: 2024-01-01

## 2024-01-01 RX ORDER — ACETAMINOPHEN 325 MG/1
650 TABLET ORAL EVERY 4 HOURS PRN
Status: DISCONTINUED | OUTPATIENT
Start: 2024-01-01 | End: 2024-01-01

## 2024-01-01 RX ORDER — HYDRALAZINE HYDROCHLORIDE 20 MG/ML
10-20 INJECTION INTRAMUSCULAR; INTRAVENOUS
Status: DISCONTINUED | OUTPATIENT
Start: 2024-01-01 | End: 2024-01-01 | Stop reason: HOSPADM

## 2024-01-01 RX ORDER — POLYETHYLENE GLYCOL 3350 17 G/17G
17 POWDER, FOR SOLUTION ORAL EVERY OTHER DAY
Status: DISCONTINUED | OUTPATIENT
Start: 2024-01-01 | End: 2024-01-01 | Stop reason: HOSPADM

## 2024-01-01 RX ORDER — FUROSEMIDE 40 MG
40 TABLET ORAL DAILY
Status: DISCONTINUED | OUTPATIENT
Start: 2024-01-01 | End: 2024-01-01

## 2024-01-01 RX ORDER — LIDOCAINE 40 MG/G
CREAM TOPICAL
Status: DISCONTINUED | OUTPATIENT
Start: 2024-01-01 | End: 2024-01-01 | Stop reason: HOSPADM

## 2024-01-01 RX ORDER — BISACODYL 10 MG
10 SUPPOSITORY, RECTAL RECTAL DAILY PRN
Status: DISCONTINUED | OUTPATIENT
Start: 2024-01-01 | End: 2024-01-01 | Stop reason: HOSPADM

## 2024-01-01 RX ORDER — LEVOTHYROXINE SODIUM 150 UG/1
150 TABLET ORAL DAILY
COMMUNITY
Start: 2024-01-01

## 2024-01-01 RX ORDER — LEVOTHYROXINE SODIUM 150 UG/1
150 TABLET ORAL DAILY
Status: DISCONTINUED | OUTPATIENT
Start: 2024-01-01 | End: 2024-01-01 | Stop reason: HOSPADM

## 2024-01-01 RX ORDER — FAMOTIDINE 20 MG/1
20 TABLET, FILM COATED ORAL EVERY OTHER DAY
Status: CANCELLED | OUTPATIENT
Start: 2024-01-01

## 2024-01-01 RX ORDER — MAGNESIUM SULFATE HEPTAHYDRATE 40 MG/ML
2 INJECTION, SOLUTION INTRAVENOUS ONCE
Status: COMPLETED | OUTPATIENT
Start: 2024-01-01 | End: 2024-01-01

## 2024-01-01 RX ORDER — LIDOCAINE HYDROCHLORIDE 40 MG/ML
SOLUTION TOPICAL PRN
Status: DISCONTINUED | OUTPATIENT
Start: 2024-01-01 | End: 2024-01-01

## 2024-01-01 RX ORDER — ALBUTEROL SULFATE 0.83 MG/ML
2.5 SOLUTION RESPIRATORY (INHALATION)
Status: DISCONTINUED | OUTPATIENT
Start: 2024-01-01 | End: 2024-01-01 | Stop reason: HOSPADM

## 2024-01-01 RX ORDER — IOPAMIDOL 755 MG/ML
75 INJECTION, SOLUTION INTRAVASCULAR ONCE
Status: COMPLETED | OUTPATIENT
Start: 2024-01-01 | End: 2024-01-01

## 2024-01-01 RX ORDER — FUROSEMIDE 20 MG
40 TABLET ORAL DAILY
Status: DISCONTINUED | OUTPATIENT
Start: 2024-01-01 | End: 2024-01-01 | Stop reason: HOSPADM

## 2024-01-01 RX ORDER — HYDROMORPHONE HYDROCHLORIDE 1 MG/ML
0.3 INJECTION, SOLUTION INTRAMUSCULAR; INTRAVENOUS; SUBCUTANEOUS
Status: COMPLETED | OUTPATIENT
Start: 2024-01-01 | End: 2024-01-01

## 2024-01-01 RX ORDER — POLYETHYLENE GLYCOL 3350 17 G/17G
238 POWDER, FOR SOLUTION ORAL ONCE
Status: DISCONTINUED | OUTPATIENT
Start: 2024-01-01 | End: 2024-01-01

## 2024-01-01 RX ORDER — HYDROCODONE BITARTRATE AND ACETAMINOPHEN 5; 325 MG/1; MG/1
2 TABLET ORAL ONCE
Status: COMPLETED | OUTPATIENT
Start: 2024-01-01 | End: 2024-01-01

## 2024-01-01 RX ORDER — POLYETHYLENE GLYCOL 3350 17 G/17G
238 POWDER, FOR SOLUTION ORAL ONCE
Status: COMPLETED | OUTPATIENT
Start: 2024-01-01 | End: 2024-01-01

## 2024-01-01 RX ORDER — DILTIAZEM HYDROCHLORIDE 180 MG/1
180 CAPSULE, EXTENDED RELEASE ORAL DAILY
Status: CANCELLED | OUTPATIENT
Start: 2024-01-01

## 2024-01-01 RX ORDER — HYDROMORPHONE HCL IN WATER/PF 6 MG/30 ML
0.2 PATIENT CONTROLLED ANALGESIA SYRINGE INTRAVENOUS
Status: DISCONTINUED | OUTPATIENT
Start: 2024-01-01 | End: 2024-01-01 | Stop reason: HOSPADM

## 2024-01-01 RX ORDER — ACETAMINOPHEN 650 MG/1
650 SUPPOSITORY RECTAL EVERY 4 HOURS PRN
Status: DISCONTINUED | OUTPATIENT
Start: 2024-01-01 | End: 2024-01-01 | Stop reason: HOSPADM

## 2024-01-01 RX ORDER — PROCHLORPERAZINE MALEATE 5 MG
5 TABLET ORAL EVERY 6 HOURS PRN
Status: DISCONTINUED | OUTPATIENT
Start: 2024-01-01 | End: 2024-01-01 | Stop reason: HOSPADM

## 2024-01-01 RX ORDER — FUROSEMIDE 10 MG/ML
10 INJECTION INTRAMUSCULAR; INTRAVENOUS ONCE
Qty: 2 ML | Refills: 0 | Status: COMPLETED | OUTPATIENT
Start: 2024-01-01 | End: 2024-01-01

## 2024-01-01 RX ORDER — METOPROLOL SUCCINATE 50 MG/1
50 TABLET, EXTENDED RELEASE ORAL DAILY
Status: DISCONTINUED | OUTPATIENT
Start: 2024-01-01 | End: 2024-01-01

## 2024-01-01 RX ORDER — SODIUM CHLORIDE, SODIUM LACTATE, POTASSIUM CHLORIDE, CALCIUM CHLORIDE 600; 310; 30; 20 MG/100ML; MG/100ML; MG/100ML; MG/100ML
INJECTION, SOLUTION INTRAVENOUS CONTINUOUS PRN
Status: DISCONTINUED | OUTPATIENT
Start: 2024-01-01 | End: 2024-01-01

## 2024-01-01 RX ORDER — SERTRALINE HYDROCHLORIDE 100 MG/1
200 TABLET, FILM COATED ORAL EVERY EVENING
Status: DISCONTINUED | OUTPATIENT
Start: 2024-01-01 | End: 2024-01-01 | Stop reason: HOSPADM

## 2024-01-01 RX ORDER — LEVOTHYROXINE SODIUM 112 UG/1
112 TABLET ORAL DAILY
Status: DISCONTINUED | OUTPATIENT
Start: 2024-01-01 | End: 2024-01-01 | Stop reason: HOSPADM

## 2024-01-01 RX ORDER — ASPIRIN 325 MG
325 TABLET ORAL ONCE
Status: COMPLETED | OUTPATIENT
Start: 2024-01-01 | End: 2024-01-01

## 2024-01-01 RX ORDER — OXYMETAZOLINE HYDROCHLORIDE 0.05 G/100ML
2 SPRAY NASAL ONCE
Status: COMPLETED | OUTPATIENT
Start: 2024-01-01 | End: 2024-01-01

## 2024-01-01 RX ADMIN — ATORVASTATIN CALCIUM 40 MG: 40 TABLET, FILM COATED ORAL at 20:05

## 2024-01-01 RX ADMIN — ACETAMINOPHEN 975 MG: 325 TABLET ORAL at 13:28

## 2024-01-01 RX ADMIN — CLOPIDOGREL BISULFATE 75 MG: 75 TABLET ORAL at 08:40

## 2024-01-01 RX ADMIN — SUCRALFATE 1 G: 1 TABLET ORAL at 06:18

## 2024-01-01 RX ADMIN — FERROUS SULFATE TAB 325 MG (65 MG ELEMENTAL FE) 325 MG: 325 (65 FE) TAB at 08:52

## 2024-01-01 RX ADMIN — DILTIAZEM HYDROCHLORIDE 180 MG: 180 CAPSULE, COATED, EXTENDED RELEASE ORAL at 08:09

## 2024-01-01 RX ADMIN — FUROSEMIDE 60 MG: 10 INJECTION, SOLUTION INTRAMUSCULAR; INTRAVENOUS at 06:15

## 2024-01-01 RX ADMIN — CLOPIDOGREL BISULFATE 75 MG: 75 TABLET ORAL at 08:24

## 2024-01-01 RX ADMIN — ASPIRIN 81 MG CHEWABLE TABLET 81 MG: 81 TABLET CHEWABLE at 08:23

## 2024-01-01 RX ADMIN — PANTOPRAZOLE SODIUM 40 MG: 40 INJECTION, POWDER, LYOPHILIZED, FOR SOLUTION INTRAVENOUS at 09:50

## 2024-01-01 RX ADMIN — HYDROMORPHONE HYDROCHLORIDE 0.5 MG: 1 INJECTION, SOLUTION INTRAMUSCULAR; INTRAVENOUS; SUBCUTANEOUS at 03:36

## 2024-01-01 RX ADMIN — ONDANSETRON 4 MG: 4 TABLET, ORALLY DISINTEGRATING ORAL at 16:43

## 2024-01-01 RX ADMIN — ACETAMINOPHEN 650 MG: 325 TABLET ORAL at 02:34

## 2024-01-01 RX ADMIN — HYDRALAZINE HYDROCHLORIDE 10 MG: 20 INJECTION INTRAMUSCULAR; INTRAVENOUS at 16:26

## 2024-01-01 RX ADMIN — FERROUS SULFATE TAB 325 MG (65 MG ELEMENTAL FE) 325 MG: 325 (65 FE) TAB at 13:10

## 2024-01-01 RX ADMIN — SERTRALINE HYDROCHLORIDE 200 MG: 50 TABLET ORAL at 21:06

## 2024-01-01 RX ADMIN — POLYETHYLENE GLYCOL 3350 17 G: 17 POWDER, FOR SOLUTION ORAL at 08:34

## 2024-01-01 RX ADMIN — METOPROLOL TARTRATE 25 MG: 25 TABLET, FILM COATED ORAL at 08:35

## 2024-01-01 RX ADMIN — SERTRALINE HYDROCHLORIDE 200 MG: 100 TABLET ORAL at 08:18

## 2024-01-01 RX ADMIN — LEVOTHYROXINE SODIUM 125 MCG: 0.03 TABLET ORAL at 06:35

## 2024-01-01 RX ADMIN — SODIUM CHLORIDE: 9 INJECTION, SOLUTION INTRAVENOUS at 17:20

## 2024-01-01 RX ADMIN — METOPROLOL TARTRATE 50 MG: 25 TABLET, FILM COATED ORAL at 21:08

## 2024-01-01 RX ADMIN — ACETAMINOPHEN 975 MG: 325 TABLET ORAL at 14:28

## 2024-01-01 RX ADMIN — ATORVASTATIN CALCIUM 40 MG: 40 TABLET, FILM COATED ORAL at 20:46

## 2024-01-01 RX ADMIN — ASPIRIN 81 MG CHEWABLE TABLET 81 MG: 81 TABLET CHEWABLE at 09:37

## 2024-01-01 RX ADMIN — PANTOPRAZOLE SODIUM 40 MG: 40 TABLET, DELAYED RELEASE ORAL at 20:13

## 2024-01-01 RX ADMIN — PANTOPRAZOLE SODIUM 40 MG: 40 TABLET, DELAYED RELEASE ORAL at 20:05

## 2024-01-01 RX ADMIN — LEVOTHYROXINE SODIUM 137 MCG: 0.11 TABLET ORAL at 06:31

## 2024-01-01 RX ADMIN — PANTOPRAZOLE SODIUM 40 MG: 40 TABLET, DELAYED RELEASE ORAL at 09:30

## 2024-01-01 RX ADMIN — ATORVASTATIN CALCIUM 40 MG: 20 TABLET, FILM COATED ORAL at 08:32

## 2024-01-01 RX ADMIN — SUCRALFATE 1 G: 1 TABLET ORAL at 06:31

## 2024-01-01 RX ADMIN — SUCRALFATE 1 G: 1 TABLET ORAL at 06:50

## 2024-01-01 RX ADMIN — SERTRALINE HYDROCHLORIDE 200 MG: 50 TABLET ORAL at 20:03

## 2024-01-01 RX ADMIN — FUROSEMIDE 40 MG: 10 INJECTION, SOLUTION INTRAMUSCULAR; INTRAVENOUS at 22:18

## 2024-01-01 RX ADMIN — SUCRALFATE 1 G: 1 TABLET ORAL at 16:20

## 2024-01-01 RX ADMIN — LEVOTHYROXINE SODIUM 150 MCG: 0.15 TABLET ORAL at 08:32

## 2024-01-01 RX ADMIN — PANTOPRAZOLE SODIUM 40 MG: 40 TABLET, DELAYED RELEASE ORAL at 15:30

## 2024-01-01 RX ADMIN — SODIUM CHLORIDE: 9 INJECTION, SOLUTION INTRAVENOUS at 09:21

## 2024-01-01 RX ADMIN — LIDOCAINE HYDROCHLORIDE 3 ML: 40 SOLUTION TOPICAL at 07:41

## 2024-01-01 RX ADMIN — APIXABAN 2.5 MG: 2.5 TABLET, FILM COATED ORAL at 07:49

## 2024-01-01 RX ADMIN — ACETAMINOPHEN 650 MG: 325 TABLET ORAL at 04:48

## 2024-01-01 RX ADMIN — PANTOPRAZOLE SODIUM 40 MG: 40 TABLET, DELAYED RELEASE ORAL at 07:07

## 2024-01-01 RX ADMIN — ACETAMINOPHEN 975 MG: 325 TABLET ORAL at 09:30

## 2024-01-01 RX ADMIN — METOPROLOL TARTRATE 50 MG: 25 TABLET, FILM COATED ORAL at 21:41

## 2024-01-01 RX ADMIN — KETAMINE HYDROCHLORIDE 50 MG: 10 INJECTION INTRAMUSCULAR; INTRAVENOUS at 07:44

## 2024-01-01 RX ADMIN — ACETAMINOPHEN 650 MG: 325 TABLET ORAL at 20:05

## 2024-01-01 RX ADMIN — OXYMETAZOLINE HYDROCHLORIDE 2 SPRAY: 0.05 SPRAY NASAL at 00:46

## 2024-01-01 RX ADMIN — SENNOSIDES AND DOCUSATE SODIUM 1 TABLET: 50; 8.6 TABLET ORAL at 13:27

## 2024-01-01 RX ADMIN — ACETAMINOPHEN 500 MG: 500 TABLET, FILM COATED ORAL at 21:55

## 2024-01-01 RX ADMIN — PANTOPRAZOLE SODIUM 40 MG: 40 TABLET, DELAYED RELEASE ORAL at 09:38

## 2024-01-01 RX ADMIN — ATORVASTATIN CALCIUM 40 MG: 20 TABLET, FILM COATED ORAL at 07:48

## 2024-01-01 RX ADMIN — SERTRALINE HYDROCHLORIDE 200 MG: 50 TABLET ORAL at 20:05

## 2024-01-01 RX ADMIN — ACETAMINOPHEN 650 MG: 325 TABLET ORAL at 04:46

## 2024-01-01 RX ADMIN — ASPIRIN 81 MG CHEWABLE TABLET 81 MG: 81 TABLET CHEWABLE at 08:09

## 2024-01-01 RX ADMIN — FUROSEMIDE 40 MG: 20 TABLET ORAL at 09:55

## 2024-01-01 RX ADMIN — SUCRALFATE 1 G: 1 TABLET ORAL at 16:17

## 2024-01-01 RX ADMIN — ASPIRIN 81 MG CHEWABLE TABLET 81 MG: 81 TABLET CHEWABLE at 08:52

## 2024-01-01 RX ADMIN — GADOBUTROL 6.5 ML: 604.72 INJECTION INTRAVENOUS at 23:52

## 2024-01-01 RX ADMIN — PANTOPRAZOLE SODIUM 40 MG: 40 TABLET, DELAYED RELEASE ORAL at 21:06

## 2024-01-01 RX ADMIN — METOPROLOL TARTRATE 50 MG: 25 TABLET, FILM COATED ORAL at 09:30

## 2024-01-01 RX ADMIN — LEVOTHYROXINE SODIUM 125 MCG: 0.03 TABLET ORAL at 06:38

## 2024-01-01 RX ADMIN — FUROSEMIDE 20 MG: 10 INJECTION, SOLUTION INTRAMUSCULAR; INTRAVENOUS at 17:39

## 2024-01-01 RX ADMIN — METOPROLOL TARTRATE 25 MG: 25 TABLET, FILM COATED ORAL at 20:03

## 2024-01-01 RX ADMIN — PANTOPRAZOLE SODIUM 40 MG: 40 TABLET, DELAYED RELEASE ORAL at 06:50

## 2024-01-01 RX ADMIN — METOPROLOL SUCCINATE 50 MG: 25 TABLET, EXTENDED RELEASE ORAL at 20:44

## 2024-01-01 RX ADMIN — FUROSEMIDE 40 MG: 10 INJECTION, SOLUTION INTRAMUSCULAR; INTRAVENOUS at 06:48

## 2024-01-01 RX ADMIN — SERTRALINE HYDROCHLORIDE 200 MG: 50 TABLET ORAL at 21:14

## 2024-01-01 RX ADMIN — SUCRALFATE 1 G: 1 TABLET ORAL at 17:09

## 2024-01-01 RX ADMIN — FUROSEMIDE 20 MG: 10 INJECTION, SOLUTION INTRAMUSCULAR; INTRAVENOUS at 15:31

## 2024-01-01 RX ADMIN — CLOPIDOGREL BISULFATE 75 MG: 75 TABLET ORAL at 21:22

## 2024-01-01 RX ADMIN — ONDANSETRON 4 MG: 2 INJECTION INTRAMUSCULAR; INTRAVENOUS at 11:41

## 2024-01-01 RX ADMIN — SERTRALINE HYDROCHLORIDE 200 MG: 100 TABLET ORAL at 08:52

## 2024-01-01 RX ADMIN — PANTOPRAZOLE SODIUM 40 MG: 40 TABLET, DELAYED RELEASE ORAL at 17:47

## 2024-01-01 RX ADMIN — PANTOPRAZOLE SODIUM 40 MG: 40 INJECTION, POWDER, LYOPHILIZED, FOR SOLUTION INTRAVENOUS at 18:06

## 2024-01-01 RX ADMIN — APIXABAN 2.5 MG: 2.5 TABLET, FILM COATED ORAL at 21:08

## 2024-01-01 RX ADMIN — LOSARTAN POTASSIUM 25 MG: 25 TABLET, FILM COATED ORAL at 13:30

## 2024-01-01 RX ADMIN — LEVOTHYROXINE SODIUM 125 MCG: 0.03 TABLET ORAL at 06:31

## 2024-01-01 RX ADMIN — SUCRALFATE 1 G: 1 TABLET ORAL at 06:53

## 2024-01-01 RX ADMIN — SERTRALINE HYDROCHLORIDE 200 MG: 100 TABLET ORAL at 08:32

## 2024-01-01 RX ADMIN — FERROUS SULFATE TAB 325 MG (65 MG ELEMENTAL FE) 325 MG: 325 (65 FE) TAB at 08:32

## 2024-01-01 RX ADMIN — HYDRALAZINE HYDROCHLORIDE 10 MG: 20 INJECTION INTRAMUSCULAR; INTRAVENOUS at 08:48

## 2024-01-01 RX ADMIN — ASPIRIN 81 MG CHEWABLE TABLET 81 MG: 81 TABLET CHEWABLE at 08:31

## 2024-01-01 RX ADMIN — CLOPIDOGREL BISULFATE 75 MG: 75 TABLET ORAL at 21:07

## 2024-01-01 RX ADMIN — METOPROLOL TARTRATE 50 MG: 25 TABLET, FILM COATED ORAL at 07:49

## 2024-01-01 RX ADMIN — DOCUSATE SODIUM 50 MG AND SENNOSIDES 8.6 MG 1 TABLET: 8.6; 5 TABLET, FILM COATED ORAL at 21:14

## 2024-01-01 RX ADMIN — SODIUM CHLORIDE, POTASSIUM CHLORIDE, SODIUM LACTATE AND CALCIUM CHLORIDE: 600; 310; 30; 20 INJECTION, SOLUTION INTRAVENOUS at 07:25

## 2024-01-01 RX ADMIN — METOPROLOL TARTRATE 50 MG: 25 TABLET, FILM COATED ORAL at 22:19

## 2024-01-01 RX ADMIN — ATORVASTATIN CALCIUM 40 MG: 40 TABLET, FILM COATED ORAL at 20:02

## 2024-01-01 RX ADMIN — SUCRALFATE 1 G: 1 TABLET ORAL at 16:26

## 2024-01-01 RX ADMIN — ACETAMINOPHEN 975 MG: 325 TABLET ORAL at 10:16

## 2024-01-01 RX ADMIN — METOPROLOL TARTRATE 50 MG: 25 TABLET, FILM COATED ORAL at 08:19

## 2024-01-01 RX ADMIN — SUCRALFATE 1 G: 1 TABLET ORAL at 17:35

## 2024-01-01 RX ADMIN — PANTOPRAZOLE SODIUM 40 MG: 40 TABLET, DELAYED RELEASE ORAL at 06:15

## 2024-01-01 RX ADMIN — POLYETHYLENE GLYCOL 3350 238 G: 17 POWDER, FOR SOLUTION ORAL at 18:32

## 2024-01-01 RX ADMIN — ENOXAPARIN SODIUM 40 MG: 40 INJECTION SUBCUTANEOUS at 22:43

## 2024-01-01 RX ADMIN — LEVOTHYROXINE SODIUM 125 MCG: 0.03 TABLET ORAL at 06:30

## 2024-01-01 RX ADMIN — POTASSIUM CHLORIDE 20 MEQ: 1.5 POWDER, FOR SOLUTION ORAL at 09:32

## 2024-01-01 RX ADMIN — LEVOTHYROXINE SODIUM 125 MCG: 0.03 TABLET ORAL at 06:47

## 2024-01-01 RX ADMIN — LABETALOL HYDROCHLORIDE 10 MG: 5 INJECTION, SOLUTION INTRAVENOUS at 23:32

## 2024-01-01 RX ADMIN — ACETAMINOPHEN 650 MG: 325 TABLET ORAL at 09:37

## 2024-01-01 RX ADMIN — HYDRALAZINE HYDROCHLORIDE 10 MG: 20 INJECTION INTRAMUSCULAR; INTRAVENOUS at 11:41

## 2024-01-01 RX ADMIN — ATORVASTATIN CALCIUM 40 MG: 40 TABLET, FILM COATED ORAL at 20:13

## 2024-01-01 RX ADMIN — ASPIRIN 81 MG CHEWABLE TABLET 81 MG: 81 TABLET CHEWABLE at 08:40

## 2024-01-01 RX ADMIN — LABETALOL HYDROCHLORIDE 20 MG: 5 INJECTION, SOLUTION INTRAVENOUS at 04:56

## 2024-01-01 RX ADMIN — FUROSEMIDE 20 MG: 10 INJECTION, SOLUTION INTRAMUSCULAR; INTRAVENOUS at 08:19

## 2024-01-01 RX ADMIN — PANTOPRAZOLE SODIUM 40 MG: 40 INJECTION, POWDER, LYOPHILIZED, FOR SOLUTION INTRAVENOUS at 18:32

## 2024-01-01 RX ADMIN — ACETAMINOPHEN 650 MG: 325 TABLET, FILM COATED ORAL at 12:55

## 2024-01-01 RX ADMIN — HYDROCODONE BITARTRATE AND ACETAMINOPHEN 2 TABLET: 5; 325 TABLET ORAL at 22:48

## 2024-01-01 RX ADMIN — APIXABAN 2.5 MG: 2.5 TABLET, FILM COATED ORAL at 08:52

## 2024-01-01 RX ADMIN — ACETAMINOPHEN 650 MG: 325 TABLET ORAL at 00:08

## 2024-01-01 RX ADMIN — PANTOPRAZOLE SODIUM 40 MG: 40 TABLET, DELAYED RELEASE ORAL at 08:40

## 2024-01-01 RX ADMIN — SERTRALINE HYDROCHLORIDE 200 MG: 100 TABLET ORAL at 20:50

## 2024-01-01 RX ADMIN — PANTOPRAZOLE SODIUM 40 MG: 40 TABLET, DELAYED RELEASE ORAL at 19:59

## 2024-01-01 RX ADMIN — SERTRALINE HYDROCHLORIDE 200 MG: 50 TABLET ORAL at 20:09

## 2024-01-01 RX ADMIN — METOPROLOL TARTRATE 25 MG: 25 TABLET, FILM COATED ORAL at 09:21

## 2024-01-01 RX ADMIN — METOPROLOL TARTRATE 25 MG: 25 TABLET, FILM COATED ORAL at 08:30

## 2024-01-01 RX ADMIN — PROPOFOL 25 MG: 10 INJECTION, EMULSION INTRAVENOUS at 07:45

## 2024-01-01 RX ADMIN — SUCRALFATE 1 G: 1 TABLET ORAL at 06:10

## 2024-01-01 RX ADMIN — POTASSIUM CHLORIDE 20 MEQ: 1500 TABLET, EXTENDED RELEASE ORAL at 08:24

## 2024-01-01 RX ADMIN — FUROSEMIDE 20 MG: 10 INJECTION, SOLUTION INTRAMUSCULAR; INTRAVENOUS at 09:30

## 2024-01-01 RX ADMIN — FERROUS SULFATE TAB 325 MG (65 MG ELEMENTAL FE) 325 MG: 325 (65 FE) TAB at 07:49

## 2024-01-01 RX ADMIN — ENOXAPARIN SODIUM 30 MG: 30 INJECTION SUBCUTANEOUS at 20:00

## 2024-01-01 RX ADMIN — DILTIAZEM HYDROCHLORIDE 180 MG: 180 CAPSULE, COATED, EXTENDED RELEASE ORAL at 12:55

## 2024-01-01 RX ADMIN — PANTOPRAZOLE SODIUM 40 MG: 40 TABLET, DELAYED RELEASE ORAL at 20:02

## 2024-01-01 RX ADMIN — CLOPIDOGREL BISULFATE 75 MG: 75 TABLET ORAL at 08:09

## 2024-01-01 RX ADMIN — DOCUSATE SODIUM 50 MG AND SENNOSIDES 8.6 MG 1 TABLET: 8.6; 5 TABLET, FILM COATED ORAL at 21:06

## 2024-01-01 RX ADMIN — HYDROMORPHONE HYDROCHLORIDE 0.3 MG: 1 INJECTION, SOLUTION INTRAMUSCULAR; INTRAVENOUS; SUBCUTANEOUS at 15:54

## 2024-01-01 RX ADMIN — ATORVASTATIN CALCIUM 40 MG: 40 TABLET, FILM COATED ORAL at 21:06

## 2024-01-01 RX ADMIN — PANTOPRAZOLE SODIUM 40 MG: 40 TABLET, DELAYED RELEASE ORAL at 08:19

## 2024-01-01 RX ADMIN — SUCRALFATE 1 G: 1 TABLET ORAL at 06:15

## 2024-01-01 RX ADMIN — PANTOPRAZOLE SODIUM 40 MG: 40 INJECTION, POWDER, LYOPHILIZED, FOR SOLUTION INTRAVENOUS at 09:38

## 2024-01-01 RX ADMIN — METOPROLOL TARTRATE 50 MG: 50 TABLET, FILM COATED ORAL at 21:58

## 2024-01-01 RX ADMIN — ATORVASTATIN CALCIUM 40 MG: 40 TABLET, FILM COATED ORAL at 20:26

## 2024-01-01 RX ADMIN — METOPROLOL TARTRATE 50 MG: 25 TABLET, FILM COATED ORAL at 20:13

## 2024-01-01 RX ADMIN — SUCRALFATE 1 G: 1 TABLET ORAL at 17:11

## 2024-01-01 RX ADMIN — FUROSEMIDE 60 MG: 10 INJECTION, SOLUTION INTRAMUSCULAR; INTRAVENOUS at 06:50

## 2024-01-01 RX ADMIN — SUCRALFATE 1 G: 1 TABLET ORAL at 16:36

## 2024-01-01 RX ADMIN — PANTOPRAZOLE SODIUM 40 MG: 40 TABLET, DELAYED RELEASE ORAL at 20:46

## 2024-01-01 RX ADMIN — FAMOTIDINE 20 MG: 20 TABLET, FILM COATED ORAL at 08:24

## 2024-01-01 RX ADMIN — PANTOPRAZOLE SODIUM 40 MG: 40 TABLET, DELAYED RELEASE ORAL at 20:26

## 2024-01-01 RX ADMIN — PROPOFOL 50 MG: 10 INJECTION, EMULSION INTRAVENOUS at 07:59

## 2024-01-01 RX ADMIN — METOPROLOL TARTRATE 50 MG: 25 TABLET, FILM COATED ORAL at 08:32

## 2024-01-01 RX ADMIN — PANTOPRAZOLE SODIUM 40 MG: 40 INJECTION, POWDER, LYOPHILIZED, FOR SOLUTION INTRAVENOUS at 08:25

## 2024-01-01 RX ADMIN — SERTRALINE HYDROCHLORIDE 200 MG: 100 TABLET ORAL at 09:30

## 2024-01-01 RX ADMIN — POTASSIUM CHLORIDE 20 MEQ: 1500 TABLET, EXTENDED RELEASE ORAL at 12:52

## 2024-01-01 RX ADMIN — DOCUSATE SODIUM 50 MG AND SENNOSIDES 8.6 MG 1 TABLET: 8.6; 5 TABLET, FILM COATED ORAL at 08:51

## 2024-01-01 RX ADMIN — ATORVASTATIN CALCIUM 40 MG: 40 TABLET, FILM COATED ORAL at 21:15

## 2024-01-01 RX ADMIN — SUCRALFATE 1 G: 1 TABLET ORAL at 06:30

## 2024-01-01 RX ADMIN — OXYCODONE HYDROCHLORIDE 5 MG: 5 TABLET ORAL at 20:26

## 2024-01-01 RX ADMIN — EMPAGLIFLOZIN 10 MG: 10 TABLET, FILM COATED ORAL at 08:32

## 2024-01-01 RX ADMIN — CLOPIDOGREL BISULFATE 75 MG: 75 TABLET ORAL at 08:31

## 2024-01-01 RX ADMIN — FUROSEMIDE 20 MG: 10 INJECTION, SOLUTION INTRAMUSCULAR; INTRAVENOUS at 17:16

## 2024-01-01 RX ADMIN — PANTOPRAZOLE SODIUM 40 MG: 40 TABLET, DELAYED RELEASE ORAL at 05:57

## 2024-01-01 RX ADMIN — APIXABAN 2.5 MG: 2.5 TABLET, FILM COATED ORAL at 08:32

## 2024-01-01 RX ADMIN — APIXABAN 2.5 MG: 2.5 TABLET, FILM COATED ORAL at 22:19

## 2024-01-01 RX ADMIN — METOPROLOL SUCCINATE 150 MG: 100 TABLET, EXTENDED RELEASE ORAL at 09:57

## 2024-01-01 RX ADMIN — PROPOFOL 25 MG: 10 INJECTION, EMULSION INTRAVENOUS at 07:42

## 2024-01-01 RX ADMIN — POTASSIUM CHLORIDE 20 MEQ: 1500 TABLET, EXTENDED RELEASE ORAL at 14:14

## 2024-01-01 RX ADMIN — ATORVASTATIN CALCIUM 40 MG: 40 TABLET, FILM COATED ORAL at 20:09

## 2024-01-01 RX ADMIN — LEVOTHYROXINE SODIUM 137 MCG: 0.11 TABLET ORAL at 08:09

## 2024-01-01 RX ADMIN — PROPOFOL 50 MG: 10 INJECTION, EMULSION INTRAVENOUS at 08:00

## 2024-01-01 RX ADMIN — PANTOPRAZOLE SODIUM 40 MG: 40 TABLET, DELAYED RELEASE ORAL at 21:42

## 2024-01-01 RX ADMIN — PROPOFOL 50 MG: 10 INJECTION, EMULSION INTRAVENOUS at 07:55

## 2024-01-01 RX ADMIN — SERTRALINE HYDROCHLORIDE 200 MG: 100 TABLET ORAL at 08:09

## 2024-01-01 RX ADMIN — PANTOPRAZOLE SODIUM 40 MG: 40 TABLET, DELAYED RELEASE ORAL at 20:09

## 2024-01-01 RX ADMIN — ASPIRIN 81 MG: 81 TABLET, COATED ORAL at 09:56

## 2024-01-01 RX ADMIN — FERROUS SULFATE TAB 325 MG (65 MG ELEMENTAL FE) 325 MG: 325 (65 FE) TAB at 13:28

## 2024-01-01 RX ADMIN — PANTOPRAZOLE SODIUM 40 MG: 40 TABLET, DELAYED RELEASE ORAL at 09:37

## 2024-01-01 RX ADMIN — ONDANSETRON 4 MG: 4 TABLET, ORALLY DISINTEGRATING ORAL at 09:37

## 2024-01-01 RX ADMIN — SERTRALINE HYDROCHLORIDE 200 MG: 100 TABLET ORAL at 07:49

## 2024-01-01 RX ADMIN — METOPROLOL TARTRATE 50 MG: 25 TABLET, FILM COATED ORAL at 08:51

## 2024-01-01 RX ADMIN — POTASSIUM CHLORIDE 20 MEQ: 1500 TABLET, EXTENDED RELEASE ORAL at 08:52

## 2024-01-01 RX ADMIN — HYDRALAZINE HYDROCHLORIDE 10 MG: 20 INJECTION INTRAMUSCULAR; INTRAVENOUS at 03:47

## 2024-01-01 RX ADMIN — SUCRALFATE 1 G: 1 TABLET ORAL at 16:40

## 2024-01-01 RX ADMIN — SODIUM CHLORIDE: 9 INJECTION, SOLUTION INTRAVENOUS at 05:11

## 2024-01-01 RX ADMIN — PROPOFOL 25 MG: 10 INJECTION, EMULSION INTRAVENOUS at 07:50

## 2024-01-01 RX ADMIN — LEVOTHYROXINE SODIUM 150 MCG: 0.15 TABLET ORAL at 08:52

## 2024-01-01 RX ADMIN — FUROSEMIDE 20 MG: 10 INJECTION, SOLUTION INTRAMUSCULAR; INTRAVENOUS at 14:22

## 2024-01-01 RX ADMIN — ASPIRIN 81 MG CHEWABLE TABLET 81 MG: 81 TABLET CHEWABLE at 17:55

## 2024-01-01 RX ADMIN — LEVOTHYROXINE SODIUM 125 MCG: 125 TABLET ORAL at 06:10

## 2024-01-01 RX ADMIN — DILTIAZEM HYDROCHLORIDE 180 MG: 180 CAPSULE, COATED, EXTENDED RELEASE ORAL at 09:37

## 2024-01-01 RX ADMIN — PANTOPRAZOLE SODIUM 40 MG: 40 TABLET, DELAYED RELEASE ORAL at 08:24

## 2024-01-01 RX ADMIN — Medication 1 MG: at 00:49

## 2024-01-01 RX ADMIN — DOCUSATE SODIUM 50 MG AND SENNOSIDES 8.6 MG 1 TABLET: 8.6; 5 TABLET, FILM COATED ORAL at 08:31

## 2024-01-01 RX ADMIN — ACETAMINOPHEN 650 MG: 325 TABLET ORAL at 11:40

## 2024-01-01 RX ADMIN — MAGNESIUM SULFATE HEPTAHYDRATE 2 G: 2 INJECTION, SOLUTION INTRAVENOUS at 14:14

## 2024-01-01 RX ADMIN — FUROSEMIDE 10 MG: 10 INJECTION, SOLUTION INTRAMUSCULAR; INTRAVENOUS at 08:29

## 2024-01-01 RX ADMIN — HYDRALAZINE HYDROCHLORIDE 20 MG: 20 INJECTION INTRAMUSCULAR; INTRAVENOUS at 05:40

## 2024-01-01 RX ADMIN — ATORVASTATIN CALCIUM 40 MG: 40 TABLET, FILM COATED ORAL at 21:42

## 2024-01-01 RX ADMIN — METOPROLOL TARTRATE 12.5 MG: 25 TABLET, FILM COATED ORAL at 08:31

## 2024-01-01 RX ADMIN — LEVOTHYROXINE SODIUM 112 MCG: 112 TABLET ORAL at 07:54

## 2024-01-01 RX ADMIN — Medication 3 MG: at 16:26

## 2024-01-01 RX ADMIN — DILTIAZEM HYDROCHLORIDE 180 MG: 180 CAPSULE, COATED, EXTENDED RELEASE ORAL at 09:21

## 2024-01-01 RX ADMIN — PROPOFOL 25 MG: 10 INJECTION, EMULSION INTRAVENOUS at 07:43

## 2024-01-01 RX ADMIN — SERTRALINE HYDROCHLORIDE 200 MG: 50 TABLET ORAL at 20:27

## 2024-01-01 RX ADMIN — LEVOTHYROXINE SODIUM 125 MCG: 0.03 TABLET ORAL at 09:38

## 2024-01-01 RX ADMIN — FUROSEMIDE 40 MG: 10 INJECTION, SOLUTION INTRAMUSCULAR; INTRAVENOUS at 15:24

## 2024-01-01 RX ADMIN — SERTRALINE HYDROCHLORIDE 200 MG: 50 TABLET ORAL at 19:59

## 2024-01-01 RX ADMIN — PANTOPRAZOLE SODIUM 40 MG: 40 INJECTION, POWDER, FOR SOLUTION INTRAVENOUS at 20:31

## 2024-01-01 RX ADMIN — ACETAMINOPHEN 650 MG: 325 TABLET, FILM COATED ORAL at 23:03

## 2024-01-01 RX ADMIN — SUCRALFATE 1 G: 1 TABLET ORAL at 17:47

## 2024-01-01 RX ADMIN — GADOBUTROL 7 ML: 604.72 INJECTION INTRAVENOUS at 11:46

## 2024-01-01 RX ADMIN — POLYETHYLENE GLYCOL 3350 17 G: 17 POWDER, FOR SOLUTION ORAL at 14:43

## 2024-01-01 RX ADMIN — METOPROLOL TARTRATE 25 MG: 25 TABLET, FILM COATED ORAL at 14:52

## 2024-01-01 RX ADMIN — PANTOPRAZOLE SODIUM 40 MG: 40 TABLET, DELAYED RELEASE ORAL at 08:52

## 2024-01-01 RX ADMIN — METOPROLOL TARTRATE 50 MG: 25 TABLET, FILM COATED ORAL at 19:44

## 2024-01-01 RX ADMIN — IOPAMIDOL 75 ML: 755 INJECTION, SOLUTION INTRAVENOUS at 20:12

## 2024-01-01 RX ADMIN — DILTIAZEM HYDROCHLORIDE 180 MG: 180 CAPSULE, COATED, EXTENDED RELEASE ORAL at 08:40

## 2024-01-01 RX ADMIN — ACETAMINOPHEN 650 MG: 325 TABLET, FILM COATED ORAL at 03:22

## 2024-01-01 RX ADMIN — POTASSIUM CHLORIDE 20 MEQ: 1.5 POWDER, FOR SOLUTION ORAL at 17:12

## 2024-01-01 RX ADMIN — CLOPIDOGREL BISULFATE 75 MG: 75 TABLET ORAL at 08:51

## 2024-01-01 RX ADMIN — ASPIRIN 325 MG ORAL TABLET 325 MG: 325 PILL ORAL at 01:16

## 2024-01-01 RX ADMIN — HYDRALAZINE HYDROCHLORIDE 10 MG: 20 INJECTION INTRAMUSCULAR; INTRAVENOUS at 08:10

## 2024-01-01 RX ADMIN — LEVOTHYROXINE SODIUM 137 MCG: 0.11 TABLET ORAL at 06:18

## 2024-01-01 RX ADMIN — SERTRALINE HYDROCHLORIDE 200 MG: 100 TABLET ORAL at 09:57

## 2024-01-01 RX ADMIN — METOPROLOL TARTRATE 50 MG: 25 TABLET, FILM COATED ORAL at 08:52

## 2024-01-01 RX ADMIN — SODIUM CHLORIDE 1000 ML: 9 INJECTION, SOLUTION INTRAVENOUS at 03:36

## 2024-01-01 RX ADMIN — DILTIAZEM HYDROCHLORIDE 180 MG: 180 CAPSULE, COATED, EXTENDED RELEASE ORAL at 08:24

## 2024-01-01 RX ADMIN — HYDROMORPHONE HYDROCHLORIDE 0.2 MG: 0.2 INJECTION, SOLUTION INTRAMUSCULAR; INTRAVENOUS; SUBCUTANEOUS at 14:52

## 2024-01-01 RX ADMIN — SUCRALFATE 1 G: 1 TABLET ORAL at 20:26

## 2024-01-01 RX ADMIN — HYDRALAZINE HYDROCHLORIDE 10 MG: 20 INJECTION INTRAMUSCULAR; INTRAVENOUS at 11:40

## 2024-01-01 RX ADMIN — SUCRALFATE 1 G: 1 TABLET ORAL at 05:57

## 2024-01-01 RX ADMIN — SODIUM CHLORIDE, POTASSIUM CHLORIDE, SODIUM LACTATE AND CALCIUM CHLORIDE: 600; 310; 30; 20 INJECTION, SOLUTION INTRAVENOUS at 07:36

## 2024-01-01 RX ADMIN — FUROSEMIDE 60 MG: 10 INJECTION, SOLUTION INTRAMUSCULAR; INTRAVENOUS at 14:14

## 2024-01-01 RX ADMIN — METOPROLOL TARTRATE 12.5 MG: 25 TABLET, FILM COATED ORAL at 21:06

## 2024-01-01 RX ADMIN — PANTOPRAZOLE SODIUM 40 MG: 40 TABLET, DELAYED RELEASE ORAL at 08:09

## 2024-01-01 RX ADMIN — ACETAMINOPHEN 650 MG: 325 TABLET ORAL at 23:46

## 2024-01-01 RX ADMIN — METOPROLOL TARTRATE 25 MG: 25 TABLET, FILM COATED ORAL at 20:27

## 2024-01-01 RX ADMIN — SUCRALFATE 1 G: 1 TABLET ORAL at 15:30

## 2024-01-01 RX ADMIN — SUCRALFATE 1 G: 1 TABLET ORAL at 06:41

## 2024-01-01 RX ADMIN — APIXABAN 2.5 MG: 2.5 TABLET, FILM COATED ORAL at 19:44

## 2024-01-01 RX ADMIN — METOPROLOL TARTRATE 50 MG: 25 TABLET, FILM COATED ORAL at 08:09

## 2024-01-01 RX ADMIN — METOPROLOL SUCCINATE 50 MG: 25 TABLET, EXTENDED RELEASE ORAL at 08:52

## 2024-01-01 RX ADMIN — SUCRALFATE 1 G: 1 TABLET ORAL at 16:44

## 2024-01-01 RX ADMIN — LEVOTHYROXINE SODIUM 150 MCG: 0.15 TABLET ORAL at 07:49

## 2024-01-01 RX ADMIN — FUROSEMIDE 20 MG: 10 INJECTION, SOLUTION INTRAMUSCULAR; INTRAVENOUS at 08:09

## 2024-01-01 RX ADMIN — LEVOTHYROXINE SODIUM 125 MCG: 125 TABLET ORAL at 05:05

## 2024-01-01 RX ADMIN — FERROUS SULFATE TAB 325 MG (65 MG ELEMENTAL FE) 325 MG: 325 (65 FE) TAB at 14:28

## 2024-01-01 RX ADMIN — SUCRALFATE 1 G: 1 TABLET ORAL at 16:30

## 2024-01-01 RX ADMIN — GLYCOPYRROLATE 0.2 MG: 0.2 INJECTION, SOLUTION INTRAMUSCULAR; INTRAVENOUS at 07:40

## 2024-01-01 RX ADMIN — FUROSEMIDE 40 MG: 10 INJECTION, SOLUTION INTRAMUSCULAR; INTRAVENOUS at 18:08

## 2024-01-01 RX ADMIN — METOPROLOL TARTRATE 25 MG: 25 TABLET, FILM COATED ORAL at 20:05

## 2024-01-01 RX ADMIN — Medication 3 MG: at 17:24

## 2024-01-01 RX ADMIN — PANTOPRAZOLE SODIUM 40 MG: 40 TABLET, DELAYED RELEASE ORAL at 21:14

## 2024-01-01 RX ADMIN — ATORVASTATIN CALCIUM 40 MG: 40 TABLET, FILM COATED ORAL at 19:59

## 2024-01-01 RX ADMIN — DILTIAZEM HYDROCHLORIDE 180 MG: 180 CAPSULE, COATED, EXTENDED RELEASE ORAL at 09:38

## 2024-01-01 RX ADMIN — ATORVASTATIN CALCIUM 40 MG: 20 TABLET, FILM COATED ORAL at 08:52

## 2024-01-01 RX ADMIN — PANTOPRAZOLE SODIUM 40 MG: 40 TABLET, DELAYED RELEASE ORAL at 08:35

## 2024-01-01 RX ADMIN — DILTIAZEM HYDROCHLORIDE 180 MG: 180 CAPSULE, COATED, EXTENDED RELEASE ORAL at 08:35

## 2024-01-01 RX ADMIN — Medication 3 MG: at 16:44

## 2024-01-01 RX ADMIN — METOPROLOL TARTRATE 50 MG: 25 TABLET, FILM COATED ORAL at 20:46

## 2024-01-01 RX ADMIN — ACETAMINOPHEN 650 MG: 325 TABLET ORAL at 08:57

## 2024-01-01 RX ADMIN — LEVOTHYROXINE SODIUM 125 MCG: 0.03 TABLET ORAL at 07:07

## 2024-01-01 ASSESSMENT — ACTIVITIES OF DAILY LIVING (ADL)
ADLS_ACUITY_SCORE: 24
ADLS_ACUITY_SCORE: 27
ADLS_ACUITY_SCORE: 37
ADLS_ACUITY_SCORE: 29
ADLS_ACUITY_SCORE: 44
ADLS_ACUITY_SCORE: 31
ADLS_ACUITY_SCORE: 37
ADLS_ACUITY_SCORE: 35
ADLS_ACUITY_SCORE: 27
ADLS_ACUITY_SCORE: 27
ADLS_ACUITY_SCORE: 37
ADLS_ACUITY_SCORE: 24
ADLS_ACUITY_SCORE: 27
ADLS_ACUITY_SCORE: 38
ADLS_ACUITY_SCORE: 27
ADLS_ACUITY_SCORE: 29
ADLS_ACUITY_SCORE: 40
ADLS_ACUITY_SCORE: 38
ADLS_ACUITY_SCORE: 37
ADLS_ACUITY_SCORE: 27
ADLS_ACUITY_SCORE: 27
ADLS_ACUITY_SCORE: 31
ADLS_ACUITY_SCORE: 32
ADLS_ACUITY_SCORE: 27
ADLS_ACUITY_SCORE: 46
ADLS_ACUITY_SCORE: 28
ADLS_ACUITY_SCORE: 24
ADLS_ACUITY_SCORE: 32
ADLS_ACUITY_SCORE: 27
ADLS_ACUITY_SCORE: 28
ADLS_ACUITY_SCORE: 28
ADLS_ACUITY_SCORE: 46
ADLS_ACUITY_SCORE: 29
ADLS_ACUITY_SCORE: 37
ADLS_ACUITY_SCORE: 38
ADLS_ACUITY_SCORE: 24
ADLS_ACUITY_SCORE: 29
ADLS_ACUITY_SCORE: 28
ADLS_ACUITY_SCORE: 28
ADLS_ACUITY_SCORE: 32
ADLS_ACUITY_SCORE: 24
ADLS_ACUITY_SCORE: 38
ADLS_ACUITY_SCORE: 46
ADLS_ACUITY_SCORE: 32
ADLS_ACUITY_SCORE: 37
ADLS_ACUITY_SCORE: 32
ADLS_ACUITY_SCORE: 40
ADLS_ACUITY_SCORE: 28
ADLS_ACUITY_SCORE: 44
ADLS_ACUITY_SCORE: 29
ADLS_ACUITY_SCORE: 38
ADLS_ACUITY_SCORE: 32
ADLS_ACUITY_SCORE: 32
ADLS_ACUITY_SCORE: 35
ADLS_ACUITY_SCORE: 27
ADLS_ACUITY_SCORE: 24
ADLS_ACUITY_SCORE: 31
ADLS_ACUITY_SCORE: 38
ADLS_ACUITY_SCORE: 27
ADLS_ACUITY_SCORE: 28
ADLS_ACUITY_SCORE: 27
ADLS_ACUITY_SCORE: 28
ADLS_ACUITY_SCORE: 27
ADLS_ACUITY_SCORE: 44
ADLS_ACUITY_SCORE: 31
ADLS_ACUITY_SCORE: 29
ADLS_ACUITY_SCORE: 31
ADLS_ACUITY_SCORE: 44
ADLS_ACUITY_SCORE: 28
ADLS_ACUITY_SCORE: 27
ADLS_ACUITY_SCORE: 38
ADLS_ACUITY_SCORE: 44
ADLS_ACUITY_SCORE: 38
ADLS_ACUITY_SCORE: 38
ADLS_ACUITY_SCORE: 40
ADLS_ACUITY_SCORE: 28
ADLS_ACUITY_SCORE: 37
ADLS_ACUITY_SCORE: 36
ADLS_ACUITY_SCORE: 37
ADLS_ACUITY_SCORE: 32
ADLS_ACUITY_SCORE: 38
ADLS_ACUITY_SCORE: 27
ADLS_ACUITY_SCORE: 29
ADLS_ACUITY_SCORE: 32
ADLS_ACUITY_SCORE: 31
ADLS_ACUITY_SCORE: 24
ADLS_ACUITY_SCORE: 24
ADLS_ACUITY_SCORE: 38
ADLS_ACUITY_SCORE: 35
ADLS_ACUITY_SCORE: 37
ADLS_ACUITY_SCORE: 27
ADLS_ACUITY_SCORE: 27
ADLS_ACUITY_SCORE: 37
ADLS_ACUITY_SCORE: 31
ADLS_ACUITY_SCORE: 24
ADLS_ACUITY_SCORE: 27
ADLS_ACUITY_SCORE: 32
ADLS_ACUITY_SCORE: 28
ADLS_ACUITY_SCORE: 40
ADLS_ACUITY_SCORE: 24
ADLS_ACUITY_SCORE: 27
ADLS_ACUITY_SCORE: 37
ADLS_ACUITY_SCORE: 28
ADLS_ACUITY_SCORE: 44
ADLS_ACUITY_SCORE: 32
ADLS_ACUITY_SCORE: 38
ADLS_ACUITY_SCORE: 32
ADLS_ACUITY_SCORE: 35
ADLS_ACUITY_SCORE: 27
ADLS_ACUITY_SCORE: 31
ADLS_ACUITY_SCORE: 28
ADLS_ACUITY_SCORE: 46
ADLS_ACUITY_SCORE: 29
ADLS_ACUITY_SCORE: 24
ADLS_ACUITY_SCORE: 27
ADLS_ACUITY_SCORE: 27
ADLS_ACUITY_SCORE: 45
ADLS_ACUITY_SCORE: 38
ADLS_ACUITY_SCORE: 24
ADLS_ACUITY_SCORE: 32
ADLS_ACUITY_SCORE: 27
ADLS_ACUITY_SCORE: 37
ADLS_ACUITY_SCORE: 28
ADLS_ACUITY_SCORE: 37
ADLS_ACUITY_SCORE: 31
ADLS_ACUITY_SCORE: 32
ADLS_ACUITY_SCORE: 27
ADLS_ACUITY_SCORE: 0
ADLS_ACUITY_SCORE: 35
ADLS_ACUITY_SCORE: 28
ADLS_ACUITY_SCORE: 38
ADLS_ACUITY_SCORE: 24
ADLS_ACUITY_SCORE: 29
ADLS_ACUITY_SCORE: 28
ADLS_ACUITY_SCORE: 32
ADLS_ACUITY_SCORE: 40
ADLS_ACUITY_SCORE: 37
ADLS_ACUITY_SCORE: 40
ADLS_ACUITY_SCORE: 27
ADLS_ACUITY_SCORE: 28
ADLS_ACUITY_SCORE: 44
ADLS_ACUITY_SCORE: 35
ADLS_ACUITY_SCORE: 38
ADLS_ACUITY_SCORE: 24
ADLS_ACUITY_SCORE: 32
ADLS_ACUITY_SCORE: 37
ADLS_ACUITY_SCORE: 31
ADLS_ACUITY_SCORE: 27
ADLS_ACUITY_SCORE: 27
ADLS_ACUITY_SCORE: 24
ADLS_ACUITY_SCORE: 38
ADLS_ACUITY_SCORE: 28
ADLS_ACUITY_SCORE: 37
ADLS_ACUITY_SCORE: 24
ADLS_ACUITY_SCORE: 31
ADLS_ACUITY_SCORE: 40
ADLS_ACUITY_SCORE: 38
ADLS_ACUITY_SCORE: 29
ADLS_ACUITY_SCORE: 32
ADLS_ACUITY_SCORE: 28
ADLS_ACUITY_SCORE: 38
ADLS_ACUITY_SCORE: 37
ADLS_ACUITY_SCORE: 32
ADLS_ACUITY_SCORE: 31
ADLS_ACUITY_SCORE: 32
ADLS_ACUITY_SCORE: 32
ADLS_ACUITY_SCORE: 27
ADLS_ACUITY_SCORE: 30
ADLS_ACUITY_SCORE: 38
ADLS_ACUITY_SCORE: 37
ADLS_ACUITY_SCORE: 46
ADLS_ACUITY_SCORE: 28
ADLS_ACUITY_SCORE: 29
ADLS_ACUITY_SCORE: 31
ADLS_ACUITY_SCORE: 27
ADLS_ACUITY_SCORE: 28
ADLS_ACUITY_SCORE: 27
ADLS_ACUITY_SCORE: 24
ADLS_ACUITY_SCORE: 24
ADLS_ACUITY_SCORE: 35
ADLS_ACUITY_SCORE: 27
ADLS_ACUITY_SCORE: 29
ADLS_ACUITY_SCORE: 38
ADLS_ACUITY_SCORE: 28
ADLS_ACUITY_SCORE: 44
ADLS_ACUITY_SCORE: 24
ADLS_ACUITY_SCORE: 24
ADLS_ACUITY_SCORE: 45
ADLS_ACUITY_SCORE: 37
ADLS_ACUITY_SCORE: 27
ADLS_ACUITY_SCORE: 44
ADLS_ACUITY_SCORE: 28
ADLS_ACUITY_SCORE: 37
ADLS_ACUITY_SCORE: 40
ADLS_ACUITY_SCORE: 35
ADLS_ACUITY_SCORE: 46
ADLS_ACUITY_SCORE: 31
ADLS_ACUITY_SCORE: 32
ADLS_ACUITY_SCORE: 37
ADLS_ACUITY_SCORE: 31
ADLS_ACUITY_SCORE: 31
ADLS_ACUITY_SCORE: 25
ADLS_ACUITY_SCORE: 28
ADLS_ACUITY_SCORE: 27
ADLS_ACUITY_SCORE: 24
ADLS_ACUITY_SCORE: 27
ADLS_ACUITY_SCORE: 41
ADLS_ACUITY_SCORE: 28
ADLS_ACUITY_SCORE: 38
ADLS_ACUITY_SCORE: 38
ADLS_ACUITY_SCORE: 29
ADLS_ACUITY_SCORE: 31
ADLS_ACUITY_SCORE: 27
ADLS_ACUITY_SCORE: 38
ADLS_ACUITY_SCORE: 24
ADLS_ACUITY_SCORE: 31
ADLS_ACUITY_SCORE: 27
ADLS_ACUITY_SCORE: 37
ADLS_ACUITY_SCORE: 28
ADLS_ACUITY_SCORE: 24
ADLS_ACUITY_SCORE: 44
DEPENDENT_IADLS:: CLEANING;COOKING;LAUNDRY;SHOPPING;TRANSPORTATION;MEAL PREPARATION
ADLS_ACUITY_SCORE: 38
ADLS_ACUITY_SCORE: 24
ADLS_ACUITY_SCORE: 38
ADLS_ACUITY_SCORE: 46
ADLS_ACUITY_SCORE: 38
ADLS_ACUITY_SCORE: 46
ADLS_ACUITY_SCORE: 44
ADLS_ACUITY_SCORE: 24
ADLS_ACUITY_SCORE: 37
ADLS_ACUITY_SCORE: 35
ADLS_ACUITY_SCORE: 37
ADLS_ACUITY_SCORE: 32
ADLS_ACUITY_SCORE: 41
ADLS_ACUITY_SCORE: 41
ADLS_ACUITY_SCORE: 37
ADLS_ACUITY_SCORE: 24
ADLS_ACUITY_SCORE: 37
ADLS_ACUITY_SCORE: 37
ADLS_ACUITY_SCORE: 31
DEPENDENT_IADLS:: CLEANING;COOKING;TRANSPORTATION
ADLS_ACUITY_SCORE: 35
ADLS_ACUITY_SCORE: 27
ADLS_ACUITY_SCORE: 29
ADLS_ACUITY_SCORE: 24
ADLS_ACUITY_SCORE: 24
ADLS_ACUITY_SCORE: 37
ADLS_ACUITY_SCORE: 27
ADLS_ACUITY_SCORE: 27
ADLS_ACUITY_SCORE: 38
ADLS_ACUITY_SCORE: 32
ADLS_ACUITY_SCORE: 37
ADLS_ACUITY_SCORE: 29
ADLS_ACUITY_SCORE: 32
ADLS_ACUITY_SCORE: 31
ADLS_ACUITY_SCORE: 32
ADLS_ACUITY_SCORE: 29
ADLS_ACUITY_SCORE: 31
ADLS_ACUITY_SCORE: 31
ADLS_ACUITY_SCORE: 24
ADLS_ACUITY_SCORE: 40
ADLS_ACUITY_SCORE: 37
ADLS_ACUITY_SCORE: 38
ADLS_ACUITY_SCORE: 24
ADLS_ACUITY_SCORE: 28
ADLS_ACUITY_SCORE: 32
ADLS_ACUITY_SCORE: 27
ADLS_ACUITY_SCORE: 44
ADLS_ACUITY_SCORE: 31
ADLS_ACUITY_SCORE: 32
ADLS_ACUITY_SCORE: 37
ADLS_ACUITY_SCORE: 31
ADLS_ACUITY_SCORE: 27
ADLS_ACUITY_SCORE: 38
ADLS_ACUITY_SCORE: 28
ADLS_ACUITY_SCORE: 46
ADLS_ACUITY_SCORE: 31
ADLS_ACUITY_SCORE: 37
ADLS_ACUITY_SCORE: 28
ADLS_ACUITY_SCORE: 27
ADLS_ACUITY_SCORE: 31
ADLS_ACUITY_SCORE: 32
ADLS_ACUITY_SCORE: 29
ADLS_ACUITY_SCORE: 46
ADLS_ACUITY_SCORE: 44
ADLS_ACUITY_SCORE: 32
ADLS_ACUITY_SCORE: 31
ADLS_ACUITY_SCORE: 27
ADLS_ACUITY_SCORE: 44
ADLS_ACUITY_SCORE: 28
ADLS_ACUITY_SCORE: 31
ADLS_ACUITY_SCORE: 24
ADLS_ACUITY_SCORE: 27
ADLS_ACUITY_SCORE: 31
ADLS_ACUITY_SCORE: 27
ADLS_ACUITY_SCORE: 41
ADLS_ACUITY_SCORE: 35
ADLS_ACUITY_SCORE: 35
ADLS_ACUITY_SCORE: 37
DEPENDENT_IADLS:: CLEANING;COOKING;TRANSPORTATION
ADLS_ACUITY_SCORE: 32
ADLS_ACUITY_SCORE: 32
ADLS_ACUITY_SCORE: 45
ADLS_ACUITY_SCORE: 27
ADLS_ACUITY_SCORE: 38
ADLS_ACUITY_SCORE: 46
ADLS_ACUITY_SCORE: 27
ADLS_ACUITY_SCORE: 32
ADLS_ACUITY_SCORE: 27
ADLS_ACUITY_SCORE: 24
ADLS_ACUITY_SCORE: 37
ADLS_ACUITY_SCORE: 32
ADLS_ACUITY_SCORE: 46
ADLS_ACUITY_SCORE: 27
ADLS_ACUITY_SCORE: 46
ADLS_ACUITY_SCORE: 27
ADLS_ACUITY_SCORE: 38
ADLS_ACUITY_SCORE: 32
ADLS_ACUITY_SCORE: 29
ADLS_ACUITY_SCORE: 31
ADLS_ACUITY_SCORE: 28
ADLS_ACUITY_SCORE: 38
ADLS_ACUITY_SCORE: 27
ADLS_ACUITY_SCORE: 32
ADLS_ACUITY_SCORE: 38
ADLS_ACUITY_SCORE: 25
ADLS_ACUITY_SCORE: 28
ADLS_ACUITY_SCORE: 38
ADLS_ACUITY_SCORE: 37
ADLS_ACUITY_SCORE: 29
ADLS_ACUITY_SCORE: 46
ADLS_ACUITY_SCORE: 25
ADLS_ACUITY_SCORE: 27
ADLS_ACUITY_SCORE: 29
ADLS_ACUITY_SCORE: 29
ADLS_ACUITY_SCORE: 32
ADLS_ACUITY_SCORE: 38
ADLS_ACUITY_SCORE: 29
ADLS_ACUITY_SCORE: 32
ADLS_ACUITY_SCORE: 38
ADLS_ACUITY_SCORE: 28
ADLS_ACUITY_SCORE: 37
ADLS_ACUITY_SCORE: 24
ADLS_ACUITY_SCORE: 27
ADLS_ACUITY_SCORE: 32
ADLS_ACUITY_SCORE: 41
ADLS_ACUITY_SCORE: 27
ADLS_ACUITY_SCORE: 27
ADLS_ACUITY_SCORE: 37
ADLS_ACUITY_SCORE: 38
ADLS_ACUITY_SCORE: 38
ADLS_ACUITY_SCORE: 40
ADLS_ACUITY_SCORE: 32
ADLS_ACUITY_SCORE: 32
ADLS_ACUITY_SCORE: 38
ADLS_ACUITY_SCORE: 24
ADLS_ACUITY_SCORE: 27
ADLS_ACUITY_SCORE: 37
ADLS_ACUITY_SCORE: 41
ADLS_ACUITY_SCORE: 24
ADLS_ACUITY_SCORE: 24
ADLS_ACUITY_SCORE: 37
ADLS_ACUITY_SCORE: 27
ADLS_ACUITY_SCORE: 27
ADLS_ACUITY_SCORE: 35
ADLS_ACUITY_SCORE: 38
ADLS_ACUITY_SCORE: 36
ADLS_ACUITY_SCORE: 29
ADLS_ACUITY_SCORE: 46
ADLS_ACUITY_SCORE: 31
ADLS_ACUITY_SCORE: 31
ADLS_ACUITY_SCORE: 27
ADLS_ACUITY_SCORE: 27
ADLS_ACUITY_SCORE: 31
ADLS_ACUITY_SCORE: 40
ADLS_ACUITY_SCORE: 38

## 2024-01-01 ASSESSMENT — COLUMBIA-SUICIDE SEVERITY RATING SCALE - C-SSRS
2. HAVE YOU ACTUALLY HAD ANY THOUGHTS OF KILLING YOURSELF IN THE PAST MONTH?: NO
1. IN THE PAST MONTH, HAVE YOU WISHED YOU WERE DEAD OR WISHED YOU COULD GO TO SLEEP AND NOT WAKE UP?: NO
6. HAVE YOU EVER DONE ANYTHING, STARTED TO DO ANYTHING, OR PREPARED TO DO ANYTHING TO END YOUR LIFE?: NO
2. HAVE YOU ACTUALLY HAD ANY THOUGHTS OF KILLING YOURSELF IN THE PAST MONTH?: NO
6. HAVE YOU EVER DONE ANYTHING, STARTED TO DO ANYTHING, OR PREPARED TO DO ANYTHING TO END YOUR LIFE?: NO
1. IN THE PAST MONTH, HAVE YOU WISHED YOU WERE DEAD OR WISHED YOU COULD GO TO SLEEP AND NOT WAKE UP?: NO

## 2024-01-01 ASSESSMENT — ENCOUNTER SYMPTOMS
DIAPHORESIS: 0
BLOOD IN STOOL: 0
FATIGUE: 1
SHORTNESS OF BREATH: 1
HEADACHES: 0
SINUS PRESSURE: 0
DYSRHYTHMIAS: 1
NAUSEA: 0
FEVER: 0
FREQUENCY: 0
CHILLS: 0
SORE THROAT: 0
WHEEZING: 0
DYSURIA: 0
VOMITING: 0
PALPITATIONS: 0
CONSTIPATION: 0
COUGH: 0
ABDOMINAL PAIN: 0
DIARRHEA: 0

## 2024-01-15 NOTE — ED TRIAGE NOTES
Recent Admission for pneumonia after adolfo. Now continues to feel generalized weak.     Since Thursday has been feeling dizzy like she will pass out. Denies vertigo symptoms in triage. Difficulty standing with this weakness. There is nausea without emesis per the patient. Admits to 2 fall. Did strike her head in the right front. Denies thinners.      When getting up the right footurts and needs significant assistance to get up from the wheelchair.

## 2024-01-16 NOTE — ED NOTES
Pt reports pain to right foot and ankle. Swelling noted to the top of her right foot. Pt denied pain on inside or outside of ankle on palpitation. Denied N/T. Reports difficulty with weight bearing.

## 2024-01-16 NOTE — ED NOTES
"[facility]   Admission Handoff    The patient is Shaila Triana, 78 year old who arrived in the ED by CAR from home with a complaint of Nausea, Dizziness (/), and Generalized Weakness  . The patient's current symptoms are new and during this time the symptoms have decreased. In the ED, patient was diagnosed with   Final diagnoses:   Anemia, unspecified type   Weakness   Chronic atrial fibrillation (H)   Stage 3 chronic kidney disease, unspecified whether stage 3a or 3b CKD (H)   Essential hypertension   Closed nondisplaced fracture of second metatarsal bone of right foot, initial encounter   Closed nondisplaced fracture of third metatarsal bone of right foot, initial encounter         Needed?: No    Allergies:    Allergies   Allergen Reactions    Codeine Nausea    Dust Mite Extract Other (See Comments)     Per allergy test    Erythromycin Dizziness     Almost passed out    Penicillin [Penicillins] Itching       Past Medical Hx:   Past Medical History:   Diagnosis Date    Depressive disorder 12/4/2006    Essential hypertension 12/4/2006    Hypothyroidism (acquired) 12/4/2006    Invasive ductal carcinoma of breast (H) 8/6/2013    Left.  ER(+) CT(+) her-2-hina (-).  Lumpectomy and sentinel biopsy followed by radiation.  Adjuvant therapy with tamoxifen completed.    Lung nodules 4/19/2016    LLL on abd/pelvis CT 4/12/16.  No change on chest CT May 2017.  No further imaging needed.    Malignant neoplasm of female breast (H) 3/18/2008    Epic    New onset atrial fibrillation (H) 7/31/2020    Pure hypercholesterolemia 12/4/2006    Sleep apnea 12/4/2006       Initial vitals were: BP: 90/59  Pulse: 110  Temp: 97.8  F (36.6  C)  Resp: 16  Height: 157.5 cm (5' 2\")  Weight: 61.7 kg (136 lb)  SpO2: 97 %   Recent vital Signs: /68   Pulse 97   Temp 99  F (37.2  C) (Oral)   Resp 18   Ht 1.575 m (5' 2\")   Wt 61.7 kg (136 lb)   LMP  (LMP Unknown)   SpO2 94%   BMI 24.87 kg/m  "     Elimination Status: Continent: Yes     Activity Level: SBA    Fall Status: Reason for falls risk:  Mobility  nonskid shoes/slippers when out of bed, arm band in place, patient and family education, and assistive device/personal items within reach    Baseline Mental status: WDL  Current Mental Status changes: at basesline    Infection present or suspected this encounter: no  Sepsis suspected: No    Isolation type: NA    Bariatric equipment needed?: No    In the ED these meds were given:   Medications   acetaminophen (TYLENOL) tablet 500 mg (500 mg Oral $Given 1/15/24 7698)   aspirin EC tablet 81 mg (has no administration in time range)   furosemide (LASIX) tablet 40 mg (has no administration in time range)   levothyroxine (SYNTHROID/LEVOTHROID) tablet 112 mcg (has no administration in time range)   metoprolol succinate ER (TOPROL XL) 24 hr tablet 150 mg (has no administration in time range)   rosuvastatin (CRESTOR) tablet 10 mg (has no administration in time range)   sertraline (ZOLOFT) tablet 200 mg (has no administration in time range)       Drips running?  No    Home pump  No    Current LDAs: Peripheral IV: Site Left AC; Gauge 18  none     Results:   Labs/Imaging  Ordered and Resulted from Time of ED Arrival Up to the Time of Departure from the ED  Results for orders placed or performed during the hospital encounter of 01/15/24 (from the past 24 hour(s))   CBC with platelets differential    Narrative    The following orders were created for panel order CBC with platelets differential.  Procedure                               Abnormality         Status                     ---------                               -----------         ------                     CBC with platelets and d...[[id number]]  Abnormal            Final result                 Please view results for these tests on the individual orders.   Basic metabolic panel   Result Value Ref Range    Sodium 135 135 - 145 mmol/L    Potassium 3.7 3.4 - 5.3  mmol/L    Chloride 99 98 - 107 mmol/L    Carbon Dioxide (CO2) 25 22 - 29 mmol/L    Anion Gap 11 7 - 15 mmol/L    Urea Nitrogen 50.8 (H) 8.0 - 23.0 mg/dL    Creatinine 2.22 (H) 0.51 - 0.95 mg/dL    GFR Estimate 22 (L) >60 mL/min/1.73m2    Calcium 10.8 (H) 8.8 - 10.2 mg/dL    Glucose 118 (H) 70 - 99 mg/dL   NT pro BNP   Result Value Ref Range    N terminal Pro BNP Inpatient 4,476 (H) 0 - 1,800 pg/mL   CBC with platelets and differential   Result Value Ref Range    WBC Count 8.2 4.0 - 11.0 10e3/uL    RBC Count 2.60 (L) 3.80 - 5.20 10e6/uL    Hemoglobin 7.6 (L) 11.7 - 15.7 g/dL    Hematocrit 23.3 (L) 35.0 - 47.0 %    MCV 90 78 - 100 fL    MCH 29.2 26.5 - 33.0 pg    MCHC 32.6 31.5 - 36.5 g/dL    RDW 14.9 10.0 - 15.0 %    Platelet Count 164 150 - 450 10e3/uL    % Neutrophils 76 %    % Lymphocytes 10 %    % Monocytes 10 %    % Eosinophils 2 %    % Basophils 1 %    % Immature Granulocytes 1 %    NRBCs per 100 WBC 0 <1 /100    Absolute Neutrophils 6.2 1.6 - 8.3 10e3/uL    Absolute Lymphocytes 0.8 0.8 - 5.3 10e3/uL    Absolute Monocytes 0.8 0.0 - 1.3 10e3/uL    Absolute Eosinophils 0.2 0.0 - 0.7 10e3/uL    Absolute Basophils 0.1 0.0 - 0.2 10e3/uL    Absolute Immature Granulocytes 0.1 <=0.4 10e3/uL    Absolute NRBCs 0.0 10e3/uL   Symptomatic Influenza A/B, RSV, & SARS-CoV2 PCR (COVID-19) Nasopharyngeal    Specimen: Nasopharyngeal; Swab   Result Value Ref Range    Influenza A PCR Negative Negative    Influenza B PCR Negative Negative    RSV PCR Negative Negative    SARS CoV2 PCR Negative Negative    Narrative    Testing was performed using the Xpert Xpress CoV2/Flu/RSV Assay on the Cepheid GeneXpert Instrument. This test should be ordered for the detection of SARS-CoV-2, influenza, and RSV viruses in individuals who meet clinical and/or epidemiological criteria. Test performance is unknown in asymptomatic patients. This test is for in vitro diagnostic use under the FDA EUA for laboratories certified under CLIA to perform high  or moderate complexity testing. This test has not been FDA cleared or approved. A negative result does not rule out the presence of PCR inhibitors in the specimen or target RNA in concentration below the limit of detection for the assay. If only one viral target is positive but coinfection with multiple targets is suspected, the sample should be re-tested with another FDA cleared, approved, or authorized test, if coinfection would change clinical management. This test was validated by the [facility]. These laboratories are certified under the Clinical Laboratory Improvement Amendments of 1988 (CLIA-88) as qualified to perform high complexity laboratory testing.   Chest CT w/o contrast    Narrative    EXAM: CT CHEST W/O CONTRAST  LOCATION: [facility]  DATE: 1/15/2024    INDICATION: fall.  weakness.  cough  COMPARISON: 08/28/2023  TECHNIQUE: CT chest without IV contrast. Multiplanar reformats were obtained. Dose reduction techniques were used.  CONTRAST: None.    FINDINGS:   LUNGS AND PLEURA: An 8 mm x 8 mm right middle lobe nodule has not changed in size (series 6 image 145). A 3 mm left lower lobe nodule along the pleura has not increased in size (series 6 image 226). An adjacent flat 6 mm x 3 mm left lower lobe nodule has   not changed (image 225).    MEDIASTINUM/AXILLAE: Enlarged left thyroid lobe. Enlarged heart. Low-attenuation in the cardiac ventricles suggests anemia. A left atrial appendage occlusion device is present. The pulmonary arteries are mildly enlarged. The ascending aorta is enlarged   at 4.3 x 4.1 cm. There is a large hiatal hernia. Prominent right axillary lymph nodes are again seen though have decreased in size from the comparison study. For example, a 9 mm right axillary lymph node was previously 13 mm.    CORONARY ARTERY CALCIFICATION: Mild.    UPPER ABDOMEN: Hypoattenuating hepatic lesion has not changed. A hypoattenuating left renal lesion is  incompletely assessed. These could be better assessed with ultrasound.    MUSCULOSKELETAL: There are degenerative changes in the spine. No fractures.      Impression    IMPRESSION:   1.  No acute fractures.  2.  Anemia.   3.  Additional stable findings are above.   Head CT w/o contrast    Narrative    EXAM: CT HEAD W/O CONTRAST  LOCATION: [facility]  DATE: 1/15/2024    INDICATION: Fall. Traumatic injury. Dizziness.  COMPARISON: None.  TECHNIQUE: Routine CT Head without IV contrast. Multiplanar reformats. Dose reduction techniques were used.    FINDINGS:  INTRACRANIAL CONTENTS: No intracranial hemorrhage, extraaxial collection, or mass effect.  No CT evidence of acute infarct. Mild presumed chronic small vessel ischemic changes. Mild generalized volume loss. No hydrocephalus.     VISUALIZED ORBITS/SINUSES/MASTOIDS: Prior bilateral cataract surgery. Visualized portions of the orbits are otherwise unremarkable. No paranasal sinus mucosal disease. No middle ear or mastoid effusion.    BONES/SOFT TISSUES: No acute abnormality.      Impression    IMPRESSION:  1.  No CT evidence for acute intracranial process.  2.  Brain atrophy and presumed chronic microvascular ischemic changes as above.   UA Macroscopic with reflex to Microscopic and Culture    Specimen: Urine, Catheter   Result Value Ref Range    Color Urine Yellow Colorless, Straw, Light Yellow, Yellow    Appearance Urine Slightly Cloudy (A) Clear    Glucose Urine Negative Negative mg/dL    Bilirubin Urine Negative Negative    Ketones Urine Negative Negative mg/dL    Specific Gravity Urine 1.009 1.003 - 1.035    Blood Urine Moderate (A) Negative    pH Urine 5.0 5.0 - 7.0    Protein Albumin Urine Negative Negative mg/dL    Urobilinogen Urine Normal Normal, 2.0 mg/dL    Nitrite Urine Negative Negative    Leukocyte Esterase Urine Negative Negative    Bacteria Urine Few (A) None Seen /HPF    Mucus Urine Present (A) None Seen /LPF    RBC Urine 9  (H) <=2 /HPF    WBC Urine 3 <=5 /HPF    Squamous Epithelials Urine 15 (H) <=1 /HPF    Hyaline Casts Urine 3 (H) <=2 /LPF    Narrative    Urine Culture not indicated   Prepare red blood cells (unit)   Result Value Ref Range    Blood Component Type Red Blood Cells     Product Code [id number]     Unit Status Issued     Unit Number [id number]     CROSSMATCH Compatible     CODING SYSTEM [id number]     ISSUE DATE AND TIME [id number]     UNIT ABO/RH A+     UNIT TYPE ISBT 6200    ABO/Rh type and screen    Narrative    The following orders were created for panel order ABO/Rh type and screen.  Procedure                               Abnormality         Status                     ---------                               -----------         ------                     Adult Type and Screen[[id number]]                            Final result                 Please view results for these tests on the individual orders.   Adult Type and Screen   Result Value Ref Range    ABO/RH(D) A POS     Antibody Screen Negative Negative    SPECIMEN EXPIRATION DATE [id number]    XR Foot Right G/E 3 Views    Narrative    EXAM: XR ANKLE RIGHT G/E 3 VIEWS, XR FOOT RIGHT G/E 3 VIEWS  LOCATION: [facility]  DATE: 1/16/2024    INDICATION: Trauma  COMPARISON: None.      Impression    IMPRESSION: Subtle linear lucencies are seen at the base of the second and third metatarsal bones, suspicious for nondisplaced fractures. Slight widening of the first metatarsal/second metatarsal space may indicate underlying Lisfranc injury. Ankle   mortise is congruent. Mild degenerative narrowing of the first metatarsophalangeal joint space noted. Other joint spaces are otherwise intact and preserved. A small ventral calcaneal spur is present. Moderate soft tissue swelling is seen in the mid to   distal foot.   XR Ankle Right G/E 3 Views    Narrative    EXAM: XR ANKLE RIGHT G/E 3 VIEWS, XR FOOT RIGHT G/E 3 VIEWS  LOCATION: [facility]  DATE: 1/16/2024    INDICATION: Trauma  COMPARISON: None.      Impression    IMPRESSION: Subtle linear lucencies are seen at the base of the second and third metatarsal bones, suspicious for nondisplaced fractures. Slight widening of the first metatarsal/second metatarsal space may indicate underlying Lisfranc injury. Ankle   mortise is congruent. Mild degenerative narrowing of the first metatarsophalangeal joint space noted. Other joint spaces are otherwise intact and preserved. A small ventral calcaneal spur is present. Moderate soft tissue swelling is seen in the mid to   distal foot.   Splint Application    Narrative    David Carney MD     1/16/2024  2:32 AM  [facility]    Splint Application    Date/Time: 1/16/2024 2:16 AM    Performed by: David Carney MD  Authorized by: David Carney MD    Risks, benefits and alternatives discussed.      PRE-PROCEDURE DETAILS     Sensation:  Normal    PROCEDURE DETAILS     Laterality:  Right    Location:  Ankle    Ankle:  R ankle    Strapping: no      Splint type:  Short leg    Supplies:  [employer]    POST PROCEDURE DETAILS     Pain:  Unchanged    Sensation:  Unchanged      PROCEDURE    Patient Tolerance:  Patient tolerated the procedure well with no immediate   complications       For the majority of the shift this patient's behavior was Green     Cardiac Rhythm: N/A  Pt needs tele? No  Skin/wound Issues:  None noted in limited ER focused assessments.     Code Status: Full Code    Pain control: good    Nausea control: good    Abnormal labs/tests/findings requiring intervention: See results. RBC transfused. Right foot splinted.     Patient tested for COVID 19 prior to admission: YES     OBS brochure/video discussed/provided to patient/family: Yes     Family present during ED course? No     Family Comments/Social Situation comments: NA.     Tasks needing completion: None    Julissa Snyder RN

## 2024-01-16 NOTE — ED NOTES
Pt called to use the commode, up with a SBA. Pt stated the bed is uncomfortable but declined any intervention. Vital signs obtained and will now spot check BPs but continue to monitor via pulse oximetry.

## 2024-01-16 NOTE — ED PROVIDER NOTES
ED Provider Note  [facility]      History     Chief Complaint   Patient presents with    Nausea    Dizziness           Generalized Weakness     HPI  Shaila Triana is a 78 year old female who presents to the emergency department with concerns regarding generalized weakness, in addition to nausea without any vomiting, and dizziness.  Symptoms present for the past many weeks.  Patient was hospitalized around [place] with ongoing symptoms, and similar type symptoms which have persisted.  I reviewed discharge summary from December 28.  Patient was hospitalized 3 nights for RSV bronchiolitis, with concerns for superimposed pneumonia, however this was ruled out.  Patient had respiratory failure, on top of her noncompliant CPAP use for sleep apnea.  Patient does have atrial fibrillation and is status post Watchman device.    Patient today is accompanied by significant other.  Reports increased amounts of ongoing generalized weakness, and fatigue, worsen, and persistently present over the past number of weeks.  No specific fever.  Has had somewhat frequent falls, and does have contusion to the head, in addition to the left arm.  Normal specific extremity type pain.  Denies any severe headache.  No chest pain.  Has persistent cough which has been ongoing        Independent Historian:        Review of External Notes:  I reviewed discharge summary as above      Allergies:  Allergies   Allergen Reactions    Codeine Nausea    Dust Mite Extract Other (See Comments)     Per allergy test    Erythromycin Dizziness     Almost passed out    Penicillin [Penicillins] Itching       Problem List:    Patient Active Problem List    Diagnosis Date Noted    Acute respiratory failure with hypoxia and hypercarbia (H) 12/25/2023     Priority: Medium    Community acquired pneumonia, unspecified laterality 12/25/2023     Priority: Medium    Anemia 10/05/2020     Priority: Medium    Dizziness 10/05/2020     Priority:  Medium    Anemia, unspecified type 10/05/2020     Priority: Medium    Chest pain, unspecified type 10/05/2020     Priority: Medium    CKD (chronic kidney disease) stage 3, GFR 30-59 ml/min (H) 10/05/2020     Priority: Medium    Elevated lipase 10/05/2020     Priority: Medium    Pancreatic abnormality on CT 10/05/2020     Priority: Medium     Incidental finding on CT 10/5/2020 -  New 8 mm lucency of the pancreas. Likely benign but needs follow-up.      Chronic anticoagulation 10/05/2020     Priority: Medium     Due to atrial fibrillation       Ascending aorta dilatation (H24) 10/05/2020     Priority: Medium     Noted on echo August 2020, stable on CT 10/5/2020.      Chronic atrial fibrillation (H) 07/31/2020     Priority: Medium    Lung nodules 04/19/2016     Priority: Medium     LLL on abd/pelvis CT 4/12/16.  No change on chest CT May 2017.  No further imaging needed.      Invasive ductal carcinoma of breast (H) 08/06/2013     Priority: Medium     Left.  ER(+) [place](+) her-2-hina (-).  Lumpectomy and sentinel biopsy followed by radiation.  Adjuvant therapy with tamoxifen completed.      Malignant neoplasm of female breast (H) 03/18/2008     Priority: Medium     Epic      Depressive disorder 12/04/2006     Priority: Medium    Essential hypertension 12/04/2006     Priority: Medium    Hypothyroidism (acquired) 12/04/2006     Priority: Medium    Pure hypercholesterolemia 12/04/2006     Priority: Medium    Sleep apnea 12/04/2006     Priority: Medium        Past Medical History:    Past Medical History:   Diagnosis Date    Depressive disorder 12/4/2006    Essential hypertension 12/4/2006    Hypothyroidism (acquired) 12/4/2006    Invasive ductal carcinoma of breast (H) 8/6/2013    Lung nodules 4/19/2016    Malignant neoplasm of female breast (H) 3/18/2008    New onset atrial fibrillation (H) 7/31/2020    Pure hypercholesterolemia 12/4/2006    Sleep apnea 12/4/2006       Past Surgical History:    Past Surgical History:    Procedure Laterality Date    APPENDECTOMY          BLEPHAROPLASTY Bilateral 2010    COLONOSCOPY  2005    ESOPHAGOSCOPY, GASTROSCOPY, DUODENOSCOPY (EGD), COMBINED N/A 10/6/2020    Procedure: ESOPHAGOGASTRODUODENOSCOPY, WITH BIOPSY and polypectomy;  Surgeon: Jorge Ramirez MD;  Location: [facility]    HERNIA REPAIR  2013    HYSTERECTOMY, JOSE  1975    LUMPECTOMY BREAST Left 2008    THYROIDECTOMY  Right     Partial    TUBAL LIGATION         Family History:    Family History   Problem Relation Age of Onset    No Known Problems Mother          age 89 after complications from a fall    Alzheimer Disease Father     Other - See Comments Sister         Polio    Colon Cancer Maternal Aunt     Breast Cancer No family hx of     Ovarian Cancer No family hx of     Prostate Cancer No family hx of        Social History:  Marital Status:   [4]  Social History     Tobacco Use    Smoking status: Never    Smokeless tobacco: Never   Substance Use Topics    Alcohol use: Yes     Comment: occ    Drug use: Never        Medications:    acetaminophen (TYLENOL) 500 MG tablet  albuterol (PROAIR HFA/PROVENTIL HFA/VENTOLIN HFA) 108 (90 Base) MCG/ACT inhaler  aspirin (ASA) 81 MG EC tablet  benzonatate (TESSALON) 100 MG capsule  calcium carbonate (OS-ANIKA) 1500 (600 Ca) MG tablet  DILT- MG 24 hr capsule  famotidine (PEPCID) 20 MG tablet  fluticasone (VERAMYST) 27.5 MCG/SPRAY nasal spray  furosemide (LASIX) 40 MG tablet  levothyroxine (SYNTHROID/LEVOTHROID) 112 MCG tablet  metoprolol succinate ER (TOPROL-XL) 100 MG 24 hr tablet  OVER-THE-COUNTER  potassium chloride ER (MICRO-K) 10 MEQ CR capsule  rosuvastatin (CRESTOR) 10 MG tablet  sertraline (ZOLOFT) 100 MG tablet          Review of Systems  A medically appropriate review of systems was performed with pertinent positives and negatives noted in the HPI, and all other systems negative.    Physical Exam   Patient Vitals for the past 24 hrs:   BP Temp Pulse SpO2 Height Weight  "  01/15/24 2025 132/85 -- -- -- -- --   01/15/24 1742 -- -- -- -- 1.575 m (5' 2\") 61.7 kg (136 lb)   01/15/24 1737 -- -- -- 97 % -- --   01/15/24 1733 90/59 97.8  F (36.6  C) 110 -- -- --          Physical Exam  General: alert and in no acute distress on arrival  Head: atraumatic, normocephalic  Lungs:  nonlabored  CV:  extremities warm and perfused.  Irregularly irregular.  Normal rate  Abd: nondistended  Skin: no rashes, no diaphoresis and skin color normal  Neuro: Patient awake, alert, speech is fluent, normal bilateral upper and strongly strength and sensation.  Attempted to ambulate patient at 9:35 PM.  Patient was able to walk outside of the room, however then legs stopped moving for the patient, and she began falling forward.  Sat in a wheelchair, and brought back to room  Psychiatric: affect/mood normal,        ED Course                 Procedures         EKG, reviewed by myself shows atrial fibrillation.  Rate 85 bpm.  Nonspecific [id number] changes.  No acute ischemic appearing changes.                 Results for orders placed or performed during the hospital encounter of 01/15/24 (from the past 24 hour(s))   CBC with platelets differential    Narrative    The following orders were created for panel order CBC with platelets differential.  Procedure                               Abnormality         Status                     ---------                               -----------         ------                     CBC with platelets and d...[[id number]]  Abnormal            Final result                 Please view results for these tests on the individual orders.   Basic metabolic panel   Result Value Ref Range    Sodium 135 135 - 145 mmol/L    Potassium 3.7 3.4 - 5.3 mmol/L    Chloride 99 98 - 107 mmol/L    Carbon Dioxide (CO2) 25 22 - 29 mmol/L    Anion Gap 11 7 - 15 mmol/L    Urea Nitrogen 50.8 (H) 8.0 - 23.0 mg/dL    Creatinine 2.22 (H) 0.51 - 0.95 mg/dL    GFR Estimate 22 (L) >60 mL/min/1.73m2    Calcium " 10.8 (H) 8.8 - 10.2 mg/dL    Glucose 118 (H) 70 - 99 mg/dL   NT pro BNP   Result Value Ref Range    N terminal Pro BNP Inpatient 4,476 (H) 0 - 1,800 pg/mL   CBC with platelets and differential   Result Value Ref Range    WBC Count 8.2 4.0 - 11.0 10e3/uL    RBC Count 2.60 (L) 3.80 - 5.20 10e6/uL    Hemoglobin 7.6 (L) 11.7 - 15.7 g/dL    Hematocrit 23.3 (L) 35.0 - 47.0 %    MCV 90 78 - 100 fL    MCH 29.2 26.5 - 33.0 pg    MCHC 32.6 31.5 - 36.5 g/dL    RDW 14.9 10.0 - 15.0 %    Platelet Count 164 150 - 450 10e3/uL    % Neutrophils 76 %    % Lymphocytes 10 %    % Monocytes 10 %    % Eosinophils 2 %    % Basophils 1 %    % Immature Granulocytes 1 %    NRBCs per 100 WBC 0 <1 /100    Absolute Neutrophils 6.2 1.6 - 8.3 10e3/uL    Absolute Lymphocytes 0.8 0.8 - 5.3 10e3/uL    Absolute Monocytes 0.8 0.0 - 1.3 10e3/uL    Absolute Eosinophils 0.2 0.0 - 0.7 10e3/uL    Absolute Basophils 0.1 0.0 - 0.2 10e3/uL    Absolute Immature Granulocytes 0.1 <=0.4 10e3/uL    Absolute NRBCs 0.0 10e3/uL   Symptomatic Influenza A/B, RSV, & SARS-CoV2 PCR (COVID-19) Nasopharyngeal    Specimen: Nasopharyngeal; Swab   Result Value Ref Range    Influenza A PCR Negative Negative    Influenza B PCR Negative Negative    RSV PCR Negative Negative    SARS CoV2 PCR Negative Negative    Narrative    Testing was performed using the Xpert Xpress CoV2/Flu/RSV Assay on the [employer] GeneXpert Instrument. This test should be ordered for the detection of SARS-CoV-2, influenza, and RSV viruses in individuals who meet clinical and/or epidemiological criteria. Test performance is unknown in asymptomatic patients. This test is for in vitro diagnostic use under the FDA EUA for laboratories certified under CLIA to perform high or moderate complexity testing. This test has not been FDA cleared or approved. A negative result does not rule out the presence of PCR inhibitors in the specimen or target RNA in concentration below the limit of detection for the assay. If only  one viral target is positive but coinfection with multiple targets is suspected, the sample should be re-tested with another FDA cleared, approved, or authorized test, if coinfection would change clinical management. This test was validated by the [facility]. These laboratories are certified under the Clinical Laboratory Improvement Amendments of 1988 (CLIA-88) as qualified to perform high complexity laboratory testing.   Chest CT w/o contrast    Narrative    EXAM: CT CHEST W/O CONTRAST  LOCATION: [facility]  DATE: 1/15/2024    INDICATION: fall.  weakness.  cough  COMPARISON: 08/28/2023  TECHNIQUE: CT chest without IV contrast. Multiplanar reformats were obtained. Dose reduction techniques were used.  CONTRAST: None.    FINDINGS:   LUNGS AND PLEURA: An 8 mm x 8 mm right middle lobe nodule has not changed in size (series 6 image 145). A 3 mm left lower lobe nodule along the pleura has not increased in size (series 6 image 226). An adjacent flat 6 mm x 3 mm left lower lobe nodule has   not changed (image 225).    MEDIASTINUM/AXILLAE: Enlarged left thyroid lobe. Enlarged heart. Low-attenuation in the cardiac ventricles suggests anemia. A left atrial appendage occlusion device is present. The pulmonary arteries are mildly enlarged. The ascending aorta is enlarged   at 4.3 x 4.1 cm. There is a large hiatal hernia. Prominent right axillary lymph nodes are again seen though have decreased in size from the comparison study. For example, a 9 mm right axillary lymph node was previously 13 mm.    CORONARY ARTERY CALCIFICATION: Mild.    UPPER ABDOMEN: Hypoattenuating hepatic lesion has not changed. A hypoattenuating left renal lesion is incompletely assessed. These could be better assessed with ultrasound.    MUSCULOSKELETAL: There are degenerative changes in the spine. No fractures.      Impression    IMPRESSION:   1.  No acute fractures.  2.  Anemia.   3.  Additional stable  findings are above.   Head CT w/o contrast    Narrative    EXAM: CT HEAD W/O CONTRAST  LOCATION: [facility]  DATE: 1/15/2024    INDICATION: Fall. Traumatic injury. Dizziness.  COMPARISON: None.  TECHNIQUE: Routine CT Head without IV contrast. Multiplanar reformats. Dose reduction techniques were used.    FINDINGS:  INTRACRANIAL CONTENTS: No intracranial hemorrhage, extraaxial collection, or mass effect.  No CT evidence of acute infarct. Mild presumed chronic small vessel ischemic changes. Mild generalized volume loss. No hydrocephalus.     VISUALIZED ORBITS/SINUSES/MASTOIDS: Prior bilateral cataract surgery. Visualized portions of the orbits are otherwise unremarkable. No paranasal sinus mucosal disease. No middle ear or mastoid effusion.    BONES/SOFT TISSUES: No acute abnormality.      Impression    IMPRESSION:  1.  No CT evidence for acute intracranial process.  2.  Brain atrophy and presumed chronic microvascular ischemic changes as above.   UA Macroscopic with reflex to Microscopic and Culture    Specimen: Urine, Catheter   Result Value Ref Range    Color Urine Yellow Colorless, Straw, Light Yellow, Yellow    Appearance Urine Slightly Cloudy (A) Clear    Glucose Urine Negative Negative mg/dL    Bilirubin Urine Negative Negative    Ketones Urine Negative Negative mg/dL    Specific Gravity Urine 1.009 1.003 - 1.035    Blood Urine Moderate (A) Negative    pH Urine 5.0 5.0 - 7.0    Protein Albumin Urine Negative Negative mg/dL    Urobilinogen Urine Normal Normal, 2.0 mg/dL    Nitrite Urine Negative Negative    Leukocyte Esterase Urine Negative Negative    Bacteria Urine Few (A) None Seen /HPF    Mucus Urine Present (A) None Seen /LPF    RBC Urine 9 (H) <=2 /HPF    WBC Urine 3 <=5 /HPF    Squamous Epithelials Urine 15 (H) <=1 /HPF    Hyaline Casts Urine 3 (H) <=2 /LPF    Narrative    Urine Culture not indicated   Prepare red blood cells (unit)   Result Value Ref Range    Blood Component  Type Red Blood Cells     Product Code [id number]     Unit Status Issued     Unit Number [id number]     CROSSMATCH Compatible     CODING SYSTEM [id number]     ISSUE DATE AND TIME [id number]     UNIT ABO/RH A+     UNIT TYPE ISBT 6200    ABO/Rh type and screen    Narrative    The following orders were created for panel order ABO/Rh type and screen.  Procedure                               Abnormality         Status                     ---------                               -----------         ------                     Adult Type and Screen[[id number]]                            Final result                 Please view results for these tests on the individual orders.   Adult Type and Screen   Result Value Ref Range    ABO/RH(D) A POS     Antibody Screen Negative Negative    SPECIMEN EXPIRATION DATE [id number]        MEDICATIONS GIVEN IN THE EMERGENCY DEPARTMENT:  Medications - No data to display        Independent Interpretation (X-rays, CTs, rhythm strip):  CT head without acute abnormality.  CT chest without pneumonia.    Consultations/Discussion of Management or Tests:  None       Social Determinants of Health affecting care:         Assessments & Plan (with Medical Decision Making)  78 year old female who presents to the Emergency Department for evaluation of has medical history significant for sleep apnea with noncompliance, permanent atrial fibrillation status post Watchman device, heart failure with preserved ejection fraction, stage III chronic kidney disease, hypothyroidism, hyperlipidemia, presenting the emergency department with significant other for concerns regarding generalized weakness, fatigue, cough, in addition to fall, in addition to dizziness/lightheadedness.  Patient found to have basic metabolic panel with slightly increased creatinine compared to baseline.  Creatinine currently 2.22, with baseline closer to 1.9.  CBC does have anemia with hemoglobin of 7.6.  Baseline closer to 9.  BNP is  elevated, however has been elevated before in the past.  Patient denies any recent blood in the stool.  There has been nausea, however no vomiting.  Given her anemia, patient may be experiencing weakness, and fatigue secondary to the anemia.  Will be transfused 1 unit packed red blood cells after consent obtained.    Patient with CT scan of the head that showed no evidence of acute posttraumatic abnormality.  CT scan of the chest showed no posttraumatic abnormality, and no other focal findings.    Attempted to walk patient outside of the room to the bathroom, and patient was able to walk approximately 10 steps, and then body attempted to move forward, however legs were not following patient.  She is not safe for discharge home at this point secondary to generalized weakness, and fatigue, with inability to ambulate on her own.  There are no focal neurologic deficits.  Do not feel that MRI is indicated at this point.  No infectious etiology thought to be likely.    Patient will be signed out to overnight provider pending physical therapy/Occupational Therapy, and patient may require hospital observation for placement.       I have reviewed the nursing notes.    I have reviewed the findings, diagnosis, plan and need for follow up with the patient.       Critical Care time:  none      NEW PRESCRIPTIONS STARTED AT TODAY'S ER VISIT  New Prescriptions    No medications on file       Final diagnoses:   Anemia, unspecified type   Weakness   Chronic atrial fibrillation (H)   Stage 3 chronic kidney disease, unspecified whether stage 3a or 3b CKD (H)   Essential hypertension       1/15/2024   [facility] EMERGENCY DEPT       MicheleJorge nguyen MD  01/15/24 5890

## 2024-01-16 NOTE — PLAN OF CARE
OT: Pt seen for IP OT evaluation, however pt with plans to transfer to [place]. Defer further OT needs to next level of care.

## 2024-01-16 NOTE — PROGRESS NOTES
01/16/24 1051   Appointment Info   Signing Clinician's Name / Credentials (PT) Eboni Mccauley, PT   Quick Adds   Quick Adds Certification   Living Environment   People in Home child(no), adult  (son)   Current Living Arrangements house   Home Accessibility stairs within home   Number of Stairs, Within Home, Primary six   Stair Railings, Within Home, Primary railings safe and in good condition   Living Environment Comments lives with son and his girlfriend, all needs met on main level   Self-Care   Equipment Currently Used at Home none   Fall history within last six months yes   Number of times patient has fallen within last six months 1  (Saturday, R foot fractures)   Activity/Exercise/Self-Care Comment indep wtih mobility without device, ADL's. shares IADL's with son and his girlfriend.   General Information   Onset of Illness/Injury or Date of Surgery 01/15/24   Referring Physician Jorge Bautista MD   Patient/Family Therapy Goals Statement (PT) none stated   Pertinent History of Current Problem (include personal factors and/or comorbidities that impact the POC) 78 y.o. female admitted with nausea, weakness, and dizziness. Pt also with recent fall with R foot imaging revealing nondisplaced fracture of the base of the second and third metatarsal, possible Lisfranc injury.   Existing Precautions/Restrictions fall   Weight-Bearing Status - RLE nonweight-bearing   Pain Assessment   Patient Currently in Pain Yes, see Vital Sign flowsheet  (R foot pain)   Range of Motion (ROM)   Range of Motion ROM is WFL   ROM Comment R foot splinted   Strength (Manual Muscle Testing)   Strength (Manual Muscle Testing) Deficits observed during functional mobility   Strength Comments general LE weakness from reduced mobility and NWB   Bed Mobility   Comment, (Bed Mobility) supine<>sit with indep   Transfers   Comment, (Transfers) sit>stand from EOB with CGA x2, maintains NWB RLE, complaints of nausea with standing at EOB so  returned to sitting   Gait/Stairs (Locomotion)   Comment, (Gait/Stairs) unable to assess as pt with complaints of nausea in standing   Clinical Impression   Criteria for Skilled Therapeutic Intervention Yes, treatment indicated   PT Diagnosis (PT) impaired functional mobility   Influenced by the following impairments LE weakness, NWB RLE, reduced activity tolerance   Functional limitations due to impairments impaired transfers, gait, stairs   Clinical Presentation (PT Evaluation Complexity) stable   Clinical Presentation Rationale clinical reasoning   Clinical Decision Making (Complexity) low complexity   Planned Therapy Interventions (PT) gait training;home exercise program;balance training;patient/family education;ROM (range of motion);stair training;strengthening;transfer training;progressive activity/exercise;risk factor education;home program guidelines   Risk & Benefits of therapy have been explained evaluation/treatment results reviewed;care plan/treatment goals reviewed;risks/benefits reviewed;current/potential barriers reviewed;participants voiced agreement with care plan;participants included;patient   Clinical Impression Comments Per chart review, pt transferring to outside hospital, will defer continued PT to next level of care. Pt will likely require TCU as she is NWB RLE and has stairs to complete at home.   PT Total Evaluation Time   PT Ricky Low Complexity Minutes (70565) 15   Therapy Certification   Start of care date 01/16/24   Certification date from 01/16/24   Certification date to 01/23/24   Medical Diagnosis weakness   Physical Therapy Goals   PT Frequency 3x/week   PT Predicted Duration/Target Date for Goal Attainment 01/23/24   PT Goals Transfers;Gait;Stairs   PT: Transfers Supervision/stand-by assist;Bed to/from chair;Assistive device;Within precautions   PT: Gait Supervision/stand-by assist;Standard walker;10 feet;Within precautions   PT: Stairs Minimal assist;6 stairs;Rail on right;Within  precautions   PT Discharge Planning   PT Plan Tues 1/3; likely transfer to outside hospital, will keep order in case pt does not transfer   PT Discharge Recommendation (DC Rec)   (transferring to outside hospital)   Total Session Time   Total Session Time (sum of timed and untimed services) 15     [facility]  OUTPATIENT PHYSICAL THERAPY EVALUATION  PLAN OF TREATMENT FOR OUTPATIENT REHABILITATION  (COMPLETE FOR INITIAL CLAIMS ONLY)  Patient's Last Name, First Name, M.I.  YOB: 1945  Shaila Triana                        Provider's Name  [facility] Medical Record No.  [id number]                             Onset Date:  01/15/24   Start of Care Date:  01/16/24   Type:     _X_PT   ___OT   ___SLP Medical Diagnosis:  weakness              PT Diagnosis:  impaired functional mobility Visits from SOC:  1     See note for plan of treatment, functional goals and certification details    I CERTIFY THE NEED FOR THESE SERVICES FURNISHED UNDER        THIS PLAN OF TREATMENT AND WHILE UNDER MY CARE     (Physician co-signature of this document indicates review and certification of the therapy plan).

## 2024-01-16 NOTE — ED PROVIDER NOTES
"     Emergency Department Patient Sign-out     ED Summary and Plan at Sign Out:  \"Significant Events prior to my assuming care: Patient presented with multiple complaints including weakness and dizziness.  She was recently admitted for RSV.  She has been having frequent falls and not doing well living alone.  Given a unit of packed red cells for hemoglobin of 7.6 to see if this would help her symptoms, but mostly unchanged.  CT of the head and chest were negative.  Labs otherwise fairly reassuring.  Patient failed her ambulation trial.  When I went to talk to the patient she was complaining of feeling weak and stated that she could not walk due to weakness and pain in her right foot.  She states that she hurt her foot on a recent fall 3 days ago.  Her foot is noted to be swollen, so x-rays were ordered.  Xrays showed possible nondisplaced fracture of the base of the second and third metatarsal with concern for possible Lisfranc injury.  Plan per Dr. Bautista, was to have the patient evaluated by PT and OT in the morning to determine disposition.\"    ED MDM:  0737.  Still no bed available here.  PT/OT order placed overnight, pending.  I will place an order for transfer to inpatient beds available.    0925.  Patient accepted at [facility], Dr. Fuentes.  They are asking that I speak with orthopedics prior to transfer further acceptance as well.  Awaiting a callback.    0944.  Dr. Rodriguez with orthopedics at [facility] ok if the patient goes there.  Awaiting bed availability.      IMPRESSION:    ICD-10-CM    1. Anemia, unspecified type  D64.9       2. Weakness  R53.1       3. Chronic atrial fibrillation (H)  I48.20       4. Stage 3 chronic kidney disease, unspecified whether stage 3a or 3b CKD (H)  N18.30       5. Essential hypertension  I10       6. Closed nondisplaced fracture of second metatarsal bone of right foot, initial encounter  S92.324A       7. Closed nondisplaced fracture of third metatarsal bone of right " foot, initial encounter  S92.334A                Albino Aguirre MD  01/16/24 0972

## 2024-01-16 NOTE — MEDICATION SCRIBE - ADMISSION MEDICATION HISTORY
Medication Scribe Admission Medication History    Admission medication history is complete. The information provided in this note is only as accurate as the sources available at the time of the update.    Information Source(s): Patient and CareEverywhere/SureScripts via  in room with patient and finished at desk.    Pertinent Information: She states that she does not take Tylenol but does take Excedrin.    Changes made to PTA medication list:  Added: Excedrin, Centrum MVI.  Deleted: None  Changed: Iron from 2 tabs daily to 1 tab daily.    Allergies reviewed with patient and updates made in EHR: yes, no change.    Medication History Completed By: Yessi Henry 1/16/2024 9:34 AM    PTA Med List   Medication Sig Last Dose    albuterol (PROAIR HFA/PROVENTIL HFA/VENTOLIN HFA) 108 (90 Base) MCG/ACT inhaler Inhale 2 puffs into the lungs every 6 hours as needed for shortness of breath / dyspnea or wheezing Past Week at prn    aspirin (ASA) 81 MG EC tablet Take 81 mg by mouth daily 1/15/2024 at am    aspirin-acetaminophen-caffeine (EXCEDRIN EXTRA STRENGTH) 250-250-65 MG tablet Take 1-2 tablets by mouth daily as needed for headaches Past Week at prn    benzonatate (TESSALON) 100 MG capsule Take 1 capsule (100 mg) by mouth 3 times daily as needed for cough Past Week at prn    calcium carbonate (OS-ANIKA) 1500 (600 Ca) MG tablet Take 600 mg by mouth 2 times daily (with meals) 1/15/2024 at am    DILT- MG 24 hr capsule Take 180 mg by mouth daily 1/15/2024 at am    famotidine (PEPCID) 20 MG tablet Take 1 tablet (20 mg) by mouth every other day 1/15/2024 at am    fluticasone (VERAMYST) 27.5 MCG/SPRAY nasal spray Spray 2 sprays into both nostrils daily as needed for allergies Past Week at prn    furosemide (LASIX) 40 MG tablet Take 40 mg by mouth daily 1/15/2024 at am    levothyroxine (SYNTHROID/LEVOTHROID) 112 MCG tablet Take 112 mcg by mouth daily 1/15/2024 at am    metoprolol succinate ER (TOPROL-XL) 100 MG 24 hr tablet Take  150 mg by mouth 2 times daily 1/15/2024 at pm    multivitamin w/minerals (CENTRUM ADULTS) tablet Take 1 tablet by mouth daily as needed (supplement) Past Week at am    OVER-THE-COUNTER Take 1 tablet by mouth daily Iron supplement, green tablet, unknown formulation and dose. 1/15/2024 at am    potassium chloride ER (MICRO-K) 10 MEQ CR capsule Take 20 mEq by mouth daily 1/15/2024 at am    rosuvastatin (CRESTOR) 10 MG tablet Take 1 tablet (10 mg) by mouth every evening for 30 days 1/14/2024 at pm    sertraline (ZOLOFT) 100 MG tablet Take 200 mg by mouth every evening 1/14/2024 at pm

## 2024-01-16 NOTE — CONSULTS
Care Management:    Received consult from ED for rehab placement for patient. Spoke with ED nurse who states they are working to transfer patient to [facility].    If patient remains at [facility], Care Management will assist with discharge planning.    JACINTO Bonilla  [facility] [phone]/ [facility] [phone]  Care Management

## 2024-01-16 NOTE — ED PROVIDER NOTES
Emergency Department Patient Sign-out       Brief HPI:  This is a 78 year old female signed out to me by Dr. Bautista .  See initial ED Provider note for details of the presentation.         Significant Events prior to my assuming care: Patient presented with multiple complaints including weakness and dizziness.  She was recently admitted for RSV.  She has been having frequent falls and not doing well living alone.  Given a unit of packed red cells for hemoglobin of 7.6 to see if this would help her symptoms, but mostly unchanged.  CT of the head and chest were negative.  Labs otherwise fairly reassuring.  Patient failed her ambulation trial.  When I went to talk to the patient she was complaining of feeling weak and stated that she could not walk due to weakness and pain in her right foot.  She states that she hurt her foot on a recent fall 3 days ago.  Her foot is noted to be swollen, so x-rays were ordered.  Xrays showed possible nondisplaced fracture of the base of the second and third metatarsal with concern for possible Lisfranc injury.  Plan per Dr. Bautista, was to have the patient evaluated by PT and OT in the morning to determine disposition.      Exam:   Patient Vitals for the past 24 hrs:   BP Temp Temp src Pulse Resp SpO2 Height Weight   01/16/24 0107 108/64 99  F (37.2  C) Oral 78 18 98 % -- --   01/16/24 0106 -- -- -- -- -- 96 % -- --   01/16/24 0100 111/79 -- -- 77 -- -- -- --   01/16/24 0030 121/79 -- -- 80 18 97 % -- --   01/16/24 0000 123/74 98.5  F (36.9  C) Oral 80 16 99 % -- --   01/15/24 2345 -- -- -- -- -- 97 % -- --   01/15/24 2330 114/65 -- -- 73 -- 100 % -- --   01/15/24 2315 -- -- -- 84 -- 100 % -- --   01/15/24 2300 101/61 -- -- 85 -- 99 % -- --   01/15/24 2245 -- -- -- 86 -- 99 % -- --   01/15/24 2230 120/69 -- -- 77 -- 97 % -- --   01/15/24 2215 -- -- -- -- -- 97 % -- --   01/15/24 2200 124/89 -- -- 84 -- 97 % -- --   01/15/24 2147 130/86 98.7  F (37.1  C) Oral 77 16 97 % -- --  "  01/15/24 2139 (!) 140/84 -- -- 91 -- 98 % -- --   01/15/24 2137 (!) 140/84 98.8  F (37.1  C) -- 80 16 98 % -- --   01/15/24 2025 132/85 -- -- -- -- -- -- --   01/15/24 1742 -- -- -- -- -- -- 1.575 m (5' 2\") 61.7 kg (136 lb)   01/15/24 1737 -- -- -- -- -- 97 % -- --   01/15/24 1733 90/59 97.8  F (36.6  C) -- 110 -- -- -- --           ED RESULTS:   Results for orders placed or performed during the hospital encounter of 01/15/24 (from the past 24 hour(s))   CBC with platelets differential     Status: Abnormal    Collection Time: 01/15/24  5:57 PM    Narrative    The following orders were created for panel order CBC with platelets differential.  Procedure                               Abnormality         Status                     ---------                               -----------         ------                     CBC with platelets and d...[[id number]]  Abnormal            Final result                 Please view results for these tests on the individual orders.   Basic metabolic panel     Status: Abnormal    Collection Time: 01/15/24  5:57 PM   Result Value Ref Range    Sodium 135 135 - 145 mmol/L    Potassium 3.7 3.4 - 5.3 mmol/L    Chloride 99 98 - 107 mmol/L    Carbon Dioxide (CO2) 25 22 - 29 mmol/L    Anion Gap 11 7 - 15 mmol/L    Urea Nitrogen 50.8 (H) 8.0 - 23.0 mg/dL    Creatinine 2.22 (H) 0.51 - 0.95 mg/dL    GFR Estimate 22 (L) >60 mL/min/1.73m2    Calcium 10.8 (H) 8.8 - 10.2 mg/dL    Glucose 118 (H) 70 - 99 mg/dL   NT pro BNP     Status: Abnormal    Collection Time: 01/15/24  5:57 PM   Result Value Ref Range    N terminal Pro BNP Inpatient 4,476 (H) 0 - 1,800 pg/mL   CBC with platelets and differential     Status: Abnormal    Collection Time: 01/15/24  5:57 PM   Result Value Ref Range    WBC Count 8.2 4.0 - 11.0 10e3/uL    RBC Count 2.60 (L) 3.80 - 5.20 10e6/uL    Hemoglobin 7.6 (L) 11.7 - 15.7 g/dL    Hematocrit 23.3 (L) 35.0 - 47.0 %    MCV 90 78 - 100 fL    MCH 29.2 26.5 - 33.0 pg    MCHC 32.6 31.5 - " 36.5 g/dL    RDW 14.9 10.0 - 15.0 %    Platelet Count 164 150 - 450 10e3/uL    % Neutrophils 76 %    % Lymphocytes 10 %    % Monocytes 10 %    % Eosinophils 2 %    % Basophils 1 %    % Immature Granulocytes 1 %    NRBCs per 100 WBC 0 <1 /100    Absolute Neutrophils 6.2 1.6 - 8.3 10e3/uL    Absolute Lymphocytes 0.8 0.8 - 5.3 10e3/uL    Absolute Monocytes 0.8 0.0 - 1.3 10e3/uL    Absolute Eosinophils 0.2 0.0 - 0.7 10e3/uL    Absolute Basophils 0.1 0.0 - 0.2 10e3/uL    Absolute Immature Granulocytes 0.1 <=0.4 10e3/uL    Absolute NRBCs 0.0 10e3/uL   Symptomatic Influenza A/B, RSV, & SARS-CoV2 PCR (COVID-19) Nasopharyngeal     Status: Normal    Collection Time: 01/15/24  6:22 PM    Specimen: Nasopharyngeal; Swab   Result Value Ref Range    Influenza A PCR Negative Negative    Influenza B PCR Negative Negative    RSV PCR Negative Negative    SARS CoV2 PCR Negative Negative    Narrative    Testing was performed using the Xpert Xpress CoV2/Flu/RSV Assay on the Cepheid GeneXpert Instrument. This test should be ordered for the detection of SARS-CoV-2, influenza, and RSV viruses in individuals who meet clinical and/or epidemiological criteria. Test performance is unknown in asymptomatic patients. This test is for in vitro diagnostic use under the FDA EUA for laboratories certified under CLIA to perform high or moderate complexity testing. This test has not been FDA cleared or approved. A negative result does not rule out the presence of PCR inhibitors in the specimen or target RNA in concentration below the limit of detection for the assay. If only one viral target is positive but coinfection with multiple targets is suspected, the sample should be re-tested with another FDA cleared, approved, or authorized test, if coinfection would change clinical management. This test was validated by the [facility] [employer]. These laboratories are certified under the Clinical Laboratory Improvement Amendments of 1988 (CLIA-88)  as qualified to perform high complexity laboratory testing.   Chest CT w/o contrast     Status: None    Collection Time: 01/15/24  6:42 PM    Narrative    EXAM: CT CHEST W/O CONTRAST  LOCATION: [facility]  DATE: 1/15/2024    INDICATION: fall.  weakness.  cough  COMPARISON: 08/28/2023  TECHNIQUE: CT chest without IV contrast. Multiplanar reformats were obtained. Dose reduction techniques were used.  CONTRAST: None.    FINDINGS:   LUNGS AND PLEURA: An 8 mm x 8 mm right middle lobe nodule has not changed in size (series 6 image 145). A 3 mm left lower lobe nodule along the pleura has not increased in size (series 6 image 226). An adjacent flat 6 mm x 3 mm left lower lobe nodule has   not changed (image 225).    MEDIASTINUM/AXILLAE: Enlarged left thyroid lobe. Enlarged heart. Low-attenuation in the cardiac ventricles suggests anemia. A left atrial appendage occlusion device is present. The pulmonary arteries are mildly enlarged. The ascending aorta is enlarged   at 4.3 x 4.1 cm. There is a large hiatal hernia. Prominent right axillary lymph nodes are again seen though have decreased in size from the comparison study. For example, a 9 mm right axillary lymph node was previously 13 mm.    CORONARY ARTERY CALCIFICATION: Mild.    UPPER ABDOMEN: Hypoattenuating hepatic lesion has not changed. A hypoattenuating left renal lesion is incompletely assessed. These could be better assessed with ultrasound.    MUSCULOSKELETAL: There are degenerative changes in the spine. No fractures.      Impression    IMPRESSION:   1.  No acute fractures.  2.  Anemia.   3.  Additional stable findings are above.   Head CT w/o contrast     Status: None    Collection Time: 01/15/24  6:42 PM    Narrative    EXAM: CT HEAD W/O CONTRAST  LOCATION: [facility]  DATE: 1/15/2024    INDICATION: Fall. Traumatic injury. Dizziness.  COMPARISON: None.  TECHNIQUE: Routine CT Head without IV contrast.  Multiplanar reformats. Dose reduction techniques were used.    FINDINGS:  INTRACRANIAL CONTENTS: No intracranial hemorrhage, extraaxial collection, or mass effect.  No CT evidence of acute infarct. Mild presumed chronic small vessel ischemic changes. Mild generalized volume loss. No hydrocephalus.     VISUALIZED ORBITS/SINUSES/MASTOIDS: Prior bilateral cataract surgery. Visualized portions of the orbits are otherwise unremarkable. No paranasal sinus mucosal disease. No middle ear or mastoid effusion.    BONES/SOFT TISSUES: No acute abnormality.      Impression    IMPRESSION:  1.  No CT evidence for acute intracranial process.  2.  Brain atrophy and presumed chronic microvascular ischemic changes as above.   UA Macroscopic with reflex to Microscopic and Culture     Status: Abnormal    Collection Time: 01/15/24  7:10 PM    Specimen: Urine, Catheter   Result Value Ref Range    Color Urine Yellow Colorless, Straw, Light Yellow, Yellow    Appearance Urine Slightly Cloudy (A) Clear    Glucose Urine Negative Negative mg/dL    Bilirubin Urine Negative Negative    Ketones Urine Negative Negative mg/dL    Specific Gravity Urine 1.009 1.003 - 1.035    Blood Urine Moderate (A) Negative    pH Urine 5.0 5.0 - 7.0    Protein Albumin Urine Negative Negative mg/dL    Urobilinogen Urine Normal Normal, 2.0 mg/dL    Nitrite Urine Negative Negative    Leukocyte Esterase Urine Negative Negative    Bacteria Urine Few (A) None Seen /HPF    Mucus Urine Present (A) None Seen /LPF    RBC Urine 9 (H) <=2 /HPF    WBC Urine 3 <=5 /HPF    Squamous Epithelials Urine 15 (H) <=1 /HPF    Hyaline Casts Urine 3 (H) <=2 /LPF    Narrative    Urine Culture not indicated   Prepare red blood cells (unit)     Status: None (Preliminary result)    Collection Time: 01/15/24  7:50 PM   Result Value Ref Range    Blood Component Type Red Blood Cells     Product Code A9824H77     Unit Status Issued     Unit Number [id number]     CROSSMATCH Compatible     CODING  SYSTEM [id number]     ISSUE DATE AND TIME [id number]     UNIT ABO/RH A+     UNIT TYPE ISBT 6200    ABO/Rh type and screen     Status: None    Collection Time: 01/15/24  8:29 PM    Narrative    The following orders were created for panel order ABO/Rh type and screen.  Procedure                               Abnormality         Status                     ---------                               -----------         ------                     Adult Type and Screen[[id number]]                            Final result                 Please view results for these tests on the individual orders.   Adult Type and Screen     Status: None    Collection Time: 01/15/24  8:29 PM   Result Value Ref Range    ABO/RH(D) A POS     Antibody Screen Negative Negative    SPECIMEN EXPIRATION DATE [id number]    XR Foot Right G/E 3 Views     Status: None    Collection Time: 01/16/24  1:36 AM    Narrative    EXAM: XR ANKLE RIGHT G/E 3 VIEWS, XR FOOT RIGHT G/E 3 VIEWS  LOCATION: [facility]  DATE: 1/16/2024    INDICATION: Trauma  COMPARISON: None.      Impression    IMPRESSION: Subtle linear lucencies are seen at the base of the second and third metatarsal bones, suspicious for nondisplaced fractures. Slight widening of the first metatarsal/second metatarsal space may indicate underlying Lisfranc injury. Ankle   mortise is congruent. Mild degenerative narrowing of the first metatarsophalangeal joint space noted. Other joint spaces are otherwise intact and preserved. A small ventral calcaneal spur is present. Moderate soft tissue swelling is seen in the mid to   distal foot.   XR Ankle Right G/E 3 Views     Status: None    Collection Time: 01/16/24  1:37 AM    Narrative    EXAM: XR ANKLE RIGHT G/E 3 VIEWS, XR FOOT RIGHT G/E 3 VIEWS  LOCATION: [facility]  DATE: 1/16/2024    INDICATION: Trauma  COMPARISON: None.      Impression    IMPRESSION: Subtle linear lucencies are seen at the base of  the second and third metatarsal bones, suspicious for nondisplaced fractures. Slight widening of the first metatarsal/second metatarsal space may indicate underlying Lisfranc injury. Ankle   mortise is congruent. Mild degenerative narrowing of the first metatarsophalangeal joint space noted. Other joint spaces are otherwise intact and preserved. A small ventral calcaneal spur is present. Moderate soft tissue swelling is seen in the mid to   distal foot.       ED MEDICATIONS:   Medications   acetaminophen (TYLENOL) tablet 500 mg (500 mg Oral $Given 1/15/24 1386)   aspirin EC tablet 81 mg (has no administration in time range)   furosemide (LASIX) tablet 40 mg (has no administration in time range)   levothyroxine (SYNTHROID/LEVOTHROID) tablet 112 mcg (has no administration in time range)   metoprolol succinate ER (TOPROL XL) 24 hr tablet 150 mg (has no administration in time range)   rosuvastatin (CRESTOR) tablet 10 mg (has no administration in time range)   sertraline (ZOLOFT) tablet 200 mg (has no administration in time range)     [facility]    Splint Application    Date/Time: 1/16/2024 2:16 AM    Performed by: David Carney MD  Authorized by: David Carney MD    Risks, benefits and alternatives discussed.      PRE-PROCEDURE DETAILS     Sensation:  Normal    PROCEDURE DETAILS     Laterality:  Right    Location:  Ankle    Ankle:  R ankle    Strapping: no      Splint type:  Short leg    Supplies:  [facility]    POST PROCEDURE DETAILS     Pain:  Unchanged    Sensation:  Unchanged      PROCEDURE    Patient Tolerance:  Patient tolerated the procedure well with no immediate complications       Impression:    ICD-10-CM    1. Anemia, unspecified type  D64.9       2. Weakness  R53.1       3. Chronic atrial fibrillation (H)  I48.20       4. Stage 3 chronic kidney disease, unspecified whether stage 3a or 3b CKD (H)  N18.30       5. Essential hypertension  I10       6. Closed nondisplaced fracture of  second metatarsal bone of right foot, initial encounter  S92.324A       7. Closed nondisplaced fracture of third metatarsal bone of right foot, initial encounter  S92.334A           Plan:    X-rays obtained showed possible nondisplaced fracture of the base of the second and third metatarsal with possible widening to suggest possible Lisfranc injury.  Patient's foot is quite swollen and this is likely an acute injury.  This may be why she has been unable to walk.  Plan to treat the patient as a Lisfranc injury.  Placed her in a posterior short leg splint and she will need to be nonweightbearing.  She states she is too weak to be able to safely use crutches.  If she is able to go home she can follow-up with Ortho as an outpatient, but I find this unlikely and she will likely need to be admitted as I do not think she would be able to manage with crutches and then Ortho could be consulted on the floor.      MD Angelika Perez Sean, MD  01/16/24 5180

## 2024-01-30 PROBLEM — K92.2 UPPER GI BLEED: Status: ACTIVE | Noted: 2024-01-01

## 2024-01-30 PROBLEM — D62 ANEMIA DUE TO BLOOD LOSS, ACUTE: Status: ACTIVE | Noted: 2024-01-01

## 2024-01-30 NOTE — LETTER
Shaila Triana  [address] [facility] [address]  February 5, 2024    Dear Shaila,   This letter is to inform you of the results of your pathology report on your upper endoscopy (EGD).    Your pathology report was:  Showed findings showed an ulcer which was consistent with an inflamed stomach, no evidence of cancer was found. I recommend a daily proton pump inhibitor (such as omeprazole 40 mg daily) and having a repeat upper endoscopy in 3 months to monitor the ulcer . If you continue to have symptoms please follow up with your primary care doctor or with myself.    If you have any questions or concerns please do not hesitate to call my office at [phone].  Sincerely,     Willie Alcazar MD  [facility]      Resulted Orders   Surgical Pathology Exam   Result Value Ref Range    Case Report       Surgical Pathology Report                         Case: [id number]                                  Authorizing Provider:  Willie Alcazar MD       Collected:           02/01/2024 07:50 AM          Ordering Location:     [facility]   Received:            02/01/2024 09:00 AM                                 [place]                                                                      Pathologist:           Andrew Marcial MD                                                            Specimens:   A) - Small Intestine, Duodenum, duodenal bulb                                                       B) - Stomach, Antrum, Antrum Ulcer                                                         Final Diagnosis       A. Duodenum, biopsies:  --No significant histopathologic change.    B. Stomach, antral ulcer biopsies:  -- Superficial fragments of reactive appearing gastric mucosa with mild active gastritis.  --Negative for Helicobacter pylori organisms or dysplasia.              Clinical Information       Procedure:  ESOPHAGOGASTRODUODENOSCOPY, WITH BIOPSY  Colonoscopy  Pre-op Diagnosis: Upper GI  bleed [K92.2]  Lower GI bleed [K92.2]  Post-op Diagnosis: K92.2 - Upper GI bleed [ICD-10-CM]  K92.2 - Lower GI bleed [ICD-10-CM]      Gross Description       A(1). Small Intestine, Duodenum, duodenal bulb:  The specimen is received in formalin, labeled with the patient's name, medical record number and other identifying information and designated  duodenal bulb . It consists of two tan soft tissue fragments, each 0.2 cm. Entirely submitted in one cassette.    B(2). Stomach, Antrum, Antrum Ulcer:  The specimen is received in formalin, labeled with the patient's name, medical record number and other identifying information and designated  stomach, antrum ulcer . It consists of a single tan soft tissue fragment measuring 0.1 cm. Entirely submitted in one cassette.   (HAYDEN Rosales)      Microscopic Description       Microscopic examination was performed. An appropriately controlled H. pylori immunostain is performed on the gastric biopsy and is negative.          Performing Labs       The technical component of this testing was completed at [facility]      Case Images         If you have any questions or concerns, please call the clinic at the number listed above.       Sincerely,      Willie Alcazar MD

## 2024-01-30 NOTE — LETTER
Transition Communication Hand-off for Care Transitions to Next Level of Care Provider    Name: Shaila Triana  : 1945  MRN #: [id number]  Primary Care Provider: Mervin Ibarra     Primary Clinic: [address] [place] [id number]     Reason for Hospitalization:  Lower GI bleed [K92.2]  Upper GI bleed [K92.2]  Anemia due to blood loss, acute [D62]  Admit Date/Time: 2024  7:30 PM  Discharge Date: 24    Payor Source: Payor: [facility] / Plan: [employer]ICARE ADVANTAGE / Product Type: HMO /     Readmission Assessment Measure (ERNESTO) Risk Score/category: fragility    Reason for Communication Hand-off Referral: Multiple providers/specialties    Home with [facility] ([phone] Fax: ([phone])    Concern for non-adherence with plan of care:   Y/N NO  Already enrolled in Tele-monitoring program and name of program:  NA  Follow-up specialty is recommended: Yes    Any outstanding tests or procedures:        Referrals       Future Labs/Procedures    Home Care Referral     Comments:    Your provider has ordered home health services. If you have not been contacted within 2 days of your discharge please call the selected Home Care agency listed on your Discharge document.  If a Home Care agency is NOT listed, please call [phone].                  JACINTO Valladares    AVS/Discharge Summary is the source of truth; this is a helpful guide for improved communication of patient story

## 2024-01-31 PROBLEM — I50.32 CHRONIC DIASTOLIC CONGESTIVE HEART FAILURE (H): Status: ACTIVE | Noted: 2024-01-01

## 2024-01-31 PROBLEM — I48.20 CHRONIC ATRIAL FIBRILLATION (H): Status: ACTIVE | Noted: 2020-07-31

## 2024-01-31 PROBLEM — D64.9 ANEMIA: Status: ACTIVE | Noted: 2020-10-05

## 2024-01-31 PROBLEM — D64.9 ANEMIA, UNSPECIFIED TYPE: Status: RESOLVED | Noted: 2020-10-05 | Resolved: 2024-01-01

## 2024-01-31 PROBLEM — D64.9 ANEMIA: Status: RESOLVED | Noted: 2020-10-05 | Resolved: 2024-01-01

## 2024-01-31 PROBLEM — N18.30 CKD (CHRONIC KIDNEY DISEASE) STAGE 3, GFR 30-59 ML/MIN (H): Status: ACTIVE | Noted: 2020-10-05

## 2024-01-31 NOTE — H&P
[facility]    History and Physical  Hospital Medicine       Date of Admission:  1/30/2024  Date of Service: 1/31/2024     Assessment & Plan   Shaila Triana is a 78 year old female with past medical hx of chronic afib, hiatal hernia, CHF, hypothyroidism, HTN, CKD, anemia, depression, anxierty who presents on 1/30/2024 with a hgb of 6.7 at her PCP visit.     Normocytic anemia presumed due to blood loss, acute  Presumed upper GI bleed    Was seen by her PCP 1/29 for hospital follow up (see below) and had a hgb of 7. She returned yesterday for a recheck and it was 6.7, so was sent to the ER. She presents with tachycardia to 120s and hgb 6.7 with positive occult blood. She has had black stools, but thought it was because she takes iron supplement. On previous admission 1/15 she presented with hgb of 7.6 and received 1U PRBC. Workup at this time was negative for hemolysis and iron studies, folate, and B12 were WNL. She was previously admitted 10/2020 for anemia requiring 2 transfusions and found to be iron deficient, thought to be from chronic GI bleeding however no source was found. She endorses fatigue, dizziness, SOB.     Pulse 130s, down to 100s. SBP 130s-170s. SpO2 mid 90s. Occult blood positive. EGD 10/5/20 showed non-bleeding gastric ulcer and multiple gastric polyps. Colonoscopy 2018 showed diverticulosis. BMBx 3/29/23 for w/u of anemia showed indolent mantle cell lymphoma as below.    In ER she received Metoprolol 50mg daily, Pantoprazole 40mg IV BID, and 2U PRBC. Recheck hgb 8.5.    - Gen surg consulted- Upper and lower endoscopy tomorrow afternoon  - Bowel prep tonight  - NPO  - Pantoprazole 40mg IV BID   - Serial hemoglobins Q8h    Orthostatic hypotension  Essential hypertension  Hospitalized 1/15-1/18 for orthostatic hypotension causing a fall and fracture of 2nd and 3rd metatarsals of right foot. She was given albumin and all of her PTA antihypertensives were held (Diltiazem  180mg daily, Metoprolol 150mg BID, Furosemide 40mg daily). She has not noticed any changes since holding her meds.     Permanent atrial fibrillation s/p Watchman 7/2022  Patient had watchman device placed 2/2 to recurrent anemia requiring blood transfusions while on Eliquis. In review of cardiology documentation 10/4/23 she has been difficult to rate control under 110 but given that she gets lightheaded they had refrained from intensifying her regimen. Her rate doesn't appear to have changed since stopping her meds on recent admission.  - Continue to hold PTA Metoprolol  - Restart Diltiazem 180mg daily since she consistently has pulse 100s-120s and has enough pressure  (SBP 130s-170s).  - Continue PTA Aspirin 81mg    Chronic HFpEF  TTE 12/20/23 with EF 59%. Trace edema on exam. Previous cardiology note 10/4/23 stated trace edema is baseline for her and it's unlikely they'll be able to get rid of all the edema.  Managed prior to admission with Kcl 20 mEq daily, continue.  - Continue to hold Lasix    CKD (chronic kidney disease) stage 4  Follows with Nephrology outpatient. Has been avoiding NSAIDs. Cr 1.95 on admission (baseline 1.75-2.2) and GFR 25 (baseline 22-29). Stable.   Nephrology note 12/18/23 notes possibility of dialysis if GFR worsens.   - BMP in am    Subclinical hypothyroidism  Appears to have elevated TSH with normal T4 since 4/2022, c/w subclinical hypothyroidism. Today TSH 6.76 and T4 0.85.   Managed prior to admission with Levothyroxine 112mcg.  - Increased dose of Synthroid to 125mcg, as hypothyroidism may cause normocytic anemia    Large hiatal hernia  Managed prior to admission with Famotidine, continue.    Depressive disorder  Managed prior to admission with Sertraline, continue.    Pure hypercholesterolemia  Managed prior to admission with Rosuvastatin 10mg, continue.    Mantle cell lymphoma  Patient has a known history of mantle cell lymphoma for which she has been observed through [place]  "oncology as an outpatient. BMBx was 3/29/23 and axillary lymph node biopsy 4/13/23.      Clinically Significant Risk Factors Present on Admission                # Drug Induced Platelet Defect: home medication list includes an antiplatelet medication   # Hypertension: Noted on problem list      # Overweight: Estimated body mass index is 26.34 kg/m  as calculated from the following:    Height as of this encounter: 1.575 m (5' 2\").    Weight as of this encounter: 65.3 kg (144 lb).               Diet: NPO for Medical/Clinical Reasons Except for: MedsNPO  DVT Prophylaxis: Pneumatic Compression Devices  Boucher Catheter: Not present  Code Status:  Full Code  Lines: Peripheral IV    Disposition Plan      Expected Discharge Date: 01/31/2024             Entered: Wally Sosa PA-C 01/31/2024, 11:16 AM     Wally Sosa PA-C        The patient's care was discussed with the Attending Physician, Dr. Mcpherson .    Primary Care Physician   Mervin Ibarra [phone]    History is obtained from the patient and review of old records via the EMR.    History of Present Illness   Shaila Triana is a 78 year old female with past medical hx of chronic afib, hiatal hernia, CHF, hypothyroidism, HTN, CKD, anemia, depression, anxierty who presents on 1/30/2024 with a hgb of 6.7 at her PCP visit.     She was recently admitted for a fall and orthostatic hypotension. At this time her hgb was 7.6 and she received one unit of blood. At her post hospital follow up visit she had hgb of 7. The next day she had hgb of 6.7 and was sent to the ER. She received 2U of blood and had occult positive stool. She states her stool has been black for a while but attributed that to iron supplements.     She had her cardiac meds stopped at her last hospitalization 2 weeks ago due to orthostatic hypotension. She hasn't noticed any changes in her health since then. She's had nausea every morning since her hospitalization in December for RSV and is unsure why. "     Denies chest pain, abdominal pain, dysuria, blood in urine, diarrhea, palpitations, edema.    Review of Systems   The 10 point Review of Systems is negative other than noted in the HPI or here.     Past Medical History    Past Medical History:   Diagnosis Date    Anemia, unspecified type     Depressive disorder 12/04/2006    Essential hypertension 12/04/2006    Hypothyroidism (acquired) 12/04/2006    Invasive ductal carcinoma of breast (H) 08/06/2013    Left.  ER(+) VT(+) her-2-hina (-).  Lumpectomy and sentinel biopsy followed by radiation.  Adjuvant therapy with tamoxifen completed.    Lung nodules 04/19/2016    LLL on abd/pelvis CT 4/12/16.  No change on chest CT May 2017.  No further imaging needed.    Malignant neoplasm of female breast (H) 03/18/2008    Epic    New onset atrial fibrillation (H) 07/31/2020    Pure hypercholesterolemia 12/04/2006    Sleep apnea 12/04/2006     Patient Active Problem List    Diagnosis Date Noted    Upper GI bleed 01/30/2024     Priority: Medium    Anemia due to blood loss, acute 01/30/2024     Priority: Medium    Acute respiratory failure with hypoxia and hypercarbia (H) 12/25/2023     Priority: Medium    Community acquired pneumonia, unspecified laterality 12/25/2023     Priority: Medium    Anemia 10/05/2020     Priority: Medium    Dizziness 10/05/2020     Priority: Medium    Chest pain, unspecified type 10/05/2020     Priority: Medium    CKD (chronic kidney disease) stage 3, GFR 30-59 ml/min (H) 10/05/2020     Priority: Medium    Elevated lipase 10/05/2020     Priority: Medium    Pancreatic abnormality on CT 10/05/2020     Priority: Medium     Incidental finding on CT 10/5/2020 -  New 8 mm lucency of the pancreas. Likely benign but needs follow-up.      Chronic anticoagulation 10/05/2020     Priority: Medium     Due to atrial fibrillation       Ascending aorta dilatation (H24) 10/05/2020     Priority: Medium     Noted on echo August 2020, stable on CT 10/5/2020.      Chronic  atrial fibrillation (H) 07/31/2020     Priority: Medium    Lung nodules 04/19/2016     Priority: Medium     LLL on abd/pelvis CT 4/12/16.  No change on chest CT May 2017.  No further imaging needed.      Invasive ductal carcinoma of breast (H) 08/06/2013     Priority: Medium     Left.  ER(+) WA(+) her-2-hina (-).  Lumpectomy and sentinel biopsy followed by radiation.  Adjuvant therapy with tamoxifen completed.      Malignant neoplasm of female breast (H) 03/18/2008     Priority: Medium     Epic      Depressive disorder 12/04/2006     Priority: Medium    Essential hypertension 12/04/2006     Priority: Medium    Hypothyroidism (acquired) 12/04/2006     Priority: Medium    Pure hypercholesterolemia 12/04/2006     Priority: Medium    Sleep apnea 12/04/2006     Priority: Medium        Past Surgical History   Past Surgical History:   Procedure Laterality Date    APPENDECTOMY      1957    BLEPHAROPLASTY Bilateral 2010    COLONOSCOPY  2005    ESOPHAGOSCOPY, GASTROSCOPY, DUODENOSCOPY (EGD), COMBINED N/A 10/6/2020    Procedure: ESOPHAGOGASTRODUODENOSCOPY, WITH BIOPSY and polypectomy;  Surgeon: Jorge Ramirez MD;  Location: [facility]    HERNIA REPAIR  2013    HYSTERECTOMY, JOSE  1975    LUMPECTOMY BREAST Left 2008    THYROIDECTOMY  Right     Partial    TUBAL LIGATION          Prior to Admission Medications   Prior to Admission Medications   Prescriptions Last Dose Informant Patient Reported? Taking?   DILT- MG 24 hr capsule 1/25/2024 at told to stop taking Self Yes No   Sig: Take 180 mg by mouth daily   OVER-THE-COUNTER 1/30/2024 at am Self Yes Yes   Sig: Take 1 tablet by mouth daily Iron supplement, green tablet, unknown formulation and dose.   albuterol (PROAIR HFA/PROVENTIL HFA/VENTOLIN HFA) 108 (90 Base) MCG/ACT inhaler Past Month at Unknown Self No Yes   Sig: Inhale 2 puffs into the lungs every 6 hours as needed for shortness of breath / dyspnea or wheezing   aspirin (ASA) 81 MG EC tablet 1/30/2024 at am Self Yes Yes    Sig: Take 81 mg by mouth daily   aspirin-acetaminophen-caffeine (EXCEDRIN EXTRA STRENGTH) 250-250-65 MG tablet Past Week at Unknown Self Yes Yes   Sig: Take 1-2 tablets by mouth daily as needed for headaches   benzonatate (TESSALON) 100 MG capsule Past Month at Unknown Self No Yes   Sig: Take 1 capsule (100 mg) by mouth 3 times daily as needed for cough   calcium carbonate (OS-ANIKA) 1500 (600 Ca) MG tablet 1/30/2024 at am Self Yes Yes   Sig: Take 600 mg by mouth 2 times daily (with meals)   famotidine (PEPCID) 20 MG tablet 1/30/2024 at am Self No Yes   Sig: Take 1 tablet (20 mg) by mouth every other day   Patient taking differently: Take 20 mg by mouth 2 times daily   fluticasone (VERAMYST) 27.5 MCG/SPRAY nasal spray Past Week at Unknown Self Yes Yes   Sig: Spray 2 sprays into both nostrils daily as needed for allergies   furosemide (LASIX) 40 MG tablet 1/25/2024 at told to stop taking Self Yes No   Sig: Take 40 mg by mouth daily   levothyroxine (SYNTHROID/LEVOTHROID) 112 MCG tablet 1/30/2024 at am Self Yes Yes   Sig: Take 112 mcg by mouth daily   metoprolol succinate ER (TOPROL-XL) 100 MG 24 hr tablet 1/25/2024 at told to stop taking Self Yes No   Sig: Take 150 mg by mouth 2 times daily   multivitamin w/minerals (CENTRUM ADULTS) tablet 1/30/2024 at am Self Yes Yes   Sig: Take 1 tablet by mouth daily as needed (supplement)   potassium chloride ER (MICRO-K) 10 MEQ CR capsule 1/30/2024 at am Self Yes Yes   Sig: Take 20 mEq by mouth daily   rosuvastatin (CRESTOR) 10 MG tablet 1/29/2024 at pm Self No Yes   Sig: Take 1 tablet (10 mg) by mouth every evening for 30 days   sertraline (ZOLOFT) 100 MG tablet 1/29/2024 at pm Self Yes Yes   Sig: Take 200 mg by mouth every evening      Facility-Administered Medications: None     Allergies   Allergies   Allergen Reactions    Codeine Nausea    Dust Mite Extract Other (See Comments)     Per allergy test    Erythromycin Dizziness     Almost passed out    Penicillin [Penicillins]  "Itching       Family History    Family History   Problem Relation Age of Onset    No Known Problems Mother          age 89 after complications from a fall    Alzheimer Disease Father     Other - See Comments Sister         Polio    Colon Cancer Maternal Aunt     Breast Cancer No family hx of     Ovarian Cancer No family hx of     Prostate Cancer No family hx of      Social History   Social History     Socioeconomic History    Marital status:      Spouse name: Not on file    Number of children: Not on file    Years of education: Not on file    Highest education level: Not on file   Occupational History    Not on file   Tobacco Use    Smoking status: Never    Smokeless tobacco: Never   Substance and Sexual Activity    Alcohol use: Yes     Comment: occ    Drug use: Never    Sexual activity: Yes     Partners: Male     Birth control/protection: Female Surgical   Other Topics Concern    Not on file   Social History Narrative    Not on file     Social Determinants of Health     Financial Resource Strain: Not on file   Food Insecurity: Not on file   Transportation Needs: Not on file   Physical Activity: Not on file   Stress: Not on file   Social Connections: Not on file   Interpersonal Safety: Not on file   Housing Stability: Not on file     Physical Exam   BP (!) 154/100   Pulse 98   Temp 98.8  F (37.1  C)   Resp 11   Ht 1.575 m (5' 2\")   Wt 65.3 kg (144 lb)   LMP  (LMP Unknown)   SpO2 95%   BMI 26.34 kg/m       Weight: 144 lbs 0 oz Body mass index is 26.34 kg/m .     Constitutional: Alert, oriented, cooperative, in no acute distress, appears nontoxic. Pale.  HENT: Oropharynx is clear and moist. No evidence of cranial trauma. Normocephalic. Eyes anicteric. EOM intact. PERRLA  Cardiovascular: Irregular rhythm, tachycardic, normal S1 and S2, and no murmur noted. Good peripheral pulses in wrists bilaterally. Trace bilateral lower extremity edema.  Respiratory: Clear to auscultation bilaterally with no " adventitious breath sounds.   GI: Soft, non-tender, normal bowel sounds, no hepatomegaly, no masses.  Musculoskeletal: Normal muscle bulk and tone. FROM in all extremities   Skin: Warm and dry, no rashes.   Neurologic: Cranial nerves 2-12 are grossly intact.       Data   Data reviewed today:   Recent Labs   Lab 01/31/24  0846 01/30/24 1945   WBC 5.9 6.2   HGB 8.5* 6.7*   MCV 93 98   * 156    137   POTASSIUM 4.3 4.4   CHLORIDE 108* 105   CO2 22 20*   BUN 33.0* 34.6*   CR 1.99* 1.95*   ANIONGAP 10 12   ANIKA 8.5* 9.2   GLC 85 91   ALBUMIN  --  3.5   PROTTOTAL  --  7.0   BILITOTAL  --  0.3   ALKPHOS  --  58   ALT  --  7   AST  --  14       No results found for this or any previous visit (from the past 24 hour(s)).    I personally reviewed the EKG tracing showing AFib

## 2024-01-31 NOTE — ANESTHESIA PREPROCEDURE EVALUATION
Anesthesia Pre-Procedure Evaluation    Patient: Shaila Triana   MRN: [id number] : 1945        Procedure : Procedure(s):  COLONOSCOPY, WITH UPPER GASTROINTESTINAL TRACT ENDOSCOPY          Past Medical History:   Diagnosis Date    Anemia     Anemia, unspecified type     Depressive disorder 2006    Essential hypertension 2006    Hypothyroidism (acquired) 2006    Invasive ductal carcinoma of breast (H) 2013    Left.  ER(+) AK(+) her-2-hina (-).  Lumpectomy and sentinel biopsy followed by radiation.  Adjuvant therapy with tamoxifen completed.    Lung nodules 2016    LLL on abd/pelvis CT 16.  No change on chest CT May 2017.  No further imaging needed.    Malignant neoplasm of female breast (H) 2008    Epic    New onset atrial fibrillation (H) 2020    Pure hypercholesterolemia 2006    Sleep apnea 2006      Past Surgical History:   Procedure Laterality Date    APPENDECTOMY          BLEPHAROPLASTY Bilateral 2010    COLONOSCOPY      ESOPHAGOSCOPY, GASTROSCOPY, DUODENOSCOPY (EGD), COMBINED N/A 10/6/2020    Procedure: ESOPHAGOGASTRODUODENOSCOPY, WITH BIOPSY and polypectomy;  Surgeon: Jorge Ramirez MD;  Location: [facility]    HERNIA REPAIR  2013    HYSTERECTOMY, JOSE  1975    LUMPECTOMY BREAST Left 2008    THYROIDECTOMY  Right     Partial    TUBAL LIGATION        Allergies   Allergen Reactions    Codeine Nausea    Dust Mite Extract Other (See Comments)     Per allergy test    Erythromycin Dizziness     Almost passed out    Penicillin [Penicillins] Itching      Social History     Tobacco Use    Smoking status: Never    Smokeless tobacco: Never   Substance Use Topics    Alcohol use: Yes     Comment: occ      Wt Readings from Last 1 Encounters:   24 65.3 kg (144 lb)        Anesthesia Evaluation   Pt has had prior anesthetic. Type: General and MAC.    History of anesthetic complications       ROS/MED HX  ENT/Pulmonary: Comment: Acute respiratory failure  "with hypoxia and hypercarbia (H),  Lung nodules    (+) sleep apnea,                                       Neurologic:       Cardiovascular:     (+)  hypertension- -   -  - -   Taking blood thinners   CHF                  dysrhythmias, a-fib,        Previous cardiac testing   Echo: Date: Results:    Stress Test:  Date: Results:    ECG Reviewed:  Date: 1/24 Results:  Atrial fibrillation   ABNORMAL RHYTHM  Cath:  Date: Results:      METS/Exercise Tolerance:     Hematologic:     (+)      anemia,          Musculoskeletal:       GI/Hepatic:       Renal/Genitourinary:     (+) renal disease,             Endo:     (+)          thyroid problem, hypothyroidism,           Psychiatric/Substance Use:     (+) psychiatric history anxiety and depression       Infectious Disease:       Malignancy:   (+) Malignancy, History of Breast.    Other:            Physical Exam    Airway        Mallampati: II   TM distance: > 3 FB   Neck ROM: full   Mouth opening: > 3 cm    Respiratory Devices and Support         Dental       (+) Multiple crowns, permanant bridges      Cardiovascular   cardiovascular exam normal          Pulmonary   pulmonary exam normal                OUTSIDE LABS:  CBC:   Lab Results   Component Value Date    WBC 5.9 01/31/2024    WBC 6.2 01/30/2024    HGB 8.5 (L) 01/31/2024    HGB 6.7 (LL) 01/30/2024    HCT 26.5 (L) 01/31/2024    HCT 22.3 (L) 01/30/2024     (L) 01/31/2024     01/30/2024     BMP:   Lab Results   Component Value Date     01/31/2024     01/30/2024    POTASSIUM 4.3 01/31/2024    POTASSIUM 4.4 01/30/2024    CHLORIDE 108 (H) 01/31/2024    CHLORIDE 105 01/30/2024    CO2 22 01/31/2024    CO2 20 (L) 01/30/2024    BUN 33.0 (H) 01/31/2024    BUN 34.6 (H) 01/30/2024    CR 1.99 (H) 01/31/2024    CR 1.95 (H) 01/30/2024    GLC 85 01/31/2024    GLC 91 01/30/2024     COAGS: No results found for: \"PTT\", \"INR\", \"FIBR\"  POC: No results found for: \"BGM\", \"HCG\", \"HCGS\"  HEPATIC:   Lab Results " "  Component Value Date    ALBUMIN 3.5 01/30/2024    PROTTOTAL 7.0 01/30/2024    ALT 7 01/30/2024    AST 14 01/30/2024    ALKPHOS 58 01/30/2024    BILITOTAL 0.3 01/30/2024     OTHER:   Lab Results   Component Value Date    LACT 0.7 12/25/2023    ANIKA 8.5 (L) 01/31/2024    MAG 1.8 12/28/2023    LIPASE 533 (H) 10/06/2020       Anesthesia Plan    ASA Status:  4       Anesthesia Type: General.              Consents    Anesthesia Plan(s) and associated risks, benefits, and realistic alternatives discussed. Questions answered and patient/representative(s) expressed understanding.     - Discussed: Risks, Benefits and Alternatives for BOTH SEDATION and the PROCEDURE were discussed     - Discussed with:  Patient            Postoperative Care       PONV prophylaxis: Background Propofol Infusion     Comments:               Micheal Chaney, NADIR CRNA    I have reviewed the pertinent notes and labs in the chart from the past 30 days and (re)examined the patient.  Any updates or changes from those notes are reflected in this note.     # Hyponatremia: Lowest Na = 135 mmol/L in last 30 days, will monitor as appropriate  # Hypercalcemia: Highest Ca = 10.8 mg/dL in last 30 days, will monitor as appropriate        # Drug Induced Platelet Defect: home medication list includes an antiplatelet medication  # Overweight: Estimated body mass index is 26.34 kg/m  as calculated from the following:    Height as of this encounter: 1.575 m (5' 2\").    Weight as of this encounter: 65.3 kg (144 lb).      "

## 2024-01-31 NOTE — ED TRIAGE NOTES
Hgb 6.7, advised to present to ED for blood transfusion. Denies bloody stools. States may be bleeding in abdomen because she has a hiatal hernia. Pt denies abd pain and N/V.      Triage Assessment (Adult)       Row Name 01/30/24 8183          Triage Assessment    Airway WDL WDL        Respiratory WDL    Respiratory WDL WDL        Skin Circulation/Temperature WDL    Skin Circulation/Temperature WDL WDL        Cardiac WDL    Cardiac WDL X;rhythm     Pulse Rate & Regularity tachycardic        Peripheral/Neurovascular WDL    Peripheral Neurovascular WDL WDL        Cognitive/Neuro/Behavioral WDL    Cognitive/Neuro/Behavioral WDL WDL

## 2024-01-31 NOTE — ED NOTES
"[facility]   Admission Handoff    The patient is Shaila Triana, 78 year old who arrived in the ED by CAR from home with a complaint of Abnormal Labs (Hgb 6.7)  . The patient's current symptoms are a recurrence of a past episode and during this time the symptoms have remained the same. In the ED, patient was diagnosed with   Final diagnoses:   None         Needed?: No    Allergies:    Allergies   Allergen Reactions    Codeine Nausea    Dust Mite Extract Other (See Comments)     Per allergy test    Erythromycin Dizziness     Almost passed out    Penicillin [Penicillins] Itching       Past Medical Hx:   Past Medical History:   Diagnosis Date    Depressive disorder 12/4/2006    Essential hypertension 12/4/2006    Hypothyroidism (acquired) 12/4/2006    Invasive ductal carcinoma of breast (H) 8/6/2013    Left.  ER(+) [place](+) her-2-hina (-).  Lumpectomy and sentinel biopsy followed by radiation.  Adjuvant therapy with tamoxifen completed.    Lung nodules 4/19/2016    LLL on abd/pelvis CT 4/12/16.  No change on chest CT May 2017.  No further imaging needed.    Malignant neoplasm of female breast (H) 3/18/2008    Epic    New onset atrial fibrillation (H) 7/31/2020    Pure hypercholesterolemia 12/4/2006    Sleep apnea 12/4/2006       Initial vitals were: BP: (!) 144/97  Pulse: (!) 124  Temp: 97.9  F (36.6  C)  Resp: 18  Height: 157.5 cm (5' 2\")  Weight: 65.3 kg (144 lb)  SpO2: 98 %   Recent vital Signs: BP (!) 158/108   Pulse (!) 123   Temp 98.4  F (36.9  C)   Resp 14   Ht 1.575 m (5' 2\")   Wt 65.3 kg (144 lb)   LMP  (LMP Unknown)   SpO2 95%   BMI 26.34 kg/m      Elimination Status: Continent: Yes     Activity Level: SBA usually transfers to W/C but can walk holding onto things    Fall Status: Reason for falls risk: Other-   mobility and GI bleed  nonskid shoes/slippers when out of bed, arm band in place, patient and family education, assistive device/personal items within reach, " and activity supervised    Baseline Mental status: WDL  Current Mental Status changes: at basesline    Infection present or suspected this encounter: no  Sepsis suspected: No    Isolation type: na    Bariatric equipment needed?: No    In the ED these meds were given:   Medications   metoprolol succinate ER (TOPROL XL) 24 hr tablet 50 mg (50 mg Oral $Given 1/30/24 2044)   pantoprazole (PROTONIX) IV push injection 40 mg (40 mg Intravenous $Given 1/30/24 2031)       Drips running?  No    Home pump  No    Current LDAs: Peripheral IV: Site right; Gauge 18  normal saline and blood    Results:   Labs/Imaging  Ordered and Resulted from Time of ED Arrival Up to the Time of Departure from the ED  Results for orders placed or performed during the hospital encounter of 01/30/24 (from the past 24 hour(s))   Prepare red blood cells (unit)   Result Value Ref Range    Blood Component Type Red Blood Cells     Product Code I0358D11     Unit Status Ready for issue     Unit Number [id number]     CROSSMATCH Compatible     CODING SYSTEM [id number]    Prepare red blood cells (unit)   Result Value Ref Range    Blood Component Type Red Blood Cells     Product Code U7037P42     Unit Status Issued     Unit Number [id number]     CROSSMATCH Compatible     CODING SYSTEM [id number]     ISSUE DATE AND TIME [id number]     UNIT ABO/RH A+     UNIT TYPE ISBT 6200    CBC with platelets differential    Narrative    The following orders were created for panel order CBC with platelets differential.  Procedure                               Abnormality         Status                     ---------                               -----------         ------                     CBC with platelets and d...[[id number]]  Abnormal            Final result                 Please view results for these tests on the individual orders.   Comprehensive metabolic panel   Result Value Ref Range    Sodium 137 135 - 145 mmol/L    Potassium 4.4 3.4 - 5.3 mmol/L    Carbon  Dioxide (CO2) 20 (L) 22 - 29 mmol/L    Anion Gap 12 7 - 15 mmol/L    Urea Nitrogen 34.6 (H) 8.0 - 23.0 mg/dL    Creatinine 1.95 (H) 0.51 - 0.95 mg/dL    GFR Estimate 25 (L) >60 mL/min/1.73m2    Calcium 9.2 8.8 - 10.2 mg/dL    Chloride 105 98 - 107 mmol/L    Glucose 91 70 - 99 mg/dL    Alkaline Phosphatase 58 40 - 150 U/L    AST 14 0 - 45 U/L    ALT 7 0 - 50 U/L    Protein Total 7.0 6.4 - 8.3 g/dL    Albumin 3.5 3.5 - 5.2 g/dL    Bilirubin Total 0.3 <=1.2 mg/dL   ABO/Rh type and screen    Narrative    The following orders were created for panel order ABO/Rh type and screen.  Procedure                               Abnormality         Status                     ---------                               -----------         ------                     Adult Type and Screen[[id number]]                            Final result                 Please view results for these tests on the individual orders.   CBC with platelets and differential   Result Value Ref Range    WBC Count 6.2 4.0 - 11.0 10e3/uL    RBC Count 2.28 (L) 3.80 - 5.20 10e6/uL    Hemoglobin 6.7 (LL) 11.7 - 15.7 g/dL    Hematocrit 22.3 (L) 35.0 - 47.0 %    MCV 98 78 - 100 fL    MCH 29.4 26.5 - 33.0 pg    MCHC 30.0 (L) 31.5 - 36.5 g/dL    RDW 17.8 (H) 10.0 - 15.0 %    Platelet Count 156 150 - 450 10e3/uL    % Neutrophils 66 %    % Lymphocytes 18 %    % Monocytes 11 %    % Eosinophils 3 %    % Basophils 1 %    % Immature Granulocytes 1 %    NRBCs per 100 WBC 0 <1 /100    Absolute Neutrophils 4.0 1.6 - 8.3 10e3/uL    Absolute Lymphocytes 1.1 0.8 - 5.3 10e3/uL    Absolute Monocytes 0.7 0.0 - 1.3 10e3/uL    Absolute Eosinophils 0.2 0.0 - 0.7 10e3/uL    Absolute Basophils 0.1 0.0 - 0.2 10e3/uL    Absolute Immature Granulocytes 0.1 <=0.4 10e3/uL    Absolute NRBCs 0.0 10e3/uL   Adult Type and Screen   Result Value Ref Range    ABO/RH(D) A POS     Antibody Screen Negative Negative    SPECIMEN EXPIRATION DATE [id number]    Troponin T, High Sensitivity   Result Value  Ref Range    Troponin T, High Sensitivity 20 (H) <=14 ng/L   [place] Draw    Narrative    The following orders were created for panel order [place] Draw.  Procedure                               Abnormality         Status                     ---------                               -----------         ------                     Extra Blue Top Tube[[id number]]                              Final result                 Please view results for these tests on the individual orders.   Extra Blue Top Tube   Result Value Ref Range    Hold Specimen JI    Occult blood stool   Result Value Ref Range    Occult Blood Positive (A) Negative       For the majority of the shift this patient's behavior was Green     Cardiac Rhythm: Atrial rhythm- Afib RVR  Pt needs tele? Yes  Skin/wound Issues: None    Code Status: Full Code    Pain control: good    Nausea control: good    Abnormal labs/tests/findings requiring intervention: hemoglobin    Patient tested for COVID 19 prior to admission: NO     OBS brochure/video discussed/provided to patient/family: N/A     Family present during ED course? No     Family Comments/Social Situation comments: live with boyfriend and son    Tasks needing completion:  blood running at this time    Sofia Resendiz RN

## 2024-01-31 NOTE — MEDICATION SCRIBE - ADMISSION MEDICATION HISTORY
Medication Scribe Admission Medication History    Admission medication history is complete. The information provided in this note is only as accurate as the sources available at the time of the update.    Information Source(s): Patient and CareEverywhere/SureScripts via  in room with patient and finished at desk    Pertinent Information: She states that she was told not to take the Diltiazem, Furosemide and Metoprolol.  So she hasn't for 5 days.  I left them on list just in case of restart.    Changes made to PTA medication list:  Added: None  Deleted: None  Changed: Famotidine from every other day to bid.    Allergies reviewed with patient and updates made in EHR: yes, no change.    Medication History Completed By: Yessi Henry 1/30/2024 9:43 PM    PTA Med List   Medication Sig Note Last Dose    albuterol (PROAIR HFA/PROVENTIL HFA/VENTOLIN HFA) 108 (90 Base) MCG/ACT inhaler Inhale 2 puffs into the lungs every 6 hours as needed for shortness of breath / dyspnea or wheezing  Past Month at Unknown    aspirin (ASA) 81 MG EC tablet Take 81 mg by mouth daily  1/30/2024 at am    aspirin-acetaminophen-caffeine (EXCEDRIN EXTRA STRENGTH) 250-250-65 MG tablet Take 1-2 tablets by mouth daily as needed for headaches  Past Week at Unknown    benzonatate (TESSALON) 100 MG capsule Take 1 capsule (100 mg) by mouth 3 times daily as needed for cough  Past Month at Unknown    calcium carbonate (OS-ANIKA) 1500 (600 Ca) MG tablet Take 600 mg by mouth 2 times daily (with meals)  1/30/2024 at am    famotidine (PEPCID) 20 MG tablet Take 1 tablet (20 mg) by mouth every other day (Patient taking differently: Take 20 mg by mouth 2 times daily)  1/30/2024 at am    fluticasone (VERAMYST) 27.5 MCG/SPRAY nasal spray Spray 2 sprays into both nostrils daily as needed for allergies  Past Week at Unknown    levothyroxine (SYNTHROID/LEVOTHROID) 112 MCG tablet Take 112 mcg by mouth daily  1/30/2024 at am    multivitamin w/minerals (CENTRUM ADULTS)  tablet Take 1 tablet by mouth daily as needed (supplement)  1/30/2024 at am    OVER-THE-COUNTER Take 1 tablet by mouth daily Iron supplement, green tablet, unknown formulation and dose.  1/30/2024 at am    potassium chloride ER (MICRO-K) 10 MEQ CR capsule Take 20 mEq by mouth daily  1/30/2024 at am    rosuvastatin (CRESTOR) 10 MG tablet Take 1 tablet (10 mg) by mouth every evening for 30 days 1/30/2024: Will need a new script of she is to continue taking this.   1/29/2024 at pm    sertraline (ZOLOFT) 100 MG tablet Take 200 mg by mouth every evening  1/29/2024 at pm

## 2024-01-31 NOTE — CONSULTS
Surgical Consultation/History and Physical  [facility]    Shaila is seen in consultation for melena, nausea, abdominal pain, weakness at the request of Dr. Kendall    Chief Complaint:  nausea, abdominal pain, melena, weakness    History of Present Illness: Shaila Triana is a 78 year old female with medical Hx including chronic atrial fibrillation, CHF, hiatal hernia, hypothyroidism, HTN, CKD, anemia who presented with a hemoglobin of 6.7 at her PCP office. She was seen in clinic with a Hgb of 7.0 on 1/29. She presented for follow up Hgb today (1/31/2024) and her hgb was 6.7.   In the ER, her fecal occult was positive. She was noted to be tachycardic, short of breath and feeling lightheaded.   She notes that she has noticed dark, tarry or black colored stools, and states that she did not think this was worrisome because she takes iron supplements.   She has a history of gastric ulcers in 2020. 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   She received 2 units of packed red blood cells in the ER with Hgb recheck 8.5.    She was recently admitted due to hypotension associated with fall and foot fractures. She stopped her metoprolol at this time due to her orthostatic hypotension and associated fall.     Presently, patient states she feels nauseous. She is not hungry. She has epigastric abdominal pain. She feels lightheaded and weak. She is having dark, black stools. She is taking iron. She denies any vomiting, hematemesis, BRBPR.     Patient Active Problem List   Diagnosis    Malignant neoplasm of female breast (H)    Depressive disorder    Essential hypertension    Subclinical hypothyroidism    Invasive ductal carcinoma of breast (H)    Lung nodules    Chronic atrial fibrillation (H)    Pure hypercholesterolemia    Sleep apnea    Dizziness    Chest pain, unspecified type    CKD (chronic kidney disease) stage 3, GFR 30-59 ml/min (H)    Elevated lipase     Pancreatic abnormality on CT    Chronic anticoagulation    Ascending aorta dilatation (H24)    Acute respiratory failure with hypoxia and hypercarbia (H)    Community acquired pneumonia, unspecified laterality    Upper GI bleed    Anemia due to blood loss, acute    Chronic diastolic congestive heart failure (H)       Past Medical History:   Diagnosis Date    Anemia     Anemia, unspecified type     Depressive disorder 2006    Essential hypertension 2006    Hypothyroidism (acquired) 2006    Invasive ductal carcinoma of breast (H) 2013    Left.  ER(+) CT(+) her-2-hina (-).  Lumpectomy and sentinel biopsy followed by radiation.  Adjuvant therapy with tamoxifen completed.    Lung nodules 2016    LLL on abd/pelvis CT 16.  No change on chest CT May 2017.  No further imaging needed.    Malignant neoplasm of female breast (H) 2008    Epic    New onset atrial fibrillation (H) 2020    Pure hypercholesterolemia 2006    Sleep apnea 2006       Past Surgical History:   Procedure Laterality Date    APPENDECTOMY          BLEPHAROPLASTY Bilateral 2010    COLONOSCOPY      ESOPHAGOSCOPY, GASTROSCOPY, DUODENOSCOPY (EGD), COMBINED N/A 10/6/2020    Procedure: ESOPHAGOGASTRODUODENOSCOPY, WITH BIOPSY and polypectomy;  Surgeon: Jorge Ramirez MD;  Location: [facility]    HERNIA REPAIR  2013    HYSTERECTOMY, JOSE  1975    LUMPECTOMY BREAST Left     THYROIDECTOMY  Right     Partial    TUBAL LIGATION         Family History   Problem Relation Age of Onset    No Known Problems Mother          age 89 after complications from a fall    Alzheimer Disease Father     Other - See Comments Sister         Polio    Colon Cancer Maternal Aunt     Breast Cancer No family hx of     Ovarian Cancer No family hx of     Prostate Cancer No family hx of        Social History     Tobacco Use    Smoking status: Never    Smokeless tobacco: Never   Substance Use Topics    Alcohol use: Yes      Comment: occ        History   Drug Use Unknown       Current Outpatient Medications   Medication Sig Dispense Refill    bisacodyl (DULCOLAX) 5 MG EC tablet Take two (2) tablet at 4 pm the day before your procedure.  If your procedure is before 11 am, take two (2) additional tablets at 8 pm.  If your procedure is after 11 am, take two (2) additional tablets at 6 am. For additional instructions refer to your colonoscopy prep instructions. 4 tablet 0    bisacodyl (DULCOLAX) 5 MG EC tablet Take two (2) tablet at 4 pm the day before your procedure.  If your procedure is before 11 am, take two (2) additional tablets at 8 pm.  If your procedure is after 11 am, take two (2) additional tablets at 6 am. For additional instructions refer to your colonoscopy prep instructions. 4 tablet 0    polyethylene glycol (MIRALAX) 17 GM/Dose powder - Mix 8.3 oz of miralax powder with 64 oz of gatorade or other sport drink (no red or purple).  For additional instructions refer to your colonoscopy prep instructions. 238 g 0    polyethylene glycol (MIRALAX) 17 GM/Dose powder - Mix 8.3 oz of miralax powder with 64 oz of gatorade or other sport drink (no red or purple).  For additional instructions refer to your colonoscopy prep instructions. 238 g 0       Allergies   Allergen Reactions    Codeine Nausea    Dust Mite Extract Other (See Comments)     Per allergy test    Erythromycin Dizziness     Almost passed out    Penicillin [Penicillins] Itching       Review of Systems:   Constitutional - Denies fevers, weight loss, malaise, lethargy  Neuro - Denies tremors or seizures  Pulmon - Denies SOB, dyspnea, hemoptysis, chronic cough or use of an inhaler  CV - Denies CP, SOB, lower extremity edema, difficulty w/ stairs, has never used NTG  GI - Denies hematemesis, BRBPR,  chronic diarrhea. Endorses nausea, epigastric pain and melena   - Denies hematuria, difficulty voiding, h/o STDs  Hematology - Denies blood clotting disorders, chronic  "anemias  Dermatology - No melanomas or skin cancers  Rheumatology - No h/o RA  Pysch - Denies depression, bipolar d/o or schizophrenia                Physical Exam:   Blood pressure (!) 148/91, pulse 107, temperature 97.9  F (36.6  C), temperature source Oral, resp. rate 16, height 1.575 m (5' 2\"), weight 65.3 kg (144 lb), SpO2 95%.  Temperature Temp  Av.3  F (36.8  C)  Min: 97.9  F (36.6  C)  Max: 98.8  F (37.1  C)   I/O last 3 completed shifts:  In: 1210   Out: -     Constitutional- No acute distress, well nourished, non-toxic  Eyes: Anicteric, no injection.  PERRL  ENT:  Normocephalic, atraumatic  Neck - supple, no LAD  Respiratory- nonlabored breathing on room air  Cardiovascular - heart rate slightly tachycardic to 107, sinus rhythm  Abdomen - Soft, nondistended. No palpable masses or organomegaly. Tenderness with epigastric palpation. No tenderness to percussion.   Neuro - No focal neuro deficits, Alert and oriented x 3  Psych: Appropriate mood and affect  Musculoskeletal: Moves upper and lower extremities appropriately   Skin: Warm, Dry, brisk cap refill          Data:     Recent Labs   Lab Test 24  1409 24  0846 01/30/24  1945 01/15/24  1757   WBC  --  5.9 6.2 8.2   HGB 8.7* 8.5* 6.7* 7.6*   HCT  --  26.5* 22.3* 23.3*   PLT  --  120* 156 164      Recent Labs   Lab Test 24  0846 01/30/24  1945 01/15/24  1757    137 135   POTASSIUM 4.3 4.4 3.7   CHLORIDE 108* 105 99   CO2 22 20* 25   BUN 33.0* 34.6* 50.8*   CR 1.99* 1.95* 2.22*     Recent Labs   Lab Test 24  1810 10/06/20  0322 10/05/20  1050   BILITOTAL 0.3 0.5  --  0.5   ALT 7 35  --  13   AST 14 40*  --  13   ALKPHOS 58 84  --  60   LIPASE  --   --  533* 640*      No lab results found.  Recent Labs   Lab Test 24  0846 01/30/24  1945 01/15/24  1757 23  0451 23  0552   ANIKA 8.5* 9.2 10.8* 7.6* 7.9*   MAG  --   --   --  1.8 1.8     Recent Labs   Lab Test 2446 24 " 01/15/24  1910 01/15/24  1757 12/25/23  1308 12/29/22  2309 12/29/22  1810 10/06/20  0322 10/05/20  1050   ANIONGAP 10 12  --  11   < >  --  11   < > 4   PROTEIN  --   --  Negative  --   --  30*  --   --   --    ALBUMIN  --  3.5  --   --   --   --  3.7  --  3.4    < > = values in this interval not displayed.          Assessment and Plan:     Shaila Triana is a 78 year old female who presents with symptomatic anemia.     We will plan for   COLONOSCOPY, WITH UPPER GASTROINTESTINAL TRACT ENDOSCOPY tomorrow 2/1/2024    - Begin bowel prep for colonoscopy  -Patient is okay to have clear liquids until midnight  -protonix IV  -Appreciate medical management of patient's hypotension, anemia and other medical conditions   - Encourage ambulation (as appropriate given hypotension) and IS                       SABAS GREEN PA-C on 1/31/2024 at 2:37 PM

## 2024-01-31 NOTE — ED NOTES
Observation Brochure and Video    Patient informed of observation status based on provider's order.  Observation brochure was given. Patient stated understanding. Questions answered.  Naina Fair RN

## 2024-01-31 NOTE — ED NOTES
Upon RN assessment pt was stable on feet and was able to transfer from wheelchair to bed SBA. Pt is wearing a cam boot on the right foot due to a fracture from a fall. Pt reports recent weakness, dizziness, and fatigue. Pt here with low hmg.  HR is tachy 120s-130s pt denies chest pain or sob. Pt is alert and oriented. Denies having black tarry stools but dose note a increase in soft stools.

## 2024-01-31 NOTE — ED NOTES
Blood complete (uneventful), patient up to bedside commode with assist of one, unable to bear weight on right leg, wearing cam boot.  Denies pain, is resting in between cares.

## 2024-01-31 NOTE — ED PROVIDER NOTES
History     Chief Complaint   Patient presents with    Abnormal Labs     Hgb 6.7     HPI  Shaila Triana is a 78 year old female who presenting with a history of breast cancer, hypertension hypothyroidism chronic atrial fibrillation chronic anemia.  She presents here with tachycardia 124 bpm and a hemoglobin of 6.7.  When she was seen in the emergency department on the 14th she was given a unit of packed red cells for hemoglobin of 7 6.  At the time she had had multiple complaints of shortness of weakness and dizziness.  She been admitted to [facility] recently.  She had undergone recent head and chest imaging that was negative.  She felt particularly weak.    She is noted that she has been overall fatigued and a sense of dyspnea on exertion.  No obvious chest pain.  Has a history of atrial fibrillation and has a Watchman device in place.  Not on anticoagulation.  She takes aspirin.      She tells me that she has darker stools but attributes that to iron.  She has no abdominal pain or chest pain.      Lab Results   Component Value Date    HGB 7.6 01/15/2024    HGB 8.8 12/28/2023    HGB 12.0 04/17/2021    HGB 8.9 10/07/2020         Allergies:  Allergies   Allergen Reactions    Codeine Nausea    Dust Mite Extract Other (See Comments)     Per allergy test    Erythromycin Dizziness     Almost passed out    Penicillin [Penicillins] Itching       Problem List:    Patient Active Problem List    Diagnosis Date Noted    Acute respiratory failure with hypoxia and hypercarbia (H) 12/25/2023     Priority: Medium    Community acquired pneumonia, unspecified laterality 12/25/2023     Priority: Medium    Anemia 10/05/2020     Priority: Medium    Dizziness 10/05/2020     Priority: Medium    Anemia, unspecified type 10/05/2020     Priority: Medium    Chest pain, unspecified type 10/05/2020     Priority: Medium    CKD (chronic kidney disease) stage 3, GFR 30-59 ml/min (H) 10/05/2020     Priority: Medium    Elevated lipase 10/05/2020      Priority: Medium    Pancreatic abnormality on CT 10/05/2020     Priority: Medium     Incidental finding on CT 10/5/2020 -  New 8 mm lucency of the pancreas. Likely benign but needs follow-up.      Chronic anticoagulation 10/05/2020     Priority: Medium     Due to atrial fibrillation       Ascending aorta dilatation (H24) 10/05/2020     Priority: Medium     Noted on echo August 2020, stable on CT 10/5/2020.      Chronic atrial fibrillation (H) 07/31/2020     Priority: Medium    Lung nodules 04/19/2016     Priority: Medium     LLL on abd/pelvis CT 4/12/16.  No change on chest CT May 2017.  No further imaging needed.      Invasive ductal carcinoma of breast (H) 08/06/2013     Priority: Medium     Left.  ER(+) RI(+) her-2-hina (-).  Lumpectomy and sentinel biopsy followed by radiation.  Adjuvant therapy with tamoxifen completed.      Malignant neoplasm of female breast (H) 03/18/2008     Priority: Medium     Epic      Depressive disorder 12/04/2006     Priority: Medium    Essential hypertension 12/04/2006     Priority: Medium    Hypothyroidism (acquired) 12/04/2006     Priority: Medium    Pure hypercholesterolemia 12/04/2006     Priority: Medium    Sleep apnea 12/04/2006     Priority: Medium        Past Medical History:    Past Medical History:   Diagnosis Date    Depressive disorder 12/4/2006    Essential hypertension 12/4/2006    Hypothyroidism (acquired) 12/4/2006    Invasive ductal carcinoma of breast (H) 8/6/2013    Lung nodules 4/19/2016    Malignant neoplasm of female breast (H) 3/18/2008    New onset atrial fibrillation (H) 7/31/2020    Pure hypercholesterolemia 12/4/2006    Sleep apnea 12/4/2006       Past Surgical History:    Past Surgical History:   Procedure Laterality Date    APPENDECTOMY      1957    BLEPHAROPLASTY Bilateral 2010    COLONOSCOPY  2005    ESOPHAGOSCOPY, GASTROSCOPY, DUODENOSCOPY (EGD), COMBINED N/A 10/6/2020    Procedure: ESOPHAGOGASTRODUODENOSCOPY, WITH BIOPSY and polypectomy;  Surgeon:  Jorge Ramirez MD;  Location: [facility]    HERNIA REPAIR  2013    HYSTERECTOMY, JOSE  1975    LUMPECTOMY BREAST Left 2008    THYROIDECTOMY  Right     Partial    TUBAL LIGATION         Family History:    Family History   Problem Relation Age of Onset    No Known Problems Mother          age 89 after complications from a fall    Alzheimer Disease Father     Other - See Comments Sister         Polio    Colon Cancer Maternal Aunt     Breast Cancer No family hx of     Ovarian Cancer No family hx of     Prostate Cancer No family hx of        Social History:  Marital Status:   [4]  Social History     Tobacco Use    Smoking status: Never    Smokeless tobacco: Never   Substance Use Topics    Alcohol use: Yes     Comment: occ    Drug use: Never        Medications:    albuterol (PROAIR HFA/PROVENTIL HFA/VENTOLIN HFA) 108 (90 Base) MCG/ACT inhaler  aspirin (ASA) 81 MG EC tablet  aspirin-acetaminophen-caffeine (EXCEDRIN EXTRA STRENGTH) 250-250-65 MG tablet  benzonatate (TESSALON) 100 MG capsule  calcium carbonate (OS-ANIKA) 1500 (600 Ca) MG tablet  DILT- MG 24 hr capsule  famotidine (PEPCID) 20 MG tablet  fluticasone (VERAMYST) 27.5 MCG/SPRAY nasal spray  furosemide (LASIX) 40 MG tablet  levothyroxine (SYNTHROID/LEVOTHROID) 112 MCG tablet  metoprolol succinate ER (TOPROL-XL) 100 MG 24 hr tablet  multivitamin w/minerals (CENTRUM ADULTS) tablet  OVER-THE-COUNTER  potassium chloride ER (MICRO-K) 10 MEQ CR capsule  rosuvastatin (CRESTOR) 10 MG tablet  sertraline (ZOLOFT) 100 MG tablet          Review of Systems   Constitutional:  Positive for fatigue. Negative for chills, diaphoresis and fever.   HENT:  Negative for ear pain, sinus pressure and sore throat.    Eyes:  Negative for visual disturbance.   Respiratory:  Positive for shortness of breath. Negative for cough and wheezing.    Cardiovascular:  Negative for chest pain and palpitations.   Gastrointestinal:  Negative for abdominal pain, blood in stool, constipation,  "diarrhea, nausea and vomiting.   Genitourinary:  Negative for dysuria, frequency and urgency.   Skin:  Negative for rash.   Neurological:  Negative for headaches.   All other systems reviewed and are negative.      Physical Exam   BP: (!) 144/97  Pulse: (!) 124  Temp: 97.9  F (36.6  C)  Resp: 18  Height: 157.5 cm (5' 2\")  Weight: 65.3 kg (144 lb)  SpO2: 98 %      Physical Exam  Constitutional:       General: She is in acute distress.      Appearance: She is diaphoretic.   Eyes:      Conjunctiva/sclera: Conjunctivae normal.   Cardiovascular:      Rate and Rhythm: Tachycardia present. Rhythm irregular.      Heart sounds: No murmur heard.  Pulmonary:      Effort: Pulmonary effort is normal. No respiratory distress.      Breath sounds: Normal breath sounds. No stridor. No wheezing or rhonchi.   Abdominal:      General: Abdomen is flat. There is no distension.      Palpations: Abdomen is soft. There is no mass.      Tenderness: There is no abdominal tenderness. There is no guarding.   Musculoskeletal:      Cervical back: Neck supple.      Right lower leg: No edema.      Left lower leg: No edema.   Skin:     Coloration: Skin is not pale.      Findings: No rash.   Neurological:      Mental Status: She is alert.      Motor: No weakness.       The rectal exam is with dark stool that I suspect is melanotic.  Patient notes that she is on iron but this looks more melanotic to me.     ED Course               EKG Interpretation:      Interpreted by Andrew Kendall MD     EKG done at 755 hours demonstrates a atrial fibrillation at 115 bpm with a normal axis and no ST change.  There is no T wave change.  There is a normal R progression and no Q waves.  Normal intervals.  This with exception of an absent MA.  Normal conduction.  Impression atrial fibrillation 115 bpm no acute change.         Procedures              Critical Care time:  none               Results for orders placed or performed during the hospital encounter of " 01/30/24 (from the past 24 hour(s))   Prepare red blood cells (unit)   Result Value Ref Range    Blood Component Type Red Blood Cells     Product Code T4358O45     Unit Status Ready for issue     Unit Number [id number]     CROSSMATCH Compatible     CODING SYSTEM QFOZ333    Prepare red blood cells (unit)   Result Value Ref Range    Blood Component Type Red Blood Cells     Product Code M7217T45     Unit Status Issued     Unit Number [id number]     CROSSMATCH Compatible     CODING SYSTEM BTIP101     ISSUE DATE AND TIME [id number]     UNIT ABO/RH A+     UNIT TYPE ISBT 6200    CBC with platelets differential    Narrative    The following orders were created for panel order CBC with platelets differential.  Procedure                               Abnormality         Status                     ---------                               -----------         ------                     CBC with platelets and d...[[id number]]  Abnormal            Final result                 Please view results for these tests on the individual orders.   Comprehensive metabolic panel   Result Value Ref Range    Sodium 137 135 - 145 mmol/L    Potassium 4.4 3.4 - 5.3 mmol/L    Carbon Dioxide (CO2) 20 (L) 22 - 29 mmol/L    Anion Gap 12 7 - 15 mmol/L    Urea Nitrogen 34.6 (H) 8.0 - 23.0 mg/dL    Creatinine 1.95 (H) 0.51 - 0.95 mg/dL    GFR Estimate 25 (L) >60 mL/min/1.73m2    Calcium 9.2 8.8 - 10.2 mg/dL    Chloride 105 98 - 107 mmol/L    Glucose 91 70 - 99 mg/dL    Alkaline Phosphatase 58 40 - 150 U/L    AST 14 0 - 45 U/L    ALT 7 0 - 50 U/L    Protein Total 7.0 6.4 - 8.3 g/dL    Albumin 3.5 3.5 - 5.2 g/dL    Bilirubin Total 0.3 <=1.2 mg/dL   ABO/Rh type and screen    Narrative    The following orders were created for panel order ABO/Rh type and screen.  Procedure                               Abnormality         Status                     ---------                               -----------         ------                     Adult Type and  Screen[[id number]]                            Final result                 Please view results for these tests on the individual orders.   CBC with platelets and differential   Result Value Ref Range    WBC Count 6.2 4.0 - 11.0 10e3/uL    RBC Count 2.28 (L) 3.80 - 5.20 10e6/uL    Hemoglobin 6.7 (LL) 11.7 - 15.7 g/dL    Hematocrit 22.3 (L) 35.0 - 47.0 %    MCV 98 78 - 100 fL    MCH 29.4 26.5 - 33.0 pg    MCHC 30.0 (L) 31.5 - 36.5 g/dL    RDW 17.8 (H) 10.0 - 15.0 %    Platelet Count 156 150 - 450 10e3/uL    % Neutrophils 66 %    % Lymphocytes 18 %    % Monocytes 11 %    % Eosinophils 3 %    % Basophils 1 %    % Immature Granulocytes 1 %    NRBCs per 100 WBC 0 <1 /100    Absolute Neutrophils 4.0 1.6 - 8.3 10e3/uL    Absolute Lymphocytes 1.1 0.8 - 5.3 10e3/uL    Absolute Monocytes 0.7 0.0 - 1.3 10e3/uL    Absolute Eosinophils 0.2 0.0 - 0.7 10e3/uL    Absolute Basophils 0.1 0.0 - 0.2 10e3/uL    Absolute Immature Granulocytes 0.1 <=0.4 10e3/uL    Absolute NRBCs 0.0 10e3/uL   Adult Type and Screen   Result Value Ref Range    ABO/RH(D) A POS     Antibody Screen Negative Negative    SPECIMEN EXPIRATION DATE [id number]    Troponin T, High Sensitivity   Result Value Ref Range    Troponin T, High Sensitivity 20 (H) <=14 ng/L   [facility] Draw    Narrative    The following orders were created for panel order [facility].  Procedure                               Abnormality         Status                     ---------                               -----------         ------                     Extra Blue Top Tube[[id number]]                              Final result                 Please view results for these tests on the individual orders.   Extra Blue Top Tube   Result Value Ref Range    Hold Specimen JIC    Occult blood stool   Result Value Ref Range    Occult Blood Positive (A) Negative       Medications - No data to display    Assessments & Plan (with Medical Decision Making)     MDM: Shaila Triana is a 78 year old  female presents with anemia outpatient called to come in for hemoglobin of 6.7.  She has a rectal exam demonstrating darker stool that I suspect is melanotic.  This could also be her iron supplements that she is taking but more suspicious for melena.  Is not significantly active.  I did recommend that patient remain in the hospital initiating transfusion protocol awaiting recheck of the hemoglobin and blood typing.  She also has a tachycardia which I suspect is in A-fib RVR.  She does have a Watchman device and is not on anticoagulation but is on daily aspirin.    Given that she was just in the emergency department 2 weeks ago also for anemia and required transfusion of a unit now her hemoglobin is lower and she has findings suggestive of upper GI bleed would recommend that she remain in the hospital for evaluation and consideration for upper endoscopy either now or in follow-up.  I have initiated proton pump inhibitor with Protonix and had her sign a consent for blood.        I have reviewed the nursing notes.    I have reviewed the findings, diagnosis, plan and need for follow up with the patient.       ED to Inpatient Handoff:    Discussed with Dr. Abdul   Patient accepted for Observation Stay  Pending studies include none  Code Status: Not Addressed             Medical Decision Making  The patient's presentation was of moderate complexity (an acute illness with systemic symptoms).    The patient's evaluation involved:  ordering and/or review of 3+ test(s) in this encounter (see separate area of note for details)    The patient's management necessitated moderate risk (prescription drug management including medications given in the ED) and high risk (drug therapy requiring intensive monitoring (see separate area of note for details)).        New Prescriptions    No medications on file       Final diagnoses:   Upper GI bleed   Anemia due to blood loss, acute       1/30/2024   [facility] EMERGENCY  DEPT       Andrew Kendall MD  01/30/24 3523

## 2024-01-31 NOTE — H&P (VIEW-ONLY)
Surgical Consultation/History and Physical  [facility]    Shaila is seen in consultation for melena, nausea, abdominal pain, weakness at the request of Dr. Kendall    Chief Complaint:  nausea, abdominal pain, melena, weakness    History of Present Illness: Shaila Triana is a 78 year old female with medical Hx including chronic atrial fibrillation, CHF, hiatal hernia, hypothyroidism, HTN, CKD, anemia who presented with a hemoglobin of 6.7 at her PCP office. She was seen in clinic with a Hgb of 7.0 on 1/29. She presented for follow up Hgb today (1/31/2024) and her hgb was 6.7.   In the ER, her fecal occult was positive. She was noted to be tachycardic, short of breath and feeling lightheaded.   She notes that she has noticed dark, tarry or black colored stools, and states that she did not think this was worrisome because she takes iron supplements.   She has a history of gastric ulcers in 2020. 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   She received 2 units of packed red blood cells in the ER with Hgb recheck 8.5.    She was recently admitted due to hypotension associated with fall and foot fractures. She stopped her metoprolol at this time due to her orthostatic hypotension and associated fall.     Presently, patient states she feels nauseous. She is not hungry. She has epigastric abdominal pain. She feels lightheaded and weak. She is having dark, black stools. She is taking iron. She denies any vomiting, hematemesis, BRBPR.     Patient Active Problem List   Diagnosis    Malignant neoplasm of female breast (H)    Depressive disorder    Essential hypertension    Subclinical hypothyroidism    Invasive ductal carcinoma of breast (H)    Lung nodules    Chronic atrial fibrillation (H)    Pure hypercholesterolemia    Sleep apnea    Dizziness    Chest pain, unspecified type    CKD (chronic kidney disease) stage 3, GFR 30-59 ml/min (H)    Elevated lipase     Pancreatic abnormality on CT    Chronic anticoagulation    Ascending aorta dilatation (H24)    Acute respiratory failure with hypoxia and hypercarbia (H)    Community acquired pneumonia, unspecified laterality    Upper GI bleed    Anemia due to blood loss, acute    Chronic diastolic congestive heart failure (H)       Past Medical History:   Diagnosis Date    Anemia     Anemia, unspecified type     Depressive disorder 2006    Essential hypertension 2006    Hypothyroidism (acquired) 2006    Invasive ductal carcinoma of breast (H) 2013    Left.  ER(+) WA(+) her-2-hina (-).  Lumpectomy and sentinel biopsy followed by radiation.  Adjuvant therapy with tamoxifen completed.    Lung nodules 2016    LLL on abd/pelvis CT 16.  No change on chest CT May 2017.  No further imaging needed.    Malignant neoplasm of female breast (H) 2008    Epic    New onset atrial fibrillation (H) 2020    Pure hypercholesterolemia 2006    Sleep apnea 2006       Past Surgical History:   Procedure Laterality Date    APPENDECTOMY          BLEPHAROPLASTY Bilateral 2010    COLONOSCOPY      ESOPHAGOSCOPY, GASTROSCOPY, DUODENOSCOPY (EGD), COMBINED N/A 10/6/2020    Procedure: ESOPHAGOGASTRODUODENOSCOPY, WITH BIOPSY and polypectomy;  Surgeon: Jorge Ramirez MD;  Location: [facility]    HERNIA REPAIR  2013    HYSTERECTOMY, JOSE  1975    LUMPECTOMY BREAST Left     THYROIDECTOMY  Right     Partial    TUBAL LIGATION         Family History   Problem Relation Age of Onset    No Known Problems Mother          age 89 after complications from a fall    Alzheimer Disease Father     Other - See Comments Sister         Polio    Colon Cancer Maternal Aunt     Breast Cancer No family hx of     Ovarian Cancer No family hx of     Prostate Cancer No family hx of        Social History     Tobacco Use    Smoking status: Never    Smokeless tobacco: Never   Substance Use Topics    Alcohol use: Yes      Comment: occ        History   Drug Use Unknown       Current Outpatient Medications   Medication Sig Dispense Refill    bisacodyl (DULCOLAX) 5 MG EC tablet Take two (2) tablet at 4 pm the day before your procedure.  If your procedure is before 11 am, take two (2) additional tablets at 8 pm.  If your procedure is after 11 am, take two (2) additional tablets at 6 am. For additional instructions refer to your colonoscopy prep instructions. 4 tablet 0    bisacodyl (DULCOLAX) 5 MG EC tablet Take two (2) tablet at 4 pm the day before your procedure.  If your procedure is before 11 am, take two (2) additional tablets at 8 pm.  If your procedure is after 11 am, take two (2) additional tablets at 6 am. For additional instructions refer to your colonoscopy prep instructions. 4 tablet 0    polyethylene glycol (MIRALAX) 17 GM/Dose powder - Mix 8.3 oz of miralax powder with 64 oz of gatorade or other sport drink (no red or purple).  For additional instructions refer to your colonoscopy prep instructions. 238 g 0    polyethylene glycol (MIRALAX) 17 GM/Dose powder - Mix 8.3 oz of miralax powder with 64 oz of gatorade or other sport drink (no red or purple).  For additional instructions refer to your colonoscopy prep instructions. 238 g 0       Allergies   Allergen Reactions    Codeine Nausea    Dust Mite Extract Other (See Comments)     Per allergy test    Erythromycin Dizziness     Almost passed out    Penicillin [Penicillins] Itching       Review of Systems:   Constitutional - Denies fevers, weight loss, malaise, lethargy  Neuro - Denies tremors or seizures  Pulmon - Denies SOB, dyspnea, hemoptysis, chronic cough or use of an inhaler  CV - Denies CP, SOB, lower extremity edema, difficulty w/ stairs, has never used NTG  GI - Denies hematemesis, BRBPR,  chronic diarrhea. Endorses nausea, epigastric pain and melena   - Denies hematuria, difficulty voiding, h/o STDs  Hematology - Denies blood clotting disorders, chronic  "anemias  Dermatology - No melanomas or skin cancers  Rheumatology - No h/o RA  Pysch - Denies depression, bipolar d/o or schizophrenia                Physical Exam:   Blood pressure (!) 148/91, pulse 107, temperature 97.9  F (36.6  C), temperature source Oral, resp. rate 16, height 1.575 m (5' 2\"), weight 65.3 kg (144 lb), SpO2 95%.  Temperature Temp  Av.3  F (36.8  C)  Min: 97.9  F (36.6  C)  Max: 98.8  F (37.1  C)   I/O last 3 completed shifts:  In: 1210   Out: -     Constitutional- No acute distress, well nourished, non-toxic  Eyes: Anicteric, no injection.  PERRL  ENT:  Normocephalic, atraumatic  Neck - supple, no LAD  Respiratory- nonlabored breathing on room air  Cardiovascular - heart rate slightly tachycardic to 107, sinus rhythm  Abdomen - Soft, nondistended. No palpable masses or organomegaly. Tenderness with epigastric palpation. No tenderness to percussion.   Neuro - No focal neuro deficits, Alert and oriented x 3  Psych: Appropriate mood and affect  Musculoskeletal: Moves upper and lower extremities appropriately   Skin: Warm, Dry, brisk cap refill          Data:     Recent Labs   Lab Test 24  1409 24  0846 01/30/24  1945 01/15/24  1757   WBC  --  5.9 6.2 8.2   HGB 8.7* 8.5* 6.7* 7.6*   HCT  --  26.5* 22.3* 23.3*   PLT  --  120* 156 164      Recent Labs   Lab Test 24  0846 01/30/24  1945 01/15/24  1757    137 135   POTASSIUM 4.3 4.4 3.7   CHLORIDE 108* 105 99   CO2 22 20* 25   BUN 33.0* 34.6* 50.8*   CR 1.99* 1.95* 2.22*     Recent Labs   Lab Test 24  1810 10/06/20  0322 10/05/20  1050   BILITOTAL 0.3 0.5  --  0.5   ALT 7 35  --  13   AST 14 40*  --  13   ALKPHOS 58 84  --  60   LIPASE  --   --  533* 640*      No lab results found.  Recent Labs   Lab Test 24  0846 01/30/24  1945 01/15/24  1757 23  0451 23  0552   ANIKA 8.5* 9.2 10.8* 7.6* 7.9*   MAG  --   --   --  1.8 1.8     Recent Labs   Lab Test 2446 24 " 01/15/24  1910 01/15/24  1757 12/25/23  1308 12/29/22  2309 12/29/22  1810 10/06/20  0322 10/05/20  1050   ANIONGAP 10 12  --  11   < >  --  11   < > 4   PROTEIN  --   --  Negative  --   --  30*  --   --   --    ALBUMIN  --  3.5  --   --   --   --  3.7  --  3.4    < > = values in this interval not displayed.          Assessment and Plan:     Shaila Triana is a 78 year old female who presents with symptomatic anemia.     We will plan for   COLONOSCOPY, WITH UPPER GASTROINTESTINAL TRACT ENDOSCOPY tomorrow 2/1/2024    - Begin bowel prep for colonoscopy  -Patient is okay to have clear liquids until midnight  -protonix IV  -Appreciate medical management of patient's hypotension, anemia and other medical conditions   - Encourage ambulation (as appropriate given hypotension) and IS                       SABAS GREEN PA-C on 1/31/2024 at 2:37 PM

## 2024-01-31 NOTE — PROGRESS NOTES
"[facility] ADMISSION NOTE    Patient admitted to room 2403 at approximately 1130 via cart from emergency room. Patient was accompanied by staff.  Verbal SBAR report received from PERCY Sepulveda RN prior to patient arrival.     Patient trasferred to bed via pivot/2 assist. Patient alert and oriented X 3. The patient is not having any pain. Admission vital signs: Blood pressure (!) 148/91, pulse 107, temperature 97.9  F (36.6  C), temperature source Oral, resp. rate 16, height 1.575 m (5' 2\"), weight 65.3 kg (144 lb), SpO2 95%. Patient was oriented to plan of care, call light, bed controls, tv, telephone, bathroom, and visiting hours.     Risk Assessment    The following safety risks were identified during admission: fall and skin. Yellow risk band applied: YES.     Skin Initial Assessment    This writer admitted this patient and completed a full skin assessment and Fabricio score in the Adult PCS flowsheet. Appropriate interventions initiated as needed.     Secondary skin check completed by Rachell SANTIAGO RN.       Education    Patient has a [place] to Observation order: Yes  Observation education completed and documented: Yes      Estephania Stern RN      "

## 2024-02-01 NOTE — ANESTHESIA POSTPROCEDURE EVALUATION
Patient: Shaila Triana    Procedure: Procedure(s):  ESOPHAGOGASTRODUODENOSCOPY, WITH BIOPSY  Colonoscopy       Anesthesia Type:  General    Note:  Disposition: Outpatient   Postop Pain Control: Uneventful            Sign Out: Well controlled pain   PONV: No   Neuro/Psych: Uneventful            Sign Out: Acceptable/Baseline neuro status   Airway/Respiratory: Uneventful            Sign Out: Acceptable/Baseline resp. status   CV/Hemodynamics: Uneventful            Sign Out: Acceptable CV status; No obvious hypovolemia; No obvious fluid overload   Other NRE: NONE   DID A NON-ROUTINE EVENT OCCUR? No           Last vitals:  Vitals Value Taken Time   /94 02/01/24 0845   Temp 36.3  C (97.3  F) 02/01/24 0828   Pulse 115 02/01/24 0845   Resp 16 02/01/24 0845   SpO2 93 % 02/01/24 0857   Vitals shown include unfiled device data.    Electronically Signed By: NADIR Silvestre CRNA  February 1, 2024  8:58 AM

## 2024-02-01 NOTE — PLAN OF CARE
Problem: Gastrointestinal Bleeding  Goal: Optimal Coping with Acute Illness  Outcome: Progressing     Problem: Gastrointestinal Bleeding  Goal: Hemostasis  Outcome: Progressing   Goal Outcome Evaluation: Hgb @ 2200 was stable @ 8.5, 8.3 this am. Pt finished drinking all of her bowel prep shortly after midnight & has been NPO since then. Stools have progressed from black liquid to thin tan. Pt received tylenol @ 2303 for c/o headache w/ good relief.

## 2024-02-01 NOTE — CONSULTS
Care Management Initial Consult    General Information  Assessment completed with: Patient,    Type of CM/SW Visit: Initial Assessment    Primary Care Provider verified and updated as needed: Yes   Readmission within the last 30 days: no previous admission in last 30 days      Reason for Consult: other (see comments) (elevated risk score)  Advance Care Planning: Advance Care Planning Reviewed: no concerns identified          Communication Assessment  Patient's communication style: spoken language (English or Bilingual)    Hearing Difficulty or Deaf: yes   Wear Glasses or Blind: no    Cognitive  Cognitive/Neuro/Behavioral: WDL                      Living Environment:   People in home: child(no), adult, significant other  son and sons girlfriend  Current living Arrangements: house      Able to return to prior arrangements:         Family/Social Support:  Care provided by: self, spouse/significant other  Provides care for: no one  Marital Status: Lives with Significant Other  Significant Other, Children          Description of Support System: Supportive, Involved         Current Resources:   Patient receiving home care services: Yes  Skilled Home Care Services: Physical Therapy, Occupational Therapy  Community Resources: Home Care  Equipment currently used at home: wheelchair, manual, grab bar, tub/shower, shower chair  Supplies currently used at home: None    Employment/Financial:  Employment Status: retired        Financial Concerns: none           Does the patient's insurance plan have a 3 day qualifying hospital stay waiver?  No    Lifestyle & Psychosocial Needs:  Social Determinants of Health     Food Insecurity: Not on file   Depression: Not at risk (7/24/2020)    PHQ-2     PHQ-2 Score: 2   Housing Stability: Not on file   Tobacco Use: Low Risk  (2/1/2024)    Patient History     Smoking Tobacco Use: Never     Smokeless Tobacco Use: Never     Passive Exposure: Not on file   Financial Resource Strain: Not on file    Alcohol Use: Not on file   Transportation Needs: Not on file   Physical Activity: Not on file   Interpersonal Safety: Not on file   Stress: Not on file   Social Connections: Not on file       Functional Status:  Prior to admission patient needed assistance:   Dependent ADLs:: Bathing, Wheelchair-with assist, Wheelchair-independent  Dependent IADLs:: Cleaning, Cooking, Transportation       Mental Health Status:  Mental Health Status: No Current Concerns       Chemical Dependency Status:  Chemical Dependency Status: No Current Concerns             Values/Beliefs:  Spiritual, Cultural Beliefs, Mosque Practices, Values that affect care: no               Additional Information:  CM consulted due to elevated readmission score. Per MD at IDT rounds pt is not medically stable for discharge today. MD anticipates discharge tomorrow.    CM met bedside with pt and pts son present for assessment. Pt lives in a one level home with 4 steps to enter. Pt lives with her son, his girlfriend and he partner. Pt has a CAM boot and is NWB on R leg, pt uses a wheelchair at baseline that she is able to move herself. Pt requires assistance with bathing and pt does not drive. Pt stated that someone is always home. Pts sons girlfriend does the cleaning and cooking.     Pt stated she currently has homecare with [facility] ([phone] Fax: [phone]) PT/OT. CM sent referral in hub.     Pt given MOON. Pt denied needs going home, pt plans to resume homecare once discharged and denied concerns regarding discharging home. Family to provide transport.    OLENA met with bedside RN Heather to discuss pts ambulation and Heather plans to walk with pt this evening and will notify care team with any concerns. No PT/OT orders in at this time.    OLENA spoke with Phong in intake at [facility] ([phone] Fax: [phone]) who confirmed pt is current with  PT and OT.    Plan: Return home with [facility] ([phone] Fax: [phone])  PT/OT    Transport: Family    Suzie Elizondo RNCM  Care Transitions Registered Nurse  Tele: [phone]

## 2024-02-01 NOTE — PLAN OF CARE
Report given to JAZMINE Romero in Med-surg.  Pt is awake, alert.  VSS.  IV saline locked.  Dr. Alcazar has talked to pt to go over what he saw in procedure.  Pt up in wheel chair and brought upstairs at 0908 AM.

## 2024-02-01 NOTE — ANESTHESIA CARE TRANSFER NOTE
Patient: Shaila Triana    Procedure: Procedure(s):  ESOPHAGOGASTRODUODENOSCOPY, WITH BIOPSY  Colonoscopy       Diagnosis: Upper GI bleed [K92.2]  Lower GI bleed [K92.2]  Diagnosis Additional Information: No value filed.    Anesthesia Type:   General     Note:    Oropharynx: oropharynx clear of all foreign objects and spontaneously breathing  Level of Consciousness: awake  Oxygen Supplementation: room air    Independent Airway: airway patency satisfactory and stable  Dentition: dentition unchanged    Report to RN Given: handoff report given  Patient transferred to: Phase II    Handoff Report: Identifed the Patient, Identified the Reponsible Provider, Reviewed the pertinent medical history, Discussed the surgical course, Reviewed Intra-OP anesthesia mangement and issues during anesthesia, Set expectations for post-procedure period and Allowed opportunity for questions and acknowledgement of understanding    Vitals:  Vitals Value Taken Time   /94 02/01/24 0845   Temp 36.3  C (97.3  F) 02/01/24 0828   Pulse 115 02/01/24 0845   Resp 16 02/01/24 0845   SpO2 93 % 02/01/24 0857   Vitals shown include unfiled device data.    Electronically Signed By: NADIR Silvestre CRNA  February 1, 2024  8:58 AM

## 2024-02-01 NOTE — INTERVAL H&P NOTE
"I have reviewed the surgical (or preoperative) H&P that is linked to this encounter, and examined the patient. There are no significant changes    Clinical Conditions Present on Arrival:  Clinically Significant Risk Factors Present on Admission         # Hyponatremia: Lowest Na = 135 mmol/L in last 30 days, will monitor as appropriate  # Hypercalcemia: Highest Ca = 10.8 mg/dL in last 30 days, will monitor as appropriate        # Drug Induced Platelet Defect: home medication list includes an antiplatelet medication  # Overweight: Estimated body mass index is 26.53 kg/m  as calculated from the following:    Height as of this encounter: 1.575 m (5' 2\").    Weight as of this encounter: 65.8 kg (145 lb 1 oz).       "

## 2024-02-01 NOTE — PLAN OF CARE
Problem: Adult Inpatient Plan of Care    Goal: Plan of Care Review  Outcome: Progressing  Flowsheets (Taken 1/31/2024 1856)  Plan of Care Reviewed With: patient  Goal: Absence of Hospital-Acquired Illness or Injury  Outcome: Progressing  Intervention: Identify and Manage Fall Risk  Recent Flowsheet Documentation  Taken 1/31/2024 1600 by Estephania Stern RN  Safety Promotion/Fall Prevention:   activity supervised   assistive device/personal items within reach   clutter free environment maintained   lighting adjusted   mobility aid in reach   nonskid shoes/slippers when out of bed   patient and family education   room near nurse's station   room organization consistent   safety round/check completed   supervised activity  Intervention: Prevent and Manage VTE (Venous Thromboembolism) Risk  Recent Flowsheet Documentation  Taken 1/31/2024 1600 by Estephania Stern RN  VTE Prevention/Management: SCDs (sequential compression devices) on  Goal: Optimal Comfort and Wellbeing  Outcome: Progressing  Goal: Readiness for Transition of Care  Outcome: Progressing  Intervention: Mutually Develop Transition Plan  Recent Flowsheet Documentation  Taken 1/31/2024 1139 by Estephania Stern RN  Equipment Currently Used at Home:   wheelchair, manual   grab bar, tub/shower   shower chair   Goal Outcome Evaluation:     Plan of Care Reviewed With: patient    Pts VSS, A&Ox4, able to make needs known appropriately. Pt reports feeling more tired than usual, and occasionally forgetful; bed alarm remains on for safety. IV in right AC saline locked. SCD's on intermittently. Pts current hemoglobin; 8.7. Pt complained of a mild headache and mild abdominal discomfort which was relieved with PRN tylenol. No SOB or dizziness noted or reported. Pt did report one episode of feeling mildly nauseous, which was relieved with rest, sips of ice water. Pt up with A1, walker and gait belt, doing well with transferring. Pt reports recent right foot fracture,  wearing brace as tolerated. Pt was told to be as non-weight bearing as possible on RLE while fracture is healing. Pt has macerated/white/moist skin on right upper foot near toes. Right foot open to air at this time. Sticky note to hospitalist for possible WOC consult. I&O's. Bowel prep started @ approx 1830 for colonoscopy/upper endoscopy scheduled for tomorrow; 2/1/24. Pt educated about process, and ideally should be finished with bowel prep by midnight tonight, then proceed to be NPO until procedure tomorrow. Pt reports last BM yesterday 1/30/24. Pt states that she uses a wheelchair primarily to get around at home. TELE on.

## 2024-02-01 NOTE — PROGRESS NOTES
[facility] Hospitalist Progress Note           Assessment & Plan        Shaila Triana is a 78 year old female with past medical hx of chronic afib, hiatal hernia, CHF, hypothyroidism, HTN, CKD, anemia, depression, anxierty who presents on 1/30/2024 with a hgb of 6.7 at her PCP visit.      Normocytic acute blood loss anemia presumed due to upper GI bleed from gastric ulcer  GERD with Large hiatal hernia    Was seen by her PCP 1/29 for hospital follow up (see below) and had a hgb of 7. She returned yesterday for a recheck and it was 6.7, so was sent to the ER. She presents with tachycardia to 120s and hgb 6.7 with positive occult blood. She has had black stools, but thought it was because she takes iron supplement. On previous admission 1/15 she presented with hgb of 7.6 and received 1U PRBC. Workup at this time was negative for hemolysis and iron studies, folate, and B12 were WNL. She was previously admitted 10/2020 for anemia requiring 2 transfusions and found to be iron deficient, thought to be from chronic GI bleeding however no source was found. She endorses fatigue, dizziness, SOB.     Pulse 130s, down to 100s. SBP 130s-170s. SpO2 mid 90s. Occult blood positive. EGD 10/5/20 showed non-bleeding gastric ulcer and multiple gastric polyps. Colonoscopy 2018 showed diverticulosis. BMBx 3/29/23 for w/u of anemia showed indolent mantle cell lymphoma as below.    In ER she received Metoprolol 50mg daily, Pantoprazole 40mg IV BID, and 2U PRBC. Recheck hgb 8.5.   - continued Pantoprazole 40mg IV BID   Held aspirin - reassess on discharge    - Gen surg consulted- Upper and lower endoscopy showed non-bleeding gastric ulcer which was clipped with biopsies pending, which is the likely source for her blood loss.    - started carafate twice daily 2/1 as per surgery   - continued famotidine 20 mg but increased to twice daily   -  anticipate discharging on omeprazole twice daily, famotidine twice daily and carafate twice  daily.  Can wean down at follow-up with primary care provider, but would continue caracate for at least 1 month and anticipate at least continuing a PPI long term, perhaps along with famotidine.    - hemoglobin down slightly 2/1 - following       Orthostatic hypotension  Essential hypertension  Hospitalized 1/15-1/18 for orthostatic hypotension causing a fall and fracture of 2nd and 3rd metatarsals of right foot. She was given albumin and all of her PTA antihypertensives were held (Diltiazem 180mg daily, Metoprolol 150mg BID, Furosemide 40mg daily). She has not noticed any changes since holding her meds.   - given elevated heart rate with elevated blood pressure resumed diltiazem 1/31.   - blood pressure stable to mildly elevated so far.    - checking orthostatic blood pressures with this      Permanent atrial fibrillation s/p Watchman 7/2022  Patient had watchman device placed 2/2 to recurrent anemia requiring blood transfusions while on Eliquis. In review of cardiology documentation 10/4/23 she has been difficult to rate control under 110 but given that she gets lightheaded they had refrained from intensifying her regimen. Her rate doesn't appear to have changed since stopping her meds on recent admission.  - Continue to hold PTA Metoprolol  - Restarted Diltiazem 180mg daily 1/31 since she consistently has pulse 100s-120s and has enough pressure  (SBP 130s-170s).  - held PTA Aspirin 81mg     Chronic HFpEF  TTE 12/20/23 with EF 59%. Trace edema on exam. Previous cardiology note 10/4/23 stated trace edema is baseline for her and it's unlikely they'll be able to get rid of all the edema.  Managed prior to admission with Kcl 20 mEq daily, continue.  - Continue off Lasix - following       CKD (chronic kidney disease) stage 4  Follows with Nephrology outpatient. Has been avoiding NSAIDs. Cr 1.95 on admission (baseline 1.75-2.2) and GFR 25 (baseline 22-29). Stable.   Nephrology note 12/18/23 notes possibility of dialysis  "if GFR worsens.   - remaining baseline      Subclinical hypothyroidism  Appears to have elevated TSH with normal T4 since 4/2022, c/w subclinical hypothyroidism. Today TSH 6.76 and T4 0.85.   Managed prior to admission with Levothyroxine 112mcg.  - Increased dose of Synthroid to 125mcg - primary care provider to order recheck TSH/T4 at follow-up, likely in 4-6 weeks.   Hypothyroidism may cause normocytic anemia but doubt this is significantly contributing here.       Depressive disorder  Managed prior to admission with Sertraline, continued.     Pure hypercholesterolemia  Managed prior to admission with Rosuvastatin 10mg, continued.     Mantle cell lymphoma  Patient has a known history of mantle cell lymphoma for which she has been observed through [place] [facility] as an outpatient. BMBx was 3/29/23 and axillary lymph node biopsy 4/13/23.              Clinically Significant Risk Factors Present on Admission                 # Drug Induced Platelet Defect: home medication list includes an antiplatelet medication   # Hypertension: Noted on problem list      # Overweight: Estimated body mass index is 26.34 kg/m  as calculated from the following:    Height as of this encounter: 1.575 m (5' 2\").    Weight as of this encounter: 65.3 kg (144 lb).               DVT Prophylaxis: Pneumatic Compression Devices given GI bleed as above     Boucher Catheter: Not present    Code Status:  Full Code    Lines: Peripheral IV        Diet  Orders Placed This Encounter      Advance Diet as Tolerated: Regular Diet Adult                  Disposition Plan      Improving. If able to tolerate regular diet, feeling well and hemoglobin remains stable overnight then can discharge home tomorrow.                   Dale Mcpherson MD, MD  Hospitalist Service  [facility]  Securely message with the Vocera Web Console (learn more here)  Text page via Cloud Takeoff Paging/Directory             Interval History:   Improving.  No pain. "  No dyspnea . Still weak feeling                  Review of Systems:    ROS: 10 point ROS neg other than the symptoms noted above in the HPI.           Medications:   Current active medications and PTA medications reviewed, see medication list for details.            Physical Exam:   Vitals were reviewed  Patient Vitals for the past 24 hrs:   BP Temp Temp src Pulse Resp SpO2 Weight   24 0926 -- 97.4  F (36.3  C) Oral -- -- -- --   24 0914 (!) 136/95 -- Oral -- 16 96 % --   24 0901 (!) 132/91 -- -- 102 16 95 % --   24 0845 (!) 128/94 -- -- 115 16 98 % --   24 0836 (!) 128/94 -- -- 114 16 94 % --   24 0828 123/86 97.3  F (36.3  C) Axillary 112 16 94 % --   24 0728 (!) 148/105 -- -- 95 18 95 % --   24 0502 -- -- -- -- -- -- 65.8 kg (145 lb 1 oz)   24 0452 134/87 97.8  F (36.6  C) Oral 72 16 96 % --   24 0015 130/76 97.9  F (36.6  C) Oral 89 16 97 % --   24 1939 134/75 97.8  F (36.6  C) Oral 81 16 95 % --   24 1629 129/87 98.2  F (36.8  C) Oral 96 16 94 % --       Temperatures:  Current - Temp: 97.4  F (36.3  C); Max - Temp  Av.7  F (36.5  C)  Min: 97.3  F (36.3  C)  Max: 98.2  F (36.8  C)  Respiration range: Resp  Av.2  Min: 16  Max: 18  Pulse range: Pulse  Av.3  Min: 72  Max: 115  Blood pressure range: Systolic (24hrs), Av , Min:123 , Max:148   ; Diastolic (24hrs), Av, Min:75, Max:105    Pulse oximetry range: SpO2  Av.4 %  Min: 94 %  Max: 98 %  I/O last 3 completed shifts:  In: -   Out: 650 [Urine:650]    Intake/Output Summary (Last 24 hours) at 2024 1148  Last data filed at 2024 0809  Gross per 24 hour   Intake 500 ml   Output 650 ml   Net -150 ml     EXAM:  General: awake and alert, NAD, oriented x 3  Head: normocephalic  Neck: unremarkable, no lymphadenopathy   HEENT: oropharynx pink and moist    Heart: Regular rate and rhythm, no murmurs, rubs, or gallops  Lungs: clear to auscultation bilaterally with good  air movement throughout  Abdomen: soft, non-tender, no masses or organomegaly  Extremities: no edema in lower extremities   Skin unremarkable as visualized.               Data:     Reviewed data:  Results for orders placed or performed during the hospital encounter of 01/30/24 (from the past 24 hour(s))   Hemoglobin   Result Value Ref Range    Hemoglobin 8.7 (L) 11.7 - 15.7 g/dL   Hemoglobin   Result Value Ref Range    Hemoglobin 8.5 (L) 11.7 - 15.7 g/dL   CBC with platelets differential    Narrative    The following orders were created for panel order CBC with platelets differential.  Procedure                               Abnormality         Status                     ---------                               -----------         ------                     CBC with platelets and d...[[id number]]  Abnormal            Final result                 Please view results for these tests on the individual orders.   Comprehensive metabolic panel   Result Value Ref Range    Sodium 135 135 - 145 mmol/L    Potassium 3.7 3.4 - 5.3 mmol/L    Carbon Dioxide (CO2) 21 (L) 22 - 29 mmol/L    Anion Gap 12 7 - 15 mmol/L    Urea Nitrogen 27.3 (H) 8.0 - 23.0 mg/dL    Creatinine 1.98 (H) 0.51 - 0.95 mg/dL    GFR Estimate 25 (L) >60 mL/min/1.73m2    Calcium 8.3 (L) 8.8 - 10.2 mg/dL    Chloride 102 98 - 107 mmol/L    Glucose 85 70 - 99 mg/dL    Alkaline Phosphatase 56 40 - 150 U/L    AST 27 0 - 45 U/L    ALT 7 0 - 50 U/L    Protein Total 6.9 6.4 - 8.3 g/dL    Albumin 3.2 (L) 3.5 - 5.2 g/dL    Bilirubin Total 1.2 <=1.2 mg/dL   INR   Result Value Ref Range    INR 1.14 0.85 - 1.15   CBC with platelets and differential   Result Value Ref Range    WBC Count 4.8 4.0 - 11.0 10e3/uL    RBC Count 2.76 (L) 3.80 - 5.20 10e6/uL    Hemoglobin 8.3 (L) 11.7 - 15.7 g/dL    Hematocrit 26.0 (L) 35.0 - 47.0 %    MCV 94 78 - 100 fL    MCH 30.1 26.5 - 33.0 pg    MCHC 31.9 31.5 - 36.5 g/dL    RDW 17.4 (H) 10.0 - 15.0 %    Platelet Count 110 (L) 150 - 450 10e3/uL     % Neutrophils 63 %    % Lymphocytes 12 %    % Monocytes 16 %    % Eosinophils 5 %    % Basophils 2 %    % Immature Granulocytes 2 %    NRBCs per 100 WBC 0 <1 /100    Absolute Neutrophils 3.1 1.6 - 8.3 10e3/uL    Absolute Lymphocytes 0.6 (L) 0.8 - 5.3 10e3/uL    Absolute Monocytes 0.8 0.0 - 1.3 10e3/uL    Absolute Eosinophils 0.2 0.0 - 0.7 10e3/uL    Absolute Basophils 0.1 0.0 - 0.2 10e3/uL    Absolute Immature Granulocytes 0.1 <=0.4 10e3/uL    Absolute NRBCs 0.0 10e3/uL   COLONOSCOPY   Result Value Ref Range    COLONOSCOPY       _______________________________________________________________________________  Patient Name: Shaila Triana            Procedure Date: 2/1/2024 7:11 AM  MRN: [id number]                       YOB: 1945  Admit Type: Outpatient                Age: 78  Gender: Female                        Attending MD: DOMONIQUE LEWIS , ,   Total Sedation Time:                    _______________________________________________________________________________     Procedure:             Colonoscopy  Indications:           Melena, Gastrointestinal occult blood loss  Providers:             DOMONIQUE LEWIS  Referring MD:            Complications:         No immediate complications.  _______________________________________________________________________________  Procedure:             Pre-Anesthesia Assessment:                         - Prior to the procedure, a History and Physical was                          performed, and patient medications, allergies and                          sensitiv ities were reviewed. The patient's tolerance                          of previous anesthesia was reviewed.                         - The risks and benefits of the procedure and the                          sedation options and risks were discussed with the                          patient. All questions were answered and informed                          consent was obtained.                         -  Patient identification and proposed procedure were                          verified prior to the procedure by the physician, the                          nurse and the anesthetist. The procedure was verified                          in the pre-procedure area in the endoscopy suite.                         - ASA Grade Assessment: II - A patient with mild                          systemic disease.                         After obtaining informed consent, the colonoscope was                          passed under direct vision. Throughout the procedure,                          the patient's  blood pressure, pulse, and oxygen                          saturations were monitored continuously. The Barcode                          0150 was introduced through the anus and advanced to                          the terminal ileum. The colonoscopy was technically                          difficult and complex due to a tortuous colon.                          Successful completion of the procedure was aided by                          straightening and shortening the scope to obtain bowel                          loop reduction and using scope torsion. The patient                          tolerated the procedure well. The quality of the bowel                          preparation was adequate. The terminal ileum,                          ileocecal valve, appendiceal orifice, and rectum were                          photographed.                                                                                   Findings:       The perianal and digital rectal examinations were normal. P ertinent        negatives include normal sphincter tone, no palpable rectal lesions and        no anal lesion or abnormality.       The terminal ileum appeared normal.       Multiple small-mouthed diverticula were found in the sigmoid colon and        ascending colon. Estimated blood loss: none.       Non-bleeding internal hemorrhoids were found  during retroflexion. The        hemorrhoids were Grade I (internal hemorrhoids that do not prolapse).       The exam was otherwise without abnormality on direct and retroflexion        views.                                                                                   Impression:            - The examined portion of the ileum was normal.                         - Diverticulosis in the sigmoid colon and in the                          ascending colon.                         - Non-bleeding internal hemorrhoids.                         - The examination was otherwise normal on direct and                          retroflexion views.                          - No specimens collected.  Recommendation:        - Return patient to hospital reich for ongoing care.                         - Advance diet as tolerated indefinitely.                         - Continue present medications.                                                                                     _________________  DOMONIQUE LEWIS,   2/1/2024 8:35:34 AM  I was physically present for the entire viewing portion of the exam.  [id number] LEWIS  Number of Addenda: 0    Note Initiated On: 2/1/2024 7:11 AM  Scope In: 7:59:28 AM  Scope Out: 8:25:04 AM     UPPER GI ENDOSCOPY   Result Value Ref Range    Upper GI Endoscopy       _______________________________________________________________________________  Patient Name: Shaila Triana            Procedure Date: 2/1/2024 7:16 AM  MRN: [id number]                       YOB: 1945  Admit Type: Outpatient                Age: 78  Gender: Female                        Attending MD: DOMONIQUE LEWIS , ,   Total Sedation Time:                    _______________________________________________________________________________     Procedure:             Upper GI endoscopy  Indications:           Gastrointestinal bleeding of unknown origin  Providers:             DOMONIQUE LEWIS  Referring MD:             Complications:         No immediate complications.  _______________________________________________________________________________  Procedure:             Pre-Anesthesia Assessment:                         - Prior to the procedure, a History and Physical was                          performed, and patient medications, allergies and                           sensitivities were reviewed. The patient's tolerance                          of previous anesthesia was reviewed.                         - The risks and benefits of the procedure and the                          sedation options and risks were discussed with the                          patient. All questions were answered and informed                          consent was obtained.                         - Patient identification and proposed procedure were                          verified prior to the procedure by the physician, the                          nurse and the anesthetist. The procedure was verified                          in the pre-procedure area in the endoscopy suite.                         - ASA Grade Assessment: II - A patient with mild                          systemic disease.                         After obtaining informed consent, the endoscope was                          passed under direct vision. Throughout the procedure,                          the pat ient's blood pressure, pulse, and oxygen                          saturations were monitored continuously. The Barcode                          0144 was introduced through the mouth, and advanced to                          the second part of duodenum. The upper GI endoscopy                          was accomplished without difficulty. The patient                          tolerated the procedure well.                                                                                   Findings:       The examined portions of the nasopharynx, oropharynx and larynx were         normal.       The Z-line was regular and was found 35 cm from the incisors.       A medium-sized hiatal hernia was present.       One non-bleeding cratered gastric ulcer with no stigmata of bleeding was        found in the gastric antrum. The lesion was 5 mm in largest dimension.        Biopsies were taken with a cold forceps for histology. To prevent        bleeding post-intervention, one hemostatic clip was successf ully placed        (MR conditional). There was no bleeding at the end of the procedure.       Localized mildly erythematous mucosa without active bleeding and with no        stigmata of bleeding was found in the duodenal bulb. Biopsies were taken        with a cold forceps for histology. Estimated blood loss was minimal.       The cardia and gastric fundus were normal on retroflexion.                                                                                   Impression:            - The examined portions of the nasopharynx, oropharynx                          and larynx were normal.                         - Z-line regular, 35 cm from the incisors.                         - Medium-sized hiatal hernia.                         - Non-bleeding gastric ulcer with no stigmata of                          bleeding. Biopsied. Clip (MR conditional) was placed.                         - Erythematous duodenopathy. Biopsied.  Recommendation:        - Return patient to hospital reich for [facility] care.                         - Advance diet as tolerated.                         - Use sucralfate tablets 1 gram PO BID indefinitely.                         - Continue present medications.                         - Await pathology results.                                                                                     _________________  DOMONIQUE LEWIS,   2/1/2024 8:32:07 AM  I was physically present for the entire viewing portion of the exam.  [id number] LEWIS  Number of Addenda: 0    Note Initiated On:  2/1/2024 7:16 AM  Scope In:  Scope Out:             Attestation:  I have reviewed today's vital signs, notes, medications, labs and imaging.  Amount of time spent managing this patient today:  50 minutes including 30 minutes in the range of motion with patient in part giving detailed explanation of what an ulcer is, how you can bleed from it and why medications help with this as she appeared to have no understanding of this.      Dale Mcpherson MD

## 2024-02-01 NOTE — CONSULTS
"[facility]  WO Nurse Inpatient Assessment     Consulted for: Bottom of right foot from surgical boot    Summary: Right planter foot with with appears to be a fungal or bacterial skin infection, topical medication recommended to treat. Writer spoke with Hal Sosa PA-C who will place orders for topical medication per his choice.     Pt was advised to clean CAM boot insert and spray with OTC antifungal foot spray when home to prevent recurrence and always wear with stocking.     Patient History (according to provider note(s):    Providers progress notes reviewed and are not pertinent to this issue.     Per pt, she has a couple of broken bones in the top of her foot and was directed to wear boot, \"more for protection\" and does not wear all the time. Has been wearing since Jan 13th when injury occured.     Assessment:      Areas visualized during today's visit: Focused: right plantar foot        Right plantar foot with areas of brown crusting, pustules and vesicles from mid foot to toes. Denies pain or itching. No erythema, edema or increased warmth. Nothing open or draining at this time. States recently had \"itching between my little toe\".      Noted what looks to be dried drainage on her CAM boot liner.     Treatment Plan:     Right plantar foot: Wash foot with soap and water or no-rinse cleanser or bathing wipes and dry well. Apply topical medication per provider's orders. If there are any open or draining areas then cover with non-stick dressing (oil emulsion dressing) and secure with rolled gauze.     Orders: Written    RECOMMEND PRIMARY TEAM ORDER: Antifungal and/or topical antibiotic for suspected fungal and or bacterial infection  Education provided: plan of care  Discussed plan of care with: Patient, Nurse, and Physicians Assistant  WO nurse follow-up plan: signing off  Notify WO if wound(s) deteriorate.  Nursing to notify the Provider(s) and re-consult the WO Nurse if new " skin concern.    DATA:     Current support surface: Standard  Standard gel/foam mattress (IsoFlex, Atmos air, etc)    BMI: Body mass index is 26.53 kg/m .   Active diet order: Orders Placed This Encounter      Advance Diet as Tolerated: Regular Diet Adult     Output: I/O last 3 completed shifts:  In: 500 [I.V.:500]  Out: 650 [Urine:650]     Labs:   Recent Labs   Lab 02/01/24  0606   ALBUMIN 3.2*   HGB 8.3*   INR 1.14   WBC 4.8     Pressure injury risk assessment:   Sensory Perception: 3-->slightly limited  Moisture: 4-->rarely moist  Activity: 3-->walks occasionally  Mobility: 3-->slightly limited  Nutrition: 3-->adequate  Friction and Shear: 3-->no apparent problem  Fabricio Score: 19    Melva Hayward RN, CWOCN  Pager no longer is use, please contact through [facility]: WOC Nurse

## 2024-02-01 NOTE — OR NURSING
Prior to procedure attempted to contact both daughter and son. Incorrect numbers in demographics. Left message on SO phone. Pt made decision to proceed with procedures.

## 2024-02-02 NOTE — PLAN OF CARE
Problem: Adult Inpatient Plan of Care  Goal: Readiness for Transition of Care  Outcome: Progressing     Problem: Gastrointestinal Bleeding  Goal: Optimal Coping with Acute Illness  Outcome: Progressing     Problem: Gastrointestinal Bleeding  Goal: Hemostasis  Outcome: Progressing     Goal Outcome Evaluation:  Patient is alert and oriented.  Vitals have been stable post procedures this morning.  No stools.  Hemoglobin is stable and planning to recheck in the morning.  Patient denies abdominal pain or nausea, tolerating a regular diet.  Patient ambulates with walker and sba, tolerated activity well.

## 2024-02-02 NOTE — PLAN OF CARE
Goal Outcome Evaluation:      Plan of Care Reviewed With: patient    Overall Patient Progress: no change  Pt A&Ox4. Able to make needs known. Denies pain.   Up SBA1 WGB with boot on rt foot.     Tele: Afib      Problem: Adult Inpatient Plan of Care  Goal: Absence of Hospital-Acquired Illness or Injury  Intervention: Identify and Manage Fall Risk  Recent Flowsheet Documentation  Taken 2/2/2024 3375 by Susie Dillon, RN  Safety Promotion/Fall Prevention:   safety round/check completed   activity supervised   mobility aid in reach   nonskid shoes/slippers when out of bed

## 2024-02-02 NOTE — PROGRESS NOTES
[facility] DISCHARGE NOTE    Patient discharged to home at 1:15 PM via wheel chair. Accompanied by son and staff. Discharge instructions reviewed with patient, opportunity offered to ask questions. Prescriptions sent to patients preferred pharmacy. All belongings sent with patient.    GAGANDEEP VIZCAINO RN

## 2024-02-02 NOTE — PROGRESS NOTES
Care Management Discharge Note    Discharge Date: 02/02/2024    Discharge Disposition: Home Care    Discharge Services: None    Discharge DME: None    Discharge Transportation: family or friend will provide    Private pay costs discussed: Not applicable    Does the patient's insurance plan have a 3 day qualifying hospital stay waiver?  Yes     Which insurance plan 3 day waiver is available? Alternative insurance waiver    Will the waiver be used for post-acute placement? No    PAS Confirmation Code:  NA  Patient/family educated on Medicare website which has current facility and service quality ratings: no    Education Provided on the Discharge Plan: Yes  Persons Notified of Discharge Plans: Patient  Patient/Family in Agreement with the Plan: yes    Handoff Referral Completed: Yes    Additional Information:  Per IDT lisa, MD states that pt is medically stable for discharge today &  recommends that pt return home with resumed home care services.  Pt/family are in agreement.    Pt is accepted for cares with [facility] ([phone] Fax: ([phone])    Transportation discussed and pt's SO  to transport once orders are complete.    JACINTO Valladares

## 2024-02-02 NOTE — PROGRESS NOTES
Patient is alert and orientated, she will let her needs be known. She is up with a stand by assist and a walker. She has a right PIV that is saline locked. She has a right rash on the bottom of her foot, WOC seen it and said it was some sore of fungal infection. They started Carafate and Protonix this AM, from the EGD and Colonoscopy that she had yesterday.Hemoglobin will be checked this AM, as she got 2 units yesterday and brought it up from 6.7-8.3. She currently has a Tele on and it has been irregular rhythm and tachy.

## 2024-02-02 NOTE — DISCHARGE SUMMARY
[facility] Discharge Summary    Shaila Triana MRN# [id number]   Age: 78 year old YOB: 1945     Date of Admission:  1/30/2024  Date of Discharge::  2/2/2024  Admitting Physician:  Dale Mcpherson MD  Discharge Physician:  Dale Mcpherson MD             Admission Diagnoses:   Lower GI bleed [K92.2]  Upper GI bleed [K92.2]  Anemia due to blood loss, acute [D62]          Principle Discharge Diagnosis:       Normocytic acute blood loss anemia presumed due to upper GI bleed from gastric ulcer    See hospital course for further active diagnoses addressed during this admission.            Procedures:   EGD 2/1/24:  Impression:            - The examined portions of the nasopharynx, oropharynx                          and larynx were normal.                          - Z-line regular, 35 cm from the incisors.                          - Medium-sized hiatal hernia.                          - Non-bleeding gastric ulcer with no stigmata of                          bleeding. Biopsied. Clip (MR conditional) was placed.                          - Erythematous duodenopathy. Biopsied.   Recommendation:        - Return patient to hospital reich for ongoing care.                          - Advance diet as tolerated.                          - Use sucralfate tablets 1 gram PO BID indefinitely.                          - Continue present medications.                          - Await pathology results.         Colonoscopy 2/1/24:  Impression:            - The examined portion of the ileum was normal.                          - Diverticulosis in the sigmoid colon and in the                          ascending colon.                          - Non-bleeding internal hemorrhoids.                          - The examination was otherwise normal on direct and                          retroflexion views.                          - No specimens collected.   Recommendation:        - Return patient to hospital reich for ongoing  care.                          - Advance diet as tolerated indefinitely.                          - Continue present medications.           Medications Prior to Admission:     Medications Prior to Admission   Medication Sig Dispense Refill Last Dose    albuterol (PROAIR HFA/PROVENTIL HFA/VENTOLIN HFA) 108 (90 Base) MCG/ACT inhaler Inhale 2 puffs into the lungs every 6 hours as needed for shortness of breath / dyspnea or wheezing 18 g 0 Past Month at Unknown    aspirin (ASA) 81 MG EC tablet Take 81 mg by mouth daily   1/30/2024 at am    aspirin-acetaminophen-caffeine (EXCEDRIN EXTRA STRENGTH) 250-250-65 MG tablet Take 1-2 tablets by mouth daily as needed for headaches   Past Week at Unknown    benzonatate (TESSALON) 100 MG capsule Take 1 capsule (100 mg) by mouth 3 times daily as needed for cough 30 capsule 0 Past Month at Unknown    calcium carbonate (OS-ANIKA) 1500 (600 Ca) MG tablet Take 600 mg by mouth 2 times daily (with meals)   1/30/2024 at am    famotidine (PEPCID) 20 MG tablet Take 1 tablet (20 mg) by mouth every other day (Patient taking differently: Take 20 mg by mouth 2 times daily)   1/30/2024 at am    fluticasone (VERAMYST) 27.5 MCG/SPRAY nasal spray Spray 2 sprays into both nostrils daily as needed for allergies   Past Week at Unknown    levothyroxine (SYNTHROID/LEVOTHROID) 112 MCG tablet Take 112 mcg by mouth daily   1/30/2024 at am    multivitamin w/minerals (CENTRUM ADULTS) tablet Take 1 tablet by mouth daily as needed (supplement)   1/30/2024 at am    OVER-THE-COUNTER Take 1 tablet by mouth daily Iron supplement, green tablet, unknown formulation and dose.   1/30/2024 at am    potassium chloride ER (MICRO-K) 10 MEQ CR capsule Take 20 mEq by mouth daily   1/30/2024 at am    rosuvastatin (CRESTOR) 10 MG tablet Take 1 tablet (10 mg) by mouth every evening for 30 days 30 tablet 0 1/29/2024 at pm    sertraline (ZOLOFT) 100 MG tablet Take 200 mg by mouth every evening   1/29/2024 at pm    DILT- MG 24  hr capsule Take 180 mg by mouth daily   1/25/2024 at told to stop taking    furosemide (LASIX) 40 MG tablet Take 40 mg by mouth daily   1/25/2024 at told to stop taking    metoprolol succinate ER (TOPROL-XL) 100 MG 24 hr tablet Take 150 mg by mouth 2 times daily   1/25/2024 at told to stop taking             Discharge Medications:     Current Discharge Medication List        START taking these medications    Details   pantoprazole (PROTONIX) 40 MG EC tablet Take 1 tablet (40 mg) by mouth 2 times daily  Qty: 60 tablet, Refills: 0    Associated Diagnoses: Gastrointestinal hemorrhage associated with gastric ulcer      sucralfate (CARAFATE) 1 GM tablet Take 1 tablet (1 g) by mouth 2 times daily (before meals)  Qty: 60 tablet, Refills: 0    Associated Diagnoses: Gastrointestinal hemorrhage associated with gastric ulcer           CONTINUE these medications which have CHANGED    Details   DILT- MG 24 hr capsule Take 1 capsule (180 mg) by mouth daily  Qty: 30 capsule, Refills: 1    Associated Diagnoses: Chronic atrial fibrillation (H)      famotidine (PEPCID) 20 MG tablet Take 1 tablet (20 mg) by mouth 2 times daily  Qty: 60 tablet, Refills: 0    Associated Diagnoses: Stage 3 chronic kidney disease, unspecified whether stage 3a or 3b CKD (H)      levothyroxine (SYNTHROID/LEVOTHROID) 125 MCG tablet Take 1 tablet (125 mcg) by mouth every morning (before breakfast)  Qty: 30 tablet, Refills: 1    Associated Diagnoses: Subclinical hypothyroidism           CONTINUE these medications which have NOT CHANGED    Details   albuterol (PROAIR HFA/PROVENTIL HFA/VENTOLIN HFA) 108 (90 Base) MCG/ACT inhaler Inhale 2 puffs into the lungs every 6 hours as needed for shortness of breath / dyspnea or wheezing  Qty: 18 g, Refills: 0      benzonatate (TESSALON) 100 MG capsule Take 1 capsule (100 mg) by mouth 3 times daily as needed for cough  Qty: 30 capsule, Refills: 0    Associated Diagnoses: Acute bronchitis due to respiratory syncytial  virus (RSV)      calcium carbonate (OS-ANIKA) 1500 (600 Ca) MG tablet Take 600 mg by mouth 2 times daily (with meals)      fluticasone (VERAMYST) 27.5 MCG/SPRAY nasal spray Spray 2 sprays into both nostrils daily as needed for allergies      multivitamin w/minerals (CENTRUM ADULTS) tablet Take 1 tablet by mouth daily as needed (supplement)      OVER-THE-COUNTER Take 1 tablet by mouth daily Iron supplement, green tablet, unknown formulation and dose.      potassium chloride ER (MICRO-K) 10 MEQ CR capsule Take 20 mEq by mouth daily      rosuvastatin (CRESTOR) 10 MG tablet Take 1 tablet (10 mg) by mouth every evening for 30 days  Qty: 30 tablet, Refills: 0    Associated Diagnoses: Pure hypercholesterolemia      sertraline (ZOLOFT) 100 MG tablet Take 200 mg by mouth every evening           STOP taking these medications       aspirin (ASA) 81 MG EC tablet Comments:   Reason for Stopping:         aspirin-acetaminophen-caffeine (EXCEDRIN EXTRA STRENGTH) 250-250-65 MG tablet Comments:   Reason for Stopping:         furosemide (LASIX) 40 MG tablet Comments:   Reason for Stopping:         metoprolol succinate ER (TOPROL-XL) 100 MG 24 hr tablet Comments:   Reason for Stopping:                          Brief History of Illness from time of admission:     From Admission H+P:   Shaila Triana is a 78 year old female with past medical hx of chronic afib, hiatal hernia, CHF, hypothyroidism, HTN, CKD, anemia, depression, anxierty who presents on 1/30/2024 with a hgb of 6.7 at her PCP visit.      She was recently admitted for a fall and orthostatic hypotension. At this time her hgb was 7.6 and she received one unit of blood. At her post hospital follow up visit she had hgb of 7. The next day she had hgb of 6.7 and was sent to the ER. She received 2U of blood and had occult positive stool. She states her stool has been black for a while but attributed that to iron supplements.      She had her cardiac meds stopped at her last  "hospitalization 2 weeks ago due to orthostatic hypotension. She hasn't noticed any changes in her health since then. She's had nausea every morning since her hospitalization in December for RSV and is unsure why.      Denies chest pain, abdominal pain, dysuria, blood in urine, diarrhea, palpitations, edema.            TODAY:     Subjective:  Doing well.  No further black or bloody stools.  No nausea.  Taking orals well.  No abdominal pain.  Strength/energy better. Feels ready for discharge home.    No other pain.       ROS:   ROS: 10 point ROS neg other than the symptoms noted above in the HPI.   /78 (BP Location: Left arm)   Pulse 84   Temp 98.3  F (36.8  C) (Oral)   Resp 16   Ht 1.575 m (5' 2\")   Wt 67.5 kg (148 lb 13 oz)   LMP  (LMP Unknown)   SpO2 94%   BMI 27.22 kg/m     EXAM:  General: awake and alert, NAD, oriented x 3  Head: normocephalic  Neck: unremarkable, no lymphadenopathy   HEENT: oropharynx pink and moist    Heart: Regular rate and rhythm, no murmurs, rubs, or gallops  Lungs: clear to auscultation bilaterally with good air movement throughout  Abdomen: soft, non-tender, no masses or organomegaly  Extremities: no edema in lower extremities - boot in place   Skin unremarkable as visualized.       Hospital Course:          Shaila Triana is a 78 year old female with past medical hx of chronic afib, hiatal hernia, CHF, hypothyroidism, HTN, CKD, anemia, depression, anxierty who presents on 1/30/2024 with a hgb of 6.7 at her PCP visit.      Normocytic acute blood loss anemia presumed due to upper GI bleed from gastric ulcer  GERD with Large hiatal hernia    Was seen by her PCP 1/29 for hospital follow up (see below) and had a hgb of 7. She returned yesterday for a recheck and it was 6.7, so was sent to the ER. She presents with tachycardia to 120s and hgb 6.7 with positive occult blood. She has had black stools, but thought it was because she takes iron supplement. On previous admission 1/15 " she presented with hgb of 7.6 and received 1U PRBC. Workup at this time was negative for hemolysis and iron studies, folate, and B12 were WNL. She was previously admitted 10/2020 for anemia requiring 2 transfusions and found to be iron deficient, thought to be from chronic GI bleeding however no source was found. She endorses fatigue, dizziness, SOB.     Pulse 130s, down to 100s. SBP 130s-170s. SpO2 mid 90s. Occult blood positive. EGD 10/5/20 showed non-bleeding gastric ulcer and multiple gastric polyps. Colonoscopy 2018 showed diverticulosis. BMBx 3/29/23 for w/u of anemia showed indolent mantle cell lymphoma as below.    In ER she received Metoprolol 50mg daily, Pantoprazole 40mg IV BID, and 2U PRBC. Recheck hgb 8.5.   - continued Pantoprazole 40mg IV BID   Held aspirin - likely can resume this at follow-up with primary care provider.    - Gen surg consulted- Upper and lower endoscopy showed non-bleeding gastric ulcer which was clipped with biopsies pending, which is the likely source for her blood loss.    - started carafate twice daily 2/1 as per surgery   - continued famotidine 20 mg but increased to twice daily   - hemoglobin remained reasonably stable, recheck at follow-up   -  will discharge on omeprazole twice daily, famotidine twice daily and carafate twice daily protonix.  Can start weaning down regimen at follow-up with primary care provider, but would continue caracate for at least 1 month and anticipate at least continuing a PPI long term. Also likely resume aspirin and recheck hemoglobin at follow-up with primary care provider.        Orthostatic hypotension  Essential hypertension  Hospitalized 1/15-1/18 for orthostatic hypotension causing a fall and fracture of 2nd and 3rd metatarsals of right foot. She was given albumin and all of her PTA antihypertensives were held (Diltiazem 180mg daily, Metoprolol 150mg BID, Furosemide 40mg daily). She has not noticed any changes since holding her meds.   -  given elevated heart rate with elevated blood pressure resumed diltiazem 1/31.   - had normal orthostatic blood pressures on diltiazem with good heart rate control and normal/mildly elevated blood pressures throughout stay - discharged on diltiazem and reassess at follow-up with primary care provider and with cardiology      Permanent atrial fibrillation s/p Watchman 7/2022  Patient had watchman device placed 2/2 to recurrent anemia requiring blood transfusions while on Eliquis. In review of cardiology documentation 10/4/23 she has been difficult to rate control under 110 but given that she gets lightheaded they had refrained from intensifying her regimen. Her rate doesn't appear to have changed since stopping her meds on recent admission.  - Continue to hold PTA Metoprolol  - Restarted Diltiazem 180mg daily 1/31 since she consistently has pulse 100s-120s and has enough pressure  (SBP 130s-170s).    - held PTA Aspirin 81mg - see above       Chronic HFpEF  TTE 12/20/23 with EF 59%. Trace edema on exam. Previous cardiology note 10/4/23 stated trace edema is baseline for her and it's unlikely they'll be able to get rid of all the edema.   Managed prior to admission with Kcl 20 mEq daily, continue.  - Continue off Lasix, follow-up with cardiology       CKD (chronic kidney disease) stage 4  Follows with Nephrology outpatient. Has been avoiding NSAIDs. Cr 1.95 on admission (baseline 1.75-2.2) and GFR 25 (baseline 22-29). Stable.   Nephrology note 12/18/23 notes possibility of dialysis if GFR worsens.   - remaining baseline      Subclinical hypothyroidism  Appears to have elevated TSH with normal T4 since 4/2022, c/w subclinical hypothyroidism. Today TSH 6.76 and T4 0.85.   Managed prior to admission with Levothyroxine 112mcg.   Hypothyroidism may cause normocytic anemia but doubt this is significantly contributing here.    - Increased dose of Synthroid to 125mcg - primary care provider to order recheck TSH/T4 at  "follow-up, likely in 4-6 weeks.       Depressive disorder  Managed prior to admission with Sertraline, continued.     Pure hypercholesterolemia  Managed prior to admission with Rosuvastatin 10mg, continued.     Mantle cell lymphoma  Patient has a known history of mantle cell lymphoma for which she has been observed through [place] [facility] as an outpatient. BMBx was 3/29/23 and axillary lymph node biopsy 4/13/23.           Clinically Significant Risk Factors Present on Admission                 # Drug Induced Platelet Defect: home medication list includes an antiplatelet medication   # Hypertension: Noted on problem list      # Overweight: Estimated body mass index is 26.34 kg/m  as calculated from the following:    Height as of this encounter: 1.575 m (5' 2\").    Weight as of this encounter: 65.3 kg (144 lb).               DVT Prophylaxis: Pneumatic Compression Devices given GI bleed as above      Boucher Catheter: Not present     Code Status:  Full Code     Lines: Peripheral IV          Discharge Instructions and Follow-Up:      Discharge diet: Orders Placed This Encounter      Advance Diet as Tolerated: Regular Diet Adult         Discharge activity: Activity as tolerated   Discharge follow-up: Follow-up with your primary care provider in 1 week.  Reassess regimen for ulcer, consider resuming aspirin and recheck hemoglobin at that time. Also order recheck TSH with reflex likely in 4-6 weeks.      Follow-up with cardiology at next available time.           Discharge Disposition:     Discharged to home with home care       Attestation:  Amount of time performed on this discharge summary: 50 minutes.    Dale Mcpherson MD      "

## 2024-02-04 NOTE — PROGRESS NOTES
Connected Care Resource Center:   [facility] Resource Center Contact  [facility]/Voicemail     Clinical Data: Post-Discharge Outreach     Outreach attempted x 2.  Left message on patient's voicemail, providing [facility]'s central phone number of [phone] ([phone]) for questions/concerns and/or to schedule an appt with an [facility] provider, if they do not have a PCP.      Plan:  [facility] will do no further outreaches at this time.       Daly Hall MA  Connected Care Resource Center, [facility]    *Connected Care Resource Team does NOT follow patient ongoing. Referrals are identified based on internal discharge reports and the outreach is to ensure patient has an understanding of their discharge instructions.

## 2024-03-15 PROBLEM — G45.9 TIA (TRANSIENT ISCHEMIC ATTACK): Status: ACTIVE | Noted: 2024-01-01

## 2024-03-15 PROBLEM — I15.9 SECONDARY HYPERTENSION: Status: ACTIVE | Noted: 2024-01-01

## 2024-03-15 PROBLEM — R47.9 SPEECH DISTURBANCE, UNSPECIFIED TYPE: Status: ACTIVE | Noted: 2024-01-01

## 2024-03-15 PROBLEM — N18.4 CKD (CHRONIC KIDNEY DISEASE) STAGE 4, GFR 15-29 ML/MIN (H): Status: ACTIVE | Noted: 2020-10-05

## 2024-03-15 PROBLEM — C83.18 MANTLE CELL LYMPHOMA OF LYMPH NODES OF MULTIPLE SITES (H): Status: ACTIVE | Noted: 2024-01-01

## 2024-03-15 PROBLEM — D69.6 THROMBOCYTOPENIA (H): Status: ACTIVE | Noted: 2023-01-01

## 2024-03-15 PROBLEM — Z95.818 PRESENCE OF WATCHMAN LEFT ATRIAL APPENDAGE CLOSURE DEVICE: Status: ACTIVE | Noted: 2022-07-20

## 2024-03-15 NOTE — ED PROVIDER NOTES
EMERGENCY DEPARTMENT ENCOUNTER            IMPRESSION:  Speech disturbance      MEDICAL DECISION MAKING:  It was my pleasure to provide care for Shaila Triana who presented for evaluation of neurologic symptoms including speech disturbance    On my exam patient is pleasant and cooperative.   Vital signs show hypertension.  Physical exam notable for there is facial symmetry.  May be some memory impairment and aphasia no focal weakness.     Stroke code was initiated    EKG independently interpreted by myself and shows atrial fibrillation which is chronic    CT and CTA of the head did not show any acute findings    Neurology was consulted and evaluated patient    MRI was ordered    Laboratory investigation independently interpreted and notable for anemia with a hemoglobin of 9, and chronic kidney disease,     Labetalol was given for blood pressure greater than 220      =================================================================  CHIEF COMPLAINT:  Chief Complaint   Patient presents with    Stroke Symptoms         HPI  HISTORY LIMITED SECONDARY TO ALTERED MENTAL STATUS    Shaila Triana is a 78 year old female with a history of lymphadenopathy, malignant lymphoma, s/p watchman 2022, CKD4, breast cancer, chronic atrial fibrillation, chronic diastolic congestive heart failure, HTN, and HLD, who presents to the ED for evaluation of confusion.    Around 1900 this evening, the patient was with her daughter at her daughter's house when she suddenly became confused and slow to respond with a mild facial droop. Her daughter reports that it sounded as if the patient was speaking a foreign language. Patient is able to follow commands in triage, but is not able to accurately state the date consistently. She is unable to name her cats or the state in which she lives. The patient did not have any difficulty walking following onset of other symptoms. She has been on blood thinners in the past, but is no longer anticoagulated  as she has the Watchman in place. Hx atrial fibrillation. No other complaints at this time.       REVIEW OF SYSTEMS  Constitutional: Does not report chills, unintentional weight loss or fatigue   Eyes: Does not report visual changes or discharge    HENT: Does not report sore throat, ear pain or neck pain  Respiratory: Does not report cough or shortness of breath    Cardiovascular: Does not report chest pain, palpitations or leg swelling  GI: Does not report abdominal pain, nausea, vomiting, or dark, bloody stools.    : Does not report hematuria, dysuria, or flank pain  Musculoskeletal: Does not report any new musculoskeletal pain or new muscle/joint pains  Skin: Does not report rash or wound  Neurologic: Does not report current headache, new weakness, focal weakness, or sensory changes  Positive for confusion, facial droop, and word-salad      Remainder of systems reviewed, unless noted in HPI all others negative.      PAST MEDICAL HISTORY:  Past Medical History:   Diagnosis Date    Anemia     Anemia, unspecified type     Depressive disorder 12/04/2006    Essential hypertension 12/04/2006    Hypothyroidism (acquired) 12/04/2006    Invasive ductal carcinoma of breast (H) 08/06/2013    Left.  ER(+) WI(+) her-2-hina (-).  Lumpectomy and sentinel biopsy followed by radiation.  Adjuvant therapy with tamoxifen completed.    Lung nodules 04/19/2016    LLL on abd/pelvis CT 4/12/16.  No change on chest CT May 2017.  No further imaging needed.    Malignant neoplasm of female breast (H) 03/18/2008    Epic    New onset atrial fibrillation (H) 07/31/2020    Pure hypercholesterolemia 12/04/2006    Sleep apnea 12/04/2006       PAST SURGICAL HISTORY:  Past Surgical History:   Procedure Laterality Date    APPENDECTOMY      1957    BLEPHAROPLASTY Bilateral 2010    COLONOSCOPY  2005    COLONOSCOPY N/A 2/1/2024    Procedure: Colonoscopy;  Surgeon: Willie Alcazar MD;  Location: [facility]    ESOPHAGOSCOPY, GASTROSCOPY, DUODENOSCOPY  (EGD), COMBINED N/A 10/6/2020    Procedure: ESOPHAGOGASTRODUODENOSCOPY, WITH BIOPSY and polypectomy;  Surgeon: Jorge Ramirez MD;  Location: [facility]    ESOPHAGOSCOPY, GASTROSCOPY, DUODENOSCOPY (EGD), COMBINED N/A 2024    Procedure: ESOPHAGOGASTRODUODENOSCOPY, WITH BIOPSY;  Surgeon: Willie Alcazar MD;  Location: [facility]    HERNIA REPAIR  2013    HYSTERECTOMY, JOSE  1975    LUMPECTOMY BREAST Left 2008    THYROIDECTOMY  Right     Partial    TUBAL LIGATION           CURRENT MEDICATIONS:    albuterol (PROAIR HFA/PROVENTIL HFA/VENTOLIN HFA) 108 (90 Base) MCG/ACT inhaler  benzonatate (TESSALON) 100 MG capsule  calcium carbonate (OS-ANIKA) 1500 (600 Ca) MG tablet  DILT- MG 24 hr capsule  famotidine (PEPCID) 20 MG tablet  ferrous sulfate (FE TABS) 325 (65 Fe) MG EC tablet  levothyroxine (SYNTHROID/LEVOTHROID) 125 MCG tablet  multivitamin w/minerals (CENTRUM ADULTS) tablet  pantoprazole (PROTONIX) 40 MG EC tablet  potassium chloride ER (MICRO-K) 10 MEQ CR capsule  rosuvastatin (CRESTOR) 10 MG tablet  sertraline (ZOLOFT) 100 MG tablet  sucralfate (CARAFATE) 1 GM tablet        ALLERGIES:  Allergies   Allergen Reactions    Codeine Nausea    Dust Mite Extract Other (See Comments)     Per allergy test    Erythromycin Dizziness     Almost passed out    Macrolides And Ketolides     Penicillin [Penicillins] Itching       FAMILY HISTORY:  Family History   Problem Relation Age of Onset    No Known Problems Mother          age 89 after complications from a fall    Alzheimer Disease Father     Other - See Comments Sister         Polio    Colon Cancer Maternal Aunt     Breast Cancer No family hx of     Ovarian Cancer No family hx of     Prostate Cancer No family hx of        SOCIAL HISTORY:   Social History     Socioeconomic History    Marital status:    Tobacco Use    Smoking status: Never    Smokeless tobacco: Never   Substance and Sexual Activity    Alcohol use: Yes     Comment: occ    Drug use: Never    Sexual  "activity: Yes     Partners: Male     Birth control/protection: Female Surgical       PHYSICAL EXAM:    BP (!) 209/121   Pulse 99   Temp 97.7  F (36.5  C) (Temporal)   Resp 18   Ht 1.575 m (5' 2\")   Wt 65.8 kg (145 lb)   LMP  (LMP Unknown)   SpO2 93%   BMI 26.52 kg/m      Constitutional: She is awake and responsive, cooperative  Head: Normocephalic, atraumatic.  ENT: Mucous membranes are moist.  No pallor.   Eyes: Pupils are reactive.  No discoloration.  Neck: No lymphadenopathy, no stridor, supple, no soft tissue swelling  Chest: No tenderness   Respiratory: Respirations even, unlabored. Lungs clear to ascultation bilaterally, in no acute respiratory distress.  Cardiovascular: Regular rate and rhythm.  Good overall perfusion.  Upper and lower extremity pulses are equal.  GI: Abdomen soft, non-tender to palpation.  No guarding or rebound. Bowel sounds present throughout.   Back: No CVA tenderness.    Musculoskeletal: Moves all 4 extremities equally, full function and capacity no peripheral edema.   Integument: Warm, dry. No rash. No bruising or petechiae.  Neurologic: She is awake and alert.  No facial asymmetry.  No dysarthria.  Likely aphasia some memory impairment.  Slight right upper extremity drift.  NIHSS 2  Psychiatric: Normal mood and affect.  Appropriate judgement.    ED COURSE:  7:44 PM Introduced myself to the patient, obtained history of present illness, and performed initial physical exam at this time.    7:55 PM Spoke with Dr. Prather, Stroke Neurology, regarding the patient.         Medical Decision Making    History:  Supplemental history from: Family  External Record(s) reviewed: External medical records including care everywhere reviewed   [facility] 3/14/2024 Office Visit     Work Up:  EKG, laboratory and imaging studies as ordered were independently interpreted by myself.   Broad differential diagnosis considered for neurologic deficit  The patient's presentation was of " high complexity.     External consultation:  Discussion of management with another provider: Stroke neurology    Complicating factors:  Patient has a complicated past medical history including lymphadenopathy, malignant lymphoma, s/p watchman 2022, CKD4, breast cancer, chronic atrial fibrillation, chronic diastolic congestive heart failure, HTN, and HLD  Care affected by social determinants of health: Access to primary care    Disposition involved shared decision-making with the patient.      LAB:  Laboratory results were independently reviewed and interpreted  Results for orders placed or performed during the hospital encounter of 03/14/24   CTA Head Neck with Contrast    Impression    IMPRESSION:   HEAD CT:  1.  No CT evidence for acute intracranial process.  2.  Brain atrophy and presumed chronic microvascular ischemic changes as above.    HEAD CTA:   1.  No significant stenosis, aneurysm, or high flow vascular malformation identified.    NECK CTA:  1.  Atherosclerotic changes of the carotid bifurcations without hemodynamically significant stenosis in the neck vessels.   2.  No evidence for dissection.  3.  Borderline size a mildly enlarged cervical lymph nodes potentially related to patient's lymphoma diagnosis. These are largely similar to a PET/CT from 08/28/2023.    Preliminary head CT and CTA findings were called to Dr. Camarena at 3/14/2024 8:09 PM CDT by Dr. LOUIS العلي.   MR Brain w/o & w Contrast    Impression    IMPRESSION:  1.  No acute intracranial process.  2.  Generalized brain atrophy and presumed microvascular ischemic changes as detailed above.   Basic metabolic panel   Result Value Ref Range    Sodium 142 135 - 145 mmol/L    Potassium 4.2 3.4 - 5.3 mmol/L    Chloride 108 (H) 98 - 107 mmol/L    Carbon Dioxide (CO2) 22 22 - 29 mmol/L    Anion Gap 12 7 - 15 mmol/L    Urea Nitrogen 23.2 (H) 8.0 - 23.0 mg/dL    Creatinine 1.73 (H) 0.51 - 0.95 mg/dL    GFR Estimate 29 (L) >60 mL/min/1.73m2    Calcium 9.0  8.8 - 10.2 mg/dL    Glucose 95 70 - 99 mg/dL   Result Value Ref Range    INR 1.05 0.85 - 1.15   Partial thromboplastin time   Result Value Ref Range    aPTT 32 22 - 38 Seconds   Result Value Ref Range    Troponin T, High Sensitivity 27 (H) <=14 ng/L   CBC with platelets and differential   Result Value Ref Range    WBC Count 5.7 4.0 - 11.0 10e3/uL    RBC Count 3.40 (L) 3.80 - 5.20 10e6/uL    Hemoglobin 9.6 (L) 11.7 - 15.7 g/dL    Hematocrit 30.2 (L) 35.0 - 47.0 %    MCV 89 78 - 100 fL    MCH 28.2 26.5 - 33.0 pg    MCHC 31.8 31.5 - 36.5 g/dL    RDW 13.5 10.0 - 15.0 %    Platelet Count 144 (L) 150 - 450 10e3/uL    % Neutrophils 61 %    % Lymphocytes 16 %    % Monocytes 15 %    % Eosinophils 6 %    % Basophils 1 %    % Immature Granulocytes 1 %    NRBCs per 100 WBC 0 <1 /100    Absolute Neutrophils 3.5 1.6 - 8.3 10e3/uL    Absolute Lymphocytes 0.9 0.8 - 5.3 10e3/uL    Absolute Monocytes 0.8 0.0 - 1.3 10e3/uL    Absolute Eosinophils 0.4 0.0 - 0.7 10e3/uL    Absolute Basophils 0.1 0.0 - 0.2 10e3/uL    Absolute Immature Granulocytes 0.0 <=0.4 10e3/uL    Absolute NRBCs 0.0 10e3/uL   Glucose by meter   Result Value Ref Range    GLUCOSE BY METER POCT 92 70 - 99 mg/dL   Extra Red Top Tube   Result Value Ref Range    Hold Specimen JIC    ECG 12-LEAD WITH MUSE ([facility])   Result Value Ref Range    Systolic Blood Pressure 198 mmHg    Diastolic Blood Pressure 97 mmHg    Ventricular Rate 105 BPM    Atrial Rate 108 BPM    RI Interval  ms    QRS Duration 96 ms     ms    QTc 507 ms    P Axis  degrees    R AXIS 28 degrees    T Axis 45 degrees    Interpretation ECG       Atrial fibrillation with rapid ventricular response  Abnormal ECG  No previous ECGs available  Confirmed by SEE ED PROVIDER NOTE FOR, ECG INTERPRETATION ([id number]),  AI BELL ([id number]) on 3/14/2024 10:35:21 PM           RADIOLOGY:  Radiology reports were independently reviewed and interpreted  MR Brain w/o & w Contrast   Final Result   IMPRESSION:   1.  No  acute intracranial process.   2.  Generalized brain atrophy and presumed microvascular ischemic changes as detailed above.      CTA Head Neck with Contrast   Final Result   IMPRESSION:    HEAD CT:   1.  No CT evidence for acute intracranial process.   2.  Brain atrophy and presumed chronic microvascular ischemic changes as above.      HEAD CTA:    1.  No significant stenosis, aneurysm, or high flow vascular malformation identified.      NECK CTA:   1.  Atherosclerotic changes of the carotid bifurcations without hemodynamically significant stenosis in the neck vessels.    2.  No evidence for dissection.   3.  Borderline size a mildly enlarged cervical lymph nodes potentially related to patient's lymphoma diagnosis. These are largely similar to a PET/CT from 08/28/2023.      Preliminary head CT and CTA findings were called to Dr. Camarena at 3/14/2024 8:09 PM CDT by Dr. LOUIS العلي.           EKG:    ECG results from 03/14/24   ECG 12-LEAD WITH MUSE ([facility])     Value    Systolic Blood Pressure 198    Diastolic Blood Pressure 97    Ventricular Rate 105    Atrial Rate 108    VA Interval     QRS Duration 96        QTc 507    P Axis     R AXIS 28    T Axis 45    Interpretation ECG      Atrial fibrillation with rapid ventricular response  Abnormal ECG  No previous ECGs available  Confirmed by SEE ED PROVIDER NOTE FOR, ECG INTERPRETATION ([id number]),  AI BELL ([id number]) on 3/14/2024 10:35:21 PM         I have independently reviewed and interpreted the EKG(s) documented above.      MEDICATIONS GIVEN IN THE EMERGENCY:  Medications   aspirin (ASA) tablet 325 mg (has no administration in time range)   iopamidol (ISOVUE-370) solution 75 mL (75 mLs Intravenous $Given 3/14/24 2012)   HYDROcodone-acetaminophen (NORCO) 5-325 MG per tablet 2 tablet (2 tablets Oral $Given 3/14/24 2247)   labetalol (NORMODYNE/TRANDATE) injection 10 mg (10 mg Intravenous $Given 3/14/24 2332)   gadobutrol (GADAVIST) injection 6.5 mL (6.5 mLs  Intravenous $Given 3/14/24 7304)           NEW PRESCRIPTIONS STARTED AT TODAY'S ER VISIT:  New Prescriptions    No medications on file                FINAL DIAGNOSIS:    ICD-10-CM    1. Speech disturbance, unspecified type  R47.9       2. Secondary hypertension  I15.9                  NAME: Shaila Triana  AGE: 78 year old female  YOB: 1945  MRN: [id number]  EVALUATION DATE & TIME: No admission date for patient encounter.    PCP: Mervin Ibarra    ED PROVIDER: Jorge Camarena M.D.      Cathryn SILVA, am serving as a scribe to document services personally performed by Dr. Jorge Camarena based on my observation and the provider's statements to me. I, Jorge Camarena MD attest that Cathryn Santiago is acting in a scribe capacity, has observed my performance of the services and has documented them in accordance with my direction.    Jorge Camarena M.D.  Emergency Medicine  [facility] EMERGENCY DEPARTMENT  [facility] [facility] [address]  [phone]  Dept: [phone]  3/14/2024         Jorge Camarena MD  03/15/24 0049       Jorge Camarena MD  03/15/24 0058

## 2024-03-15 NOTE — H&P
"[facility]    History and Physical - Hospitalist Service       Date of Admission:  3/14/2024    Assessment & Plan      Shaila Triana is a 78F presents with acute slurred speech, word finding difficulty; pmhx includes atrial fibrillation (watchman), CKD4, breast CA (s/p lumpectomy, rads/tamoxifen), HTN/HLD, hypothyroid; admitted to observation for TIA.    #TIA  Presents with slurred speech, word finding difficulty--nearly resolved by time of presentation. Onset 630pm on 3/14. NIHSS on admission evaluation is 2.  -telemetry  -TTE  -A1c  -lipid panel  -troponin trend  -ASA 81mg PO every day  -atorvastatin 40mg PO every day  -neurology consulted  -PT/OT    #atrial fibrillation  [id number] of 5, history of watchman.  -diltiazem 180mg PO every day    #CKD4  Stable.  -BMP trend    #normocytic anaemia  Unclear baseline, improved trend overall.  -CBC trend    #thrombocytopenia  -CBC trend    #HLD  -atorvastatin 40 (replaces crestor)    #HTN  -diltizem 180mg PO every day  -hydralazine IV PRN    #hypothyroid  -synthroid 125mcg PO qAM    #MDD  -sertraline 200mg PO every day    #GERD  -pantoprazole 40mg PO BID         Observation Goals: -diagnostic tests and consults completed and resulted, -vital signs normal or at patient baseline, Nurse to notify provider when observation goals have been met and patient is ready for discharge.  Diet: Regular Diet Adult    DVT Prophylaxis: Pneumatic Compression Devices  Boucher Catheter: Not present  Lines: None     Cardiac Monitoring: ACTIVE order. Indication: TIA  Code Status: Full Code      Clinically Significant Risk Factors Present on Admission                  # Hypertension: Noted on problem list      # Overweight: Estimated body mass index is 26.52 kg/m  as calculated from the following:    Height as of this encounter: 1.575 m (5' 2\").    Weight as of this encounter: 65.8 kg (145 lb).       # Financial/Environmental Concerns:           Disposition Plan    "   Expected Discharge Date: 03/16/2024                  Jaylan Augustine MD  Hospitalist Service  [facility]  Securely message with [employer] (more info)  Text page via [employer] Paging/Directory     ______________________________________________________________________    Chief Complaint   Slurred speech    History is obtained from the patient    History of Present Illness   Shaila Triana is a 78F presents with acute slurred speech, word finding difficulty; pmhx includes atrial fibrillation (watchman), CKD4, breast CA (s/p lumpectomy, rads/tamoxifen), HTN/HLD, hypothyroid; admitted to observation for TIA.    Presents with acute sudden onset of slurred speech, difficulty with word finding.  This occurred at 6:30 PM on 3/14, she was convinced to come to the emergency room by her daughter, however by the time that she had come into the ED her slurred speech had resolved, however she still had some residual confusion, difficulty with some word finding.      Past Medical History    Past Medical History:   Diagnosis Date    Anemia     Anemia, unspecified type     Depressive disorder 12/04/2006    Essential hypertension 12/04/2006    Hypothyroidism (acquired) 12/04/2006    Invasive ductal carcinoma of breast (H) 08/06/2013    Left.  ER(+) [place](+) her-2-hina (-).  Lumpectomy and sentinel biopsy followed by radiation.  Adjuvant therapy with tamoxifen completed.    Lung nodules 04/19/2016    LLL on abd/pelvis CT 4/12/16.  No change on chest CT May 2017.  No further imaging needed.    Malignant neoplasm of female breast (H) 03/18/2008    Epic    New onset atrial fibrillation (H) 07/31/2020    Pure hypercholesterolemia 12/04/2006    Sleep apnea 12/04/2006       Past Surgical History   Past Surgical History:   Procedure Laterality Date    APPENDECTOMY      1957    BLEPHAROPLASTY Bilateral 2010    COLONOSCOPY  2005    COLONOSCOPY N/A 2/1/2024    Procedure: Colonoscopy;  Surgeon: Willie Alcazar MD;   Location: [facility]    ESOPHAGOSCOPY, GASTROSCOPY, DUODENOSCOPY (EGD), COMBINED N/A 10/6/2020    Procedure: ESOPHAGOGASTRODUODENOSCOPY, WITH BIOPSY and polypectomy;  Surgeon: Jorge Ramirez MD;  Location: [facility]    ESOPHAGOSCOPY, GASTROSCOPY, DUODENOSCOPY (EGD), COMBINED N/A 2/1/2024    Procedure: ESOPHAGOGASTRODUODENOSCOPY, WITH BIOPSY;  Surgeon: Willie Alcazar MD;  Location: [facility]    HERNIA REPAIR  2013    HYSTERECTOMY, JOSE  1975    LUMPECTOMY BREAST Left 2008    THYROIDECTOMY  Right     Partial    TUBAL LIGATION         Prior to Admission Medications   Prior to Admission Medications   Prescriptions Last Dose Informant Patient Reported? Taking?   DILT- MG 24 hr capsule 3/14/2024 at am Self No Yes   Sig: Take 1 capsule (180 mg) by mouth daily   albuterol (PROAIR HFA/PROVENTIL HFA/VENTOLIN HFA) 108 (90 Base) MCG/ACT inhaler Unknown at prn Self No Yes   Sig: Inhale 2 puffs into the lungs every 6 hours as needed for shortness of breath / dyspnea or wheezing   benzonatate (TESSALON) 100 MG capsule Unknown at prn Self No Yes   Sig: Take 1 capsule (100 mg) by mouth 3 times daily as needed for cough   calcium carbonate (OS-ANIKA) 1500 (600 Ca) MG tablet 3/14/2024 at am Self Yes Yes   Sig: Take 600 mg by mouth 2 times daily (with meals)   famotidine (PEPCID) 20 MG tablet 3/14/2024 at am Self Yes Yes   Sig: Take 20 mg by mouth 2 times daily   ferrous sulfate (FE TABS) 325 (65 Fe) MG EC tablet 3/14/2024 at am Self Yes Yes   Sig: Take 325 mg by mouth daily   levothyroxine (SYNTHROID/LEVOTHROID) 125 MCG tablet 3/14/2024 at am Self No Yes   Sig: Take 1 tablet (125 mcg) by mouth every morning (before breakfast)   multivitamin w/minerals (CENTRUM ADULTS) tablet 3/14/2024 at am Self Yes Yes   Sig: Take 1 tablet by mouth daily as needed (supplement)   pantoprazole (PROTONIX) 40 MG EC tablet 3/14/2024 at am Self No Yes   Sig: Take 1 tablet (40 mg) by mouth 2 times daily   potassium chloride ER (MICRO-K) 10 MEQ CR capsule  3/14/2024 at am Self Yes Yes   Sig: Take 2 capsules by mouth daily   rosuvastatin (CRESTOR) 10 MG tablet 3/13/2024 at hs Self No Yes   Sig: Take 1 tablet (10 mg) by mouth every evening for 30 days   sertraline (ZOLOFT) 100 MG tablet 3/14/2024 at am Self Yes Yes   Sig: Take 200 mg by mouth every evening   sucralfate (CARAFATE) 1 GM tablet 3/14/2024 at am Self No Yes   Sig: Take 1 tablet (1 g) by mouth 2 times daily (before meals)      Facility-Administered Medications: None        Review of Systems    The 10 point Review of Systems is negative other than noted in the HPI or here.      Physical Exam   Vital Signs: Temp: 97.7  F (36.5  C) Temp src: Temporal BP: (!) 158/116 Pulse: 105   Resp: 16 SpO2: 94 % O2 Device: Nasal cannula Oxygen Delivery: 2 LPM  Weight: 145 lbs 0 oz    Constitutional: awake, alert, cooperative, no apparent distress, and appears stated age  Respiratory: CTAB  Cardiovascular: irregularly irregular, no m/g/r  Musculoskeletal: shoulder/elbow flexion/extension 5/5 bilaterally and symmetric  Neurologic: AOx4, dozes off during evaluation, NIHSS 2.    Medical Decision Making       45 MINUTES SPENT BY ME on the date of service doing chart review, history, exam, documentation & further activities per the note.  MANAGEMENT DISCUSSED with the following over the past 24 hours: patient   NOTE(S)/MEDICAL RECORDS REVIEWED over the past 24 hours: outpatient F, ED notesM       Data     I have personally reviewed the following data over the past 24 hrs:    5.7  \   9.6 (L)   / 144 (L)     142 108 (H) 23.2 (H) /  95   4.2 22 1.73 (H) \     Trop: 27 (H) BNP: N/A     TSH: N/A T4: N/A A1C: 4.6     INR:  1.05 PTT:  32   D-dimer:  N/A Fibrinogen:  N/A       Imaging results reviewed over the past 24 hrs:   Recent Results (from the past 24 hour(s))   CTA Head Neck with Contrast    Narrative    EXAM: CTA HEAD NECK W CONTRAST  LOCATION: [facility]  DATE: 3/14/2024    INDICATION: Code stroke. Acute  onset confusion, slurred speech  COMPARISON: CT head 01/15/2024.  CONTRAST: 75 mL Isovue 370  TECHNIQUE: Head and neck CT angiogram with IV contrast. Noncontrast head CT followed by axial helical CT images of the head and neck vessels obtained during the arterial phase of intravenous contrast administration. Axial 2D reconstructed images and   multiplanar 3D MIP reconstructed images of the head and neck vessels were performed by the technologist. Dose reduction techniques were used. All stenosis measurements made according to NASCET criteria unless otherwise specified.    FINDINGS:   NONCONTRAST HEAD CT:   INTRACRANIAL CONTENTS: No intracranial hemorrhage, extraaxial collection, or mass effect.  No CT evidence of acute infarct. Mild presumed chronic small vessel ischemic changes. Mild generalized volume loss. No hydrocephalus.     VISUALIZED ORBITS/SINUSES/MASTOIDS: Prior bilateral cataract surgery. Visualized portions of the orbits are otherwise unremarkable. No paranasal sinus mucosal disease. No middle ear or mastoid effusion.    BONES/SOFT TISSUES: No acute abnormality.    HEAD CTA:  ANTERIOR CIRCULATION: Catheter atherosclerotic plaque the carotid siphons bilaterally with minimal associated stenosis. No evidence for proximal large vessel occlusion or flow-limiting stenosis elsewhere. No definite aneurysm or high flow vascular   malformation identified. Standard [place] of Person anatomy.    POSTERIOR CIRCULATION: No stenosis/occlusion, aneurysm, or high flow vascular malformation. Balanced vertebral arteries supply a normal basilar artery.     DURAL VENOUS SINUSES: Not well evaluated on a technical basis.    NECK CTA:  RIGHT CAROTID: Atherosclerotic plaque results in less than 30% stenosis in the right ICA. No dissection.    LEFT CAROTID: Atherosclerotic plaque results in less than 30% stenosis in the left ICA. No dissection.    VERTEBRAL ARTERIES: No focal stenosis or dissection. Balanced vertebral  arteries.    AORTIC ARCH: Classic aortic arch anatomy with no significant stenosis at the origin of the great vessels.    NONVASCULAR STRUCTURES: Asymmetric enlargement of the left thyroid lobe. An underlying left thyroid nodule is possible, but incompletely evaluated. Multiple borderline size a mildly enlarged bilateral cervical lymph nodes. A left level IIb lymph node   measures up to 8 x 12 mm for example. The majority of these were likely present on a previous PET/CT from 08/28/2023      Impression    IMPRESSION:   HEAD CT:  1.  No CT evidence for acute intracranial process.  2.  Brain atrophy and presumed chronic microvascular ischemic changes as above.    HEAD CTA:   1.  No significant stenosis, aneurysm, or high flow vascular malformation identified.    NECK CTA:  1.  Atherosclerotic changes of the carotid bifurcations without hemodynamically significant stenosis in the neck vessels.   2.  No evidence for dissection.  3.  Borderline size a mildly enlarged cervical lymph nodes potentially related to patient's lymphoma diagnosis. These are largely similar to a PET/CT from 08/28/2023.    Preliminary head CT and CTA findings were called to Dr. Camarena at 3/14/2024 8:09 PM CDT by Dr. LOUIS العلي.   MR Brain w/o & w Contrast    Narrative    EXAM: MR BRAIN W/O and W CONTRAST  LOCATION: [facility]  DATE: 3/15/2024    INDICATION: Speech difficulty.  COMPARISON: CTA head and neck 03/14/2024.  CONTRAST: 6.5 mL Gadavist.  TECHNIQUE: Routine multiplanar multisequence head MRI without and with intravenous contrast.    FINDINGS: Image quality is mildly degraded by motion.    INTRACRANIAL CONTENTS: No acute or subacute infarct. No mass, acute hemorrhage, or extra-axial fluid collections. Scattered nonspecific T2/FLAIR hyperintensities within the cerebral white matter most consistent with mild chronic microvascular ischemic   change. Mild generalized cerebral atrophy. No hydrocephalus. Normal position of  the cerebellar tonsils. No pathologic contrast enhancement.    SELLA: No abnormality accounting for technique.    OSSEOUS STRUCTURES/SOFT TISSUES: Normal marrow signal. The major intracranial vascular flow voids are maintained.     ORBITS: No abnormality accounting for technique.     SINUSES/MASTOIDS: No paranasal sinus mucosal disease. No middle ear or mastoid effusion.       Impression    IMPRESSION:  1.  No acute intracranial process.  2.  Generalized brain atrophy and presumed microvascular ischemic changes as detailed above.

## 2024-03-15 NOTE — MEDICATION SCRIBE - ADMISSION MEDICATION HISTORY
Medication Scribe Admission Medication History    Admission medication history is complete. The information provided in this note is only as accurate as the sources available at the time of the update.    Information Source(s): Patient via in-person    Pertinent Information: Patient states that she took all her morning dose today.    Changes made to PTA medication list:  Added: None  Deleted: None  Changed: None    Allergies reviewed with patient and updates made in EHR: yes    Medication History Completed By: Santa Oliver 3/14/2024 10:21 PM    PTA Med List   Medication Sig Last Dose    albuterol (PROAIR HFA/PROVENTIL HFA/VENTOLIN HFA) 108 (90 Base) MCG/ACT inhaler Inhale 2 puffs into the lungs every 6 hours as needed for shortness of breath / dyspnea or wheezing Unknown at prn    benzonatate (TESSALON) 100 MG capsule Take 1 capsule (100 mg) by mouth 3 times daily as needed for cough Unknown at prn    calcium carbonate (OS-ANIKA) 1500 (600 Ca) MG tablet Take 600 mg by mouth 2 times daily (with meals) 3/14/2024 at am    DILT- MG 24 hr capsule Take 1 capsule (180 mg) by mouth daily 3/14/2024 at am    famotidine (PEPCID) 20 MG tablet Take 20 mg by mouth 2 times daily 3/14/2024 at am    ferrous sulfate (FE TABS) 325 (65 Fe) MG EC tablet Take 325 mg by mouth daily 3/14/2024 at am    levothyroxine (SYNTHROID/LEVOTHROID) 125 MCG tablet Take 1 tablet (125 mcg) by mouth every morning (before breakfast) 3/14/2024 at am    multivitamin w/minerals (CENTRUM ADULTS) tablet Take 1 tablet by mouth daily as needed (supplement) 3/14/2024 at am    pantoprazole (PROTONIX) 40 MG EC tablet Take 1 tablet (40 mg) by mouth 2 times daily 3/14/2024 at am    potassium chloride ER (MICRO-K) 10 MEQ CR capsule Take 2 capsules by mouth daily 3/14/2024 at am    rosuvastatin (CRESTOR) 10 MG tablet Take 1 tablet (10 mg) by mouth every evening for 30 days 3/13/2024 at hs    sertraline (ZOLOFT) 100 MG tablet Take 200 mg by mouth every  evening 3/14/2024 at am    sucralfate (CARAFATE) 1 GM tablet Take 1 tablet (1 g) by mouth 2 times daily (before meals) 3/14/2024 at am

## 2024-03-15 NOTE — PLAN OF CARE
Goal Outcome Evaluation:    Problem: Comorbidity Management  Goal: Blood Pressure in Desired Range  Outcome: Progressing    Problem: Stroke, Ischemic (Includes Transient Ischemic Attack)  Goal: Effective Oxygenation and Ventilation  Outcome: Progressing    Pt denied pain.  Intermittent confusion   NIH: 2 for dysarthria and mild drift in left leg  Tele: atrial fibrillation       Teresa Luna RN

## 2024-03-15 NOTE — PLAN OF CARE
Problem: Adult Inpatient Plan of Care  Goal: Absence of Hospital-Acquired Illness or Injury  3/15/2024 1545 by Kale Woody, RN  Outcome: Progressing  3/15/2024 1421 by Kale Woody RN  Outcome: Progressing  Intervention: Identify and Manage Fall Risk  Recent Flowsheet Documentation  Taken 3/15/2024 1340 by Kale Woody, RN  Safety Promotion/Fall Prevention:   safety round/check completed   activity supervised   mobility aid in reach   nonskid shoes/slippers when out of bed  Taken 3/15/2024 0924 by Kale Woody, RN  Safety Promotion/Fall Prevention:   safety round/check completed   activity supervised   mobility aid in reach   nonskid shoes/slippers when out of bed  Intervention: Prevent and Manage VTE (Venous Thromboembolism) Risk  Recent Flowsheet Documentation  Taken 3/15/2024 1340 by Kale Woody RN  VTE Prevention/Management: SCDs (sequential compression devices) off  Taken 3/15/2024 0924 by Kale Woody RN  VTE Prevention/Management: SCDs (sequential compression devices) off  Intervention: Prevent Infection  Recent Flowsheet Documentation  Taken 3/15/2024 1340 by Kale Woody RN  Infection Prevention:   equipment surfaces disinfected   hand hygiene promoted   rest/sleep promoted   single patient room provided  Taken 3/15/2024 0924 by Kale Woody RN  Infection Prevention:   equipment surfaces disinfected   hand hygiene promoted   rest/sleep promoted   single patient room provided     Problem: Adult Inpatient Plan of Care  Goal: Optimal Comfort and Wellbeing  3/15/2024 1545 by Kale Woody, RN  Outcome: Progressing  3/15/2024 1421 by Kale Woody RN  Outcome: Progressing   Goal Outcome Evaluation:       Pt denies pain. Pt is disoriented to time. Pt scored 1 on NIHSS for garbled speech. Pt to transfer to room P1 room 112. Report called to P1 RN. Heart rhythm afib to afib w/ RVR in the low 100's with activity. Pt is tolerating room air and regular diet  well. No other concerns noted.   Kale Woody RN  3/15/2024  3:49 PM

## 2024-03-15 NOTE — CONSULTS
NEUROLOGY INPATIENT CONSULTATION NOTE       [facility]  [address] [place] [address]  Tel: [phone]  Fax: [phone]  [email or web address]     Shaila Triana,  1945, MRN [id number]  PCP: Mervin Ibarra  Date: 03/15/2024     ASSESSMENT & PLAN     Diagnosis code: TIA    Transient ischemic attack  78-year-old female with history of HTN, HLD, A-fib s/p watchman, CKD stage IV, breast cancer s/p lumpectomy, hypothyroidism admitted with transient speech difficulty and confusion  CT of the head and CTA shows atrophy and age-related changes.  No large vessel occlusion  MRI brain no acute intracranial process.  Generalized brain atrophy  Echocardiogram shows normal ejection fraction with severe left atrial enlargement  Check EEG  Lipid panel and continue Lipitor 40 mg daily.  Dual antiplatelet therapy with aspirin and Plavix for 90 days, afterwards switch to enteric-coated aspirin 325 mg daily  Physical, Occupational Therapy consult    Thank you again for this referral, please feel free to contact me if you have any questions.    Wilian Crooks MD  [facility] NEUROLOGY, [place]     CHIEF COMPLAINT TIA (transient ischemic attack)     HISTORY OF PRESENT ILLNESS     We have been requested by Dr. Rangel to evaluate Shaila Triana who is a 78 year old  female for TIA    Patient is 78-year-old female with history of HTN, HLD, A-fib s/p Watchman, CKD stage IV, breast cancer s/p lumpectomy, XRT/chemo, hypothyroidism who presented to the emergency room with sudden onset of slurred speech, difficulty with word finding on 3/14/2024.  There is no history of any focal weakness but in addition to dysarthria she had some confusion.  Initially a stroke code was called but CT of the head and CTA did not show any ischemia or any large vessel occlusion.  MRI brain showed generalized brain atrophy and small vessel ischemic disease.  Echocardiogram showed normal ejection  "fraction with tricuspid insufficiency and severe left atrial enlargement.  Hemoglobin A1c was 4.6.  Although she is on Crestor at home no recent LDL is documented in the chart.  Patient feels she is back to her baseline     PROBLEM LIST      Patient Active Problem List   Diagnosis Code    Malignant neoplasm of female breast (H) C50.919    Depressive disorder F32.A    Essential hypertension I10    Subclinical hypothyroidism E03.8    Invasive ductal carcinoma of breast (H) C50.919    Lung nodules R91.8    Chronic atrial fibrillation (H) I48.20    Pure hypercholesterolemia E78.00    Sleep apnea G47.30    Anemia D64.9    Dizziness R42    Chest pain, unspecified type R07.9    CKD (chronic kidney disease) stage 4, GFR 15-29 ml/min (H) N18.4    Elevated lipase R74.8    Pancreatic abnormality on CT Q45.3    Chronic anticoagulation Z79.01    Ascending aorta dilatation (H24) I77.810    Acute respiratory failure with hypoxia and hypercarbia (H) J96.01, J96.02    Community acquired pneumonia, unspecified laterality J18.9    Upper GI bleed K92.2    Anemia due to blood loss, acute D62    Chronic diastolic congestive heart failure (H) I50.32    Secondary hypertension I15.9    Speech disturbance, unspecified type R47.9    TIA (transient ischemic attack) G45.9    Thrombocytopenia (H24) D69.6    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) C83.18    Presence of Watchman left atrial appendage closure device Z95.818      Clinically Significant Risk Factors Present on Admission                  # Thrombocytopenia: Lowest platelets = 119 in last 2 days, will monitor for bleeding       # Hypertension: Noted on problem list        # Overweight: Estimated body mass index is 26.52 kg/m  as calculated from the following:    Height as of this encounter: 1.575 m (5' 2\").    Weight as of this encounter: 65.8 kg (145 lb).         # Financial/Environmental Concerns: none        # CKD, Stage 3b (GFR 30-44): Will monitor and treat as " appropriate  - last Cr =  1.67 mg/dL (Ref range: 0.51 - 0.95 mg/dL)  - last GFR = 31 mL/min/1.73m2 (Ref range: >60 mL/min/1.73m2)     PAST MEDICAL & SURGICAL HISTORY     Past Medical History: Patient  has a past medical history of Anemia, Anemia, unspecified type, Depressive disorder (12/04/2006), Essential hypertension (12/04/2006), Hypothyroidism (acquired) (12/04/2006), Invasive ductal carcinoma of breast (H) (08/06/2013), Lung nodules (04/19/2016), Malignant neoplasm of female breast (H) (03/18/2008), New onset atrial fibrillation (H) (07/31/2020), Pure hypercholesterolemia (12/04/2006), and Sleep apnea (12/04/2006).    Past Surgical History: She  has a past surgical history that includes thyroidectomy  (Right); hysterectomy, pelon (1975); appendectomy; Lumpectomy breast (Left, 2008); Blepharoplasty (Bilateral, 2010); tubal ligation; colonoscopy (2005); hernia repair (2013); Esophagoscopy, gastroscopy, duodenoscopy (EGD), combined (N/A, 10/6/2020); Esophagoscopy, gastroscopy, duodenoscopy (EGD), combined (N/A, 2/1/2024); and Colonoscopy (N/A, 2/1/2024).     SOCIAL HISTORY     Reviewed, and she  reports that she has never smoked. She has never used smokeless tobacco. She reports current alcohol use. She reports that she does not use drugs.     FAMILY HISTORY     Reviewed, and family history includes Alzheimer Disease in her father; Colon Cancer in her maternal aunt; No Known Problems in her mother; Other - See Comments in her sister.     ALLERGIES     Allergies   Allergen Reactions    Codeine Nausea    Dust Mite Extract Other (See Comments)     Per allergy test    Erythromycin Dizziness     Almost passed out    Macrolides And Ketolides     Penicillin [Penicillins] Itching        REVIEW OF SYSTEMS     Pertinent items are noted in HPI.     HOME & HOSPITAL MEDICATIONS     Prior to Admission Medications  Medications Prior to Admission   Medication Sig Dispense Refill Last Dose    albuterol (PROAIR HFA/PROVENTIL  HFA/VENTOLIN HFA) 108 (90 Base) MCG/ACT inhaler Inhale 2 puffs into the lungs every 6 hours as needed for shortness of breath / dyspnea or wheezing 18 g 0 Unknown at prn    benzonatate (TESSALON) 100 MG capsule Take 1 capsule (100 mg) by mouth 3 times daily as needed for cough 30 capsule 0 Unknown at prn    calcium carbonate (OS-ANIKA) 1500 (600 Ca) MG tablet Take 600 mg by mouth 2 times daily (with meals)   3/14/2024 at am    DILT- MG 24 hr capsule Take 1 capsule (180 mg) by mouth daily 30 capsule 1 3/14/2024 at am    famotidine (PEPCID) 20 MG tablet Take 20 mg by mouth 2 times daily   3/14/2024 at am    ferrous sulfate (FE TABS) 325 (65 Fe) MG EC tablet Take 325 mg by mouth daily   3/14/2024 at am    levothyroxine (SYNTHROID/LEVOTHROID) 125 MCG tablet Take 1 tablet (125 mcg) by mouth every morning (before breakfast) 30 tablet 1 3/14/2024 at am    multivitamin w/minerals (CENTRUM ADULTS) tablet Take 1 tablet by mouth daily as needed (supplement)   3/14/2024 at am    pantoprazole (PROTONIX) 40 MG EC tablet Take 1 tablet (40 mg) by mouth 2 times daily 60 tablet 0 3/14/2024 at am    potassium chloride ER (MICRO-K) 10 MEQ CR capsule Take 2 capsules by mouth daily   3/14/2024 at am    rosuvastatin (CRESTOR) 10 MG tablet Take 1 tablet (10 mg) by mouth every evening for 30 days 30 tablet 0 3/13/2024 at hs    sertraline (ZOLOFT) 100 MG tablet Take 200 mg by mouth every evening   3/14/2024 at am    sucralfate (CARAFATE) 1 GM tablet Take 1 tablet (1 g) by mouth 2 times daily (before meals) 60 tablet 0 3/14/2024 at am       Hospital Medications   aspirin  81 mg Oral Daily    Or    aspirin  81 mg Oral or NG Tube Daily    atorvastatin  40 mg Oral QPM    diltiazem ER COATED BEADS  180 mg Oral Daily    levothyroxine  125 mcg Oral QAM AC    pantoprazole  40 mg Oral BID    sertraline  200 mg Oral QPM        PHYSICAL EXAM     Vital signs  Temp:  [97.7  F (36.5  C)-97.9  F (36.6  C)] 97.8  F (36.6  C)  Pulse:  [] 100  Resp:   [15-40] 20  BP: (145-209)/() 154/90  SpO2:  [91 %-99 %] 94 %    General Physical Exam: Patient is alert and oriented x 3. Vital signs were reviewed and are documented in EMR. Neck was supple, no carotid bruit, thyromegaly, JVD or lymphadenopathy noted.  Neurological Exam:  Mental status: Patient is alert and oriented x 3  Speech: Normal with no dysarthria or aphasia  Cranial nerves: Patient is hard of hearing rest of the cranial nerves are intact  Motor: 5/5 no pronator drift  Reflexes: 3+ downgoing toes  Sensory: Intact to light touch pinprick.  No double simultaneous extinction  Coordination: Intact finger-nose testing heel-knee-shin  Gait: Deferred     DIAGNOSTIC STUDIES     Pertinent Radiology   Radiology Results: Reviewed impression and images     CT  HEAD CT:  1.  No CT evidence for acute intracranial process.  2.  Brain atrophy and presumed chronic microvascular ischemic changes as above.     HEAD CTA:   1.  No significant stenosis, aneurysm, or high flow vascular malformation identified.     NECK CTA:  1.  Atherosclerotic changes of the carotid bifurcations without hemodynamically significant stenosis in the neck vessels.   2.  No evidence for dissection.  3.  Borderline size a mildly enlarged cervical lymph nodes potentially related to patient's lymphoma diagnosis. These are largely similar to a PET/CT from 08/28/2023.    MRI  1.  No acute intracranial process.  2.  Generalized brain atrophy and presumed microvascular ischemic changes as detailed above.    ECHOCARDIOGRAM  1. Normal left ventricular size and systolic performance with a visually  estimated ejection fraction of 55-60%.  2. There is mild concentric increase in left ventricular wall thickness.  3. There is mild to moderate tricuspid insufficiency.  4. Normal right ventricular size and systolic performance.  5. There is severe left atrial enlargement. There is moderate right atrial  enlargement.  6. Right ventricular systolic pressure  relative to right atrial pressure is  moderately increased. The pulmonary artery pressure is estimated to be 60-65  mmHg plus right atrial pressure (the IVC is moderately dilated).  7. Echo contrast examination was performed using agitated NS as contrast  agent. The right heart opacity was good and the left heart was well  visualized. There was no evidence of right to left shunting during spontaneous  respiration or following release of Valsalva.    Pertinent Labs   Lab Results: Personally Reviewed   Recent Results (from the past 24 hour(s))   Glucose by meter    Collection Time: 03/14/24  7:48 PM   Result Value Ref Range    GLUCOSE BY METER POCT 92 70 - 99 mg/dL   Basic metabolic panel    Collection Time: 03/14/24  7:59 PM   Result Value Ref Range    Sodium 142 135 - 145 mmol/L    Potassium 4.2 3.4 - 5.3 mmol/L    Chloride 108 (H) 98 - 107 mmol/L    Carbon Dioxide (CO2) 22 22 - 29 mmol/L    Anion Gap 12 7 - 15 mmol/L    Urea Nitrogen 23.2 (H) 8.0 - 23.0 mg/dL    Creatinine 1.73 (H) 0.51 - 0.95 mg/dL    GFR Estimate 29 (L) >60 mL/min/1.73m2    Calcium 9.0 8.8 - 10.2 mg/dL    Glucose 95 70 - 99 mg/dL   INR    Collection Time: 03/14/24  7:59 PM   Result Value Ref Range    INR 1.05 0.85 - 1.15   Partial thromboplastin time    Collection Time: 03/14/24  7:59 PM   Result Value Ref Range    aPTT 32 22 - 38 Seconds   Troponin T, High Sensitivity    Collection Time: 03/14/24  7:59 PM   Result Value Ref Range    Troponin T, High Sensitivity 27 (H) <=14 ng/L   CBC with platelets and differential    Collection Time: 03/14/24  7:59 PM   Result Value Ref Range    WBC Count 5.7 4.0 - 11.0 10e3/uL    RBC Count 3.40 (L) 3.80 - 5.20 10e6/uL    Hemoglobin 9.6 (L) 11.7 - 15.7 g/dL    Hematocrit 30.2 (L) 35.0 - 47.0 %    MCV 89 78 - 100 fL    MCH 28.2 26.5 - 33.0 pg    MCHC 31.8 31.5 - 36.5 g/dL    RDW 13.5 10.0 - 15.0 %    Platelet Count 144 (L) 150 - 450 10e3/uL    % Neutrophils 61 %    % Lymphocytes 16 %    % Monocytes 15 %    %  Eosinophils 6 %    % Basophils 1 %    % Immature Granulocytes 1 %    NRBCs per 100 WBC 0 <1 /100    Absolute Neutrophils 3.5 1.6 - 8.3 10e3/uL    Absolute Lymphocytes 0.9 0.8 - 5.3 10e3/uL    Absolute Monocytes 0.8 0.0 - 1.3 10e3/uL    Absolute Eosinophils 0.4 0.0 - 0.7 10e3/uL    Absolute Basophils 0.1 0.0 - 0.2 10e3/uL    Absolute Immature Granulocytes 0.0 <=0.4 10e3/uL    Absolute NRBCs 0.0 10e3/uL   Extra Red Top Tube    Collection Time: 03/14/24  7:59 PM   Result Value Ref Range    Hold Specimen JIC    Hemoglobin A1c    Collection Time: 03/14/24  7:59 PM   Result Value Ref Range    Hemoglobin A1C 4.6 <5.7 %   ECG 12-LEAD WITH MUSE (LHE)    Collection Time: 03/14/24  8:49 PM   Result Value Ref Range    Systolic Blood Pressure 198 mmHg    Diastolic Blood Pressure 97 mmHg    Ventricular Rate 105 BPM    Atrial Rate 108 BPM    OK Interval  ms    QRS Duration 96 ms     ms    QTc 507 ms    P Axis  degrees    R AXIS 28 degrees    T Axis 45 degrees    Interpretation ECG       Atrial fibrillation with rapid ventricular response  Abnormal ECG  No previous ECGs available  Confirmed by SEE ED PROVIDER NOTE FOR, ECG INTERPRETATION ([id number]),  AI BELL ([id number]) on 3/14/2024 10:35:21 PM     Glucose by meter    Collection Time: 03/15/24  9:20 AM   Result Value Ref Range    GLUCOSE BY METER POCT 120 (H) 70 - 99 mg/dL   Basic metabolic panel    Collection Time: 03/15/24 10:09 AM   Result Value Ref Range    Sodium 137 135 - 145 mmol/L    Potassium 4.0 3.4 - 5.3 mmol/L    Chloride 106 98 - 107 mmol/L    Carbon Dioxide (CO2) 22 22 - 29 mmol/L    Anion Gap 9 7 - 15 mmol/L    Urea Nitrogen 22.2 8.0 - 23.0 mg/dL    Creatinine 1.67 (H) 0.51 - 0.95 mg/dL    GFR Estimate 31 (L) >60 mL/min/1.73m2    Calcium 8.6 (L) 8.8 - 10.2 mg/dL    Glucose 132 (H) 70 - 99 mg/dL   CBC with platelets    Collection Time: 03/15/24 10:09 AM   Result Value Ref Range    WBC Count 4.4 4.0 - 11.0 10e3/uL    RBC Count 3.20 (L) 3.80 - 5.20 10e6/uL     Hemoglobin 9.1 (L) 11.7 - 15.7 g/dL    Hematocrit 28.8 (L) 35.0 - 47.0 %    MCV 90 78 - 100 fL    MCH 28.4 26.5 - 33.0 pg    MCHC 31.6 31.5 - 36.5 g/dL    RDW 13.7 10.0 - 15.0 %    Platelet Count 119 (L) 150 - 450 10e3/uL   Lipid panel    Collection Time: 03/15/24 10:09 AM   Result Value Ref Range    Cholesterol 147 <200 mg/dL    Triglycerides 97 <150 mg/dL    Direct Measure HDL 40 (L) >=50 mg/dL    LDL Cholesterol Calculated 88 <=100 mg/dL    Non HDL Cholesterol 107 <130 mg/dL   Glucose by meter    Collection Time: 03/15/24  1:10 PM   Result Value Ref Range    GLUCOSE BY METER POCT 115 (H) 70 - 99 mg/dL   Glucose by meter    Collection Time: 03/15/24  6:26 PM   Result Value Ref Range    GLUCOSE BY METER POCT 159 (H) 70 - 99 mg/dL       Total time spent for face to face visit, reviewing labs/imaging studies, counseling and coordination of care was: 1 Hour 30 Minutes More than 50% of this time was spent on counseling and coordination of care.    This note was dictated using voice recognition software.  Any grammatical or context distortions are unintentional and inherent to the software.

## 2024-03-15 NOTE — ED NOTES
"Chief Complaint   Patient presents with     Well Child       Initial Pulse 134  Temp 97.9  F (36.6  C) (Axillary)  Ht 2' 4\" (0.711 m)  Wt 20 lb 11.5 oz (9.398 kg)  HC 17.25\" (43.8 cm)  SpO2 100%  BMI 18.58 kg/m2 Estimated body mass index is 18.58 kg/(m^2) as calculated from the following:    Height as of this encounter: 2' 4\" (0.711 m).    Weight as of this encounter: 20 lb 11.5 oz (9.398 kg).  Medication Reconciliation: complete    " Per neurology Stroke Code is deescalated at 2039 hours.

## 2024-03-15 NOTE — PLAN OF CARE
Goal Outcome Evaluation:           Problem: Adult Inpatient Plan of Care  Goal: Optimal Comfort and Wellbeing  Outcome: Progressing     Problem: Stroke, Ischemic (Includes Transient Ischemic Attack)  Goal: Optimal Nutrition Intake  Outcome: Progressing       Pt denies pain. Pt is disoriented to time. Pt is able to make needs known. Some dysarthria noted. Pt scored 1 on NIHSS. Pt is tolerating regular diet well. Pt denies pain. Cares clustered to promote rest.  Kale Woody RN  3/15/2024

## 2024-03-15 NOTE — CONSULTS
Care Management Initial Consult    General Information  Assessment completed with: Patient, Family, Children, pt, son Alisia Zamorano' Ex Tl  Type of CM/SW Visit: CM Role Introduction    Primary Care Provider verified and updated as needed: Yes   Readmission within the last 30 days: no previous admission in last 30 days      Reason for Consult: other (see comments), discharge planning, length of stay  Advance Care Planning: Advance Care Planning Reviewed: verified with patient, no concerns identified     General Information Comments: lives w/son Jaun and BRONSON Jay    Communication Assessment  Patient's communication style: spoken language (English or Bilingual)             Cognitive  Cognitive/Neuro/Behavioral: .WDL except, orientation     Arousal Level: opens eyes spontaneously  Orientation: disoriented to, time  Mood/Behavior: calm     Speech: garbled    Living Environment:   People in home: child(no), adult, significant other  Zaheer Zamorano, BRONSON  Current living Arrangements: house      Able to return to prior arrangements: yes       Family/Social Support:  Care provided by: self  Provides care for: no one  Marital Status: Lives with Significant Other  Significant Other, Children          Description of Support System: Supportive, Involved    Support Assessment: Adequate family and caregiver support, Adequate social supports    Current Resources:   Patient receiving home care services: No     Community Resources: None  Equipment currently used at home:    Supplies currently used at home: Hearing Aid Batteries    Employment/Financial:  Employment Status: retired        Financial Concerns: none   Referral to Financial Worker: No       Does the patient's insurance plan have a 3 day qualifying hospital stay waiver?  No    Lifestyle & Psychosocial Needs:  Social Determinants of Health     Food Insecurity: Not on file   Depression: Not at risk (7/24/2020)    PHQ-2     PHQ-2 Score: 2   Housing Stability: Not on file  "  Tobacco Use: Low Risk  (2/1/2024)    Patient History     Smoking Tobacco Use: Never     Smokeless Tobacco Use: Never     Passive Exposure: Not on file   Financial Resource Strain: Not on file   Alcohol Use: Not on file   Transportation Needs: Not on file   Physical Activity: Not on file   Interpersonal Safety: Not on file   Stress: Not on file   Social Connections: Not on file       Functional Status:  Prior to admission patient needed assistance:   Dependent ADLs:: Bathing, Wheelchair-with assist, Wheelchair-independent  Dependent IADLs:: Cleaning, Cooking, Transportation  Assesssment of Functional Status: Not at baseline with ADL Functioning    Mental Health Status:  Mental Health Status: No Current Concerns       Chemical Dependency Status:                Values/Beliefs:  Spiritual, Cultural Beliefs, Yazdanism Practices, Values that affect care:                 Additional Information:  Assessed, lives w/son Jaun, SO Pierre and dwayne Boothe. Independent at baseline w/WC and no svcs, CM to follow for discharge needs. Family can transport.    Discussed PENA w/pt and son Jaun, he doesn't feel like he understands it well as he had a stroke a year and half ago and can't help with much. Pt kept repeating that she has all of it, Medicare, but doesn't demonstrate understanding of Medicare B paying for observation and the difference. Pt states she has had [facility] PT/[facility] come to the house 2x week but it has since ended a week ago. Pt states she is in walker and wheelchair most of the day. States she sleeps in a recliner.     CM called emily Cortez x 2 and both times it was disconnected by message \"application error has occurred\".     CM reached out to Tl Triana, pt's son's Jaun's wife. Although they are  and Tl does not live with them she is still involved in Shaila's life and states that Pierre helps when and where he can. Jaun drives only during the day. [facility] can also transport if needed. And dtr Dana is now " sleeping and may be able to provide more information. CM unable to leave VM on her phone. Tl also states that pt is her own decision  maker and handles her own finances.    At this time, OLENA does not feel comfortable with PENA being understood by pt or son. And Tl is not involved in finances so OLENA did not discuss PENA with Tl.     Rodney Mg RN

## 2024-03-15 NOTE — ED TRIAGE NOTES
Pt here d/t SOB and confusion. Per family LKWT was about an hour ago at 1840 when she had word salad, facial droop and confusion. Family states most of the Sx have resolved, but she remains somewhat confused. Not facial droop noted in triage with Provider exam. Pt is [place] and is slow to respond to questions, unable to answer others correctly.      Triage Assessment (Adult)       Row Name 03/1945          Triage Assessment    Airway WDL WDL        Respiratory WDL    Respiratory WDL WDL        Skin Circulation/Temperature WDL    Skin Circulation/Temperature WDL WDL        Cardiac WDL    Cardiac WDL WDL                     Was seen at clinic yesterday and today d/t HTN. Was cleared at that time.      Triage Assessment (Adult)       Row Name 03/1945          Triage Assessment    Airway WDL WDL        Respiratory WDL    Respiratory WDL WDL        Skin Circulation/Temperature WDL    Skin Circulation/Temperature WDL WDL        Cardiac WDL    Cardiac WDL WDL

## 2024-03-15 NOTE — CONSULTS
"      [facility]    Stroke Consult Note    Reason for Consult: Stroke Code     Chief Complaint: Stroke Symptoms      HPI  78 year old female with HTN, HLD, Afib s/p watchman 2022 due to GI bleed, CKD, malignant lymphoma comes in with slurred speech, facial asymmetry, confusion that started about an hour ago. On ED MD exam, speech clear but appears disoriented. Cannot tell what state she is in and what is her cat name (she should be able to do it very easily at baseline).   On tele eval initially patient noted to have paraphasic errors, difficulty naming and repeating however during encounter, speech improving with mild loss of fluency. SBP in 180-190    Imaging Findings  CT head: no evidence of acute ischemic stroke or intracranial bleed  CTA head and neck: no evidence of large vessel occlusion of critical stenosis    Intravenous Thrombolysis  Not given due to:   - minor/isolated/quickly resolving symptoms    Endovascular Treatment  Not initiated due to absence of proximal vessel occlusion    Impression   Rapidly improving aphasia    Recommendations  - Neurochecks and Vital Signs every 4h   - Daily aspirin 325 mg for secondary stroke prevention  - Statin: initiate based on lipid profile  - MRI Brain with and without contrast  - TTE (with Bubble Study if age 60 yrs or less)  - 24-hour Telemetry  - Bedside Glucose Monitoring  - A1c, Lipid Panel  - PT/OT/SLP  - Stroke Education  - Euthermia, Euglycemia  -Infectious metabolic work per primary team    Patient Follow-up     - final recommendation pending work-up    Thank you for this consult.     The Stroke Staff is Dr. Hernandez.    Yajaira Prather MD  Vascular Neurology Fellow    To page me or covering stroke neurology team member, click here: AMCOM  Choose \"On Call\" tab at top, then select \"NEUROLOGY/ALL SITES\" from middle drop-down box, press Enter, then look for \"stroke\" or \"telestroke\" for your " site.    ______________________________________________________    Past Medical History   Past Medical History:   Diagnosis Date     Anemia      Anemia, unspecified type      Depressive disorder 12/04/2006     Essential hypertension 12/04/2006     Hypothyroidism (acquired) 12/04/2006     Invasive ductal carcinoma of breast (H) 08/06/2013    Left.  ER(+) NE(+) her-2-hina (-).  Lumpectomy and sentinel biopsy followed by radiation.  Adjuvant therapy with tamoxifen completed.     Lung nodules 04/19/2016    LLL on abd/pelvis CT 4/12/16.  No change on chest CT May 2017.  No further imaging needed.     Malignant neoplasm of female breast (H) 03/18/2008    Epic     New onset atrial fibrillation (H) 07/31/2020     Pure hypercholesterolemia 12/04/2006     Sleep apnea 12/04/2006     Past Surgical History   Past Surgical History:   Procedure Laterality Date     APPENDECTOMY      1957     BLEPHAROPLASTY Bilateral 2010     COLONOSCOPY  2005     COLONOSCOPY N/A 2/1/2024    Procedure: Colonoscopy;  Surgeon: Willie Alcazar MD;  Location: [facility]     ESOPHAGOSCOPY, GASTROSCOPY, DUODENOSCOPY (EGD), COMBINED N/A 10/6/2020    Procedure: ESOPHAGOGASTRODUODENOSCOPY, WITH BIOPSY and polypectomy;  Surgeon: Jorge Ramirez MD;  Location: [facility]     ESOPHAGOSCOPY, GASTROSCOPY, DUODENOSCOPY (EGD), COMBINED N/A 2/1/2024    Procedure: ESOPHAGOGASTRODUODENOSCOPY, WITH BIOPSY;  Surgeon: Willie Alcazar MD;  Location: [facility]     HERNIA REPAIR  2013     HYSTERECTOMY, JOSE  1975     LUMPECTOMY BREAST Left 2008     THYROIDECTOMY  Right     Partial     TUBAL LIGATION       Medications   Home Meds  Prior to Admission medications    Medication Sig Start Date End Date Taking? Authorizing Provider   albuterol (PROAIR HFA/PROVENTIL HFA/VENTOLIN HFA) 108 (90 Base) MCG/ACT inhaler Inhale 2 puffs into the lungs every 6 hours as needed for shortness of breath / dyspnea or wheezing 11/28/22   Lorena Hsu, APRN CNP   benzonatate (TESSALON) 100 MG capsule Take 1  capsule (100 mg) by mouth 3 times daily as needed for cough 23   David Abdul DO   calcium carbonate (OS-ANIKA) 1500 (600 Ca) MG tablet Take 600 mg by mouth 2 times daily (with meals) 22   Reported, Patient   DILT- MG 24 hr capsule Take 1 capsule (180 mg) by mouth daily 24   Dale Mcpherson MD   famotidine (PEPCID) 20 MG tablet Take 1 tablet (20 mg) by mouth every other day 24   Dale Mcpherson MD   fluticasone (VERAMYST) 27.5 MCG/SPRAY nasal spray Spray 2 sprays into both nostrils daily as needed for allergies    Reported, Patient   levothyroxine (SYNTHROID/LEVOTHROID) 125 MCG tablet Take 1 tablet (125 mcg) by mouth every morning (before breakfast) 2/3/24   Dale Mcpherson MD   multivitamin w/minerals (CENTRUM ADULTS) tablet Take 1 tablet by mouth daily as needed (supplement)    Reported, Patient   OVER-THE-COUNTER Take 1 tablet by mouth daily Iron supplement, green tablet, unknown formulation and dose.    Reported, Patient   pantoprazole (PROTONIX) 40 MG EC tablet Take 1 tablet (40 mg) by mouth 2 times daily 24   Dale Mcpherson MD   potassium chloride ER (MICRO-K) 10 MEQ CR capsule Take 20 mEq by mouth daily    Reported, Patient   rosuvastatin (CRESTOR) 10 MG tablet Take 1 tablet (10 mg) by mouth every evening for 30 days 23  David Abdul DO   sertraline (ZOLOFT) 100 MG tablet Take 200 mg by mouth every evening    Reported, Patient   sucralfate (CARAFATE) 1 GM tablet Take 1 tablet (1 g) by mouth 2 times daily (before meals) 24   Dale Mcpherson MD       Scheduled Meds      Infusion Meds      PRN Meds      Allergies   Allergies   Allergen Reactions     Codeine Nausea     Dust Mite Extract Other (See Comments)     Per allergy test     Erythromycin Dizziness     Almost passed out     Penicillin [Penicillins] Itching     Family History   Family History   Problem Relation Age of Onset     No Known Problems Mother          age 89 after complications from a fall      Alzheimer Disease Father      Other - See Comments Sister         Polio     Colon Cancer Maternal Aunt      Breast Cancer No family hx of      Ovarian Cancer No family hx of      Prostate Cancer No family hx of      Social History   Social History     Tobacco Use     Smoking status: Never     Smokeless tobacco: Never   Substance Use Topics     Alcohol use: Yes     Comment: occ     Drug use: Never   PHYSICAL EXAMINATION  Temp:  [97.7  F (36.5  C)] 97.7  F (36.5  C)  Pulse:  [] 95  Resp:  [20-22] 20  BP: (180-198)/(114-115) 182/115  SpO2:  [92 %-94 %] 92 %     Stroke Scales    NIHSS  1a. Level of Consciousness 0-->Alert, keenly responsive   1b. LOC Questions 1-->Answers one question correctly   1c. LOC Commands 0-->Performs both tasks correctly   2.   Best Gaze 0-->Normal   3.   Visual 0-->No visual loss   4.   Facial Palsy 0-->Normal symmetrical movements   5a. Motor Arm, Left 0-->No drift, limb holds 90 (or 45) degrees for full 10 secs   5b. Motor Arm, Right 0-->No drift, limb holds 90 (or 45) degrees for full 10 secs   6a. Motor Leg, Left 0-->No drift, leg holds 30 degree position for full 5 secs   6b. Motor Leg, right 0-->No drift, leg holds 30 degree position for full 5 secs   7.   Limb Ataxia 0-->Absent   8.   Sensory 0-->Normal, no sensory loss   9.   Best Language 1-->Mild-to-moderate aphasia, some obvious loss of fluency or facility of comprehension, without significant limitation on ideas expressed or form of expression. Reduction of speech and/or comprehension, however, makes conversation. . . (see row details)   10. Dysarthria 0-->Normal   11. Extinction and Inattention  0-->No abnormality   Total 2 (03/14/24 2030)       Modified Wolcott Score (Pre-morbid)  0 - No symptoms.    Imaging  I personally reviewed all imaging; relevant findings per HPI.     Lab Results Data   CBC  Recent Labs   Lab 03/14/24 1959   WBC 5.7   RBC 3.40*   HGB 9.6*   HCT 30.2*   *     Basic Metabolic Panel    Recent  "Labs   Lab 03/14/24 1959 03/14/24 1948     --    POTASSIUM 4.2  --    CHLORIDE 108*  --    CO2 22  --    BUN 23.2*  --    CR 1.73*  --    GLC 95 92   ANIKA 9.0  --      Liver Panel  No results for input(s): \"PROTTOTAL\", \"ALBUMIN\", \"BILITOTAL\", \"ALKPHOS\", \"AST\", \"ALT\", \"BILIDIRECT\" in the last 168 hours.  INR    Recent Labs   Lab Test 03/14/24 1959 02/01/24  0606   INR 1.05 1.14      Lipid Profile  No lab results found.  A1C  No lab results found.  Troponin    Recent Labs   Lab 03/14/24 1959   CTROPT 27*          Stroke Code Data Data   Stroke Code Data  (for stroke code with tele)  Stroke code activated 03/14/24 1951   First stroke provider response 03/14/24 1952   Video start time 03/14/24 2014   Video end time 03/14/24 2039   Last known normal 03/14/24  1850   Time of discovery (or onset of symptoms)         Head CT read by Stroke Neuro Provider 03/14/24 2013   Was stroke code de-escalated? Yes  03/14/24 2040     Telestroke Service Details  Type of service telemedicine diagnostic assessment of acute neurological changes   Reason telemedicine is appropriate patient requires assessment with a specialist for diagnosis and treatment of neurological symptoms   Mode of transmission secure interactive audio and video communication per [place]   Originating site (patient location) [facility]    Distant site (provider location) Provider remote site             "

## 2024-03-16 NOTE — PLAN OF CARE
"Goal Outcome Evaluation:                        Problem: Stroke, Ischemic (Includes Transient Ischemic Attack)  Goal: Effective Oxygenation and Ventilation  Outcome: Progressing       NIH score of 1 for dysarthria. C/O tongue feeling \"thick\" but no difficulties swallowing.   1-2+ BLE edema. HR afib, tachy with activity.   LSC but having increase in GARCIA.  BS active. Good appetite.   Voiding without difficulty.     BP continues to be elevated. PRN Hydralazine given twice.   "

## 2024-03-16 NOTE — PROGRESS NOTES
Bedside EEG was performed. Wake, drowsy, and sleep were obtained.   Activations completed were: (eyes open/eyes closed, mental activations,photic)   Patient's mental status was: (alert/oriented, confused, sedated, cooperative/uncooperative)  Was the neurologist consulted regarding significant waveforms or interventions needed? N/a  Skin intact? yes     EEG #    EEG system used: [id number]    Neurologist dictation to follow.

## 2024-03-16 NOTE — PROGRESS NOTES
[facility]    Medicine Progress Note - Hospitalist Service    Date of Admission:  3/14/2024    Assessment & Plan   77 yo F with h/o MDD, HTN, hypothyroid, breast CA, lymphoma, HFpEF, CKD4, PAUL, HLD, anemia, Afib, s/p LAAO device who presents with acute slurred speech, left facial droop, and word-finding difficulty.  Symptoms resolved in less than 1-2 hours.  CTA head and neck without acute changes. MRI brain negative for acute changes.  Echo normal LV function, mild conc LVH, severe left atrial enlargement, mod right atrial enlargement, PA pressure estimated 60-65 mmHg.      Principal Problem:    TIA (transient ischemic attack) - as above, ASA and plavix for 90 days per neuro then  mg daily.  Lipitor, PT/OT eval. OT recommending home with assist. Waiting on EEG results. Could discharge is these are unremarkable.    Active Problems:    Depressive disorder - zoloft as at home    Essential hypertension - continue diltiazem as at home    Subclinical hypothyroidism - continue levothyroxine as at home    Invasive ductal carcinoma of breast - s/p lumpectomy/XRT and tamoxifen    Chronic atrial fibrillation - s/p LAAO, rate stable on diltiazem    Pure hypercholesterolemia - statin    Sleep apnea - defer to PCP    Elevated PA pressure - possibly related to PAUL? F/up with outpatient PCP.    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  Diet: Regular Diet Adult    DVT Prophylaxis: Enoxaparin (Lovenox) SQ  Boucher Catheter: Not present  Lines: None     Cardiac Monitoring: ACTIVE order. Indication: Stroke, acute (48 hours)  Code Status: Full Code      Clinically Significant Risk Factors Present on Admission                # Thrombocytopenia: Lowest platelets = 119 in last 2 days, will monitor for bleeding   # Hypertension: Noted on problem list      # Overweight: Estimated body mass index is 26.52 kg/m  as calculated from the following:    Height as of this encounter: 1.575 m (5' 2\").    Weight as of this encounter: 65.8 kg (145 lb).         # Financial/Environmental Concerns: none         Disposition Plan      Expected Discharge Date: 03/17/2024                    CANDACE HALL MD  Hospitalist Service  [facility]  Securely message with [employer] (more info)  Text page via [employer] Paging/Directory   ______________________________________________________________________    Interval History   Continues to feel fine. No recurrence of presenting symptoms.    Physical Exam   Vital Signs: Temp: (P) 98.1  F (36.7  C) Temp src: (P) Oral BP: (!) (P) 182/116 (Nurse notified.) Pulse: (P) 106   Resp: (P) 20 SpO2: (P) 93 % O2 Device: (P) None (Room air)    Weight: 145 lbs 0 oz    General Appearance: Older F in NAD  Respiratory:   CTAB  Cardiovascular: irreg S1S2  GI: +BS, soft, NT  Skin: no rashes on exposed areas  Neuro: Alert, CN 2-12 intact, motor 5/5 upper and lower ext symm, sens grossly intact to light touch.      Medical Decision Making       50 MINUTES SPENT BY ME on the date of service doing chart review, history, exam, documentation & further activities per the note.  Discussed with neurology    Data         Imaging results reviewed over the past 24 hrs:   No results found for this or any previous visit (from the past 24 hour(s)).    "

## 2024-03-16 NOTE — PROGRESS NOTES
Care Management Follow Up    Length of Stay (days): 0    Expected Discharge Date: 03/16/2024     Concerns to be Addressed:     eeg possibly today, OT recs  Patient plan of care discussed at interdisciplinary rounds: Yes    Anticipated Discharge Disposition:  home today possibly after eeg neuro consult and OT recs     Anticipated Discharge Services:  OT recs pending  Anticipated Discharge DME:  none    Patient/family educated on Medicare website which has current facility and service quality ratings:    Education Provided on the Discharge Plan:    Patient/Family in Agreement with the Plan:      Referrals Placed by CM/SW:  none  Private pay costs discussed: Not applicable    Additional Information:    Hospitalist feels patient can go home pending eeg results and OT recs.     Assessed, lives w/son BRONSON Zamorano and dil Tl. Independent at baseline w/WC and no svcs, CM to follow for discharge needs. Family can transport.     CM will continue to follow care progression and aide in discharge planning as needed.        Sallie Wheat RN

## 2024-03-16 NOTE — PROGRESS NOTES
Physical Therapy: Orders received. Chart reviewed and discussed with care team.? Physical Therapy not indicated, as pt is at her baseline.? Defer discharge recommendations to OT.? Will complete orders.     Naina Abdul DPT 3/16/2024,  [id number]

## 2024-03-16 NOTE — UTILIZATION REVIEW
Concurrent stay review; Secondary Review Determination     Under the authority of the Utilization Management Committee, the utilization review process indicated a secondary review on Shaila Triana.  The review outcome is based on review of the medical records, discussions with staff, and applying clinical experience noted on the date of the review.        (x) Observation Status Appropriate - Concurrent stay review    RATIONALE FOR DETERMINATION   78 years old female with PMH of  MDD, HTN, hypothyroid, breast CA, lymphoma, HFpEF, CKD4, PAUL, HLD, anemia, Afib, s/p LAAO device who presents to the ER with acute slurred speech, left facial droop, and word-finding difficulty.  Symptoms resolved in less than 1-2 hours. Admitted with TIA, CTA head and neck without acute changes. MRI brain negative for acute changes.  Echo unremarkable, neurology consult , pending EEG, PT/OT reccommended home discharge .        Patient is clinically improving and there is no clear indication to change patient's status to inpatient. The severity of illness, intensity of service provided, expected LOS and risk for adverse outcome make the care appropriate for observation.    The information on this document is developed by the utilization review team in order for the business office to ensure compliance.  This only denotes the appropriateness of proper admission status and does not reflect the quality of care rendered.         The definitions of Inpatient Status and Observation Status used in making the determination above are those provided in the CMS Coverage Manual, Chapter 1 and Chapter 6, section 70.4.      Sincerely,   Figueroa Granda MD  Utilization Review  Physician Advisor  [facility]

## 2024-03-16 NOTE — PLAN OF CARE
Goal Outcome Evaluation:                        Problem: Stroke, Ischemic (Includes Transient Ischemic Attack)  Goal: Optimal Cerebral Tissue Perfusion  Outcome: Progressing        Pt reports L ear pain. Reports has been ongoing.   LS diminished. GARCIA but sats remain >90%.  BS active. Voiding without difficulty.    NIH score of 1 for dysarthria.   A/O, pleasant. Some forgetfulness.   AFIB on tele.

## 2024-03-16 NOTE — PROGRESS NOTES
PRIMARY DIAGNOSIS: TIA  OUTPATIENT/OBSERVATION GOALS TO BE MET BEFORE DISCHARGE:  ADLs back to baseline: Yes    Activity and level of assistance: Up with standby assistance.    Pain status: Improved with use of alternative comfort measures i.e.: positioning    Return to near baseline physical activity: Yes     Discharge Planner Nurse   Safe discharge environment identified: Yes  Barriers to discharge: No       Entered by: Yoly Smith RN 03/15/2024 8:47 PM       Patient alert and oriented. NIH 1. Tele continued, running afib. EEG to be completed in the morning.

## 2024-03-16 NOTE — PROGRESS NOTES
Care Management Discharge Note    Discharge Date: 03/16/2024       Discharge Disposition:  home with family to assist    Discharge Services:  none    Discharge DME:  none    Discharge Transportation: family or friend will provide    Private pay costs discussed: Not applicable    Does the patient's insurance plan have a 3 day qualifying hospital stay waiver?  No    PAS Confirmation Code:  n/a  Patient/family educated on Medicare website which has current facility and service quality ratings:  yes    Education Provided on the Discharge Plan:  yes  Persons Notified of Discharge Plans: family  Patient/Family in Agreement with the Plan:  yes, family to transport    Handoff Referral Completed: No    Additional Information:  Home with family for additional support and care. Family to transport    Assessed, lives w/son BRONSON Zamorano and dwayne Boothe. Independent at baseline w/WC and no svcs, CM to follow for discharge needs. Family can transport.       Sallie Wheat RN

## 2024-03-16 NOTE — PROGRESS NOTES
[facility] Progress Note - Hospitalist Service    Date of Admission:  3/14/2024    Assessment & Plan     77 yo F with h/o MDD, HTN, hypothyroid, breast CA, lymphoma, HFpEF, CKD4, PAUL, HLD, anemia, Afib, s/p LAAO device who presents with acute slurred speech, left facial droop, and word-finding difficulty.  Symptoms resolved in less than 1-2 hours.  CTA head and neck without acute changes. MRI brain negative for acute changes.  Echo normal LV function, mild conc LVH, severe left atrial enlargement, mod right atrial enlargement, PA pressure estimated 60-65 mmHg.      Principal Problem:    TIA (transient ischemic attack) - as above, ASA and plavix for 90 days per neuro then  mg daily.  Lipitor, PT/OT eval.    Active Problems:    Depressive disorder - zoloft as at home    Essential hypertension - continue diltiazem as at home    Subclinical hypothyroidism - continue levothyroxine as at home    Invasive ductal carcinoma of breast - s/p lumpectomy/XRT and tamoxifen    Chronic atrial fibrillation - s/p LAAO, rate stable on diltiazem    Pure hypercholesterolemia - statin    Sleep apnea - defer to PCP    Elevated PA pressure - possibly related to PAUL? F/up with outpatient PCP.    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  Diet: Regular Diet Adult    DVT Prophylaxis: Enoxaparin (Lovenox) SQ  Boucher Catheter: Not  "present  Lines: None     Cardiac Monitoring: ACTIVE order. Indication: Stroke, acute (48 hours)  Code Status: Full Code      Clinically Significant Risk Factors Present on Admission                # Thrombocytopenia: Lowest platelets = 119 in last 2 days, will monitor for bleeding   # Hypertension: Noted on problem list      # Overweight: Estimated body mass index is 26.52 kg/m  as calculated from the following:    Height as of this encounter: 1.575 m (5' 2\").    Weight as of this encounter: 65.8 kg (145 lb).       # Financial/Environmental Concerns: none         Disposition Plan     Expected Discharge Date: 03/16/2024                    Mathieu Rangel MD  Hospitalist Service  [facility]  Securely message with [employer] (more info)  Text page via [employer] Paging/Directory   ______________________________________________________________________    Interval History   Feels better, no residual symptoms that she can tell.    Physical Exam   Vital Signs: Temp: 97.8  F (36.6  C) Temp src: Oral BP: (!) 163/89 Pulse: 103   Resp: 20 SpO2: 95 % O2 Device: None (Room air) Oxygen Delivery: 5 LPM  Weight: 145 lbs 0 oz    General Appearance: Older F in NAD  Respiratory:   occas rhonchi bilaterally  Cardiovascular: irreg S1S2  GI: +BS, soft, NT  Skin: no rashes on exposed areas  Neuro: Alert, CN 2-12 intact, motor 5/5 upper and lower ext symm, sens grossly intact to light touch.      Medical Decision Making       50 MINUTES SPENT BY ME on the date of service doing chart review, history, exam, documentation & further activities per the note.  Discussed with neurology    Data     I have personally reviewed the following data over the past 24 hrs:    4.4  \   9.1 (L)   / 119 (L)     137 106 22.2 /  159 (H)   4.0 22 1.67 (H) \     TSH: N/A T4: N/A A1C: N/A       Imaging results reviewed over the past 24 hrs:   Recent Results (from the past 24 hour(s))   MR Brain w/o & w Contrast    Narrative    EXAM: MR BRAIN W/O and " W CONTRAST  LOCATION: [facility]  DATE: 3/15/2024    INDICATION: Speech difficulty.  COMPARISON: CTA head and neck 2024.  CONTRAST: 6.5 mL Gadavist.  TECHNIQUE: Routine multiplanar multisequence head MRI without and with intravenous contrast.    FINDINGS: Image quality is mildly degraded by motion.    INTRACRANIAL CONTENTS: No acute or subacute infarct. No mass, acute hemorrhage, or extra-axial fluid collections. Scattered nonspecific T2/FLAIR hyperintensities within the cerebral white matter most consistent with mild chronic microvascular ischemic   change. Mild generalized cerebral atrophy. No hydrocephalus. Normal position of the cerebellar tonsils. No pathologic contrast enhancement.    SELLA: No abnormality accounting for technique.    OSSEOUS STRUCTURES/SOFT TISSUES: Normal marrow signal. The major intracranial vascular flow voids are maintained.     ORBITS: No abnormality accounting for technique.     SINUSES/MASTOIDS: No paranasal sinus mucosal disease. No middle ear or mastoid effusion.       Impression    IMPRESSION:  1.  No acute intracranial process.  2.  Generalized brain atrophy and presumed microvascular ischemic changes as detailed above.   Echocardiogram Complete    Narrative    [id number]  [id number]  [id number]  [id number]^ELDA^SIRIA^[place] [address]     Name: LUIS VILLANUEVA  MRN: [id number]  : 1945  Study Date: 03/15/2024 06:48 AM  Age: 78 yrs  Gender: Female  Patient Location: [facility]  Reason For Study: TIA  Ordering Physician: SIRIA SCHROEDER  Performed By: CHANTAL     BSA: 1.7 m2  Height: 62 in  Weight: 145 lb  HR: 97  ______________________________________________________________________________  Procedure  Complete Portable Echo Adult. Complete Portable Bubble Echo Adult.  ______________________________________________________________________________  Interpretation Summary     1. Normal left ventricular  size and systolic performance with a visually  estimated ejection fraction of 55-60%.  2. There is mild concentric increase in left ventricular wall thickness.  3. There is mild to moderate tricuspid insufficiency.  4. Normal right ventricular size and systolic performance.  5. There is severe left atrial enlargement. There is moderate right atrial  enlargement.  6. Right ventricular systolic pressure relative to right atrial pressure is  moderately increased. The pulmonary artery pressure is estimated to be 60-65  mmHg plus right atrial pressure (the IVC is moderately dilated).  7. Echo contrast examination was performed using agitated NS as contrast  agent. The right heart opacity was good and the left heart was well  visualized. There was no evidence of right to left shunting during spontaneous  respiration or following release of Valsalva.  ______________________________________________________________________________  Left ventricle:  Normal left ventricular size and systolic performance with a visually  estimated ejection fraction of 55-60%. There is normal regional wall motion.  There is mild concentric increase in left ventricular wall thickness.     Assessment of LV Diastolic Function: Patient appears to be in atrial  fibrillation which limits assessment of diastolic filling.     Right ventricle:  Normal right ventricular size and systolic performance.     Left atrium:  There is severe left atrial enlargement.     Right atrium:  There is moderate right atrial enlargement.     IVC:  The IVC is moderately dilated.     Aortic valve:  The aortic valve is comprised of three cusps. There is mild aortic valve  sclerosis without significant stenosis.     Mitral valve:  The mitral valve appears morphologically normal. There is mild mitral  insufficiency.     Tricuspid valve:  The tricuspid valve is grossly morphologically normal. There is mild to  moderate tricuspid insufficiency.     Pulmonic valve:  The pulmonic  valve is grossly morphologically normal. There is mild pulmonic  insufficiency.     Thoracic aorta:  There is borderline enlargement of the proximal ascending aorta.     Pericardium:  There is no significant pericardial effusion.  ______________________________________________________________________________  ______________________________________________________________________________  MMode/2D Measurements & Calculations  IVSd: 1.5 cm  LVIDd: 4.4 cm  LVIDs: 3.5 cm  LVPWd: 1.5 cm  FS: 21.1 %  LV mass(C)d: 256.2 grams  LV mass(C)dI: 153.6 grams/m2  Ao root diam: 2.8 cm  LA dimension: 4.6 cm  asc Aorta Diam: 3.9 cm  LA/Ao: 1.6  LVOT diam: 1.9 cm  LVOT area: 2.8 cm2  Ao root diam index Ht(cm/m): 1.8  Ao root diam index BSA (cm/m2): 1.7  Asc Ao diam index BSA (cm/m2): 2.3  Asc Ao diam index Ht(cm/m): 2.5  LA Volume (BP): 71.4 ml     LA Volume Index (BP): 42.8 ml/m2  LA Volume Indexed (AL/bp): 45.5 ml/m2  RV Base: 5.2 cm  RWT: 0.66     TAPSE: 1.4 cm     Doppler Measurements & Calculations  MV E max moreno: 113.0 cm/sec  MV dec slope: 608.7 cm/sec2  MV dec time: 0.19 sec  Ao V2 max: 153.7 cm/sec  Ao max P.0 mmHg  Ao V2 mean: 108.0 cm/sec  Ao mean P.0 mmHg  Ao V2 VTI: 28.7 cm  MICHEL(I,D): 1.5 cm2  MICHEL(V,D): 1.6 cm2  LV V1 max PG: 3.2 mmHg  LV V1 max: 89.4 cm/sec  LV V1 VTI: 15.2 cm  SV(LVOT): 43.2 ml  SI(LVOT): 25.9 ml/m2  PA acc time: 0.08 sec  PI end-d moreno: 180.0 cm/sec  TR max moreno: 395.0 cm/sec  TR max P.4 mmHg  AV Moreno Ratio (DI): 0.58  MICHEL Index (cm2/m2): 0.90  E/E' av.5  Lateral E/e': 13.8  Medial E/e': 19.3     RV S Moreno: 7.5 cm/sec     ______________________________________________________________________________  Report approved by: Bridgette Bedoya 03/15/2024 11:22 AM            Statement Selected

## 2024-03-16 NOTE — PROGRESS NOTES
PRIMARY DIAGNOSIS: TIA  OUTPATIENT/OBSERVATION GOALS TO BE MET BEFORE DISCHARGE:  ADLs back to baseline: Yes    Activity and level of assistance: Up with standby assistance.    Pain status: Pain free.    Return to near baseline physical activity: Yes     Discharge Planner Nurse   Safe discharge environment identified: Yes  Barriers to discharge: Yes       Entered by: Yoly Smith RN 03/16/2024 1:39 AM     Patient alert and oriented. Slight garble to speech. Vitals stable on RA. Telemetry running Afib. EEG to be completed in morning. PT/OT consults. Possible discharge.

## 2024-03-16 NOTE — PROGRESS NOTES
03/16/24 0847   Appointment Info   Signing Clinician's Name / Credentials (OT) Zoila Gutiérrez/L   Living Environment   People in Home significant other;child(no), adult   Current Living Arrangements house   Home Accessibility stairs within home   Number of Stairs, Within Home, Primary greater than 10 stairs  (stairs to basement, pt does not have to use stairs)   Transportation Anticipated family or friend will provide   Living Environment Comments Pt is able to stay on one level.  Pt has tub shower with chair.   Self-Care   Current Activity Tolerance moderate   Equipment Currently Used at Home walker, rolling;wheelchair, manual;shower chair   Fall history within last six months yes   Number of times patient has fallen within last six months   (a few)   General Information   Onset of Illness/Injury or Date of Surgery 03/14/24   Referring Physician Claire   Patient/Family Therapy Goal Statement (OT) home   Additional Occupational Profile Info/Pertinent History of Current Problem ?TIA, dysarthria   Existing Precautions/Restrictions fall   Cognitive Status Examination   Cognitive Status Comments Appears grossly intact.  Likely has some mild cognitive impairment at baseline.   Visual Perception   Visual Impairment/Limitations WFL   Sensory   Sensory Quick Adds sensation intact   Range of Motion Comprehensive   General Range of Motion no range of motion deficits identified   Strength Comprehensive (MMT)   General Manual Muscle Testing (MMT) Assessment no strength deficits identified   Coordination   Upper Extremity Coordination No deficits were identified   Bed Mobility   Comment (Bed Mobility) NT   Transfers   Transfer Comments SBA   Balance   Balance Comments SBA   Activities of Daily Living   BADL Assessment/Intervention   (SBA with LB dressing)   Clinical Impression   Criteria for Skilled Therapeutic Interventions Met (OT) Yes, treatment indicated   OT Diagnosis Impaired ADL independence   OT Problem  List-Impairments impacting ADL activity tolerance impaired;balance   Assessment of Occupational Performance 1-3 Performance Deficits   Planned Therapy Interventions (OT) ADL retraining;transfer training   Risk & Benefits of therapy have been explained evaluation/treatment results reviewed;participants included;patient   Clinical Impression Comments Pt seen bedside for OT eval and one time treatment.  Pt demonstrates abiltiy to complete ADLs and mobiltiy at home with current level of assist.  No further OT indicated.  Recommend home with assist from family and significant other.   OT Total Evaluation Time   OT Eval, Low Complexity Minutes (40426) 10   Therapy Certification   Start of Care Date 03/16/24   Certification date from 03/16/24   Certification date to 03/23/24   Medical Diagnosis TIA   OT Goals   Therapy Frequency (OT) One time eval and treatment   OT Predicted Duration/Target Date for Goal Attainment 03/16/24   OT Goals Transfers;OT Goal 1   OT: Transfer Supervision/stand-by assist   OT: Goal 1 Pt will toelrate 3 minutes standing act with SBA to increase I iwth ADLs such as G/H   OT Discharge Planning   OT Plan Discontinue OT   OT Discharge Recommendation (DC Rec) home with assist   OT Rationale for DC Rec Recommend home with assist as pt likely close to baseline and has support at home.   OT Brief overview of current status SBA                                                                                   [facility]      OUTPATIENT OCCUPATIONAL THERAPY  EVALUATION  PLAN OF TREATMENT FOR OUTPATIENT REHABILITATION  (COMPLETE FOR INITIAL CLAIMS ONLY)  Patient's Last Name, First Name, M.I.  YOB: 1945  Shaila Triana                          Provider's Name  [facility] Medical Record No.  [id number]                               Onset Date:  03/14/24   Start of Care Date:  (P) 03/16/24     Type:     ___PT   _X_OT   ___SLP  Medical Diagnosis:  (P) TIA                        OT Diagnosis:  (P) Impaired ADL independence   Visits from SOC:  1   _________________________________________________________________________________  Plan of Treatment/Functional Goals    Planned Interventions: (P) ADL retraining, transfer training   Goals: See Occupational Therapy Goals on Care Plan in [facility] electronic health record.    Therapy Frequency: (P) One time eval and treatment  Predicted Duration of Therapy Intervention: (P) 03/16/24  _________________________________________________________________________________    I CERTIFY THE NEED FOR THESE SERVICES FURNISHED UNDER        THIS PLAN OF TREATMENT AND WHILE UNDER MY CARE .             Physician Signature               Date    X_____________________________________________________                  Certification date from: (P) 03/16/24, Certification date to: (P) 03/23/24    Referring Physician: Rangel (P)            Initial Assessment        See Occupational Therapy evaluation dated (P) 03/16/24 in Epic electronic health record.

## 2024-03-16 NOTE — PLAN OF CARE
Problem: Adult Inpatient Plan of Care  Goal: Optimal Comfort and Wellbeing  Outcome: Progressing     Problem: Stroke, Ischemic (Includes Transient Ischemic Attack)  Goal: Improved Sensorimotor Function  Outcome: Progressing  Goal: Safe and Effective Swallow  Outcome: Progressing     Problem: Comorbidity Management  Goal: Blood Pressure in Desired Range  Outcome: Progressing   Goal Outcome Evaluation:       Patient alert and oriented. Vitals stable on RA. Tele running Afib. PT/OT consulted. EEG to be completed today.

## 2024-03-17 NOTE — PROVIDER NOTIFICATION
Notified Dr. Hunt asking to come and assess new vaginal bleeding. Moderate amount, no clots. Pt states that she has not seen an OBGYN recently but does state that she does have hemorrhoids. Will continue to monitor.

## 2024-03-17 NOTE — PROGRESS NOTES
"PRIMARY DIAGNOSIS: \"GENERIC\" NURSING  OUTPATIENT/OBSERVATION GOALS TO BE MET BEFORE DISCHARGE:  ADLs back to baseline: Yes    Activity and level of assistance: Ambulating independently.    Pain status: Pain free.    Return to near baseline physical activity: Yes     Discharge Planner Nurse   Safe discharge environment identified: Yes  Barriers to discharge: No       Entered by: Yoly Smith RN 03/16/2024 9:15 PM     Patient alert and oriented. Vitals stable on RA. Tele running Afib. PT/OT consults completed. [facility] 1. Possible discharge tomorrow.  "

## 2024-03-17 NOTE — PLAN OF CARE
PRIMARY DIAGNOSIS: SYNCOPE/TIA  OUTPATIENT/OBSERVATION GOALS TO BE MET BEFORE DISCHARGE:  1. Orthostatic performed: N/A    2. Diagnostic testing complete & at baseline neurologic testing: No    3. Cleared by consultants (if involved): No    4. Interpretation of cardiac rhythm per telemetry tech: afib      5. Tolerating adequate PO diet and medications: Yes    6. Return to near baseline physical activity or neurologic status: Yes    Discharge Planner Nurse   Safe discharge environment identified: Yes  Barriers to discharge: Yes       Entered by: Peri Capone RN 03/17/2024 7:22 AM     Please review provider order for any additional goals.   Nurse to notify provider when observation goals have been met and patient is ready for discharge.Goal Outcome Evaluation:             BP elevated. Occasionally tachy.

## 2024-03-17 NOTE — ED PROVIDER NOTES
Dr. Hunt paged to notify that pt is now experiencing a nose bleed that is not stopping, clots noted. Pt complaining of a headache, ear/neck pain, and nausea. Awaiting Dr. Hunt to come and assess. Will continue to monitor.

## 2024-03-17 NOTE — PROGRESS NOTES
"PRIMARY DIAGNOSIS: \"GENERIC\" NURSING  OUTPATIENT/OBSERVATION GOALS TO BE MET BEFORE DISCHARGE:  ADLs back to baseline: Yes     Activity and level of assistance: Ambulating independently.     Pain status: Pain free.     Return to near baseline physical activity: Yes             Discharge Planner Nurse []Expand by Default  Safe discharge environment identified: Yes  Barriers to discharge: No       Entered by: Yoly Smith RN 03/17/2024 0030        Patient alert and oriented. Vitals stable on RA. Tele running Afib. PT/OT consults completed. [facility] 1. Possible discharge today.  "

## 2024-03-17 NOTE — PLAN OF CARE
PRIMARY DIAGNOSIS: SYNCOPE/TIA  OUTPATIENT/OBSERVATION GOALS TO BE MET BEFORE DISCHARGE:  1. Orthostatic performed: N/A    2. Diagnostic testing complete & at baseline neurologic testing:    3. Cleared by consultants (if involved): No    4. Interpretation of cardiac rhythm per telemetry tech: AFIB      5. Tolerating adequate PO diet and medications: Yes    6. Return to near baseline physical activity or neurologic status: Yes    Discharge Planner Nurse   Safe discharge environment identified: Yes  Barriers to discharge: Yes       Entered by: Peri Capone RN 03/17/2024 7:28 AM     Please review provider order for any additional goals.   Nurse to notify provider when observation goals have been met and patient is ready for discharge.Goal Outcome Evaluation:                  Elevated BP's       - - -

## 2024-03-17 NOTE — PROGRESS NOTES
[facility]    Medicine Progress Note - Hospitalist Service    Date of Admission:  3/14/2024    Assessment & Plan     Shaila Triana is a 79 yo F with h/o MDD, HTN, hypothyroidism, breast CA, HFpEF, CKD4, PAUL, HLD, anemia, Afib s/p LAAO device, who was admitted for stroke-like symptoms. Found to have TIA. Hospital course complicated by nose bleeding and rectal bleeding.    TIA:  Presents with acute slurred speech, left facial droop, and word-finding difficulty. Symptoms resolved in less than 1-2 hours.   CTA head and neck without acute changes. MRI brain negative for acute changes.   Echo normal LV function.  - Per neurology recommendation, patient was started ASA and plavix for 90 days, then  mg daily. Patient developed rectal bleeding and recurrent nose bleed on 3/17. Aspirin and plavix on hold.  - Continue lipitor  - Completed EEG and result pending.    Rectal bleeding:  Patient was found to have bright red blood on her bed sheet today. Physical exam showed external hemorrhoid with erythema and swelling. Her bleeding is most likely from the hemorrhoid.  - Continue to observe.     Nose bleeding:  Patient also developed recurrent nose bleed with severe headache, nausea today.  CT head repeated today and showed no acute intracranial hemorrhage.  Nose bleed was able to be stopped by applying pressure  - Monitor  - Hold aspirin and plavix as above  - Monitor hemoglobin    Chronic atrial fibrillation with RVR: s/p LAAO  HR was elevated to the 120s-130s today.  - Continue PTA diltiazem 180 mg daily  - Start metoprolol 25 mg bid    Essential hypertension: BP elevated to 175/111 today.  - Metoprolol and diltiazem as above  - Hydralazine prn    Hypothyroidism: Continue PTA levothyroxine    Gastric ulcer: as shown by EGD on 2/1/24  - Per GI, sucralfate tablets 1 gram PO BID indefinitely.   - Continue PPI bid    CKD, stage IV: Creatinine stable at baseline.          Observation Goals: List all  "goals to be met before discharge home    , Free from ACUTE neuro deficits, Testing complete, Other: , Nurse to notify provider when observation goals have been met and patient is ready for discharge.  Diet: Regular Diet Adult    DVT Prophylaxis: Pneumatic Compression Devices  Boucher Catheter: Not present  Lines: None     Cardiac Monitoring: ACTIVE order. Indication: Stroke, acute (48 hours)  Code Status: Full Code      Clinically Significant Risk Factors Present on Admission                  # Hypertension: Noted on problem list      # Overweight: Estimated body mass index is 26.52 kg/m  as calculated from the following:    Height as of this encounter: 1.575 m (5' 2\").    Weight as of this encounter: 65.8 kg (145 lb).       # Financial/Environmental Concerns: none         Disposition Plan      Expected Discharge Date: 03/18/2024        Discharge Comments: Hypertension  New vaginal bleeding            Devang Hunt MD  Hospitalist Service  [facility]  Securely message with payleven (more info)  Text page via [employer] Paging/Directory   ______________________________________________________________________    Interval History   The initial plan was to discharge home if EEG is normal. However, patient developed rectal bleeding and nose bleed today. Patient was found to have moderate amount of bright red blood on her bed sheet this morning. Nursing staff thought that is vaginal bleeding. But on physical exam, no blood was identified from the vagina. She had residual old blood at the posterior side of her rectum and she has external hemorrhoid with erythema and swelling. Her bleeding is most likely from the external hemorrhoid. Patient also developed nausea, severe headache and recurrent nose bleed. She has atrial fibrillation with RVR and uncontrolled hypertension.     Physical Exam   Vital Signs: Temp: (!) 96.5  F (35.8  C) (Nurse notified.) Temp src: Oral BP: 138/86 Pulse: 116   Resp: 18 SpO2: 95 % " O2 Device: None (Room air)    Weight: 145 lbs 0 oz    General appearance: not in acute distress  HEENT: PERRL, EOMI  Lungs: Clear breath sounds in bilateral lung fields  Cardiovascular: Regular rate and rhythm, normal S1-S2  Abdomen: Soft, non tender, no distension  Musculoskeletal: No joint swelling  No vaginal bleeding  External hemorrhoid with erythema and swelling  Skin: No rash and no edema  Neurology: AAO ×3.  Cranial nerves II - XII normal.  Normal muscle strength in all four extremities.     Medical Decision Making       55 MINUTES SPENT BY ME on the date of service doing chart review, history, exam, documentation & further activities per the note.      Data         Imaging results reviewed over the past 24 hrs:   Recent Results (from the past 24 hour(s))   CT Head w/o Contrast    Narrative    EXAM: CT HEAD WITHOUT CONTRAST  LOCATION: [facility]  DATE: 03/17/2024    INDICATION: Admitted for TIA. Now has recurrent nose bleed with headache, neck pain. Rule out head bleed.  COMPARISON: Brain MR 03/14/2024.  TECHNIQUE: Routine CT Head without IV contrast. Multiplanar reformats. Dose reduction techniques were used.    FINDINGS:  INTRACRANIAL CONTENTS: Mildly limited by patient motion. No intracranial hemorrhage, extra-axial collection, or mass effect.  No CT evidence of acute infarct. Mild presumed chronic small vessel ischemic changes. Mild generalized volume loss. No   hydrocephalus.     VISUALIZED ORBITS/SINUSES/MASTOIDS: No intraorbital abnormality. No paranasal sinus mucosal disease. No middle ear or mastoid effusion.    BONES/SOFT TISSUES: No acute abnormality.      Impression    IMPRESSION:  1.  Mildly limited study due to patient motion.  2.  No CT evidence for acute intracranial process.  3.  Brain atrophy and presumed chronic microvascular ischemic changes as above.

## 2024-03-17 NOTE — PLAN OF CARE
PRIMARY DIAGNOSIS: SYNCOPE/TIA  OUTPATIENT/OBSERVATION GOALS TO BE MET BEFORE DISCHARGE:  1. Orthostatic performed: N/A    2. Diagnostic testing complete & at baseline neurologic testing: Yes    3. Cleared by consultants (if involved): No    4. Interpretation of cardiac rhythm per telemetry tech: afib    5. Tolerating adequate PO diet and medications: Yes    6. Return to near baseline physical activity or neurologic status: Yes    Discharge Planner Nurse   Safe discharge environment identified: Yes  Barriers to discharge: Yes       Entered by: Peri Capone RN 03/17/2024 7:23 AM     Please review provider order for any additional goals.   Nurse to notify provider when observation goals have been met and patient is ready for discharge.Goal Outcome Evaluation:                    EEG done. Pt continues to have elevated BP. AFIB with RVR occasionally.

## 2024-03-17 NOTE — PLAN OF CARE
Goal Outcome Evaluation:       Patient alert and oriented. Vitals stable on RA. Complaint of headache, prn tylenol given twice with relief. Patient up independently to the bathroom. Call light appropriate. No acute events over night. Tele running Afib.

## 2024-03-18 NOTE — PROGRESS NOTES
PRIMARY DIAGNOSIS: TIA  OUTPATIENT/OBSERVATION GOALS TO BE MET BEFORE DISCHARGE:  1. Orthostatic performed: N/A    2. Diagnostic testing complete & at baseline neurologic testing: Yes    3. Cleared by consultants (if involved): Yes    4. Interpretation of cardiac rhythm per telemetry tech: atrial fibrillation, at times tachy low 100s    5. Tolerating adequate PO diet and medications: Yes    6. Return to near baseline physical activity or neurologic status: Yes    Discharge Planner Nurse   Safe discharge environment identified: Yes  Barriers to discharge: Yes- further work up of nose bleeds, headache.        Entered by: Diane Hudson RN 03/18/2024 5:32 AM     Please review provider order for any additional goals.   Nurse to notify provider when observation goals have been met and patient is ready for discharge.    Slept only a few hours overnight. Alert, oriented fully at midnight, but only alert to self, place and situation at 0400. Independent in room and with toileting. Scant blood after voiding- has external hemorrhoid.     X2 small nosebleeds overnight. Pt refuses to let staff change gown- will re- attempt in morning.     Endorses moderate headache- prn tylenol given with some helpfulness.     NIHSS scores of 0 and 1- aphasia. Pt more forgetful and restless as night progressed- had some issues following commands but was pleasant and always answered orientation questions correctly besides time. Aphasia re-occurred from 0400 onwards. Resident paged regarding re-occurring aphasia with new restlessness and some confusion- no new orders placed, RN to page again if new focal neurological deficit occurs. BP's WNL this shift. Tele- a fib, at times tachy into low 100s- highest . Sepsis protocol fired at 0430- lactic 0.5.    Plan was to discharge home yesterday until headache, severe bloody nose and elevated BP's occurred. Unsure discharge plans for today.      Temp: 98.1  F (36.7  C) Temp src: Oral BP:  126/88 Pulse: 100   Resp: 20 SpO2: 93 % O2 Device: None (Room air)

## 2024-03-18 NOTE — PLAN OF CARE
Problem: Adult Inpatient Plan of Care  Goal: Absence of Hospital-Acquired Illness or Injury  Intervention: Prevent Skin Injury  Recent Flowsheet Documentation  Taken 3/18/2024 0915 by Hailey Carr, RN  Body Position: position changed independently     Problem: Adult Inpatient Plan of Care  Goal: Absence of Hospital-Acquired Illness or Injury  Intervention: Prevent Infection  Recent Flowsheet Documentation  Taken 3/18/2024 1321 by Hailey Carr, RN  Infection Prevention:   hand hygiene promoted   rest/sleep promoted   single patient room provided   Goal Outcome Evaluation:      Plan of Care Reviewed With: patient    Overall Patient Progress: improvingOverall Patient Progress: improving       Patient alert and oriented x4 per questions however needs reorientation to bathroom and room.  No rectal bleeding today, no nose bleeds on day shift.  Gait steady, voids without difficulty.  NO BM this shift.  Complaints of headache this am, patient stated improved after acetaminophen.

## 2024-03-18 NOTE — SIGNIFICANT EVENT
"5:05 PM    RN called me for recurrent garbled speech. Patient was in the middle of eating dinner.    When I arrived patient keeps saying \"I don't know what's wrong with me, I don't feel good,\" stereotyped speech and she says the same thing to every question.  Following commands a little bit.  Was noted with SpO2 85 to 90%, oxygen applied.  Shortly after oxygen reapplied, patient said \"I feel sick.\"    Wonder if patient may have aspirated while she was eating her dinner resulting in low oxygen saturation and recurrence of TIA symptoms.  She had recurrence of TIA symptoms last night as well.  We will ask neurology to [place] back, they had signed off few days ago already.  Not sure another MRI would be helpful here.  Certainly we cannot MRI her every time TIA symptoms reemerge.  Speech has also never evaluated the patient and I will consult them for tomorrow.    /78 (BP Location: Right arm)   Pulse 84   Temp 98.4  F (36.9  C) (Oral)   Resp 18   Ht 1.575 m (5' 2\")   Wt 67.8 kg (149 lb 7.6 oz)   LMP  (LMP Unknown)   SpO2 93%   BMI 27.34 kg/m      Sil Ponce MD  [facility] Medicine Service  "

## 2024-03-18 NOTE — PROGRESS NOTES
Care Management Follow Up    Length of Stay (days): 0    Expected Discharge Date: 03/19/2024     Concerns to be Addressed:       Patient plan of care discussed at interdisciplinary rounds: Yes    Anticipated Discharge Disposition: Home     Anticipated Discharge Services:    Anticipated Discharge DME:      Patient/family educated on Medicare website which has current facility and service quality ratings:    Education Provided on the Discharge Plan:    Patient/Family in Agreement with the Plan:      Referrals Placed by CM/SW:    Private pay costs discussed: Not applicable    Additional Information:  Chart reviewed. Discussed with MD. Not ready for discharge today. OT rec is home with assist. CM following     Social history:   Lives w/son BRONSON Zamorano and dil Tl. Independent at baseline w/WC and no svcs, CM to follow for discharge needs. Family can transport.     Dana Urbina RN

## 2024-03-18 NOTE — PLAN OF CARE
PRIMARY DIAGNOSIS: SYNCOPE/TIA  OUTPATIENT/OBSERVATION GOALS TO BE MET BEFORE DISCHARGE:  1. Orthostatic performed: N/A     2. Diagnostic testing complete & at baseline neurologic testing: Yes     3. Cleared by consultants (if involved): No     4. Interpretation of cardiac rhythm per telemetry tech: AFIB with RVR     5. Tolerating adequate PO diet and medications: Yes     6. Return to near baseline physical activity or neurologic status: Yes     Discharge Planner Nurse   Safe discharge environment identified: Yes  Barriers to discharge: Yes       Entered by: Peri Capone RN 03/17/2024 10:05 PM  Please review provider order for any additional goals.   Nurse to notify provider when observation goals have been met and patient is ready for discharge.Goal Outcome Evaluation:           AFIB with RVR

## 2024-03-18 NOTE — PROGRESS NOTES
PRIMARY DIAGNOSIS: TIA  OUTPATIENT/OBSERVATION GOALS TO BE MET BEFORE DISCHARGE:  1. Orthostatic performed: N/A    2. Diagnostic testing complete & at baseline neurologic testing: Yes    3. Cleared by consultants (if involved): Yes    4. Interpretation of cardiac rhythm per telemetry tech: a fib    5. Tolerating adequate PO diet and medications: Yes    6. Return to near baseline physical activity or neurologic status: Yes    Discharge Planner Nurse   Safe discharge environment identified: Yes  Barriers to discharge: Yes       Entered by: Diane Hudson RN 03/18/2024 12:45 AM     Please review provider order for any additional goals.   Nurse to notify provider when observation goals have been met and patient is ready for discharge.

## 2024-03-18 NOTE — PLAN OF CARE
Goal Outcome Evaluation:                      Pt had one more nose bleed this evening. No clots and lasted ten minutes.  Pt reported pain again in ear and head. Oxy given for pain of 5/10 with good relief.  Dizziness resolved. No more vaginal/rectal bleeding noted.     NIH score of 1 for dysarthria.  Tele Afib with rate in 80's-90's.

## 2024-03-18 NOTE — PROGRESS NOTES
"5:05 PM    RN called me for recurrent garbled speech . Patient was in the middle of eating dinner.    When I arrived patient keeps saying \"I don't know what's wrong with me, I don't feel good,\" stereotyped speech and she says the same thing to every question.  Following commands a little bit.  Was noted with SpO2 85 to 90%, oxygen applied.  Shortly after oxygen reapplied, patient said \"I feel sick.\"    Wonder if patient may have aspirated while she was eating her dinner resulting in low oxygen saturation and recurrence of TIA symptoms.  She had recurrence of TIA symptoms last night as well.  We will ask neurology to [place] back, they had signed off few days ago already.  Not sure another MRI would be helpful here.  Certainly we cannot MRI her every time TIA symptoms reemerge.  Speech has also never evaluated the patient and I will consult them for tomorrow.    /78 (BP Location: Right arm)   Pulse 84   Temp 98.4  F (36.9  C) (Oral)   Resp 18   Ht 1.575 m (5' 2\")   Wt 67.8 kg (149 lb 7.6 oz)   LMP  (LMP Unknown)   SpO2 93%   BMI 27.34 kg/m      Sil Ponce MD  [facility] Medicine Service  "

## 2024-03-18 NOTE — UTILIZATION REVIEW
Admission Status; Secondary Review Determination   Under the authority of the Utilization Management Committee, the utilization review process indicated a secondary review on Shaila Triana. The review outcome is based on review of the medical records, discussions with staff, and applying clinical experience noted on the date of the review.   (x) Inpatient Status Appropriate - This patient's medical care is consistent with medical management for inpatient care and reasonable inpatient medical practice.     RATIONALE FOR DETERMINATION   Shaila Triana is an 78 yr old female with MDD, HTN, hypothyroidism, breast Ca, HFpEF, CKD4, PAUL, HLD, anemia, Afib s/p LAAO who presented with stroke like symptoms.  MRI ok thus deemed TIA.  Unfortunately prior to ability to discharge, once on ASA and Plavix she developed rectal bleeding and also epistaxis in setting of headache and nausea.  Ongoing evaluation to assess for potential head bleed and treatment/monitoring of bleeding.  She was still having bleeding overnight.  She presented on 3/14.  While TIA assessment for safe disposition she developed bleeding on 3/16 overnight to 3/17 and ASA and plavix have been held.  Need to resume and monitor for more bleeding and epistaxis noted overnight into 3/18.  Per Dr. Ponce, holding discharge again.  Has crossed 5 midnights thus far and ongoing stay.    At the time of admission with the information available to the attending physician more than 2 nights Hospital complex care was anticipated, based on patient risk of adverse outcome if treated as outpatient and complex care required. Inpatient admission is appropriate based on the Medicare guidelines.   The information on this document is developed by the utilization review team in order for the business office to ensure compliance. This only denotes the appropriateness of proper admission status and does not reflect the quality of care rendered.   The definitions of Inpatient Status  and Observation Status used in making the determination above are those provided in the CMS Coverage Manual, Chapter 1 and Chapter 6, section 70.4.   Sincerely,   Leti Caruso MD  Utilization Review  Physician Advisor  [facility]

## 2024-03-18 NOTE — SIGNIFICANT EVENT
Dr. Ponce notified about patient's neuro changes right now. Unable to follow commands, very garbled speech, continues to repeat she does not feel good. NIH is 19. Dr. Ponce here now to assess.

## 2024-03-18 NOTE — PLAN OF CARE
Goal Outcome Evaluation:                        Problem: Comorbidity Management  Goal: Blood Pressure in Desired Range  Outcome: Progressing         0800 Pt woke up and had moderate amt of blood on sheets and underwear. Pt had thought she felt the discharge from her vagina. Pt BP elevated, prn hydralazine given.     [id number] Pt started having severe nosebleed with clots, lasting around 20 minutes. BP elevated again. Pt c/o of severe HA, neck pain and ear pain. APAP given and ineffective. Pt then began dry heaving. Zofran given. PRN hydralazine given. HR in 130's-160's.     1400 Pt had another nose bleed. No clots this time. Pt continues to c/o of HA and ear pain. Crying, rocking back and forth in bed. Received PRN order for Dilaudid. Given with good relief. AFIB with RVR on tele, sustaining in 130's. Received order for PO metoprolol. Slept well after Dilaudid.

## 2024-03-18 NOTE — PROGRESS NOTES
[facility]    Medicine Progress Note - Hospitalist Service    Date of Admission:  3/14/2024    Assessment & Plan                Shaila Triana is a 78 year old female with MDD, HTN, hypothyroidism, breast CA, HFpEF, CKD4, PAUL, HLD, anemia, Afib s/p LAAO device, mantle cell lymphoma, who was admitted for stroke-like symptoms. Found to have TIA. Hospital course complicated by nose bleeding and rectal bleeding, fluctuating symptoms. Hospital Day: 5        TIA:  Presented with acute slurred speech, left facial droop, and word-finding difficulty. Symptoms resolved in less than 1-2 hours, did have recurrent symptoms last night.  CTA head and neck without acute changes. MRI brain negative for acute changes.  Symptoms attributed to TIA.  -Echo normal LV function.  - Per neurology recommendation, patient was started ASA and plavix for 90 days, then  mg daily. Patient developed rectal bleeding and recurrent nose bleed on 3/17. Aspirin and plavix currently on hold; bleeding has stopped. May attempt to resume tomorrow starting with ASA if still without bleeding.  -OT saw and cleared for home with assist.  PT did not assess as patient felt to be at gross motor baseline. Speech never assessed, likely not needed as speech currently fluent and passed bedside swallow.  - Continue lipitor 40mg, increased from home Crestor 10mg, LDL 88 not quite at goal  - EEG showed generalized slowing consistent with nonspecific encephalopathy.  -A1c 4.6     bleeding hemorrhoids:  Patient was found to have bright red blood on her bed sheet 3/17. Physical exam showed external hemorrhoid with erythema and swelling. Her bleeding is most likely from the hemorrhoid.  - miralax to prevent straining  -daily Hgb     Nose bleeding:  -developed recurrent nose bleed, nausea on 3/17.  -Nose bleed was able to be stopped by applying pressure    Bleeding tendency?  -As above developed multiple sites of bleeding while on dual  antiplatelet therapy and subcu Lovenox  -With history of mantle cell lymphoma, question if she has some platelet dysfunction.  She also tends to be a little bit thrombocytopenic (119-144k) though platelet count was normal today (154k).  She does have CKD but is not significantly uremic, so not uremic platelet dysfunction.  INR checked and normal.  -If unable to resume antiplatelet agent, could consider asking oncology to evaluate her for the question of pathological bleeding tendency.  -cautious reintroduction of antiplatelets; will not resume Lovenox.  Possibly resume aspirin tomorrow  - Monitor hemoglobin    Headache  -chronic problem for her, recurrence on 3/17 and still c/o HA today  -CT head repeated and showed no acute intracranial hemorrhage.  -tylenol, oxycodone PRN     Chronic atrial fibrillation with RVR: s/p LAAO  HR was elevated at times.  - Continue PTA diltiazem 180 mg daily  - Started metoprolol 25 mg bid     Essential hypertension: BP elevated to 175/111 today.  - Metoprolol started here due to A fib RVR, nosebleed and uncontrolled HTN. Continue home diltiazem as above  - Hydralazine prn     Hypothyroidism: Continue PTA levothyroxine     Gastric ulcer: as shown by EGD on 2/1/24  - Per GI, sucralfate tablets 1 gram PO BID indefinitely.   - Continue PPI bid     CKD, stage IV: Creatinine stable at baseline. Continue to monitor with recent hemodynamic fluctuations.  Mood, home Zoloft       DVT Prophylaxis: Moderate risk. Pharmacologic prophylaxis contraindicated due to active bleeding  Diet: Regular Diet Adult    Boucher Catheter: Not present  Lines: None     Cardiac Monitoring: ACTIVE order. Indication: Stroke, acute (48 hours)  Code Status: Full Code      Clinically Significant Risk Factors Present on Admission                  # Hypertension: Noted on problem list      # Overweight: Estimated body mass index is 26.52 kg/m  as calculated from the following:    Height as of this encounter: 1.575 m (5'  "2\").    Weight as of this encounter: 65.8 kg (145 lb).       # Financial/Environmental Concerns: none         Disposition Plan   Disposition: Home with home care     Expected Discharge Date: 03/20/2024        Discharge Comments: Hypertension  New vaginal bleeding     Medically ready to discharge today: No     The patient's care was discussed with the Bedside Nurse and Patient.    Sil Ponce MD  Hospitalist Service  [facility]  Securely message with [employer] (more info)  Text page via [employer] Paging/Directory   ______________________________________________________________________      Physical Exam   Vital Signs: Temp: 98  F (36.7  C) Temp src: Oral BP: (!) 124/96 Pulse: 84   Resp: 18 SpO2: 91 % O2 Device: None (Room air)    Weight: 145 lbs 0 oz  General: in no apparent distress, non-toxic, and alert female sitting in bedside chair oriented x3  HEENT: Head normocephalic atraumatic, oral mucosa moist. Sclerae anicteric  CV: Regular rhythm, normal rate, no murmurs  Resp: No wheezes, no rales or rhonchi, no focal consolidations  GI: Belly soft, nondistended, nontender, bowel sounds present  Skin: No rashes or lesions. Many bandaids on fingers  Extremities: No peripheral edema  Psych: Normal affect, mood euthymic  Neuro: Grossly normal        Medical Decision Making               Data   Recent Results (from the past 12 hour(s))   Platelet count    Collection Time: 03/18/24  6:52 AM   Result Value Ref Range    Platelet Count 154 150 - 450 10e3/uL   Creatinine    Collection Time: 03/18/24  6:52 AM   Result Value Ref Range    Creatinine 1.84 (H) 0.51 - 0.95 mg/dL    GFR Estimate 27 (L) >60 mL/min/1.73m2   Lactic Acid STAT    Collection Time: 03/18/24  6:52 AM   Result Value Ref Range    Lactic Acid 0.5 (L) 0.7 - 2.0 mmol/L   Glucose by meter    Collection Time: 03/18/24  8:41 AM   Result Value Ref Range    GLUCOSE BY METER POCT 94 70 - 99 mg/dL     Interval History   Patient's speech is fluent " today.  She complains of a headache, she gets this sometimes.  She states he received Tylenol for this.  No active nosebleed.  She states no active rectal bleeding either.  She denies issues with hemorrhoids.  States she did have constipation a lot in her younger years however.  Denies current constipation.  Given fluctuating/lyubov hospital course, would advocate for discharge potentially tomorrow if she remains stable.     I tried to call and update her daughter but did not receive an answer, I will try her back tomorrow.

## 2024-03-18 NOTE — PLAN OF CARE
PRIMARY DIAGNOSIS: SYNCOPE/TIA  OUTPATIENT/OBSERVATION GOALS TO BE MET BEFORE DISCHARGE:  1. Orthostatic performed: N/A     2. Diagnostic testing complete & at baseline neurologic testing: Yes     3. Cleared by consultants (if involved): No     4. Interpretation of cardiac rhythm per telemetry tech: AFIB with RVR     5. Tolerating adequate PO diet and medications: Yes     6. Return to near baseline physical activity or neurologic status: Yes     Discharge Planner Nurse   Safe discharge environment identified: Yes  Barriers to discharge: Yes       Entered by: Peri Capone RN 03/17/2024 10:05 PM  Please review provider order for any additional goals.   Nurse to notify provider when observation goals have been met and patient is ready for discharge.Goal Outcome Evaluation:            Elevated BP. Bloody nose.

## 2024-03-19 NOTE — SIGNIFICANT EVENT
"Significant Event Note    Time of event: 3:32 AM March 19, 2024    Description of event:  Notified of recurrent word finding difficulties and garbled speech    78-year-old admitted for TIA with slurred speech, left facial droop, word finding difficulty.  Has had 2 separate occasions of recurrent symptoms since hospitalized, most recently the afternoon of 3/18.  Workup so far including CTA head and neck, brain MRI without any acute changes.  Echo within normal limits.  EEG with generalized slowing consistent with nonspecific encephalopathy.  Dual antiplatelet therapy currently on hold due to recurrent nosebleeds and rectal bleeding.    Currently having difficulties with her speech, word finding.  Able to understand a few words.  Currently not complaining of any pain, did complain of headache earlier in the night.  No extremity weakness.    /88 (BP Location: Left arm)   Pulse 83   Temp 98.2  F (36.8  C) (Oral)   Resp 20   Ht 1.575 m (5' 2\")   Wt 67.8 kg (149 lb 7.6 oz)   LMP  (LMP Unknown)   SpO2 91%   BMI 27.34 kg/m    General -comfortable.  Alert.  Speech is intermittently garbled  Respiratory -comfortable respiratory effort.  Moving air well bilaterally  Cardiac -irregularly irregular rhythm.  Normal rate.  Soft systolic murmur present.  Neuro -alert.  Speech is slurred, garbled.  Able to understand a few words at a time.  Able to follow most commands.  Face appears symmetric, no obvious droopiness.  Smile appears symmetric.  Nasolabial folds intact.  Sensation symmetric on the face.  Pupils equal round reactive.  Extraocular muscles appear intact.  5-5 strength to flexion at the elbow bilaterally.  5 out of 5 strength with flexion at the hips bilaterally.  Symmetric plantarflexion bilaterally    Assessment plan:  Word-finding difficulties/slurred speech  Recurring TIA symptoms with word finding difficulties, slurred speech.  Otherwise, no focal findings on neurologic exam.  With history of easy " bleeding while on dual antiplatelet therapy, will obtain CT head without contrast to rule out new intracranial process, such as a bleed.  I do wonder if sleep deprivation/underlying cognitive decline could be a factor as well.  Primary hospital team is planning to reconsult neurology to discuss further evaluation for recurrent symptoms.  Speech therapy to evaluate as well.  - CT head w/o contrast     Discussed with: supervising physician, Dr. Augustine, bedside nurse    7:11 AM  CT head without any evidence of acute process.  Chronic microvascular changes noted.    Maulik Franco MD  House

## 2024-03-19 NOTE — PROGRESS NOTES
"Care Management Follow Up    Length of Stay (days): 1    Expected Discharge Date: 03/20/2024     Concerns to be Addressed:     discharge planning   Patient plan of care discussed at interdisciplinary rounds: Yes    Anticipated Discharge Disposition: Home     Anticipated Discharge Services:     Education Provided on the Discharge Plan:  Per Care Team   Patient/Family in Agreement with the Plan:  Yes    Referrals Placed by CM/SW:    Private pay costs discussed: Not applicable    Additional Information:  Chart reviewed.    Cm updates:  OT recs home with assist which family can provide.   Speech saw pt today.  Neuro consult placed.       Daughter Dana called and would like a medical update, RNCM paged hosp and she plans to call daughter. She stated she tried to call daughter yesterday but no answer.       Social hx:  \"Lives w/son BRONSON Zamorano and dil Tl. Independent at baseline w/WC and no svcs, CM to follow for discharge needs. Family can transport.  \"      Cm will continue to follow plan of care,review recommendations, and assist with any discharge needs anticipated.      Anabel Hudson RN      "

## 2024-03-19 NOTE — PLAN OF CARE
Problem: Adult Inpatient Plan of Care  Goal: Absence of Hospital-Acquired Illness or Injury  Intervention: Identify and Manage Fall Risk  Recent Flowsheet Documentation  Taken 3/19/2024 0444 by Hailey Carr, RN  Safety Promotion/Fall Prevention:   activity supervised   clutter free environment maintained   nonskid shoes/slippers when out of bed   room door open   room near nurse's station   safety round/check completed   supervised activity     Problem: Stroke, Ischemic (Includes Transient Ischemic Attack)  Goal: Optimal Coping  Outcome: Progressing   Goal Outcome Evaluation:      Plan of Care Reviewed With: patient    Overall Patient Progress: improvingOverall Patient Progress: improving     Patient is able to answer orientation answers correctly, however appears to have intermittent forgetfulness.  Patient is unable to locate the bathroom within her room.  Patient has difficulty answering questions at times, and difficulty following commands.  Family states patient does have hearing aides at home and is [place].  Gait steady today.  Stated BM this afternoon.  Denies difficulty voiding.      [id number]  Neuro aware of new MRI results.  Bladder Scan of 57.  .

## 2024-03-19 NOTE — PROGRESS NOTES
[facility]    Medicine Progress Note - Hospitalist Service    Date of Admission:  3/14/2024    Assessment & Plan                Shaila Triana is a 78 year old female with MDD, HTN, hypothyroidism, breast CA, HFpEF, CKD4, PAUL, HLD, anemia, Afib s/p LAAO device, mantle cell lymphoma, who was admitted for stroke-like symptoms. Initially MRI showed no stroke. Hospital course complicated by nose bleeding and rectal bleeding requiring antiplatelets to be held, then with stuttering TIA symptoms. Repeat MRI showed progression to acute stoke. Hospital Day: 6        #Acute stroke of the anterolateral right occipital lobe  Presented with acute slurred speech, left facial droop, and word-finding difficulty. Symptoms resolved in less than 1-2 hours, but since then with multiple spells of recurrent symptoms.  CTA head and neck without acute changes.   -Initial MRI negative- but repeat MRI today shows new acute to subacute infarct in the right anterolateral occipital lobe, which corresponds to her fluent aphasia symptoms  -formal Stroke protocol entered  -allow permissive HTN  -PT/OT/SLP. PT never formally saw and will re-tag them now  -Tele monitoring- has known A fib with LAAO in place  -Echo normal LV function.  - Per neurology recommendation, plan was ASA and plavix for 90 days, then  mg daily. However patient developed rectal bleeding and nose bleed on 3/17. Aspirin and plavix and DVT proph currently on hold; bleeding has stopped. will attempt to cautiously resume single agent antiplatelet today- start ASA 81mg. Neuro formally re-consulted today.  - Continue lipitor 40mg, increased from home Crestor 10mg, LDL 88 not quite at goal  - EEG showed generalized slowing consistent with nonspecific encephalopathy.  -A1c 4.6    #Acute metabolic encephalopathy  -patient seems confused today, suspect this is related to sensory/communication deficits from acute stroke, in the setting of unfamiliar  environment  -Constipation may be contributing too  -suppertime melatonin  -bladder scan to rule out retention  -treat metabolic issues as best able     #bleeding hemorrhoids:  Patient was found to have bright red blood on her bed sheet 3/17. Physical exam showed external hemorrhoid with erythema and swelling. Her bleeding is most likely from the hemorrhoid.  - miralax to prevent straining  -daily Hgb- stable     #Nose bleeding:  -developed recurrent nose bleed, nausea on 3/17.  -Nose bleed was able to be stopped by applying pressure    #Bleeding tendency?  -As above developed multiple sites of bleeding while on dual antiplatelet therapy and subcu Lovenox  -With history of mantle cell lymphoma, question if she has some platelet dysfunction.  She also tends to be a little bit thrombocytopenic (119-144k) though platelet count was normal today (154k).  She does have CKD but is not significantly uremic, so not uremic platelet dysfunction.  INR checked and normal.  -If unable to resume antiplatelet agent, could consider asking oncology to evaluate her for the question of pathological bleeding tendency.  -cautious reintroduction of antiplatelets; will not resume Lovenox.  resume aspirin today  - Monitor hemoglobin    #Headache  -chronic problem for her, recurrence on 3/17, now resolved  -CT head repeated and showed no acute intracranial hemorrhage.  -tylenol, oxycodone PRN    #Constipation  -c/o abdominal pain, seems a little bit distended, no BM charted here  -with hemorrhoid issues- suspect she is constipated  -continue Miralax started 3/18  -add BID pericolace  -supp PRN     #CKD, stage IV  -Creatinine within baseline range but has trended up slightly. Will start some gentle IVF. Continue to monitor with recent hemodynamic fluctuations.    #Hyponatremia  -suspect mildly hypovolemic with slight rise in Cr, BP running lower  -Gentle IVF and trend    #Chronic atrial fibrillation with RVR: s/p LAAO  HR was elevated at  "times.  - holding diltiazem and reduced new med metoprolol for permissive HTN. Monitor on tele.     #Essential hypertension: need to allow permissive HTN now given confirmed stroke  - Metoprolol started here due to A fib RVR, nosebleed and uncontrolled HTN. Give at minimum dosage and hold for BP <140  -hold home diltiazem for now  - Hydralazine and labetalol prn     #Hypothyroidism: Continue PTA levothyroxine. Update TSH- last was 6.7 in January     #Gastric ulcer: as shown by EGD on 2/1/24  - Per GI, sucralfate tablets 1 gram PO BID indefinitely.   - Continue PPI bid     #Mood, home Zoloft       DVT Prophylaxis: Moderate risk. Pharmacologic prophylaxis contraindicated due to active bleeding  Diet: Regular Diet Adult    Boucher Catheter: Not present  Lines: None     Cardiac Monitoring: ACTIVE order. Indication: Stroke, acute (48 hours)  Code Status: Full Code      Clinically Significant Risk Factors         # Hyponatremia: Lowest Na = 129 mmol/L in last 2 days, will monitor as appropriate          # Hypertension: Noted on problem list        # Overweight: Estimated body mass index is 27.42 kg/m  as calculated from the following:    Height as of this encounter: 1.575 m (5' 2\").    Weight as of this encounter: 68 kg (149 lb 14.6 oz)., PRESENT ON ADMISSION       # Financial/Environmental Concerns: none         Disposition Plan   Disposition: Home with home care     Expected Discharge Date: 03/20/2024        Discharge Comments: Hypertension  reconsult neuro- stroke like symptoms     Medically ready to discharge today: No     The patient's care was discussed with the Patient and Patient's Family.    Sil Ponce MD  Hospitalist Service  [facility]  Securely message with [employer] (more info)  Text page via [facility] Paging/Directory   ______________________________________________________________________      Physical Exam   Vital Signs: Temp: 98.3  F (36.8  C) Temp src: Oral BP: 139/84 Pulse: 91   Resp: " 18 SpO2: 94 % O2 Device: None (Room air) Oxygen Delivery: 2 LPM  Weight: 149 lbs 14.6 oz  General: in no apparent distress, non-toxic, and alert female sitting in bedside chair oriented to self only.  HEENT: Head normocephalic atraumatic, oral mucosa moist. Sclerae anicteric  CV: Regular rhythm, normal rate, no murmurs  Resp: No wheezes, no rales or rhonchi, no focal consolidations  GI: Belly mildly distended , nontender, bowel sounds present  Skin: No rashes or lesions. Many bandaids on fingers  Extremities: No peripheral edema  Psych: Normal affect, mildly anxious mood  Neuro: Grossly normal. No aphasia        Medical Decision Making               Data   Recent Results (from the past 12 hour(s))   CBC with platelets    Collection Time: 03/19/24  6:52 AM   Result Value Ref Range    WBC Count 5.3 4.0 - 11.0 10e3/uL    RBC Count 3.21 (L) 3.80 - 5.20 10e6/uL    Hemoglobin 9.0 (L) 11.7 - 15.7 g/dL    Hematocrit 28.8 (L) 35.0 - 47.0 %    MCV 90 78 - 100 fL    MCH 28.0 26.5 - 33.0 pg    MCHC 31.3 (L) 31.5 - 36.5 g/dL    RDW 13.2 10.0 - 15.0 %    Platelet Count 153 150 - 450 10e3/uL   Basic metabolic panel    Collection Time: 03/19/24  6:52 AM   Result Value Ref Range    Sodium 129 (L) 135 - 145 mmol/L    Potassium 4.2 3.4 - 5.3 mmol/L    Chloride 97 (L) 98 - 107 mmol/L    Carbon Dioxide (CO2) 26 22 - 29 mmol/L    Anion Gap 6 (L) 7 - 15 mmol/L    Urea Nitrogen 21.0 8.0 - 23.0 mg/dL    Creatinine 1.96 (H) 0.51 - 0.95 mg/dL    GFR Estimate 25 (L) >60 mL/min/1.73m2    Calcium 8.2 (L) 8.8 - 10.2 mg/dL    Glucose 95 70 - 99 mg/dL     Interval History   Patient doing ok today but had a rough night with another episode of fluent aphasia and AMS. She is to undergo MRI shortly. She currently states doing ok. Speech is coherent. Complains of some abd pain. Questioning constipation. Did Start miralax yesterday. Headache has resolved.     I called and updated dtr Dana

## 2024-03-19 NOTE — CARE PLAN
"0230 Rounded on patient who was awake in bed and appearing confused. After further assessment, the patient was having difficulty speaking and had garbled speech. NIH was 3 at this time.    0250 On call hospitalist, Dr. Augustine, paged and replied that they would like House to evaluate at bedside.    0300 Paged House to evaluate at bedside.     0315 Dr. Franco came to bedside. Refer to Dr. Franco's note and order for a STAT head CT obtained.     0428 CT Head obtained and returned to patient room. Patient's speech seems to be improving with more clear speech and headache had resolved. She stated, \"I still feel funny\". Patient back in bed with bed alarm on.   "

## 2024-03-19 NOTE — CONSULTS
NEUROLOGY CONSULTATION NOTE     Shaila Triana,  1945, MRN [id number] Date: 3/19/2024     [facility]   Code status:  Full Code   PCP: Mervin Ibarra, [phone]      ASSESSMENT & PLAN   Diagnosis code: Recurrent/fluctuating neurologic symptoms. Stroke. Suspect underlying cognitive impairment    Ms. Triana is an 77 yo woman admitted for evaluation for transient neurologic symptoms whom neurology was consulted again for recurrence of symptoms    Initial stroke work-up negative    Initial CT/CTA/MRI unremarkable  No epileptiform activity on electroencephalogram  Repeat head CT (3/19) unremarkable  Continue Neuro checks, Telemetry, O2 Sat  Resume DAPT for 90 days, followed by ASA monotherapy. Monitor for bleeding  Repeat brain MRI today (3/20) shows tiny right occipital infarct (acute-early subacute)  Speech language pathology has seen the patient. ?cognitive impairment vs receptive aphasia  Will request occupational therapy/SLUMs  Neurology will follow along       Chief Complaint   Patient presents with    Stroke Symptoms        HISTORY OF PRESENT ILLNESS     We have been requested by Dr. Franco to evaluate Shaila Triana who is a 78 year old female for recurrent TIA symptoms. Initial presentation was on 3/14 with aphasia, confusion. Patient has additional history of HTN, HLD, Afib/watchman, CKD, breast cancer s/p lumpectomy and hypothyroidism.    CT/CTA showed nothing acute. CTA without large vessel stenosis. Brain MRI did not reveal stroke. Presumed TIA. Echocardiogram showed Left atrial enlargement. Electroencephalogram showed non-specific diffuse slowing. Plan was for DAPT for 90 days followed by 325mg ASA monotherapy.    Patient had recurrence of speech difficulties in the evening of 3/18 and AM of 3/19, with an episode of hypoxia. Shaila reported feeling unwell upon evaluation. Head CT unremarkable.    Shaila says she has some abdominal pain. Otherwise, cannot articulate much in the way of  concerns. Speech swallow eval went ok today. Nurse is at bedside and asks about resuming Plavix + ASA. Patient has had some problems with nosebleeds while in house. Upon re-examination, discussion, Shaila says her abdominal pain has dissipated (she had a bowel movement this afternoon, per nurse).    Family at bedside says that Shaila's confusion, speech difficulties are new over recent days.     PAST MEDICAL & SURGICAL HISTORY     Medical History  Past Medical History:   Diagnosis Date    Anemia     Anemia, unspecified type     Depressive disorder 12/04/2006    Essential hypertension 12/04/2006    Hypothyroidism (acquired) 12/04/2006    Invasive ductal carcinoma of breast (H) 08/06/2013    Left.  ER(+) MT(+) her-2-hina (-).  Lumpectomy and sentinel biopsy followed by radiation.  Adjuvant therapy with tamoxifen completed.    Lung nodules 04/19/2016    LLL on abd/pelvis CT 4/12/16.  No change on chest CT May 2017.  No further imaging needed.    Malignant neoplasm of female breast (H) 03/18/2008    Epic    New onset atrial fibrillation (H) 07/31/2020    Pure hypercholesterolemia 12/04/2006    Sleep apnea 12/04/2006     Surgical History  Past Surgical History:   Procedure Laterality Date    APPENDECTOMY      1957    BLEPHAROPLASTY Bilateral 2010    COLONOSCOPY  2005    COLONOSCOPY N/A 2/1/2024    Procedure: Colonoscopy;  Surgeon: Willie Alcazar MD;  Location: [facility]    ESOPHAGOSCOPY, GASTROSCOPY, DUODENOSCOPY (EGD), COMBINED N/A 10/6/2020    Procedure: ESOPHAGOGASTRODUODENOSCOPY, WITH BIOPSY and polypectomy;  Surgeon: Jorge Ramirez MD;  Location: [facility]    ESOPHAGOSCOPY, GASTROSCOPY, DUODENOSCOPY (EGD), COMBINED N/A 2/1/2024    Procedure: ESOPHAGOGASTRODUODENOSCOPY, WITH BIOPSY;  Surgeon: Willie Alcazar MD;  Location: [facility]    HERNIA REPAIR  2013    HYSTERECTOMY, OJSE  1975    LUMPECTOMY BREAST Left 2008    THYROIDECTOMY  Right     Partial    TUBAL LIGATION          SOCIAL HISTORY     Social History      FAMILY  HISTORY     Reviewed, and family history includes Alzheimer Disease in her father; Colon Cancer in her maternal aunt; No Known Problems in her mother; Other - See Comments in her sister.     ALLERGIES     Allergies   Allergen Reactions    Codeine Nausea    Dust Mite Extract Other (See Comments)     Per allergy test    Erythromycin Dizziness     Almost passed out    Macrolides And Ketolides     Penicillin [Penicillins] Itching        REVIEW OF SYSTEMS     Review of systems not obtained due to patient factors.     HOME & HOSPITAL MEDICATIONS     Prior to Admission Medications  Medications Prior to Admission   Medication Sig Dispense Refill Last Dose    albuterol (PROAIR HFA/PROVENTIL HFA/VENTOLIN HFA) 108 (90 Base) MCG/ACT inhaler Inhale 2 puffs into the lungs every 6 hours as needed for shortness of breath / dyspnea or wheezing 18 g 0 Unknown at prn    benzonatate (TESSALON) 100 MG capsule Take 1 capsule (100 mg) by mouth 3 times daily as needed for cough 30 capsule 0 Unknown at prn    calcium carbonate (OS-ANIKA) 1500 (600 Ca) MG tablet Take 600 mg by mouth 2 times daily (with meals)   3/14/2024 at am    DILT- MG 24 hr capsule Take 1 capsule (180 mg) by mouth daily 30 capsule 1 3/14/2024 at am    famotidine (PEPCID) 20 MG tablet Take 20 mg by mouth 2 times daily   3/14/2024 at am    ferrous sulfate (FE TABS) 325 (65 Fe) MG EC tablet Take 325 mg by mouth daily   3/14/2024 at am    levothyroxine (SYNTHROID/LEVOTHROID) 125 MCG tablet Take 1 tablet (125 mcg) by mouth every morning (before breakfast) 30 tablet 1 3/14/2024 at am    multivitamin w/minerals (CENTRUM ADULTS) tablet Take 1 tablet by mouth daily as needed (supplement)   3/14/2024 at am    pantoprazole (PROTONIX) 40 MG EC tablet Take 1 tablet (40 mg) by mouth 2 times daily 60 tablet 0 3/14/2024 at am    potassium chloride ER (MICRO-K) 10 MEQ CR capsule Take 2 capsules by mouth daily   3/14/2024 at am    rosuvastatin (CRESTOR) 10 MG tablet Take 1 tablet (10  mg) by mouth every evening for 30 days 30 tablet 0 3/13/2024 at hs    sertraline (ZOLOFT) 100 MG tablet Take 200 mg by mouth every evening   3/14/2024 at am    sucralfate (CARAFATE) 1 GM tablet Take 1 tablet (1 g) by mouth 2 times daily (before meals) 60 tablet 0 3/14/2024 at am       Hospital Medications   [Held by provider] aspirin  81 mg Oral Daily    Or    [Held by provider] aspirin  81 mg Oral or NG Tube Daily    atorvastatin  40 mg Oral QPM    [Held by provider] clopidogrel  75 mg Oral Daily    diltiazem ER COATED BEADS  180 mg Oral Daily    levothyroxine  125 mcg Oral QAM AC    metoprolol tartrate  25 mg Oral BID    pantoprazole  40 mg Oral BID    polyethylene glycol  17 g Oral Daily    sertraline  200 mg Oral QPM    sucralfate  1 g Oral BID AC        PHYSICAL EXAM     Vital signs  Temp:  [98  F (36.7  C)-99.3  F (37.4  C)] 98.2  F (36.8  C)  Pulse:  [79-99] 83  Resp:  [18-21] 20  BP: (116-140)/(73-97) 138/88  SpO2:  [86 %-93 %] 93 %    Weight:   [unfilled]    General Physical Exam: Patient is alert and oriented x 1 (self). Vital signs were reviewed and are documented in EMR.   Neurological Exam:  Mental status: Somewhat attentive; Seems confused. ?Receptive aphasia  Speech: Expressive aphasia, variable  Cranial nerves:  Patient has trouble with testing. Face symmetric, EOM appear full  Motor: Moves all extremities without difficulty       DIAGNOSTIC STUDIES     Pertinent Radiology   Radiology Results: Personally reviewed image/s    CT (3/19)  FINDINGS:  INTRACRANIAL CONTENTS: No intracranial hemorrhage, extraaxial collection, or mass effect.  No CT evidence of acute infarct. Mild presumed chronic small vessel ischemic changes. Mild generalized volume loss. No hydrocephalus.      VISUALIZED ORBITS/SINUSES/MASTOIDS: No intraorbital abnormality. No paranasal sinus mucosal disease. No middle ear or mastoid effusion.     BONES/SOFT TISSUES: No acute abnormality.                                                                    IMPRESSION:  1.  No CT evidence for acute intracranial process.  2.  Brain atrophy and presumed chronic microvascular ischemic changes as above.    MRI  IMPRESSION:  1.  Tiny focus of acute to subacute infarction in the anterolateral right occipital lobe, new from the prior exam.  2.  No superimposed acute intracranial process.  3.  Mild age-related changes as above.    Pertinent Labs   Lab Results: personally reviewed.   Recent Results (from the past 24 hour(s))   Platelet count    Collection Time: 03/18/24  6:52 AM   Result Value Ref Range    Platelet Count 154 150 - 450 10e3/uL   Creatinine    Collection Time: 03/18/24  6:52 AM   Result Value Ref Range    Creatinine 1.84 (H) 0.51 - 0.95 mg/dL    GFR Estimate 27 (L) >60 mL/min/1.73m2   Lactic Acid STAT    Collection Time: 03/18/24  6:52 AM   Result Value Ref Range    Lactic Acid 0.5 (L) 0.7 - 2.0 mmol/L   Hemoglobin    Collection Time: 03/18/24  6:52 AM   Result Value Ref Range    Hemoglobin 8.5 (L) 11.7 - 15.7 g/dL   Glucose by meter    Collection Time: 03/18/24  8:41 AM   Result Value Ref Range    GLUCOSE BY METER POCT 94 70 - 99 mg/dL   Glucose by meter    Collection Time: 03/18/24  5:02 PM   Result Value Ref Range    GLUCOSE BY METER POCT 116 (H) 70 - 99 mg/dL   Glucose by meter    Collection Time: 03/18/24 10:41 PM   Result Value Ref Range    GLUCOSE BY METER POCT 113 (H) 70 - 99 mg/dL       Total time spent for face to face visit, reviewing labs/imaging studies, counseling and coordination of care was: 1 Hour 15 Minutes. More than 50% of this time was spent on counseling and coordination of care.    Dragon software used in the dictation of this note.    Krissy Phelan MD  [facility] - [address], [address]  [place] [address] [phone]

## 2024-03-19 NOTE — PROGRESS NOTES
SPEECH PATHOLOGY CLINICAL SWALLOW EVALUATION & SPEECH LANGUAGE EVALUATION       03/19/24 0900   Appointment Info   Signing Clinician's Name / Credentials (SLP) Cristopher Guzman MA [facility] SLP   General Information   Onset of Illness/Injury or Date of Surgery 03/14/24   Patient/Family Therapy Goal Statement (SLP) currently denies symptoms   Pertinent History of Current Problem 78-year-old admitted for TIA with slurred speech, left facial droop, word finding difficulty.  Has had 2 separate occasions of recurrent symptoms since hospitalized, most recently the afternoon of 3/18.  Workup so far including CTA head and neck, brain MRI without any acute changes.  Echo within normal limits.  EEG with generalized slowing consistent with nonspecific encephalopathy.  Dual antiplatelet therapy currently on hold due to recurrent nosebleeds and rectal bleeding.   General Observations Pt is alert, up in the for breakfast.   Type of Evaluation   Type of Evaluation Swallow Evaluation;Speech, Language, Cognition   Oral Motor   Oral Musculature generally intact   Structural Abnormalities none present   Mucosal Quality adequate   Dentition (Oral Motor)   Dentition (Oral Motor) natural dentition;adequate dentition;some missing teeth   Facial Symmetry (Oral Motor)   Facial Symmetry (Oral Motor) WNL   Comment, Facial Symmetry (Oral Motor) currently no facial droop   Lip Function (Oral Motor)   Lip Range of Motion (Oral Motor) WNL   Lip Strength (Oral Motor) WNL   Tongue Function (Oral Motor)   Tongue Strength (Oral Motor) WNL   Tongue Coordination/Speed (Oral Motor) WNL   Tongue ROM (Oral Motor) WNL   Jaw Function (Oral Motor)   Jaw Function (Oral Motor) WNL   Cough/Swallow/Gag Reflex (Oral Motor)   Volitional Throat Clear/Cough (Oral Motor) WNL   Volitional Swallow (Oral Motor) WNL   Vocal Quality/Secretion Management (Oral Motor)   Vocal Quality (Oral Motor) WFL   Secretion Management (Oral Motor) WNL   General Swallowing Observations    Past History of Dysphagia none   Respiratory Support room air   Current Diet/Method of Nutritional Intake (General Swallowing Observations, NIS) thin liquids (level 0);regular diet   Swallowing Evaluation Clinical swallow evaluation   Clinical Swallow Evaluation   Feeding Assistance minimal assistance required   Clinical Swallow Evaluation Textures Trialed thin liquids;solid foods   Clinical Swallow Eval: Thin Liquid Texture Trial   Mode of Presentation, Thin Liquids straw;spoon;self-fed   Volume of Liquid or Food Presented 6 oz   Oral Phase of Swallow WFL   Pharyngeal Phase of Swallow intact   Diagnostic Statement took sips of liquid by straw and ate mixed consisntency cereal with milk by spoon. Good oral control, no overt s/s aspiration, no change in respiratory status.   Clinical Swallow Evaluation: Solid Food Texture Trial   Mode of Presentation self-fed   Volume Presented cereal w/milk   Oral Phase WFL   Pharyngeal Phase intact   Diagnostic Statement Good oral masication and control. No oral stasis, no overt s/s aspiration.   Esophageal Phase of Swallow   Patient reports or presents with symptoms of esophageal dysphagia No   Swallowing Recommendations   Diet Consistency Recommendations thin liquids (level 0);regular diet   Supervision Level for Intake patient independent   Medication Administration Recommendations, Swallowing (SLP) one pill at a time with sips of liquid   Instrumental Assessment Recommendations instrumental evaluation not recommended at this time   Motor Speech   Vocal Loudness (Motor Speech) WFL   Speech Intelligibility (Motor Speech) WFL   Breath Support (Motor Speech) intact   Resonance (Motor Speech) WFL   Comment, Motor Speech Assessment No dysarthria   Articulation (Motor Speech) WFL   Respiration (motor speech) Other (see comments)  (WFL)   Auditory Comprehension   Follows Commands (Auditory Comprehension) 1-step command   Comment, Assessment (Auditory Comprehension) Difficulty  w/attention, staying on task. Improves with visual contextual cues within the environment   Yes/No Questions (Auditory Comprehension) simple/factual questions;biographical/personal questions   1 Step, Follows Commands (Auditory Comprehension) 50-74% accuracy;achieved with cues;other (see comments)  (best within context of environment)   Biographical/Personal Questions (Auditory Comprehension) over 90% accuracy   Simple/Factual Questions (Auditory Comprehension) 50-74% accuracy   Verbal Expression   Comment, Assessment (Verbal Expression) Difficulty w/attention, staying on task. Able to name 6 animals independently, then became perseverative, unable to add items w/cues. Perseverated on animals when we changed to naming states in the [place], unable to understand task.   Confrontational Naming (Verbal Expression) objects   Conversational Speech (Verbal Expression) connected speech   Narrative Speech (Verbal Expression) picture description;storytelling/retelling   Repetition Skills (Verbal Expression) phrases   Word Finding Skills (Verbal Expression) generative naming   Objects, Confrontational Naming (Verbal Expression) intact   Generative Naming, Word Finding (Verbal Expression) impaired;unsuccessful with cues   Phrases, Repetition Skills (Verbal Expression) intact   Picture Description, Narrative Speech (Verbal Expression) moderate impairment   Storytelling/Retelling, Narrative Speech (Verbal Expression) severe impairment   Connected Speech, Conversational (Verbal Expression) moderate impairment   Pragmatic Language   Nonverbal Skills (Pragmatic Language) WFL;body language/personal space;eye contact;facial affect;other (see comments)  (mildly flat but does have eye contact, approp facial expressions.)   Reading Comprehension   Oral Reading Ability (Reading Comprehension) WFL;other (see comments)  (word, phrases in functional environment)   Cognition   Cognitive Status Exam Comments Oriented to person, month, and date  "within a day, perseverated on \"19\" and stated year at 1919, unable to acknowledge it's 2024. Oriented to place as [place] in [place] but not name of the hospital, and stated she had a mini stroke but could not articulate details of her symptoms. She appeared to have difficulty with sequencing simple tasks only partially setting up breakfast then became confused and needed cues. Variable orientation to personal items on her tray, where the bathroom is in her room, etc.   Orientation Status (Cognition) verbal cues/prompts needed for orientation   Affect/Mental Status (Cognition) confused   Clinical Impression   Criteria for Skilled Therapeutic Interventions Met (SLP Eval) Yes, treatment indicated   SLP Diagnosis aphasia, cognitive linguistic impairment   Activity Limitations Related to Problem List (SLP) problem solving for safety, conveying specific information, thoughts, and needs.   Risks & Benefits of therapy have been explained evaluation/treatment results reviewed;care plan/treatment goals reviewed;current/potential barriers reviewed;participants voiced agreement with care plan;participants included;patient   Clinical Impression Comments Patient presents with no current concerns for dysphagia. She demonstrates good oral control, mastication, and clearance after swallows. No overt s/s aspiration or breathing changes. Communication and cognitive linguistic function appears impaired. She has no dysarthria or facial droop at this time. She is partially and generally oriented but has difficulty follow directions, with attention, and staying on task. She has difficulty relaying specific information with details and easily becomes confused with decreased awareness of errors or difficulty. Further cog linguisitic assessment is recommended.   SLP Total Evaluation Time   Eval: oral/pharyngeal swallow function, clinical swallow Minutes (55646) 15   Eval: Sound production with lang comprehension and expression Minutes " (07500) 20   SLP Goals   Therapy Frequency (SLP Eval) 5 times/week   SLP Predicted Duration/Target Date for Goal Attainment 03/29/24   SLP Goals SLP Goal 1;SLP Goal 2   SLP: Goal 1 Pt will verbalize details and complete content during generative naming, explaining/describing with visual stimuli/cues   SLP: Goal 2 Pt will improve a/c for orientation, recall of simple info, and 1 step directions.   SLP Discharge Planning   SLP Plan 5x/wk CLQT and add or adjust goals, generative naming/verbalizing details, attention to task, a/c for orientation, details, directions. ?lang vs cog or both impaired.   SLP Discharge Recommendation home with assist   SLP Rationale for DC Rec language and cognitive linguistic impairments w/decreased attention to task, easily confused and disoriented to environment, difficulty prob solving for independent safety   SLP Brief overview of current status  reg diet w/thin liq, impaired language and cognitive liquistic function.

## 2024-03-19 NOTE — PLAN OF CARE
Patient resting in bed with family at bedside. Patient is A/Ox 3-4 with intermittent confusion. Offered applesauce to go with pills but patient refused. Pills were taken one at a time with water and patient was slow and methodical with each pill. The patient stated mild discomfort with swallowing. Offered crackers and soda to help prevent upset stomach. NIH at 2000 was 2, see flowsheets. Patient able to follow commands.    Refer to significant event notes by this writer and Dr. Franco for information regarding neurological changes throughout the night.    Problem: Adult Inpatient Plan of Care  Goal: Absence of Hospital-Acquired Illness or Injury  Intervention: Prevent and Manage VTE (Venous Thromboembolism) Risk  Recent Flowsheet Documentation  Taken 3/18/2024 2003 by Marilyn Bah RN  VTE Prevention/Management: SCDs (sequential compression devices) on     Problem: Adult Inpatient Plan of Care  Goal: Optimal Comfort and Wellbeing  Outcome: Progressing     Problem: Stroke, Ischemic (Includes Transient Ischemic Attack)  Goal: Improved Communication Skills  Outcome: Progressing     Problem: Comorbidity Management  Goal: Blood Pressure in Desired Range  Outcome: Progressing

## 2024-03-20 NOTE — PROGRESS NOTES
[facility]    Medicine Progress Note - Hospitalist Service    Date of Admission:  3/14/2024    Assessment & Plan          Shaila Triana is a 78 year old female with hx breast CA, HFpEF, CKD4, PAUL, Afib s/p LAAO device, mantle cell lymphoma, possilble MCI who was admitted for stroke-like symptoms. Initially MRI showed no stroke. Hospital course complicated by nose bleeding and rectal bleeding requiring antiplatelets to be held, then with stuttering TIA symptoms. Repeat MRI showed progression to acute stoke.       #Acute stroke of the anterolateral right occipital lobe  ---Presented with acute slurred speech, left facial droop, and word-finding difficulty. ---Initial symptoms resolved in less than 1-2 hours, then multiple spells of recurrent symptoms.  ---admit CTA head and neck without acute changes.   -Initial MRI negative- but repeat MRI 3/19 shows new acute to subacute infarct in the right anterolateral occipital lobe, which corresponds to her fluent aphasia symptoms  -stroke order set used  -allow permissive HTN  -PT/OT rec TCU   --SLP.followung  -Tele monitoring- has known A fib with LAAO in place  -Echo normal LV function.  - Per neurology recommendation when reconsulted 3/19 plan was ASA and plavix for 90 days, then  mg daily.   ---Note patient developed rectal bleeding and nose bleed on 3/17. Aspirin and plavix held 3/17-3/19; bleeding has stopped.   - Continue lipitor 40mg, increased from home Crestor 10mg, LDL 88 not quite at goal  - EEG repeated today - initially showed generalized slowing consistent with nonspecific encephalopathy.  -A1c 4.6    #Acute metabolic encephalopathy  -likely related to stroke  -developed more 3/19  -suppertime melatonin  -bladder scan did rule out retention  -treat metabolic issues as best able  -- No recommending formal cognitive testing but slums prior to discharge  --TSH normal     #bleeding hemorrhoids:  Patient was found to have bright red  blood on her bed sheet 3/17. Physical exam showed external hemorrhoid with erythema and swelling. Her bleeding is most likely from the hemorrhoid.  - miralax to prevent straining  -daily Hgb- stable; no evidence acute blood loss anemia     #Nose bleeding:  -developed recurrent nose bleed, nausea on 3/17.  -Nose bleed was able to be stopped by applying pressure    # Mantle cell lymphoma  -As above developed multiple sites of bleeding while on dual antiplatelet therapy and subcu Lovenox  -With history of mantle cell lymphoma, question if she has some platelet dysfunction.  She also tends to be a little bit thrombocytopenic (119-144k) though platelet count was normal today (154k).    -If unable to tolerate antiplatelet agent, could consider asking oncology to evaluate.  -cautious reintroduction of antiplatelets; on DAPT but not Lovenox  - Monitor hemoglobin    #Headache  -chronic problem for her, recurrence on 3/17, now resolved  -CT head repeated and showed no acute intracranial hemorrhage.  -tylenol, oxycodone PRN    #Constipation  -c/o abdominal pain, seems a little bit distended, no BM charted here  -with hemorrhoid issues- suspect she is constipated  -continue Miralax started 3/18  -add BID pericolace  -supp PRN     #CKD, stage IV  -Creatinine within baseline range but has trended up slightly. Will start some gentle IVF. Continue to monitor with recent hemodynamic fluctuations.    #Hyponatremia  -suspect mildly hypovolemic with slight rise in Cr, BP running lower  -Gentle IVF and trend    #Chronic atrial fibrillation with RVR: s/p LAAO  ---on tele with HR itermittently elevated.  - holding diltiazem and reduced new med metoprolol for permissive HTN. Monitor on tele.     #Essential hypertension: need to allow permissive HTN now given confirmed stroke  - Metoprolol started here due to A fib RVR, nosebleed and uncontrolled HTN. Give at minimum dosage and hold for BP <140  -hold home diltiazem for now  - Hydralazine  "and labetalol prn     #Hypothyroidism: Continue PTA levothyroxine.  Normal tsh     #Gastric ulcer: as shown by EGD on 2/1/24  - Per GI, sucralfate tablets 1 gram PO BID indefinitely.   - Continue PPI bid     #Mood, home Zoloft    Hyponatremia  --- Mild, improved, continue to monitor    Called daughter Dana        Diet: Regular Diet Adult    DVT Prophylaxis: Pneumatic Compression Devices  Boucher Catheter: Not present  Lines: None     Cardiac Monitoring: ACTIVE order. Indication: Stroke, acute (48 hours)  Code Status: Full Code      Clinically Significant Risk Factors         # Hyponatremia: Lowest Na = 129 mmol/L in last 2 days, will monitor as appropriate          # Hypertension: Noted on problem list        # Overweight: Estimated body mass index is 27.42 kg/m  as calculated from the following:    Height as of this encounter: 1.575 m (5' 2\").    Weight as of this encounter: 68 kg (149 lb 14.6 oz)., PRESENT ON ADMISSION     # Financial/Environmental Concerns: none         Disposition Plan      Expected Discharge Date: 03/20/2024        Discharge Comments: acute stroke            Krys Bassett MD  Hospitalist Service  [facility] [place]  Securely message with [employer] (more info)  Text page via [employer] Paging/Directory   ______________________________________________________________________    Interval History   ---Patient seen and chart reviewed.  ---She was sleepy today but participating in PT  ---no nose bleeds so far  ---appears to have some neuro cognitive issues    Physical Exam   Vital Signs: Temp: 98  F (36.7  C) Temp src: Oral BP: (!) 154/100 Pulse: 96   Resp: 18 SpO2: 94 % O2 Device: None (Room air) Oxygen Delivery: 2 LPM  Weight: 149 lbs 14.6 oz    General Appearance: Pleasant NAD  Respiratory: CTA-B  Cardiovascular: irr irr, no murmers rubs or gallops  GI: soft, nontender  Skin: no significant LE edema  Other:  strength equal, participating ok with conversations; no focal deficts " appreciated but appears to have slow speed to answer questions     Medical Decision Making             Data     I have personally reviewed the following data over the past 24 hrs:    4.9  \   8.7 (L)   / 138 (L)     132 (L) 100 19.7 /  106 (H)   4.0 26 1.76 (H) \       Imaging results reviewed over the past 24 hrs:   No results found for this or any previous visit (from the past 24 hour(s)).

## 2024-03-20 NOTE — PROGRESS NOTES
03/20/24 1000   Appointment Info   Signing Clinician's Name / Credentials (PT) Rebeca Dill,PT   Living Environment   People in Home spouse;child(no), adult   Current Living Arrangements house   Home Accessibility stairs to enter home   Number of Stairs, Main Entrance 6   Stair Railings, Main Entrance other (see comments)  (1 railing)   Living Environment Comments pt reports she can stay on main level   Self-Care   Equipment Currently Used at Home wheelchair, manual;walker, rolling   Fall history within last six months yes   Number of times patient has fallen within last six months 1   Activity/Exercise/Self-Care Comment pt difficulty answering questions and finding wordsm pt fallingasleep during eval   General Information   Onset of Illness/Injury or Date of Surgery 03/14/24   Referring Physician Dr. Krys Bassett   Patient/Family Therapy Goals Statement (PT) unsure   Pertinent History of Current Problem (include personal factors and/or comorbidities that impact the POC) Shaila Triana is a 78 year old female with MDD, HTN, hypothyroidism, breast CA, HFpEF, CKD4, PAUL, HLD, anemia, Afib s/p LAAO device, mantle cell lymphoma, who was admitted for stroke-like symptoms. Initially MRI showed no stroke. Hospital course complicated by nose bleeding and rectal bleeding requiring antiplatelets to be held, then with stuttering TIA symptoms. Repeat MRI showed progression to acute stoke. Hospital Day: 6   General Observations pt in bed falling asleep easily while talking   Cognition   Affect/Mental Status (Cognition) unable/difficult to assess;low arousal/lethargic   Pain Assessment   Patient Currently in Pain No   Range of Motion (ROM)   ROM Comment BLE WFL   Strength (Manual Muscle Testing)   Strength Comments BLE WFL w/ MMT   Bed Mobility   Impairments Contributing to Impaired Bed Mobility impaired coordination   Assistive Device (Bed Mobility) other (see comments);bed rails  (HOB elevated)   Comment, (Bed Mobility)  pt needs cues to stay on task, supine > sit CGA/minAx1   Transfers   Comment, (Transfers) pt reports she was wearing a boot on RLE due to foot fractures sit > stand CGA   Gait/Stairs (Locomotion)   Stearns Level (Gait) contact guard   Assistive Device (Gait) walker, front-wheeled   Distance in Feet (Gait) 4'   Pattern (Gait) step-to   Deviations/Abnormal Patterns (Gait) weight shifting decreased   Comment, (Gait/Stairs) cues for safety, pt pushing the FWW away from her and trying to reach for the recliner   Balance   Balance Comments CGA   Clinical Impression   Criteria for Skilled Therapeutic Intervention Yes, treatment indicated   PT Diagnosis (PT) decreased functional mobility   Influenced by the following impairments cognition, fatigue, decreased balance   Functional limitations due to impairments bed mob, transfers ,gait and stairs   Clinical Presentation (PT Evaluation Complexity) evolving   Clinical Presentation Rationale presents as medically diagnosed   Clinical Decision Making (Complexity) moderate complexity   Planned Therapy Interventions (PT) bed mobility training;gait training;transfer training;progressive activity/exercise;balance training   Risk & Benefits of therapy have been explained evaluation/treatment results reviewed   PT Total Evaluation Time   PT Eval, Moderate Complexity Minutes (10388) 14   Physical Therapy Goals   PT Frequency Daily   PT Predicted Duration/Target Date for Goal Attainment 03/27/24   PT Goals Bed Mobility;Transfers;Gait;Stairs   PT: Bed Mobility Supervision/stand-by assist;Supine to/from sit   PT: Transfers Supervision/stand-by assist;Sit to/from stand;Bed to/from chair;Assistive device   PT: Gait Rolling walker;50 feet  (CGA)   PT: Stairs 6 stairs;Minimal assist  (CGA, 1 rail)   Interventions   Interventions Quick Adds Therapeutic Activity   Therapeutic Activity   Therapeutic Activities: dynamic activities to improve functional performance Minutes (75614) 8   Symptoms  Noted During/After Treatment Fatigue   Treatment Detail/Skilled Intervention sat EOB x3min, stand>sit FWW CUEsfor hand placement and safety   PT Discharge Planning   PT Plan bed mob, transfers, gait , standing ex/balance ex, stairs   PT Discharge Recommendation (DC Rec) Transitional Care Facility   PT Rationale for DC Rec pt Ax1 for all mobility and safety cuing, pt fatigues easily, limited toleration for activites   PT Brief overview of current status pt CGA/minAx1 for bed mob, transfers and limited gait w/ FWW   PT Equipment Needed at Discharge other (see comments)  (pt may have FWW and w/c at home)   Total Session Time   Timed Code Treatment Minutes 8   Total Session Time (sum of timed and untimed services) 22

## 2024-03-20 NOTE — PROGRESS NOTES
Primary Diagnosis: TIA    Patient is resting in bed. Patient is A/Ox4 with intermittent confusion. Bed/Chair alarm on at all times to decrease fall risk. Unable to call for needs but periodic rounding was consistently.     Patient tolerated oral medications along with regular diet. I&O charted throughout shift, see flowsheets. Patient continent of urine and stool. SBA with gait belt around room. Pain free during duration of shift.     NIH was at 0900, see flowsheets. Patient scored a 4. Patient able to follow commands. EEG performed today.    1230: BP taken. 155/98 . Does not meet criteria for intervention. Continue to monitor.

## 2024-03-20 NOTE — PHARMACY-CONSULT NOTE
Pharmacy Consult to evaluate for medication related stroke core measures    Shaila Triana, 78 year old female admitted for stroke symptoms on 3/14/2024.    Thrombolytic was not given because of Time from onset contraindications    VTE Prophylaxis SCDs /PCDs placed on 3/18, as appropriate prior to end of hospital day 2.    Antithrombotic: aspirin and clopidogrel started on 3/19, as appropriate by end of hospital day 2. Continue antithrombotic therapy on discharge to meet quality measures, unless contraindicated.    Anticoagulation if history of A-fib/flutter: [id number] of 5, history of watchman     LDL Cholesterol Calculated   Date Value Ref Range Status   03/15/2024 88 <=100 mg/dL Final       Patient currently receiving Lipitor (atorvastatin) continue statin on discharge to meet quality measures, unless contraindicated.    Recommendations: None at this time    Thank you for the consult.    Juan Haddad [facility] 3/19/2024 7:56 PM

## 2024-03-20 NOTE — CONSULTS
"Care Management Follow Up    Length of Stay (days): 2    Expected Discharge Date: 03/20/2024     Concerns to be Addressed:   discharge planning   Patient plan of care discussed at interdisciplinary rounds: Yes    Anticipated Discharge Disposition: Home     Anticipated Discharge Services:    Education Provided on the Discharge Plan:  Per Care Team   Patient/Family in Agreement with the Plan:  Yes    Referrals Placed by CM/SW:    Private pay costs discussed: Not applicable    Additional Information:  Chart reviewed.     Cm updates:  Neurology following pt, did repeat brain MRI yesterday.   OT recs home with assist, which family can provide.       Social hx:  \"Lives w/son BRONSON Zamorano and dil Tl. Independent at baseline w/WC and no svcs, CM to follow for discharge needs. Family can transport.  \"          Cm will continue to follow plan of care,review recommendations, and assist with any discharge needs anticipated.       Anabel Hudson RN      "

## 2024-03-20 NOTE — PROGRESS NOTES
[facility] Neurology Progress Note    Shaila Triana MRN# [id number]   Age: 78 year old YOB: 1945          Assessment and Plan:   Assessment:  Right Occipital infarction (subacute)    The patient has a new right occipital infarction seen on imaging, but overall there is little to indicate it is symptomatic.  Prior EEG was normal, but again this could be repeated today given her confusion and poor attention.  I do not think the new infarction would play a role in her speech issues given the location.  She is currently on the correct stroke prophylaxis, and I wouldn't necessarily recommend anticoagulation at this time given her complications with bleeding.  Her cognitive issues seem more in-line with encephalopathy than that of a specific aphasia, and may need further evaluation as an outpatient.    Plan:  - EEG (routine) ~ ordered for you  - DAPT for 90 days and then  mg monothreapy  - Continue with SLP  - Outpatient cognitive impairment w/up to be done (consider neuropsychology), SLUMS could be done prior to discharge with OT    SKYLER Cuevas D.O.  [facility] Neurology  Pager: [phone]    Total time today (47 min) in this patient encounter was spent on pre-charting, counseling and/or coordination of care.         History of Presenting Symptoms:   79 y/o female with recurrent TIA symptoms, presenting 3/14/2024 with aphasia/confusion in setting of prior history of HTN, HLD, A-fib (watchman), Breast cancer s/p lumpectomy. Initial CTA/CT were negative. Initial MRI brain was not revealing. Etiology thought to be TIA.  Echocardiogram showed LA enlargement. Initial recommendation were for DAPT for 90 days followed by ASA monotherapy.  Reoccuring speech difficulties 3/18 and 3/19 during hypoxia led to repeat CT which was normal. EEG showed generalized slowing.  Rectal bleeding developed 3/17/2024 so DVT prophylaxis along with DAPT was stopped. Worsened speech issues 3/20/2024  "led to a new MRI brain 3/20/2024, which showed right occipital infarction (acute to subacute).  ASA 81 mg was restarted.    Interval History since last visit   The patient is alone today on her exam.  She doesn't express new symptoms.     Physical Exam:   Vitals: BP (!) 161/97 (BP Location: Right arm)   Pulse 95   Temp 98.2  F (36.8  C) (Oral)   Resp 18   Ht 1.575 m (5' 2\")   Wt 68 kg (149 lb 14.6 oz)   LMP  (LMP Unknown)   SpO2 93%   BMI 27.42 kg/m     General: Seated comfortably in no acute distress.   HEENT: Neck supple with normal range of motion. No paracervical muscle tenderness or tightness.    Neurologic:     Mental Status: Fully alert, but cannot follow multiple step commands and even has difficulty with single step commands. Can read single words easily and can repeat individual words.  Falling into and out of sleep during interview today. Can match hand shapes presented in her vision. Tells me her address, and can put 5-7 word phrases together without utterances.  Seems confused overall in terms of where she is, why she is in the hospital (cannot name date, time, reason for being here).     Cranial Nerves: Visual fields intact to threat in all quadrants (no specific lack of flinch response). PERRL. Follows my eyes in all directions, but often drifts off in attention and closes eyes if task is long.  Facial sensation intact/symmetric. Facial movements symmetric. Hearing not formally tested but there is some marked difficulty to respond to low volume questions over high volume questions (likely more related to encephalopathy).  Palate elevation symmetric, uvula midline. No dysarthria. Shoulder shrug strong bilaterally. Tongue protrusion midline.     Motor: No tremors or other abnormal movements observed. Muscle tone normal throughout. No pronator drift. Normal/symmetric rapid finger tapping. Gives resistance in SA, BF, TE, WF, WE, FF, FE, HF, HE, KF, KE, PF, DF (4) but cannot really indicate if 5 " due to effort and confusion.     Deep Tendon Reflexes: 2+/symmetric throughout upper and patellar b/l, but achilles are both 1+ and symmetric. No clonus. Toes downgoing bilaterally.     Sensory: Intact/symmetric to pinprick in all extremities but difficult to ascribe any vibration duration due to limited attention.     Coordination: Finger tapping, and FNF without dysmetria or ataxia.     Gait: Deferred     Data: Pertinent since last visit   Imaging:  As above

## 2024-03-20 NOTE — PLAN OF CARE
Problem: Adult Inpatient Plan of Care  Goal: Absence of Hospital-Acquired Illness or Injury  Outcome: Progressing  Intervention: Identify and Manage Fall Risk  Recent Flowsheet Documentation  Taken 3/20/2024 0900 by Hailey Carr RN  Safety Promotion/Fall Prevention:   activity supervised   clutter free environment maintained   nonskid shoes/slippers when out of bed   room door open   room near nurse's station   safety round/check completed   supervised activity  Intervention: Prevent and Manage VTE (Venous Thromboembolism) Risk  Recent Flowsheet Documentation  Taken 3/20/2024 0900 by Hailey Carr, RN  VTE Prevention/Management: SCDs (sequential compression devices) on  Intervention: Prevent Infection  Recent Flowsheet Documentation  Taken 3/20/2024 0900 by Hailey Carr, RN  Infection Prevention:   hand hygiene promoted   rest/sleep promoted     Problem: Stroke, Ischemic (Includes Transient Ischemic Attack)  Goal: Optimal Coping  Outcome: Progressing   Goal Outcome Evaluation:      Plan of Care Reviewed With: patient    Overall Patient Progress: improvingOverall Patient Progress: improving         Patient able to answer orientation questions appropriately.  Appears to be forgetful at times.  Unable to follow all directions this am, able to follow directions this afternoon.  Able to ambulate to the bathroom.  Voiding without difficulty.  Declined miralax this am.  Decreased appetite, denies nausea.

## 2024-03-20 NOTE — PLAN OF CARE
Problem: Stroke, Ischemic (Includes Transient Ischemic Attack)  Goal: Improved Communication Skills  Outcome: Progressing  Intervention: Optimize Communication Skills  Recent Flowsheet Documentation  Taken 3/20/2024 0405 by Aden Meza, RN  Communication Enhancement Strategies: call light answered in person  Taken 3/20/2024 0033 by Aden Meza, RN  Communication Enhancement Strategies: call light answered in person  Taken 3/19/2024 2114 by Aden Meza, RN  Communication Enhancement Strategies: call light answered in person  Goal: Optimal Functional Ability  Outcome: Progressing  Intervention: Optimize Functional Ability  Recent Flowsheet Documentation  Taken 3/20/2024 0405 by Aden Meza, RN  Activity Management:   ambulated to bathroom   back to bed  Taken 3/20/2024 0200 by Aden Meza, RN  Activity Management:   ambulated to bathroom   back to bed     Problem: Fall Injury Risk  Goal: Absence of Fall and Fall-Related Injury  Outcome: Progressing  Intervention: Identify and Manage Contributors  Recent Flowsheet Documentation  Taken 3/20/2024 0405 by Aden Meza, RN  Medication Review/Management: medications reviewed  Taken 3/20/2024 0033 by Aden Meza, RN  Medication Review/Management: medications reviewed  Taken 3/19/2024 2114 by Aden Meza, RN  Medication Review/Management: medications reviewed  Intervention: Promote Injury-Free Environment  Recent Flowsheet Documentation  Taken 3/20/2024 0405 by Aden Meza, RN  Safety Promotion/Fall Prevention:   activity supervised   clutter free environment maintained   nonskid shoes/slippers when out of bed   room door open   room near nurse's station   safety round/check completed   supervised activity  Taken 3/20/2024 0033 by Aden Meza, RN  Safety Promotion/Fall Prevention:   activity supervised   clutter free environment maintained   nonskid shoes/slippers when out of bed   room door open   room  near nurse's station   safety round/check completed   supervised activity  Taken 3/19/2024 2114 by Aden Meza RN  Safety Promotion/Fall Prevention:   activity supervised   clutter free environment maintained   nonskid shoes/slippers when out of bed   room door open   room near nurse's station   safety round/check completed   supervised activity   Goal Outcome Evaluation:       Pt alert but forgetful at times. Up to the bathroom with standby assist. Scoring 2 in NIH. Afib on tele. IV infusing. No bleeding episodes noted this shift.Bed alarm on for safety. Denies pain. No acute changes overnight

## 2024-03-20 NOTE — PROGRESS NOTES
03/20/24 1515   Appointment Info   Signing Clinician's Name / Credentials (OT) Hayley Pulido OTR/L   Living Environment   People in Home spouse;child(no), adult   Current Living Arrangements house   Home Accessibility stairs to enter home   Number of Stairs, Main Entrance 6   Stair Railings, Main Entrance other (see comments)   Number of Stairs, Within Home, Primary greater than 10 stairs   Stair Railings, Within Home, Primary railings safe and in good condition   Transportation Anticipated family or friend will provide   Living Environment Comments pt reports she can stay on main level   Self-Care   Current Activity Tolerance moderate   Equipment Currently Used at Home wheelchair, manual;walker, rolling   Fall history within last six months yes   Activity/Exercise/Self-Care Comment pt reports being independent at home.   Instrumental Activities of Daily Living (IADL)   IADL Comments shares responsibilities with family.  Pt reports driving at baseline and setting up her own meds and money managment   General Information   Onset of Illness/Injury or Date of Surgery 03/14/24   Referring Physician Krissy De La Torre MD   Patient/Family Therapy Goal Statement (OT) home   Additional Occupational Profile Info/Pertinent History of Current Problem per chart:78 year old female with MDD, HTN, hypothyroidism, breast CA, HFpEF, CKD4, PAUL, HLD, anemia, Afib s/p LAAO device, mantle cell lymphoma, who was admitted for stroke-like symptoms. Initially MRI showed no stroke. Hospital course complicated by nose bleeding and rectal bleeding requiring antiplatelets to be held, then with stuttering TIA symptoms. Repeat MRI showed progression to acute stoke.   Existing Precautions/Restrictions fall   Limitations/Impairments safety/cognitive   Cognitive Status Examination   Orientation Status orientation to person, place and time   Affect/Mental Status (Cognitive) confused   Follows Commands follows one-step  commands;50-74% accuracy   Memory Deficit moderate deficit;severe deficit;immediate recall   Attention Deficit moderate deficit   Executive Function Deficit moderate deficit;information processing;insight/awareness of deficits;judgment;organization/sequencing;planning/decision-making;problem-solving/reasoning;self-monitoring/self-correction   Cognitive Status Comments pt is not at her baseline   Cognitive Screens/Assessments   Cognitive Assessments Completed [facility] Mental Status Exam (UMS):  Total Score out of /30 10   [facility] Norms 1-20 equals dementia   [facility] Domains assessed: orientation, memory, attention, executive functions   Strength Comprehensive (MMT)   Comment, General Manual Muscle Testing (MMT) Assessment WFL   Bed Mobility   Bed Mobility supine-sit;sit-supine   Supine-Sit [place] (Bed Mobility) independent   Sit-Supine [place] (Bed Mobility) independent   Transfers   Transfer Comments EOB sitting for cognitive eval.  no loss of balance   Clinical Impression   Criteria for Skilled Therapeutic Interventions Met (OT) Yes, treatment indicated   OT Diagnosis impaired cognition   Influenced by the following impairments CVA   OT Problem List-Impairments impacting ADL activity tolerance impaired;balance   Assessment of Occupational Performance 3-5 Performance Deficits   Identified Performance Deficits executive functioning, cognition   Planned Therapy Interventions (OT) ADL retraining;transfer training;home program guidelines   Clinical Decision Making Complexity (OT) problem focused assessment/low complexity   Risk & Benefits of therapy have been explained evaluation/treatment results reviewed;participants included;patient;care plan/treatment goals reviewed;risks/benefits reviewed   Clinical Impression Comments pt has experienced a decline in her cognitive funcitoning affecting her daily moblity and independence.  pt would benefit from further therapy   OT Total Evaluation Time    OT Eval, Low Complexity Minutes (93990) 14   OT Goals   Therapy Frequency (OT) Daily   OT Predicted Duration/Target Date for Goal Attainment 03/25/24   OT Goals Cognition;Transfers   OT: Transfer Modified independent;Supervision/stand-by assist;with assistive device   OT: Cognitive Patient/caregiver will verbalize understanding of cognitive assessment results/recommendations as needed for safe discharge planning   OT Discharge Planning   OT Plan functional moblity, grooming standing at sink, ACL   OT Discharge Recommendation (DC Rec) Transitional Care Facility   OT Rationale for DC Rec recommend TCU at discharge for further progression and evaluation since stroke.  Pt would need 24 hour supervision and assist with all med setup, driving etc.   OT Brief overview of current status SLUMS 10/30.  Unable to setup medication in pill box

## 2024-03-20 NOTE — PROGRESS NOTES
Bedside EEG was performed that included photic stimulation; hyperventilation was deferred. The patient was awake and fidgety throughout the recording. Skin intact.    [id number] used.

## 2024-03-21 NOTE — PLAN OF CARE
Problem: Adult Inpatient Plan of Care  Goal: Absence of Hospital-Acquired Illness or Injury  Intervention: Identify and Manage Fall Risk  Recent Flowsheet Documentation  Taken 3/20/2024 1900 by Lisseth Valentine RN  Safety Promotion/Fall Prevention:   activity supervised   clutter free environment maintained   nonskid shoes/slippers when out of bed   room door open   room near nurse's station   safety round/check completed   supervised activity  Taken 3/20/2024 1530 by Lisseth Valentine RN  Safety Promotion/Fall Prevention:   activity supervised   clutter free environment maintained   nonskid shoes/slippers when out of bed   room door open   room near nurse's station   safety round/check completed   supervised activity  Intervention: Prevent Skin Injury  Recent Flowsheet Documentation  Taken 3/20/2024 1900 by Lisseth Valentine RN  Body Position: position changed independently  Taken 3/20/2024 1800 by Lisseth Valentine RN  Body Position: sitting up in bed  Taken 3/20/2024 1530 by Lisseth Valentine RN  Body Position: position changed independently  Intervention: Prevent and Manage VTE (Venous Thromboembolism) Risk  Recent Flowsheet Documentation  Taken 3/20/2024 1900 by Lisseth Valentine RN  VTE Prevention/Management: SCDs (sequential compression devices) on  Taken 3/20/2024 1530 by Lisseth Valentine RN  VTE Prevention/Management: SCDs (sequential compression devices) on  Intervention: Prevent Infection  Recent Flowsheet Documentation  Taken 3/20/2024 1900 by Lisseth Valentine RN  Infection Prevention:   hand hygiene promoted   rest/sleep promoted  Taken 3/20/2024 1530 by Lisseth Valentine RN  Infection Prevention:   hand hygiene promoted   rest/sleep promoted     Problem: Stroke, Ischemic (Includes Transient Ischemic Attack)  Goal: Optimal Cerebral Tissue Perfusion  Intervention: Protect and Optimize Cerebral Perfusion  Recent Flowsheet Documentation  Taken 3/20/2024 1900 by Lisseth Valentine RN  Sensory  Stimulation Regulation:   care clustered   lighting decreased  Taken 3/20/2024 1530 by Lisseth Valentine RN  Sensory Stimulation Regulation:   care clustered   lighting decreased  Goal: Optimal Cognitive Function  Intervention: Optimize Cognitive Function  Recent Flowsheet Documentation  Taken 3/20/2024 1900 by Lisseth Valentine RN  Sensory Stimulation Regulation:   care clustered   lighting decreased  Reorientation Measures: clock in view  Taken 3/20/2024 1530 by Lisseth Valentine RN  Sensory Stimulation Regulation:   care clustered   lighting decreased  Reorientation Measures: clock in view  Goal: Improved Communication Skills  Intervention: Optimize Communication Skills  Recent Flowsheet Documentation  Taken 3/20/2024 1900 by Lisseth Valentine RN  Communication Enhancement Strategies: call light answered in person  Taken 3/20/2024 1530 by Lisseth Valentine RN  Communication Enhancement Strategies: call light answered in person  Goal: Optimal Functional Ability  Intervention: Optimize Functional Ability  Recent Flowsheet Documentation  Taken 3/20/2024 1900 by Lisseth Valentine RN  Activity Management: up ad tavia  Taken 3/20/2024 1530 by Lisseth Valentine RN  Activity Management: up ad tavia  Goal: Effective Oxygenation and Ventilation  Intervention: Optimize Oxygenation and Ventilation  Recent Flowsheet Documentation  Taken 3/20/2024 1900 by Lisseth Valentine RN  Head of Bed (HOB) Positioning: HOB at 20-30 degrees  Taken 3/20/2024 1800 by Lisseth Valentine RN  Head of Bed (HOB) Positioning: HOB at 20-30 degrees  Taken 3/20/2024 1530 by Lisseth Valentine RN  Head of Bed (HOB) Positioning: HOB at 20-30 degrees  Goal: Improved Sensorimotor Function  Intervention: Optimize Range of Motion, Motor Control and Function  Recent Flowsheet Documentation  Taken 3/20/2024 1900 by Lisseth Valentine RN  Positioning/Transfer Devices:   pillows   in use  Taken 3/20/2024 1800 by Lisseth Valentine RN  Positioning/Transfer  Devices:   pillows   in use  Taken 3/20/2024 1530 by Lisseth Valentine RN  Positioning/Transfer Devices:   pillows   in use     Problem: Skin Injury Risk Increased  Goal: Skin Health and Integrity  Intervention: Plan: Nurse Driven Intervention: Moisture Management  Recent Flowsheet Documentation  Taken 3/20/2024 1900 by Lisseth Valentine RN  Moisture Interventions:   Encourage regular toileting   Incontinence pad  Taken 3/20/2024 1530 by Lisseth Valentine RN  Moisture Interventions:   Encourage regular toileting   Incontinence pad  Intervention: Plan: Nurse Driven Intervention: Friction and Shear  Recent Flowsheet Documentation  Taken 3/20/2024 1900 by Lisseth Valentine RN  Friction/Shear Interventions: HOB 30 degrees or less  Taken 3/20/2024 1530 by Lisseth Valentine RN  Friction/Shear Interventions: HOB 30 degrees or less  Intervention: Optimize Skin Protection  Recent Flowsheet Documentation  Taken 3/20/2024 1900 by Lisseth Valentine RN  Activity Management: up ad tavia  Head of Bed (HOB) Positioning: HOB at 20-30 degrees  Taken 3/20/2024 1800 by Lisseth Valentine RN  Head of Bed (HOB) Positioning: HOB at 20-30 degrees  Taken 3/20/2024 1530 by Lisseth Valentine RN  Activity Management: up ad tavia  Head of Bed (HOB) Positioning: HOB at 20-30 degrees     Problem: Fall Injury Risk  Goal: Absence of Fall and Fall-Related Injury  Intervention: Promote Injury-Free Environment  Recent Flowsheet Documentation  Taken 3/20/2024 1900 by Lisseth Valentine RN  Safety Promotion/Fall Prevention:   activity supervised   clutter free environment maintained   nonskid shoes/slippers when out of bed   room door open   room near nurse's station   safety round/check completed   supervised activity  Taken 3/20/2024 1530 by Lisseth Valentine RN  Safety Promotion/Fall Prevention:   activity supervised   clutter free environment maintained   nonskid shoes/slippers when out of bed   room door open   room near nurse's station    safety round/check completed   supervised activity     Problem: Risk for Delirium  Goal: Improved Behavioral Control  Intervention: Minimize Safety Risk  Recent Flowsheet Documentation  Taken 3/20/2024 1900 by Lisseth Valentine RN  Communication Enhancement Strategies: call light answered in person  Taken 3/20/2024 1530 by Lisseth Valentine RN  Communication Enhancement Strategies: call light answered in person  Goal: Improved Attention and Thought Clarity  Intervention: Maximize Cognitive Function  Recent Flowsheet Documentation  Taken 3/20/2024 1900 by Lisseth Valentine RN  Sensory Stimulation Regulation:   care clustered   lighting decreased  Reorientation Measures: clock in view  Taken 3/20/2024 1530 by Lisseth Valentine RN  Sensory Stimulation Regulation:   care clustered   lighting decreased  Reorientation Measures: clock in view   Goal Outcome Evaluation:  VSS Denies pain. No deviation from NIH score. Up frequently this evening ambulating. Good PO intake. Maintaining saturation on room air. Tolerating IV fluids without adverse effect. Does not always utilize call light appropriately but is able to verbalize needs when staff are present. Staff anticipate needs and respond accordingly.

## 2024-03-21 NOTE — PROGRESS NOTES
[facility]    Medicine Progress Note - Hospitalist Service    Date of Admission:  3/14/2024    Assessment & Plan          Shaila Triana is a 78 year old female with hx breast CA, HFpEF, CKD4, PAUL, Afib s/p LAAO device, mantle cell lymphoma, possilble MCI who was admitted for stroke-like symptoms. Initially MRI showed no stroke. Hospital course complicated by nose bleeding and rectal bleeding requiring antiplatelets to be held, then with stuttering TIA symptoms. Repeat MRI showed progression to acute stoke.       #Acute stroke of the anterolateral right occipital lobe  ---Presented with acute slurred speech, left facial droop, and word-finding difficulty. ---Initial symptoms resolved in less than 1-2 hours, then multiple spells of recurrent symptoms during hosptal stay  ---admit CTA head and neck without acute changes.   -Initial MRI negative- repeat MRI 3/19 shows new acute to subacute infarct in the right anterolateral occipital lobe, which corresponds to her fluent aphasia symptoms  -bp have been high  - now work on bp control more as 48 hours from dx  -PT/OT rec TCU   --SLP.following  -Tele monitoring- has known A fib with LAAO in place  -Echo normal LV function.  - Per neurology recommendation when reconsulted 3/19 plan was ASA and plavix for 90 days, then  mg daily.   ---Note patient developed rectal bleeding and nose bleed on 3/17. Aspirin and plavix held 3/17-3/19; bleeding has stopped.small nosebleed today and neuro still rec DAPT  - Continue lipitor 40mg, increased from home Crestor 10mg, LDL 88  - EEG repeated - initially showed generalized slowing consistent with nonspecific encephalopathy.  -A1c 4.6    #Acute metabolic encephalopathy  -improved  --likely related to stroke  -developed more 3/19  -- Neuro recommending formal cognitive testing but slums prior to discharge  --TSH normal    Epistaxis  ---had on 3/17 and today 3/19  ---does stop easily with clamp and no  evidence ABL anemia  --discussed wit neuro and would still recommend DAPT     #bleeding hemorrhoids:  --bright red blood on her bed sheet 3/17. ---Physical exam showed external hemorrhoid with erythema and swelling.   - miralax to prevent straining; decrease dose to every other day now  -daily Hgb- stable; no evidence acute blood loss anemia     # Mantle cell lymphoma  Intermittent thrombocytopenia  Normocytic anemia   developed multiple sites of bleeding while on dual antiplatelet therapy and subcu Lovenox  -possible platelet dysfunction.    -If unable to tolerate antiplatelet agent, could consider asking oncology to evaluate.  -cautious reintroduction of antiplatelets; on DAPT but not Lovenox  - Monitor hemoglobin, no evidence ABL    #Headache  -chronic problem for her, recurrence on 3/17, now resolved  -CT head repeated and showed no acute intracranial hemorrhage.  -tylenol, oxycodone PRN    #CKD, stage IV  -Creatinine within baseline range (around 1.7)  -improved with IVF    #Hyponatremia  -improved, stable  --suspected mildly hypovolemic   -Gentle IVF since 3/19 - now stop today    #Chronic atrial fibrillation with RVR: s/p LAAO  ---on tele with HR itermittently elevated.  - holding diltiazem as started metoprolol this admit - cosider discharge with metoprolol     #Essential hypertension:  ---bp up  ---s/p permissive HTN  given  stroke  - Metoprolol started here   --increase toprol today  -holding home diltiazem (consider discharge with metoprolol and OFF diltiazem)  ---two doses lasix today  - Hydralazine and labetalol prn     #Hypothyroidism: Continue PTA levothyroxine.  Normal tsh     #Gastric ulcer: as shown by EGD on 2/1/24  - Per GI, sucralfate tablets 1 gram PO BID indefinitely.   - Continue PPI bid     #Mood, home Zoloft    Called daughter Dana 3/18 and explained need for TCU        Diet: Regular Diet Adult    DVT Prophylaxis: Pneumatic Compression Devices  Boucher Catheter: Not present  Lines: None    "  Cardiac Monitoring: ACTIVE order. Indication: Stroke, acute (48 hours)  Code Status: Full Code      Clinically Significant Risk Factors                  # Hypertension: Noted on problem list        # Overweight: Estimated body mass index is 27.91 kg/m  as calculated from the following:    Height as of this encounter: 1.575 m (5' 2\").    Weight as of this encounter: 69.2 kg (152 lb 9.6 oz)., PRESENT ON ADMISSION       # Financial/Environmental Concerns: none         Disposition Plan      Expected Discharge Date: 03/21/2024        Discharge Comments: acute stroke            Krys Bassett MD  Hospitalist Service  [facility]  Securely message with [facility] (more info)  Text page via [employer] Paging/Directory   ______________________________________________________________________    Interval History   ---loose stools overnight  ---offf and on oxygen  ---mild confusion  ---had nosebleed today  ---wants to go home but did discuss TCU with her    Physical Exam   Vital Signs: Temp: 97.6  F (36.4  C) Temp src: Oral BP: (!) 165/99 (martine recheck) Pulse: 107   Resp: 20 SpO2: 91 % O2 Device: Nasal cannula Oxygen Delivery: 2 LPM  Weight: 152 lbs 9.6 oz    General Appearance: Pleasant NAD, more awake and conversational today  Respiratory: CTA-B  Cardiovascular: irr irr, no murmers rubs or gallops  GI: soft, nontender  Skin: no significant LE edema  Other:  strength equal, participating ok with conversations; no focal deficts appreciated but appears to have slow speed to answer questions     Medical Decision Making             Data         Imaging results reviewed over the past 24 hrs:   No results found for this or any previous visit (from the past 24 hour(s)).  "

## 2024-03-21 NOTE — PROGRESS NOTES
[facility] Neurology Progress Note    Shaila Triana MRN# [id number]   Age: 78 year old YOB: 1945          Assessment and Plan:   Assessment:  Subacute right occipital lobe infarction    The patient was taken off of antiplatelet agents during admission for issues of bleeding, but may have a had a new infarction during that time (it is unclear if the infarct truly in acute in my opinion). She has had a clearing of her mentation in just the last 24 hours, and I do not feel her new infarct is symptomatic.  Hopefully she can tolerate DAPT without issues of bleeding moving forward, but if not, then ASA monotherapy could be done but I suspect she would have an elevated stroke risk.      Plan:  DAPT for 90 days, then  mg as monotherapy  Set up SLP or OT evaluation for speech and cognitive screening (SLUMS, MoCA, and/or CPT) upon discharge     SKYLER Cuevas D.O.  [facility] Neurology  Pager: [phone]    Total time today (52 min) in this patient encounter was spent on pre-charting, counseling and/or coordination of care.       History of Presenting Symptoms:   79 y/o female with recurrent TIA symptoms, presenting 3/14/2024 with aphasia/confusion in setting of prior history of HTN, HLD, A-fib (watchman), Breast cancer s/p lumpectomy. Initial CTA/CT were negative. Initial MRI brain was not revealing. Etiology thought to be TIA. Echocardiogram showed LA enlargement. Initial recommendation were for DAPT for 90 days followed by ASA monotherapy. Reoccuring speech difficulties 3/18 and 3/19 during hypoxia led to repeat CT which was normal. EEG showed generalized slowing. Rectal bleeding developed 3/17/2024 so DVT prophylaxis along with DAPT was stopped. Worsened speech issues 3/20/2024 led to a new MRI brain 3/20/2024, which showed right occipital infarction (acute to subacute). ASA 81 mg was restarted.     Interval History since last visit   The patient seems alert today. She has no  "complaints of vision impairment, and feels near her baseline. She confirms that just this summer she was driving, managing her finances, managing her medications, and making all her appointments. She feels that since the winter of 2023, she has had a generalized worsening of her overall health and required more help on a daily basis.     Physical Exam:   Vitals: BP (!) 174/96 (BP Location: Right arm)   Pulse 93   Temp 97.9  F (36.6  C) (Oral)   Resp 16   Ht 1.575 m (5' 2\")   Wt 69.2 kg (152 lb 9.6 oz)   LMP  (LMP Unknown)   SpO2 92%   BMI 27.91 kg/m     General: Seated comfortably in no acute distress.  HEENT: Neck supple with normal range of motion.   Skin: No rashes  Neurologic:     Mental Status: Fully alert, attentive and oriented. Speech clear and fluent, no paraphasic errors. Spells WORLD backward easily. 1.25 is noted to have 5 quarters. Repeats sentences without errors today. Follows 3 step commands.      Cranial Nerves: Visual fields intact. PERRL. EOMI with normal smooth pursuit. Facial sensation intact/symmetric. Facial movements symmetric. Hearing not formally tested but intact to conversation.      Motor: No tremors or other abnormal movements observed. Muscle tone normal throughout. Strength 5/5 throughout upper and lower extremities.     Deep Tendon Reflexes: 2+/symmetric throughout upper and lower extremities.      Sensory: Vibration and pinprick present b/l and symmetrically in both upper and lower extremities today. Negative Romberg.      Coordination: Finger-nose-finger without dysmetria. Rapid alternating movements intact/symmetric with normal speed and rhythm.     Gait: Stands easily from bed, Marches in place.      Data: Pertinent since last visit   Imaging:  MRI brain; 3/19/2024:  IMPRESSION:  1.  Tiny focus of acute to subacute infarction in the anterolateral right occipital lobe, new from the prior exam.  2.  No superimposed acute intracranial process.  3.  Mild age-related changes " "as above    Procedures:  EEG: 3/20/2024:  \"IMPRESSION: This EEG study is abnormal due to diffuse slowing, consistent with moderate encephalopathy of non-specific etiology. No interictal epileptiform discharges, no electrographic or clinical seizures, and no paroxysmal behavioral events are seen during the study. \"    "

## 2024-03-21 NOTE — PROGRESS NOTES
"Care Management Follow Up    Length of Stay (days): 3    Expected Discharge Date: 03/21/2024     Concerns to be Addressed:  discharge planning      Patient plan of care discussed at interdisciplinary rounds: Yes    Anticipated Discharge Disposition: Home     Anticipated Discharge Services:    Education Provided on the Discharge Plan:  Per Care Team   Patient/Family in Agreement with the Plan:  Yes    Referrals Placed by CM/SW:    Private pay costs discussed: Not applicable    Additional Information:  Chart reviewed.     Cm updates:  Therapy recs TCU. Patient is refusing TCU at this time, would rather go home with home care.       Pt has a couple stairs to get into home, and then really only goes to basement for laundry , which family would need to assist with. Along with transport and medication set up.         RNCM called and left VM for son Jaun to see if they can provide 24/7 assistance at home or not. Awaiting call back.       RNCM tried to leave VM for daughter Dana Matta was unable to leave VM .        Social hx:  \"Lives w/son Jaun, SO Dirk and dwayne Boothe. Independent at baseline w/WC and no svcs, CM to follow for discharge needs. Family can transport.  \"           Cm will continue to follow plan of care,review recommendations, and assist with any discharge needs anticipated.        Anabel Hudson RN    "

## 2024-03-21 NOTE — PLAN OF CARE
Problem: Adult Inpatient Plan of Care  Goal: Plan of Care Review  Description: The Plan of Care Review/Shift note should be completed every shift.  The Outcome Evaluation is a brief statement about your assessment that the patient is improving, declining, or no change.  This information will be displayed automatically on your shift  note.  Outcome: Progressing     Problem: Stroke, Ischemic (Includes Transient Ischemic Attack)  Goal: Optimal Coping  Outcome: Progressing   Goal Outcome Evaluation:         Patient is scoring 2-3 on NIH. Patient had a bloody nose for 2 hours today. Staff ended up obtaining a blue clip that is specific to nose bleeds. There are extra in the room in case it is needed again.

## 2024-03-21 NOTE — PLAN OF CARE
"  Problem: Adult Inpatient Plan of Care  Goal: Plan of Care Review  Description: The Plan of Care Review/Shift note should be completed every shift.  The Outcome Evaluation is a brief statement about your assessment that the patient is improving, declining, or no change.  This information will be displayed automatically on your shift  note.  Outcome: Progressing   Goal Outcome Evaluation:    A&O x1 - disoriented to place, time and situation. 2L O2 but would occasionally take off NC. Denies pain during this time. Continues to be on tele = afib. NIH = 3. Has been having some loose watery stools overnight, no signs of rectal bleeding noted. Pleasant and redirectable. No new concerns noted during this time, care plan is ongoing.     BP (!) 174/96 (BP Location: Right arm)   Pulse 93   Temp 97.9  F (36.6  C) (Oral)   Resp 16   Ht 1.575 m (5' 2\")   Wt 69.2 kg (152 lb 9.6 oz)   LMP  (LMP Unknown)   SpO2 92%   BMI 27.91 kg/m          "

## 2024-03-22 NOTE — PROGRESS NOTES
While this RN was administering an afternoon medication, pt started to aggressively blow and put her fingers deep in to both nostrils.  This RN reminded pt to only blow her nose lightly and to not put anything in her nose, as this could be contributing or causation of her nose bleeds.  Pt reported to understand. Yoon Jiménez RN

## 2024-03-22 NOTE — PLAN OF CARE
Problem: Comorbidity Management  Goal: Blood Pressure in Desired Range  Outcome: Not Progressing  Intervention: Maintain Blood Pressure Management  Recent Flowsheet Documentation  Taken 3/22/2024 0405 by Oneal Johnson RN  Medication Review/Management: medications reviewed  Taken 3/22/2024 0000 by Oneal Johnson RN  Medication Review/Management: medications reviewed  Taken 3/21/2024 2003 by Oneal Johnson RN  Medication Review/Management: medications reviewed     Problem: Risk for Delirium  Goal: Improved Sleep  Outcome: Not Progressing   Goal Outcome Evaluation:  A&Ox4. Denies pain. Telemetry Atrial fibrillation. NIH score(2-1-2) for sensory loss, aphasia, and facial droop. Pt had epistaxis of the right nostril overnight. One time dose of afrin was ordered and given and effective, along with nose clamp. BP elevated overnight (view flowsheets). Dr. Lakhani ordered to give PRN labetalol 20 mg with minimal effect. Ordered to give 20 mg hydralazine. Last /93. Pt did not sleep much overnight.

## 2024-03-22 NOTE — PLAN OF CARE
Physical Therapy Discharge Summary    Reason for therapy discharge:    Discharged to home with home therapy.    Progress towards therapy goal(s). See goals on Care Plan in [facility] electronic health record for goal details.  Goals partially met.  Barriers to achieving goals:   discharge from facility.    Therapy recommendation(s):    Continued therapy is recommended.  Rationale/Recommendations:  Continue with family assist and home PT as pt declines TCU and is progressing towards goals.

## 2024-03-22 NOTE — PLAN OF CARE
Occupational Therapy Discharge Summary    Reason for therapy discharge:    Discharged to home with home therapy.  TCU was recommended   Progress towards therapy goal(s). See goals on Care Plan in [facility] electronic health record for goal details.  Goals partially met.  Barriers to achieving goals:   discharge from facility.    Therapy recommendation(s):    Continued therapy is recommended.  Rationale/Recommendations:  decreased ADLs.    Goal Outcome Evaluation:         Mary Reyes, OTR/EVA  3/22/2024

## 2024-03-22 NOTE — PROGRESS NOTES
[facility]    PROGRESS NOTE - Hospitalist Service    ASSESSMENT AND PLAN     Principal Problem:    TIA (transient ischemic attack)  Active Problems:    Depressive disorder    Essential hypertension    Subclinical hypothyroidism    Invasive ductal carcinoma of breast (H)    Chronic atrial fibrillation (H)    Pure hypercholesterolemia    Sleep apnea    Anemia    CKD (chronic kidney disease) stage 4, GFR 15-29 ml/min (H)    Chronic diastolic congestive heart failure (H)    Secondary hypertension    Speech disturbance, unspecified type    Thrombocytopenia (H24)    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)    Presence of Watchman left atrial appendage closure device    Shaila Triana is a 78 year old female with hx breast CA, HFpEF, CKD4, PAUL, Afib s/p LAAO device, mantle cell lymphoma, possilble MCI who was admitted for stroke-like symptoms. Initially MRI showed no stroke. Hospital course complicated by nose bleeding and rectal bleeding requiring antiplatelets to be held, then with stuttering TIA symptoms. Repeat MRI showed progression to acute stoke.        #Acute stroke of the anterolateral right occipital lobe  ---Presented with acute slurred speech, left facial droop, and word-finding difficulty. ---Initial symptoms resolved in less than 1-2 hours, then multiple spells of recurrent symptoms during hosptal stay  ---admit CTA head and neck without acute changes.   -Initial MRI negative- repeat MRI 3/19 shows new acute to subacute infarct in the right anterolateral occipital lobe, which corresponds to her fluent aphasia symptoms  -PT/OT rec TCU- referrals sent 3/22   --SLP.following  -Tele monitoring- has known A fib with LAAO in place  -Echo normal LV function.  - Per neurology recommendation when reconsulted 3/19 plan was ASA and plavix for 90 days, then  mg daily.   ---Note patient developed rectal bleeding and nose bleed on 3/17. Aspirin and plavix held 3/17-3/19;  bleeding has stopped.small nosebleed today and neuro still rec DAPT  - Continue lipitor 40mg, increased from home Crestor 10mg, LDL 88  - EEG repeated - initially showed generalized slowing consistent with nonspecific encephalopathy.  -A1c 4.6     #Acute metabolic encephalopathy  -improved  --likely related to stroke  -developed more 3/19  -- Neuro recommending formal cognitive testing. slums prior to discharge  --TSH normal     Epistaxis  ---had on 3/17 and 3/19  ---does stop easily with clamp and no evidence ABL anemia  --discussed wit neuro and would still recommend DAPT     #bleeding hemorrhoids:  --bright red blood on her bed sheet 3/17. ---Physical exam showed external hemorrhoid with erythema and swelling.   - miralax to prevent straining; decrease dose to every other day now  -daily Hgb- stable; no evidence acute blood loss anemia     # Mantle cell lymphoma  Intermittent thrombocytopenia  Normocytic anemia   developed multiple sites of bleeding while on dual antiplatelet therapy and subcu Lovenox  -possible platelet dysfunction.    -If unable to tolerate antiplatelet agent, could consider asking oncology to evaluate.  -cautious reintroduction of antiplatelets; on DAPT but not Lovenox  - Monitor hemoglobin, no evidence ABL     #Headache  -chronic problem for her, recurrence on 3/17, now resolved  -CT head repeated and showed no acute intracranial hemorrhage.  -tylenol, oxycodone PRN     #CKD, stage IV  -Creatinine within baseline range (around 1.7)  -improved with IVF     #Hyponatremia  -improved with IVF      #Chronic atrial fibrillation with RVR: s/p LAAO  ---on tele with HR itermittently elevated.  - Discontinued diltiazem as initiated metoprolol- titrating up      #Essential hypertension:  - Metoprolol started here. Titrating up. Added losartan 3/22   -holding home diltiazem (consider discharge with metoprolol and OFF diltiazem)  ---two doses lasix 3/21  - Hydralazine and labetalol prn      #Hypothyroidism: Continue PTA levothyroxine.  Normal tsh     #Gastric ulcer: as shown by EGD on 2/1/24  - Per GI, sucralfate tablets 1 gram PO BID indefinitely.   - Continue PPI bid     #Mood, home Zoloft    Barriers to discharge: TCU placement    Anticipated length of stay: 1 more day      Present on Admission:   TIA (transient ischemic attack)   Essential hypertension   Depressive disorder   Subclinical hypothyroidism   Chronic atrial fibrillation (H)   Pure hypercholesterolemia   Sleep apnea   CKD (chronic kidney disease) stage 4, GFR 15-29 ml/min (H)   Chronic diastolic congestive heart failure (H)   Thrombocytopenia (H24)   Invasive ductal carcinoma of breast (H)   Presence of Watchman left atrial appendage closure device   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)   Anemia   Secondary hypertension   Speech disturbance, unspecified type      Subjective:  Patient resting comfortably in bed.  Grandson at bedside.  Patient notes that she is too debilitated people at home who could not help her if she needed help.  Agreeable to TCU.    PHYSICAL EXAM  Temp:  [97.5  F (36.4  C)-98  F (36.7  C)] 97.5  F (36.4  C)  Pulse:  [] 83  Resp:  [16-20] 16  BP: (138-209)/() 154/108  SpO2:  [90 %-96 %] 96 %  Wt Readings from Last 1 Encounters:   03/20/24 69.2 kg (152 lb 9.6 oz)       Intake/Output Summary (Last 24 hours) at 3/22/2024 1240  Last data filed at 3/21/2024 1800  Gross per 24 hour   Intake 360 ml   Output --   Net 360 ml      Body mass index is 27.91 kg/m .    GENRL: Alert and answering questions appropriately. Not in acute distress. Sitting in bed   CHEST: Breathing easily   EXTRM: No pedal edema  NEURO: Following commands and moving extremities Normal mentation  PSYCH: Normal affect and mood.   INTGM: No skin rash    Medical Decision Making       35 MINUTES SPENT BY ME on the date of service doing chart review, history, exam, documentation & further activities per the note.       PERTINENT LABS/IMAGING:  Results for orders placed or performed during the hospital encounter of 03/14/24   CTA Head Neck with Contrast    Impression    IMPRESSION:   HEAD CT:  1.  No CT evidence for acute intracranial process.  2.  Brain atrophy and presumed chronic microvascular ischemic changes as above.    HEAD CTA:   1.  No significant stenosis, aneurysm, or high flow vascular malformation identified.    NECK CTA:  1.  Atherosclerotic changes of the carotid bifurcations without hemodynamically significant stenosis in the neck vessels.   2.  No evidence for dissection.  3.  Borderline size a mildly enlarged cervical lymph nodes potentially related to patient's lymphoma diagnosis. These are largely similar to a PET/CT from 08/28/2023.    Preliminary head CT and CTA findings were called to Dr. Camarena at 3/14/2024 8:09 PM CDT by Dr. LOUIS العلي.   MR Brain w/o & w Contrast    Impression    IMPRESSION:  1.  No acute intracranial process.  2.  Generalized brain atrophy and presumed microvascular ischemic changes as detailed above.   CT Head w/o Contrast    Impression    IMPRESSION:  1.  Mildly limited study due to patient motion.  2.  No CT evidence for acute intracranial process.  3.  Brain atrophy and presumed chronic microvascular ischemic changes as above.       CT Head w/o Contrast    Impression    IMPRESSION:  1.  No CT evidence for acute intracranial process.  2.  Brain atrophy and presumed chronic microvascular ischemic changes as above.   MR Brain w/o & w Contrast    Impression    IMPRESSION:  1.  Tiny focus of acute to subacute infarction in the anterolateral right occipital lobe, new from the prior exam.  2.  No superimposed acute intracranial process.  3.  Mild age-related changes as above.     Most Recent 3 CBC's:  Recent Labs   Lab Test 03/22/24  0659 03/21/24  0829 03/20/24  0605 03/19/24  0652 03/17/24  1735 03/15/24  1009   WBC  --   --  4.9 5.3  --  4.4   HGB 8.9* 9.2* 8.7* 9.0*   < > 9.1*   MCV  --  "  --  88 90  --  90   PLT  --  155 138* 153   < > 119*    < > = values in this interval not displayed.     Most Recent 3 BMP's:  Recent Labs   Lab Test 03/22/24  1159 03/22/24  0726 03/21/24  1801 03/21/24  0829 03/20/24  0751 03/20/24  0605 03/19/24  1604 03/19/24  0652   NA  --   --   --  131*  --  132*  --  129*   POTASSIUM  --   --   --  3.6  --  4.0  --  4.2   CHLORIDE  --   --   --  99  --  100  --  97*   CO2  --   --   --  25  --  26  --  26   BUN  --   --   --  16.9  --  19.7  --  21.0   CR  --   --   --  1.57*  --  1.76*  --  1.96*   ANIONGAP  --   --   --  7  --  6*  --  6*   ANIKA  --   --   --  8.2*  --  8.2*  --  8.2*   * 101* 103* 128*   < > 93   < > 95    < > = values in this interval not displayed.     Most Recent 2 LFT's:  Recent Labs   Lab Test 02/01/24 0606 01/30/24 1945   AST 27 14   ALT 7 7   ALKPHOS 56 58   BILITOTAL 1.2 0.3       Recent Labs   Lab Test 03/15/24  1009   CHOL 147   HDL 40*   LDL 88   TRIG 97     Recent Labs   Lab Test 03/15/24  1009   LDL 88     Recent Labs   Lab Test 03/22/24  1159 03/21/24  1801 03/21/24  0829   NA  --   --  131*   POTASSIUM  --   --  3.6   CHLORIDE  --   --  99   CO2  --   --  25   *   < > 128*   BUN  --   --  16.9   CR  --   --  1.57*   GFRESTIMATED  --   --  33*   ANIKA  --   --  8.2*    < > = values in this interval not displayed.     Recent Labs   Lab Test 03/14/24 1959   A1C 4.6     Recent Labs   Lab Test 03/22/24  0659 03/21/24  0829 03/20/24  0605   HGB 8.9* 9.2* 8.7*     No results for input(s): \"TROPONINI\" in the last 80485 hours.  Recent Labs   Lab Test 01/15/24  1757 12/25/23  1308 12/29/22  1810   NTBNPI 4,476* 9,070* 15,299*     Recent Labs   Lab Test 03/19/24  0652   TSH 1.97     Recent Labs   Lab Test 03/14/24  1959 02/01/24  0606   INR 1.05 1.14       Peri Sanders, DO  Hospitalist Service  [facility]      "

## 2024-03-22 NOTE — PROGRESS NOTES
"Pt's son Jaun has been reached:  Jaun states that S/O Dirk left to  pt \"30 minutes ago\" (at 1730).  Confirmed that pt is still to be picked up by S/O.  Yoon Jiménez RN   "

## 2024-03-22 NOTE — PROGRESS NOTES
Care Management Follow Up    Length of Stay (days): 4    Expected Discharge Date: 03/22/2024     Concerns to be Addressed:   discharge planning   Patient plan of care discussed at interdisciplinary rounds: Yes    Anticipated Discharge Disposition: Home     Anticipated Discharge Services:    Education Provided on the Discharge Plan:  Per Care Team   Patient/Family in Agreement with the Plan:  Yes    Referrals Placed by CM/SW:    Private pay costs discussed: Not applicable    Additional Information:  Chart reviewed.     CM updates:  Therapy recs TCU, pt would rather go home with home care. Therapy suggests 24/7 support if pt was to return home. Pt says that someone can be with her 24/7. She gave writer permission to call her son Jaun to ask him what he thinks since she lives with him.       Pt's son Jaun called writer back once writer had left the office yesterday around 4:30pm, he left VM but unclear what he was saying.       RNCM called son Jaun back this AM and left another VM. Awaiting call back. Per Grandson in pts room that family that lives in pts home have health conditions themselves and may have a hard time taking care of pt.       RNCM attempted to call daughter Dana again this AM , but was not able to leave a message, writer will try again later this AM.       10:03am- Spoke to pt's grandson and pt in room and pt agreeable to writer sending two TCU referrals and if they decline pt says she would rather go home with home care.         RNCM sent referrals for [facility] and [facility] (pending).         [facility] can accept pt tomorrow, [facility] cannot they do not accept [facility].         11:51am- RNCM spoke with pt. RNCM sat in the room for about 30 mins trying to convince pt TCU would be a good idea, and she is now refusing to go to TCU. Pt is agreeable to home with home care, writer sent a home care referral for home PT/OT/RN/SW/[facility]. Pending.         [facility] accepted  "patient for home PT/OT/RN/HHA/ SW.         Pt states her S/O will pick her up at discharge.     Social hx:  \"Lives w/son Jaun, BRONSON Jay and dwayne Boothe. Independent at baseline w/WC and no svcs, CM to follow for discharge needs. Family can transport.  \"           Cm will continue to follow plan of care,review recommendations, and assist with any discharge needs anticipated.      Anabel Hudson RN    Care Management Discharge Note    Discharge Date: 03/22/2024       Discharge Disposition: Home Care      Discharge Transportation: family or friend will provide    Education Provided on the Discharge Plan:  Per Care Team   Persons Notified of Discharge Plans: Yes    Patient/Family in Agreement with the Plan:      Handoff Referral Completed: Yes    Additional Information:    Final discharge plan is for pt to return to son Jaun's home with Op follow up. [facility] accepted pt for home PT/OT/RN/HHA/ SW services. Pt is working on calling a family member to pick pt up.       Anabel Hudson RN          "

## 2024-03-22 NOTE — PROGRESS NOTES
Message left on S/O Dirk's cell phone on record requesting an estimated time of arrival for pt  as pt was expecting him over 1 hour ago.  Yoon Jiménez RN

## 2024-03-22 NOTE — PLAN OF CARE
"Goal Outcome Evaluation:        Problem: Stroke, Ischemic (Includes Transient Ischemic Attack)  Goal: Improved Communication Skills  Intervention: Optimize Communication Skills  Recent Flowsheet Documentation  Taken 3/22/2024 1300 by Peri Jiménez RN  Communication Enhancement Strategies:   call light answered in person   extra time allowed for response   nonverbal strategies used   one-step directions provided   repetition utilized   verbal communication attempts encouraged  Taken 3/22/2024 0845 by Peri Jiménez RN  Communication Enhancement Strategies:   call light answered in person   extra time allowed for response   nonverbal strategies used   one-step directions provided   repetition utilized   verbal communication attempts encouraged     Problem: Comorbidity Management  Goal: Blood Pressure in Desired Range  Outcome: Not Progressing  Intervention: Maintain Blood Pressure Management  Recent Flowsheet Documentation  Taken 3/22/2024 1300 by Peri Jiménez RN  Medication Review/Management: medications reviewed  Taken 3/22/2024 0845 by Peri Jiménez RN  Medication Review/Management: medications reviewed          BP (!) 168/103 (BP Location: Left arm, Patient Position: Sitting, Cuff Size: Adult Regular)   Pulse 83   Temp 98  F (36.7  C) (Oral)   Resp 18   Ht 1.575 m (5' 2\")   Wt 69.2 kg (152 lb 9.6 oz)   LMP  (LMP Unknown)   SpO2 96%   BMI 27.91 kg/m    Pt has received discharge orders and is planning on discharging to home after rejecting recommended TCU she was accepted to.  Patient states that her S/O will be arriving to pick her up \"any time\". At 1500 pt reported that S/O was on the way to [facility].  As of 1700 pt is in her room with her belongings packed.  Yoon Jiménez RN                  "

## 2024-03-22 NOTE — PLAN OF CARE
"Goal Outcome Evaluation:  Short shift [id number]     BP (!) 171/96 (BP Location: Left arm)   Pulse 105   Temp 98  F (36.7  C) (Oral)   Resp 20   Ht 1.575 m (5' 2\")   Wt 69.2 kg (152 lb 9.6 oz)   LMP  (LMP Unknown)   SpO2 90%   BMI 27.91 kg/m        High parameters for BP ( >220 for hydralazine) Pt has been without nosebleed on this afternoon shift.  Pt verbalized her wish to discharge to home and missing her boyfriend.  NIHSS score continues for word finding / slight slur to speech. A-fib on telemetry.  Ate well, hydrating well.  Up to toilet and passing gas, good amount of urine Qhr.         Problem: Stroke, Ischemic (Includes Transient Ischemic Attack)  Goal: Optimal Coping  Outcome: Progressing  Intervention: Support Psychosocial Response to Stroke  Recent Flowsheet Documentation  Taken 3/21/2024 1506 by Peri Jiménez RN  Supportive Measures:   active listening utilized   verbalization of feelings encouraged   self-care encouraged   positive reinforcement provided      Problem: Comorbidity Management  Goal: Blood Pressure in Desired Range  Intervention: Maintain Blood Pressure Management  Recent Flowsheet Documentation  Taken 3/21/2024 1506 by Peri Jiménez RN  Medication Review/Management: medications reviewed   Hydralazine PRN >220 Pressures are higher at 171/96    Pt is in bed appearing comfortable for bedside shift change.  Yoon Jiménez RN         "

## 2024-03-22 NOTE — PROGRESS NOTES
"Significant other, Pierre, has arrived to  pt: Upon his arrival to pt's room, pt reported to feel sick to her stomach and went to the bathroom, dry heaving for approximately 2 minutes.  Nursing Assistant was standing by in the bathroom as Pierre began to yell at this RN questioning where an additional part of her walking boot was.  This RN relayed that physical therapy had applied the boot during PT in pt's room and that the boot appeared to be intact.  Pierre continued to yell.  This RN instructed Pierre to lower his voice, to not yell and that I would assist in problem solving the walking boot issue.  Pierre went in to the bathroom with Nursing Assistant and pt and began to take the boot off, repositioning removable parts.  Pierre was heard saying \"I can yell, right Shaila, I can really really yell, right Shaila\".  Pt did not reply. Charge RN called security to stand by for safety.  This RN and security followed pt being wheeled out of [facility] in her own wheel chair quickly by Pierre.  Security stated that they would watch as pt was transferred in to Pierre's vehicle.  This RN approached pt with security and asked pt if she felt safe in Pierre's care, and let her know that she had options to discharge differently if she felt unsafe.  Pt replied \"he gets mean, but he does not hit me and they need me at home\".  Patient's discharge medications were sent to P1, patient was given her discharge medications directly.  Yoon Jiménez RN    "

## 2024-03-25 NOTE — DISCHARGE SUMMARY
"[facility]  Hospitalist Discharge Summary      Date of Admission:  3/14/2024  Date of Discharge:  3/22/2024  6:20 PM  Discharging Provider: Peri Sanders DO  Discharge Service: Hospitalist Service    Discharge Diagnoses   Acute CVA   Acute metabolic encephalopathy   Epistaxis   Bleeding hemorrhoids     Clinically Significant Risk Factors     # Overweight: Estimated body mass index is 27.91 kg/m  as calculated from the following:    Height as of this encounter: 1.575 m (5' 2\").    Weight as of this encounter: 69.2 kg (152 lb 9.6 oz).       Follow-ups Needed After Discharge   Follow-up Appointments     Follow-up and recommended labs and tests       Follow up with primary care provider, Mervin Ibarra, within 3-4 days.    Follow up new blood pressure medications.   Follow up for neurocognitive testing            Unresulted Labs Ordered in the Past 30 Days of this Admission       No orders found from 2/13/2024 to 3/15/2024.            Discharge Disposition   Discharged to home  Condition at discharge: Stable    Hospital Course   Shaila Triana is a 78 year old female with hx breast CA, HFpEF, CKD4, PAUL, Afib s/p LAAO device, mantle cell lymphoma, possilble MCI who was admitted for stroke-like symptoms. Initially MRI showed no stroke. Hospital course complicated by nose bleeding and rectal bleeding requiring antiplatelets to be held, then with stuttering TIA symptoms. Repeat MRI showed progression to acute stoke.        #Acute stroke of the anterolateral right occipital lobe  ---Presented with acute slurred speech, left facial droop, and word-finding difficulty. ---Initial symptoms resolved in less than 1-2 hours, then multiple spells of recurrent symptoms during hosptal stay  ---admit CTA head and neck without acute changes.   -Initial MRI negative- repeat MRI 3/19 shows new acute to subacute infarct in the right anterolateral occipital lobe, which corresponds to her fluent aphasia " symptoms  -PT/OT rec TCU- referrals sent 3/22   --SLP.following  -Tele monitoring- has known A fib with LAAO in place  -Echo normal LV function.  - Per neurology recommendation when reconsulted 3/19 plan was ASA and plavix for 90 days, then  mg daily.   ---Note patient developed rectal bleeding and nose bleed on 3/17. Aspirin and plavix held 3/17-3/19; bleeding has stopped.small nosebleed today and neuro still rec DAPT  - Continue lipitor 40mg, increased from home Crestor 10mg, LDL 88  - EEG repeated - initially showed generalized slowing consistent with nonspecific encephalopathy.  -A1c 4.6     #Acute metabolic encephalopathy  -improved  --likely related to stroke  -developed more 3/19  -- Neuro recommending formal cognitive testing. slums prior to discharge  --TSH normal     Epistaxis  ---had on 3/17 and 3/19  ---does stop easily with clamp and no evidence ABL anemia  --discussed wit neuro and would still recommend DAPT     #bleeding hemorrhoids:  --bright red blood on her bed sheet 3/17. ---Physical exam showed external hemorrhoid with erythema and swelling.   - miralax to prevent straining; decrease dose to every other day now  -daily Hgb- stable; no evidence acute blood loss anemia     # Mantle cell lymphoma  Intermittent thrombocytopenia  Normocytic anemia   developed multiple sites of bleeding while on dual antiplatelet therapy and subcu Lovenox  -possible platelet dysfunction.    -If unable to tolerate antiplatelet agent, could consider asking oncology to evaluate.  -cautious reintroduction of antiplatelets; on DAPT but not Lovenox  - Monitor hemoglobin, no evidence ABL     #Headache  -chronic problem for her, recurrence on 3/17, now resolved  -CT head repeated and showed no acute intracranial hemorrhage.  -tylenol, oxycodone PRN     #CKD, stage IV  -Creatinine within baseline range (around 1.7)  -improved with IVF     #Hyponatremia  -improved with IVF      #Chronic atrial fibrillation with RVR: s/p  LAAO  ---on tele with HR itermittently elevated.  - Discontinued diltiazem as initiated metoprolol- titrating up       #Essential hypertension:  - Metoprolol started here. Titrating up. Added losartan 3/22   -holding home diltiazem (consider discharge with metoprolol and OFF diltiazem)  ---two doses lasix 3/21  - Hydralazine and labetalol prn     #Hypothyroidism: Continue PTA levothyroxine.  Normal tsh     #Gastric ulcer: as shown by EGD on 2/1/24  - Per GI, sucralfate tablets 1 gram PO BID indefinitely.   - Continue PPI bid     #Mood, home Zoloft    Consultations This Hospital Stay   NEUROLOGY IP STROKE CONSULT  CARE MANAGEMENT / SOCIAL WORK IP CONSULT  PHYSICAL THERAPY ADULT IP CONSULT  OCCUPATIONAL THERAPY ADULT IP CONSULT  SPEECH LANGUAGE PATH ADULT IP CONSULT  NEUROLOGY IP STROKE CONSULT  PHYSICAL THERAPY ADULT IP CONSULT  PHARMACY IP CONSULT  REHAB ADMISSIONS LIAISON IP CONSULT  CARE MANAGEMENT / SOCIAL WORK IP CONSULT  OCCUPATIONAL THERAPY ADULT IP CONSULT    Code Status   Prior    Time Spent on this Encounter   I, Peri Sanders DO, personally saw the patient today and spent greater than 30 minutes discharging this patient.       Peri Sanders DO  [address] [address]  Phone: [phone]  Fax: [phone]  ______________________________________________________________________    Physical Exam   Vital Signs:                    Weight: 152 lbs 9.6 oz         Primary Care Physician   Mervin Ibarra    Discharge Orders      Home Care Referral      Speech Therapy  Referral      Occupational Therapy  Referral      Reason for your hospital stay    Acute stroke     Follow-up and recommended labs and tests     Follow up with primary care provider, Mervin Ibarra, within 3-4 days.    Follow up new blood pressure medications.   Follow up for neurocognitive testing     Activity    Your activity upon discharge: activity as  tolerated     Diet    Follow this diet upon discharge: Orders Placed This Encounter      Regular Diet Adult       Significant Results and Procedures   Most Recent 3 CBC's:  Recent Labs   Lab Test 03/22/24  0659 03/21/24  0829 03/20/24  0605 03/19/24  0652 03/17/24  1735 03/15/24  1009   WBC  --   --  4.9 5.3  --  4.4   HGB 8.9* 9.2* 8.7* 9.0*   < > 9.1*   MCV  --   --  88 90  --  90   PLT  --  155 138* 153   < > 119*    < > = values in this interval not displayed.     Most Recent 3 BMP's:  Recent Labs   Lab Test 03/22/24  1159 03/22/24  0726 03/21/24  1801 03/21/24  0829 03/20/24  0751 03/20/24  0605 03/19/24  1604 03/19/24  0652   NA  --   --   --  131*  --  132*  --  129*   POTASSIUM  --   --   --  3.6  --  4.0  --  4.2   CHLORIDE  --   --   --  99  --  100  --  97*   CO2  --   --   --  25  --  26  --  26   BUN  --   --   --  16.9  --  19.7  --  21.0   CR  --   --   --  1.57*  --  1.76*  --  1.96*   ANIONGAP  --   --   --  7  --  6*  --  6*   ANIKA  --   --   --  8.2*  --  8.2*  --  8.2*   * 101* 103* 128*   < > 93   < > 95    < > = values in this interval not displayed.     Most Recent 2 LFT's:  Recent Labs   Lab Test 02/01/24  0606 01/30/24  1945   AST 27 14   ALT 7 7   ALKPHOS 56 58   BILITOTAL 1.2 0.3   ,   Results for orders placed or performed during the hospital encounter of 03/14/24   CTA Head Neck with Contrast    Narrative    EXAM: CTA HEAD NECK W CONTRAST  LOCATION: [facility]  DATE: 3/14/2024    INDICATION: Code stroke. Acute onset confusion, slurred speech  COMPARISON: CT head 01/15/2024.  CONTRAST: 75 mL Isovue 370  TECHNIQUE: Head and neck CT angiogram with IV contrast. Noncontrast head CT followed by axial helical CT images of the head and neck vessels obtained during the arterial phase of intravenous contrast administration. Axial 2D reconstructed images and   multiplanar 3D MIP reconstructed images of the head and neck vessels were performed by the technologist. Dose  reduction techniques were used. All stenosis measurements made according to NASCET criteria unless otherwise specified.    FINDINGS:   NONCONTRAST HEAD CT:   INTRACRANIAL CONTENTS: No intracranial hemorrhage, extraaxial collection, or mass effect.  No CT evidence of acute infarct. Mild presumed chronic small vessel ischemic changes. Mild generalized volume loss. No hydrocephalus.     VISUALIZED ORBITS/SINUSES/MASTOIDS: Prior bilateral cataract surgery. Visualized portions of the orbits are otherwise unremarkable. No paranasal sinus mucosal disease. No middle ear or mastoid effusion.    BONES/SOFT TISSUES: No acute abnormality.    HEAD CTA:  ANTERIOR CIRCULATION: Catheter atherosclerotic plaque the carotid siphons bilaterally with minimal associated stenosis. No evidence for proximal large vessel occlusion or flow-limiting stenosis elsewhere. No definite aneurysm or high flow vascular   malformation identified. Standard [place] anatomy.    POSTERIOR CIRCULATION: No stenosis/occlusion, aneurysm, or high flow vascular malformation. Balanced vertebral arteries supply a normal basilar artery.     DURAL VENOUS SINUSES: Not well evaluated on a technical basis.    NECK CTA:  RIGHT CAROTID: Atherosclerotic plaque results in less than 30% stenosis in the right ICA. No dissection.    LEFT CAROTID: Atherosclerotic plaque results in less than 30% stenosis in the left ICA. No dissection.    VERTEBRAL ARTERIES: No focal stenosis or dissection. Balanced vertebral arteries.    AORTIC ARCH: Classic aortic arch anatomy with no significant stenosis at the origin of the great vessels.    NONVASCULAR STRUCTURES: Asymmetric enlargement of the left thyroid lobe. An underlying left thyroid nodule is possible, but incompletely evaluated. Multiple borderline size a mildly enlarged bilateral cervical lymph nodes. A left level IIb lymph node   measures up to 8 x 12 mm for example. The majority of these were likely present on a previous  PET/CT from 08/28/2023      Impression    IMPRESSION:   HEAD CT:  1.  No CT evidence for acute intracranial process.  2.  Brain atrophy and presumed chronic microvascular ischemic changes as above.    HEAD CTA:   1.  No significant stenosis, aneurysm, or high flow vascular malformation identified.    NECK CTA:  1.  Atherosclerotic changes of the carotid bifurcations without hemodynamically significant stenosis in the neck vessels.   2.  No evidence for dissection.  3.  Borderline size a mildly enlarged cervical lymph nodes potentially related to patient's lymphoma diagnosis. These are largely similar to a PET/CT from 08/28/2023.    Preliminary head CT and CTA findings were called to Dr. Camarena at 3/14/2024 8:09 PM CDT by Dr. LOUIS العلي.   MR Brain w/o & w Contrast    Narrative    EXAM: MR BRAIN W/O and W CONTRAST  LOCATION: [facility]  DATE: 3/15/2024    INDICATION: Speech difficulty.  COMPARISON: CTA head and neck 03/14/2024.  CONTRAST: 6.5 mL Gadavist.  TECHNIQUE: Routine multiplanar multisequence head MRI without and with intravenous contrast.    FINDINGS: Image quality is mildly degraded by motion.    INTRACRANIAL CONTENTS: No acute or subacute infarct. No mass, acute hemorrhage, or extra-axial fluid collections. Scattered nonspecific T2/FLAIR hyperintensities within the cerebral white matter most consistent with mild chronic microvascular ischemic   change. Mild generalized cerebral atrophy. No hydrocephalus. Normal position of the cerebellar tonsils. No pathologic contrast enhancement.    SELLA: No abnormality accounting for technique.    OSSEOUS STRUCTURES/SOFT TISSUES: Normal marrow signal. The major intracranial vascular flow voids are maintained.     ORBITS: No abnormality accounting for technique.     SINUSES/MASTOIDS: No paranasal sinus mucosal disease. No middle ear or mastoid effusion.       Impression    IMPRESSION:  1.  No acute intracranial process.  2.  Generalized brain  atrophy and presumed microvascular ischemic changes as detailed above.   CT Head w/o Contrast    Narrative    EXAM: CT HEAD WITHOUT CONTRAST  LOCATION: [facility]  DATE: 03/17/2024    INDICATION: Admitted for TIA. Now has recurrent nose bleed with headache, neck pain. Rule out head bleed.  COMPARISON: Brain MR 03/14/2024.  TECHNIQUE: Routine CT Head without IV contrast. Multiplanar reformats. Dose reduction techniques were used.    FINDINGS:  INTRACRANIAL CONTENTS: Mildly limited by patient motion. No intracranial hemorrhage, extra-axial collection, or mass effect.  No CT evidence of acute infarct. Mild presumed chronic small vessel ischemic changes. Mild generalized volume loss. No   hydrocephalus.     VISUALIZED ORBITS/SINUSES/MASTOIDS: No intraorbital abnormality. No paranasal sinus mucosal disease. No middle ear or mastoid effusion.    BONES/SOFT TISSUES: No acute abnormality.      Impression    IMPRESSION:  1.  Mildly limited study due to patient motion.  2.  No CT evidence for acute intracranial process.  3.  Brain atrophy and presumed chronic microvascular ischemic changes as above.       CT Head w/o Contrast    Narrative    EXAM: CT HEAD W/O CONTRAST  LOCATION: [facility]  DATE: 3/19/2024    INDICATION: Admitted with TIA. Reurrent episode of slurred speech. Evaluate for new intracranial process.  COMPARISON: 03/17/2024 head CT 03/14/2024 MRI brain.  TECHNIQUE: Routine CT Head without IV contrast. Multiplanar reformats. Dose reduction techniques were used.    FINDINGS:  INTRACRANIAL CONTENTS: No intracranial hemorrhage, extraaxial collection, or mass effect.  No CT evidence of acute infarct. Mild presumed chronic small vessel ischemic changes. Mild generalized volume loss. No hydrocephalus.     VISUALIZED ORBITS/SINUSES/MASTOIDS: No intraorbital abnormality. No paranasal sinus mucosal disease. No middle ear or mastoid effusion.    BONES/SOFT TISSUES: No acute  abnormality.      Impression    IMPRESSION:  1.  No CT evidence for acute intracranial process.  2.  Brain atrophy and presumed chronic microvascular ischemic changes as above.   MR Brain w/o & w Contrast    Narrative    EXAM: MR BRAIN W/O and W CONTRAST  LOCATION: [facility]  DATE: 3/19/2024    INDICATION: watchman, stuttering stroke symptoms  COMPARISON: 2024.  CONTRAST: 7ml gadavist  TECHNIQUE: Routine multiplanar multisequence head MRI without and with intravenous contrast.    FINDINGS:  INTRACRANIAL CONTENTS: Mildly motion degraded exam. There is a tiny focus of restriction in the anterior aspect of the lateral right occipital compatible with acute to subacute infarction. No mass, acute hemorrhage, or extra-axial fluid collections.   Scattered nonspecific T2/FLAIR hyperintensities within the cerebral white matter most consistent with mild chronic microvascular ischemic change. Mild to moderate generalized cerebral atrophy. No hydrocephalus. Normal position of the cerebellar tonsils.   No pathologic contrast enhancement.    SELLA: No abnormality accounting for technique.    OSSEOUS STRUCTURES/SOFT TISSUES: Normal marrow signal. The major intracranial vascular flow voids are maintained.     ORBITS: Prior bilateral cataract surgery. Visualized portions of the orbits are otherwise unremarkable.     SINUSES/MASTOIDS: Mild mucosal thickening scattered about the paranasal sinuses. No middle ear or mastoid effusion.       Impression    IMPRESSION:  1.  Tiny focus of acute to subacute infarction in the anterolateral right occipital lobe, new from the prior exam.  2.  No superimposed acute intracranial process.  3.  Mild age-related changes as above.   Echocardiogram Complete    Narrative    [id number]  [id number]  [id number]  [id number]^[facility]^SIRIA^[place] [address]     Name: LUIS VILLANUEVA  MRN: [id number]  : 1945  Study Date:  03/15/2024 06:48 AM  Age: 78 yrs  Gender: Female  Patient Location: [facility]  Reason For Study: TIA  Ordering Physician: SIRIA SCHROEDER  Performed By: CHANTAL     BSA: 1.7 m2  Height: 62 in  Weight: 145 lb  HR: 97  ______________________________________________________________________________  Procedure  Complete Portable Echo Adult. Complete Portable Bubble Echo Adult.  ______________________________________________________________________________  Interpretation Summary     1. Normal left ventricular size and systolic performance with a visually  estimated ejection fraction of 55-60%.  2. There is mild concentric increase in left ventricular wall thickness.  3. There is mild to moderate tricuspid insufficiency.  4. Normal right ventricular size and systolic performance.  5. There is severe left atrial enlargement. There is moderate right atrial  enlargement.  6. Right ventricular systolic pressure relative to right atrial pressure is  moderately increased. The pulmonary artery pressure is estimated to be 60-65  mmHg plus right atrial pressure (the IVC is moderately dilated).  7. Echo contrast examination was performed using agitated NS as contrast  agent. The right heart opacity was good and the left heart was well  visualized. There was no evidence of right to left shunting during spontaneous  respiration or following release of Valsalva.  ______________________________________________________________________________  Left ventricle:  Normal left ventricular size and systolic performance with a visually  estimated ejection fraction of 55-60%. There is normal regional wall motion.  There is mild concentric increase in left ventricular wall thickness.     Assessment of LV Diastolic Function: Patient appears to be in atrial  fibrillation which limits assessment of diastolic filling.     Right ventricle:  Normal right ventricular size and systolic performance.     Left atrium:  There is severe left atrial  enlargement.     Right atrium:  There is moderate right atrial enlargement.     IVC:  The IVC is moderately dilated.     Aortic valve:  The aortic valve is comprised of three cusps. There is mild aortic valve  sclerosis without significant stenosis.     Mitral valve:  The mitral valve appears morphologically normal. There is mild mitral  insufficiency.     Tricuspid valve:  The tricuspid valve is grossly morphologically normal. There is mild to  moderate tricuspid insufficiency.     Pulmonic valve:  The pulmonic valve is grossly morphologically normal. There is mild pulmonic  insufficiency.     Thoracic aorta:  There is borderline enlargement of the proximal ascending aorta.     Pericardium:  There is no significant pericardial effusion.  ______________________________________________________________________________  ______________________________________________________________________________  MMode/2D Measurements & Calculations  IVSd: 1.5 cm  LVIDd: 4.4 cm  LVIDs: 3.5 cm  LVPWd: 1.5 cm  FS: 21.1 %  LV mass(C)d: 256.2 grams  LV mass(C)dI: 153.6 grams/m2  Ao root diam: 2.8 cm  LA dimension: 4.6 cm  asc Aorta Diam: 3.9 cm  LA/Ao: 1.6  LVOT diam: 1.9 cm  LVOT area: 2.8 cm2  Ao root diam index Ht(cm/m): 1.8  Ao root diam index BSA (cm/m2): 1.7  Asc Ao diam index BSA (cm/m2): 2.3  Asc Ao diam index Ht(cm/m): 2.5  LA Volume (BP): 71.4 ml     LA Volume Index (BP): 42.8 ml/m2  LA Volume Indexed (AL/bp): 45.5 ml/m2  RV Base: 5.2 cm  RWT: 0.66     TAPSE: 1.4 cm     Doppler Measurements & Calculations  MV E max risa: 113.0 cm/sec  MV dec slope: 608.7 cm/sec2  MV dec time: 0.19 sec  Ao V2 max: 153.7 cm/sec  Ao max P.0 mmHg  Ao V2 mean: 108.0 cm/sec  Ao mean P.0 mmHg  Ao V2 VTI: 28.7 cm  MICHEL(I,D): 1.5 cm2  MICHEL(V,D): 1.6 cm2  LV V1 max PG: 3.2 mmHg  LV V1 max: 89.4 cm/sec  LV V1 VTI: 15.2 cm  SV(LVOT): 43.2 ml  SI(LVOT): 25.9 ml/m2  PA acc time: 0.08 sec  PI end-d risa: 180.0 cm/sec  TR max risa: 395.0 cm/sec  TR max PG:  62.4 mmHg  AV [place] Ratio (DI): 0.58  MICHEL Index (cm2/m2): 0.90  E/E' av.5  Lateral E/e': 13.8  Medial E/e': 19.3     RV S Moreno: 7.5 cm/sec     ______________________________________________________________________________  Report approved by: Bridgette Bedoya 03/15/2024 11:22 AM             Discharge Medications   Discharge Medication List as of 3/22/2024  4:13 PM        START taking these medications    Details   aspirin 81 MG EC tablet Take 1 tablet (81 mg) by mouth daily for 90 days, Disp-90 tablet, R-0, E-Prescribe      atorvastatin (LIPITOR) 40 MG tablet Take 1 tablet (40 mg) by mouth every evening for 30 days, Disp-30 tablet, R-0, E-Prescribe      clopidogrel (PLAVIX) 75 MG tablet Take 1 tablet (75 mg) by mouth daily for 90 days, Disp-90 tablet, R-0, E-Prescribe      losartan (COZAAR) 25 MG tablet Take 1 tablet (25 mg) by mouth daily for 30 days, Disp-30 tablet, R-0, E-Prescribe      metoprolol tartrate (LOPRESSOR) 50 MG tablet Take 1 tablet (50 mg) by mouth 2 times daily for 30 days, Disp-60 tablet, R-0, E-Prescribe           CONTINUE these medications which have NOT CHANGED    Details   albuterol (PROAIR HFA/PROVENTIL HFA/VENTOLIN HFA) 108 (90 Base) MCG/ACT inhaler Inhale 2 puffs into the lungs every 6 hours as needed for shortness of breath / dyspnea or wheezing, Disp-18 g, R-0, E-Prescribe      benzonatate (TESSALON) 100 MG capsule Take 1 capsule (100 mg) by mouth 3 times daily as needed for cough, Disp-30 capsule, R-0, E-Prescribe      calcium carbonate (OS-ANIKA) 1500 (600 Ca) MG tablet Take 600 mg by mouth 2 times daily (with meals), Historical      famotidine (PEPCID) 20 MG tablet Take 20 mg by mouth 2 times daily, Historical      ferrous sulfate (FE TABS) 325 (65 Fe) MG EC tablet Take 325 mg by mouth daily, Historical      levothyroxine (SYNTHROID/LEVOTHROID) 125 MCG tablet Take 1 tablet (125 mcg) by mouth every morning (before breakfast), Disp-30 tablet, R-1, E-Prescribe      multivitamin  w/minerals (CENTRUM ADULTS) tablet Take 1 tablet by mouth daily as needed (supplement), Historical      pantoprazole (PROTONIX) 40 MG EC tablet Take 1 tablet (40 mg) by mouth 2 times daily, Disp-60 tablet, R-0, E-Prescribe      potassium chloride ER (MICRO-K) 10 MEQ CR capsule Take 2 capsules by mouth daily, Historical      sertraline (ZOLOFT) 100 MG tablet Take 200 mg by mouth every evening, Historical      sucralfate (CARAFATE) 1 GM tablet Take 1 tablet (1 g) by mouth 2 times daily (before meals), Disp-60 tablet, R-0, E-Prescribe           STOP taking these medications       DILT- MG 24 hr capsule Comments:   Reason for Stopping:         rosuvastatin (CRESTOR) 10 MG tablet Comments:   Reason for Stopping:             Allergies   Allergies   Allergen Reactions    Codeine Nausea    Dust Mite Extract Other (See Comments)     Per allergy test    Erythromycin Dizziness     Almost passed out    Macrolides And Ketolides     Penicillin [Penicillins] Itching

## 2024-06-25 PROBLEM — N28.9 RENAL INSUFFICIENCY: Status: ACTIVE | Noted: 2024-01-01

## 2024-06-25 PROBLEM — S80.11XA LEG HEMATOMA, RIGHT, INITIAL ENCOUNTER: Status: ACTIVE | Noted: 2024-01-01

## 2024-06-25 PROBLEM — D64.9 CHRONIC ANEMIA: Status: ACTIVE | Noted: 2024-01-01
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
## 2024-06-25 NOTE — PLAN OF CARE
Goal Outcome Evaluation:       Pt. Pleasant, denies pain when not moving, pivot to commode, R knee ace wrap, eating well, will cont to monitor.

## 2024-06-25 NOTE — ED NOTES
Hematoma noted at right medial knee.  Patient  is  on  eliquis for past CVA. Ace applied to knee.  Attempted to  ambulate with walker,  patient needed assistance  of 2 to sit on edge  of bed,  and able to stand at bedside. Minimal movement to walk using walker. Back to  bed. Turned on left side per request. Dr Anna updated

## 2024-06-25 NOTE — ED PROVIDER NOTES
EMERGENCY DEPARTMENT ENCOUNTER      NAME: Shaila Triana  AGE: 78 year old female  YOB: 1945  MRN: [id number]  EVALUATION DATE & TIME: 6/25/2024  2:54 AM    PCP: Mervin Ibarra    ED PROVIDER: Brendon Jones MD      Chief Complaint   Patient presents with    Fall     Right knee pain         FINAL IMPRESSION:  1. Leg hematoma, right, initial encounter    2. Fall at home, initial encounter          ED COURSE & MEDICAL DECISION MAKING:    Pertinent Labs & Imaging studies reviewed. (See chart for details)  78 year old female presents to the Emergency Department for evaluation of fall    ED Course as of 06/25/24 0812   Tue Jun 25, 2024   0333 Patient is an 78-year-old female who is on Eliquis for atrial fibrillation and presents the emergency department after a fall where she struck her head, has a hematoma over her left temporal bone and has pain in her right knee and her left thigh where there are hematomas present as well.  Otherwise primary and secondary survey unremarkable.  She is in severe pain, and received 100 mcg of fentanyl and route by EMS intranasal.  Will provide Dilaudid, fluids while assessing for injury.  Her history appears consistent with mechanical fall, and do not think further workup for syncope is indicated.  Fortunately distal to her right knee injury she has normal pulses, strength, sensation, no deformity.   0342 Baseline anemia.  No leukocytosis.   0433 No acute fracture or dislocation seen on plain film of the right knee or left femur.  Chest x-ray does not show any acute cardiopulmonary process.   0456 Patient has a new DARIAN.  Baseline mild hyponatremia.  No significant transaminitis.   0512 CT imaging reassuring against traumatic pathology     Pt signed out with the following to do:  - f/up ambulatory trial when patient is more awake from her narcotics    Additional ED Course Timeline:  3:05 AM I met with the patient, obtained history, performed an initial exam, and  discussed options and plan for diagnostics and treatment here in the ED.     Medical Decision Making  Obtained supplemental history:Supplemental history obtained?: No  Reviewed external records: External records reviewed?: Documented in chart  Care impacted by chronic illness:Anticoagulated State, Cerebrovascular Disease, Chronic Kidney Disease, Heart Disease, and Other: hypothyroidism  Care significantly affected by social determinants of health:Access to Medical Care  Did you consider but not order tests?: Work up considered but not performed and documented in chart, if applicable  Did you interpret images independently?: Independent interpretation of ECG and images noted in documentation, when applicable.  Consultation discussion with other provider:Did you involve another provider (consultant, , pharmacy, etc.)?: No  Admission considered. Patient was signed out to the oncoming physician, disposition pending.        At the conclusion of the encounter I discussed the results of all of the tests and the disposition. The questions were answered. The patient or family acknowledged understanding and was agreeable with the care plan.     0 minutes of critical care time     MEDICATIONS GIVEN IN THE EMERGENCY:  Medications   sodium chloride 0.9% BOLUS 1,000 mL (1,000 mLs Intravenous $New Bag 6/25/24 0336)       NEW PRESCRIPTIONS STARTED AT TODAY'S ER VISIT  New Prescriptions    No medications on file          =================================================================    HPI    Patient information was obtained from: The patient    Use of : N/A         Shaila Triana is a 78 year old female with a pertinent history of HTN, A-fib, TIA, CKD stage 4, anemia, hypothyroidism, CVA, who presents to this ED via EMS for evaluation of fall.    The patient reports she is anticoagulated on Eliquis for Atrial fibrillation. The patient was getting up from her sister's bed and fell off from the side of the bed. She  hit her head during the fall. She is complaining of right knee pain with noticeable bruising at this time. She is also complaining of very mild pain to her head.     Otherwise, the patient denied any other medical complaints at this time.      PAST MEDICAL HISTORY:  Past Medical History:   Diagnosis Date    Anemia     Anemia, unspecified type     Depressive disorder 12/04/2006    Essential hypertension 12/04/2006    Hypothyroidism (acquired) 12/04/2006    Invasive ductal carcinoma of breast (H) 08/06/2013    Left.  ER(+) DE(+) her-2-hina (-).  Lumpectomy and sentinel biopsy followed by radiation.  Adjuvant therapy with tamoxifen completed.    Lung nodules 04/19/2016    LLL on abd/pelvis CT 4/12/16.  No change on chest CT May 2017.  No further imaging needed.    Malignant neoplasm of female breast (H) 03/18/2008    Epic    New onset atrial fibrillation (H) 07/31/2020    Pure hypercholesterolemia 12/04/2006    Sleep apnea 12/04/2006       PAST SURGICAL HISTORY:  Past Surgical History:   Procedure Laterality Date    APPENDECTOMY      1957    BLEPHAROPLASTY Bilateral 2010    COLONOSCOPY  2005    COLONOSCOPY N/A 2/1/2024    Procedure: Colonoscopy;  Surgeon: Willie Alcazar MD;  Location: [facility]    ESOPHAGOSCOPY, GASTROSCOPY, DUODENOSCOPY (EGD), COMBINED N/A 10/6/2020    Procedure: ESOPHAGOGASTRODUODENOSCOPY, WITH BIOPSY and polypectomy;  Surgeon: Jorge Ramirez MD;  Location: [facility]    ESOPHAGOSCOPY, GASTROSCOPY, DUODENOSCOPY (EGD), COMBINED N/A 2/1/2024    Procedure: ESOPHAGOGASTRODUODENOSCOPY, WITH BIOPSY;  Surgeon: Willie Alcazar MD;  Location: [facility]    HERNIA REPAIR  2013    HYSTERECTOMY, JOSE  1975    LUMPECTOMY BREAST Left 2008    THYROIDECTOMY  Right     Partial    TUBAL LIGATION             CURRENT MEDICATIONS:    albuterol (PROAIR HFA/PROVENTIL HFA/VENTOLIN HFA) 108 (90 Base) MCG/ACT inhaler  atorvastatin (LIPITOR) 40 MG tablet  benzonatate (TESSALON) 100 MG capsule  calcium carbonate (OS-ANIKA) 1500 (600 Ca) MG  tablet  clopidogrel (PLAVIX) 75 MG tablet  famotidine (PEPCID) 20 MG tablet  ferrous sulfate (FE TABS) 325 (65 Fe) MG EC tablet  levothyroxine (SYNTHROID/LEVOTHROID) 125 MCG tablet  losartan (COZAAR) 25 MG tablet  metoprolol tartrate (LOPRESSOR) 50 MG tablet  multivitamin w/minerals (CENTRUM ADULTS) tablet  pantoprazole (PROTONIX) 40 MG EC tablet  potassium chloride ER (MICRO-K) 10 MEQ CR capsule  sertraline (ZOLOFT) 100 MG tablet  sucralfate (CARAFATE) 1 GM tablet        ALLERGIES:  Allergies   Allergen Reactions    Codeine Nausea    Dust Mite Extract Other (See Comments)     Per allergy test    Erythromycin Dizziness     Almost passed out    Macrolides And Ketolides     Penicillin [Penicillins] Itching       FAMILY HISTORY:  Family History   Problem Relation Age of Onset    No Known Problems Mother          age 89 after complications from a fall    Alzheimer Disease Father     Other - See Comments Sister         Polio    Colon Cancer Maternal Aunt     Breast Cancer No family hx of     Ovarian Cancer No family hx of     Prostate Cancer No family hx of        SOCIAL HISTORY:   Social History     Socioeconomic History    Marital status:    Tobacco Use    Smoking status: Never    Smokeless tobacco: Never   Substance and Sexual Activity    Alcohol use: Yes     Comment: occ    Drug use: Never    Sexual activity: Yes     Partners: Male     Birth control/protection: Female Surgical     Social Determinants of Health     Financial Resource Strain: Low Risk  (2023)    [facility] [employer] [facility]    Financial Resource Strain     Difficulty of Paying Living Expenses: 3   Food Insecurity: No Food Insecurity (2023)    [facility] [employer] [facility], [facility]    Food Insecurity     Worried About Running Out of Food in the Last Year: 1   Transportation Needs: No  Transportation Needs (7/13/2023)    [facility] from [facility], [facility]    Transportation Needs     Lack of Transportation (Medical): 1   Social Connections: Socially Integrated (7/13/2023)    Received from [facility], [facility]    Social Connections     Frequency of Communication with Friends and Family: 0   Housing Stability: Low Risk  (7/13/2023)    Received from [facility], [facility]    Housing Stability     Unable to Pay for Housing in the Last Year: 1       VITALS:  /81   Pulse 82   LMP  (LMP Unknown)   SpO2 93%     PHYSICAL EXAM    Physical Exam  Vitals and nursing note reviewed.   Constitutional:       General: She is not in acute distress.     Appearance: Normal appearance. She is obese. She is not ill-appearing.   HENT:      Head: Normocephalic.      Comments: Hematoma over left temporal skull.  No instability.  No midface instability or tenderness.  No trauma of the oropharynx.  No septal hematoma.     Nose: Nose normal.      Mouth/Throat:      Mouth: Mucous membranes are dry.      Pharynx: Oropharynx is clear.   Eyes:      Extraocular Movements: Extraocular movements intact.      Conjunctiva/sclera: Conjunctivae normal.      Pupils: Pupils are equal, round, and reactive to light.   Cardiovascular:      Rate and Rhythm: Normal rate.      Pulses: Normal pulses.      Heart sounds: Normal heart sounds. No murmur heard.  Pulmonary:      Effort: Pulmonary effort is normal. No respiratory distress.      Breath sounds: Normal breath sounds.   Abdominal:      General: Abdomen is flat. There is no distension.      Palpations: Abdomen is soft.      Tenderness: There is no abdominal tenderness.      Comments: Pelvis stable, nontender.   Musculoskeletal:      Cervical back: Normal range of motion and neck  supple. No tenderness.      Right lower leg: No edema.      Left lower leg: No edema.      Comments: Hematoma to left lateral thigh with tenderness.  Hematoma to right medial knee, and refuses to move the knee to assess range of motion.  Distally pulses intact, 2+ and symmetric.  Sensation and motor function distally normal.  No other traumatic findings.  No midline back tenderness.   Skin:     General: Skin is warm and dry.      Capillary Refill: Capillary refill takes less than 2 seconds.   Neurological:      General: No focal deficit present.      Mental Status: She is alert and oriented to person, place, and time. Mental status is at baseline.   Psychiatric:         Mood and Affect: Mood normal.         Behavior: Behavior normal.         Thought Content: Thought content normal.         Judgment: Judgment normal.            LAB:  All pertinent labs reviewed and interpreted.  Results for orders placed or performed during the hospital encounter of 06/25/24   CT Head w/o Contrast    Impression    IMPRESSION:  HEAD CT:  1.  No CT evidence for acute intracranial process.  2.  Brain atrophy and presumed chronic microvascular ischemic changes as above.    CERVICAL SPINE CT:  1.  No CT evidence for acute fracture or posttraumatic subluxation.  2.  Degenerative changes as highlighted above.                CT Cervical Spine w/o Contrast    Impression    IMPRESSION:  HEAD CT:  1.  No CT evidence for acute intracranial process.  2.  Brain atrophy and presumed chronic microvascular ischemic changes as above.    CERVICAL SPINE CT:  1.  No CT evidence for acute fracture or posttraumatic subluxation.  2.  Degenerative changes as highlighted above.                XR Femur Left 2 Views    Impression    IMPRESSION: Anatomic alignment of the left femur without fracture or dislocation. Chondroid calcifications in the distal metaphysis of the left femur, unchanged. Mild degenerative changes left hip and knee joints. Atherosclerotic  changes.    XR Chest 1 View    Impression    IMPRESSION: Heart size is enlarged. Retrocardiac opacity is favored to represent a large hiatal hernia. Widening of the superior mediastinum is unchanged, favored secondary to tortuous vascularity. Hyperlucency in the right lung is likely artifactual due   to rotation. No CHF, lobar consolidation or pneumothorax. Mild thoracolumbar scoliosis. No acute bony abnormalities.   XR Knee Right 1/2 Views    Impression    IMPRESSION: No visible fracture or dislocation. Masslike density in the medial soft tissues of the distal thigh 9.5 x 6.1 cm in size. Vascular calcification.   Extra Blue Top Tube   Result Value Ref Range    Hold Specimen JIC    Extra Red Top Tube   Result Value Ref Range    Hold Specimen JIC    Extra Green Top (Lithium Heparin) Tube   Result Value Ref Range    Hold Specimen JIC    Extra Purple Top Tube   Result Value Ref Range    Hold Specimen JIC    Basic metabolic panel   Result Value Ref Range    Sodium 131 (L) 135 - 145 mmol/L    Potassium 3.6 3.4 - 5.3 mmol/L    Chloride 99 98 - 107 mmol/L    Carbon Dioxide (CO2) 23 22 - 29 mmol/L    Anion Gap 9 7 - 15 mmol/L    Urea Nitrogen 28.2 (H) 8.0 - 23.0 mg/dL    Creatinine 2.41 (H) 0.51 - 0.95 mg/dL    GFR Estimate 20 (L) >60 mL/min/1.73m2    Calcium 7.8 (L) 8.8 - 10.2 mg/dL    Glucose 100 (H) 70 - 99 mg/dL   Hepatic function panel   Result Value Ref Range    Protein Total 6.3 (L) 6.4 - 8.3 g/dL    Albumin 3.2 (L) 3.5 - 5.2 g/dL    Bilirubin Total 0.3 <=1.2 mg/dL    Alkaline Phosphatase 68 40 - 150 U/L    AST 16 0 - 45 U/L    ALT 13 0 - 50 U/L    Bilirubin Direct <0.20 0.00 - 0.30 mg/dL   CBC with platelets and differential   Result Value Ref Range    WBC Count 8.4 4.0 - 11.0 10e3/uL    RBC Count 3.05 (L) 3.80 - 5.20 10e6/uL    Hemoglobin 8.2 (L) 11.7 - 15.7 g/dL    Hematocrit 26.2 (L) 35.0 - 47.0 %    MCV 86 78 - 100 fL    MCH 26.9 26.5 - 33.0 pg    MCHC 31.3 (L) 31.5 - 36.5 g/dL    RDW 15.9 (H) 10.0 - 15.0 %     Platelet Count 176 150 - 450 10e3/uL    % Neutrophils 74 %    % Lymphocytes 8 %    % Monocytes 10 %    % Eosinophils 8 %    % Basophils 1 %    % Immature Granulocytes 1 %    NRBCs per 100 WBC 0 <1 /100    Absolute Neutrophils 6.2 1.6 - 8.3 10e3/uL    Absolute Lymphocytes 0.6 (L) 0.8 - 5.3 10e3/uL    Absolute Monocytes 0.8 0.0 - 1.3 10e3/uL    Absolute Eosinophils 0.6 0.0 - 0.7 10e3/uL    Absolute Basophils 0.1 0.0 - 0.2 10e3/uL    Absolute Immature Granulocytes 0.1 <=0.4 10e3/uL    Absolute NRBCs 0.0 10e3/uL       RADIOLOGY:  Reviewed all pertinent imaging. Please see official radiology report.  CT Cervical Spine w/o Contrast   Final Result   IMPRESSION:   HEAD CT:   1.  No CT evidence for acute intracranial process.   2.  Brain atrophy and presumed chronic microvascular ischemic changes as above.      CERVICAL SPINE CT:   1.  No CT evidence for acute fracture or posttraumatic subluxation.   2.  Degenerative changes as highlighted above.                       CT Head w/o Contrast   Final Result   IMPRESSION:   HEAD CT:   1.  No CT evidence for acute intracranial process.   2.  Brain atrophy and presumed chronic microvascular ischemic changes as above.      CERVICAL SPINE CT:   1.  No CT evidence for acute fracture or posttraumatic subluxation.   2.  Degenerative changes as highlighted above.                       XR Chest 1 View   Final Result   IMPRESSION: Heart size is enlarged. Retrocardiac opacity is favored to represent a large hiatal hernia. Widening of the superior mediastinum is unchanged, favored secondary to tortuous vascularity. Hyperlucency in the right lung is likely artifactual due    to rotation. No CHF, lobar consolidation or pneumothorax. Mild thoracolumbar scoliosis. No acute bony abnormalities.      XR Knee Right 1/2 Views   Final Result   IMPRESSION: No visible fracture or dislocation. Masslike density in the medial soft tissues of the distal thigh 9.5 x 6.1 cm in size. Vascular calcification.       XR Femur Left 2 Views   Final Result   IMPRESSION: Anatomic alignment of the left femur without fracture or dislocation. Chondroid calcifications in the distal metaphysis of the left femur, unchanged. Mild degenerative changes left hip and knee joints. Atherosclerotic changes.           PROCEDURES:   None      [facility] Documentation:   CMS Diagnoses:               I, Gregsal Zhang, am serving as a scribe to document services personally performed by Brendon Jones MD  based on my observation and the provider's statements to me. I, Brendon Jones, attest that Greg Zhang is acting in a scribe capacity, has observed my performance of the services and has documented them in accordance with my direction.    Brendon Jones MD  [facility] EMERGENCY DEPARTMENT  [facility] [facility] [address] [phone]       Brendon Jones MD  06/25/24 0812

## 2024-06-25 NOTE — ED NOTES
Bed: [place] [address]  Expected date: 6/25/24  Expected time: 2:51 AM  Means of arrival: Ambulance  Comments:  Yanna. 79 yo female with fall on thinners. Right leg pain.100 mcg of Fentanyl was given.

## 2024-06-25 NOTE — CONSULTS
Care Management Initial Consult    General Information  Assessment completed with: Patient, Children, son Jaun  Type of CM/SW Visit: Initial Assessment    Primary Care Provider verified and updated as needed: Yes   Readmission within the last 30 days: no previous admission in last 30 days         Advance Care Planning: Advance Care Planning Reviewed:  (no HCD)          Communication Assessment  Patient's communication style: spoken language (English or Bilingual)             Cognitive  Cognitive/Neuro/Behavioral: WDL                      Living Environment:   People in home: child(no), adult, significant other (son's girlfriend)     Current living Arrangements: house      Able to return to prior arrangements: yes       Family/Social Support:  Care provided by: self, child(no)  Provides care for: no one, unable/limited ability to care for self  Marital Status: Lives with Significant Other  Children, Significant Other          Description of Support System: Supportive         Current Resources:   Patient receiving home care services: No     Community Resources: None  Equipment currently used at home: none  Supplies currently used at home: None    Employment/Financial:  Employment Status:          Financial Concerns:             Does the patient's insurance plan have a 3 day qualifying hospital stay waiver?  Yes     Which insurance plan 3 day waiver is available? Alternative insurance waiver    Will the waiver be used for post-acute placement? Undetermined at this time    Lifestyle & Psychosocial Needs:  Social Determinants of Health     Food Insecurity: No Food Insecurity (7/13/2023)    Received from [employer] [employer] [facility]    Food Insecurity     Worried About Running Out of Food in the Last Year: 1   Depression: Not at risk (6/20/2024)    Received from [employer]    PHQ-2     PHQ-2 TOTAL SCORE: 0   Housing  Stability: Low Risk  (7/13/2023)    Received from [employer] [facility] [employer] [facility], [facility] [employer] [facility]    Housing Stability     Unable to Pay for Housing in the Last Year: 1   Tobacco Use: Low Risk  (6/20/2024)    Received from [facility] [employer] [facility] [employer] [facility]    Patient History     Smoking Tobacco Use: Never     Smokeless Tobacco Use: Never     Passive Exposure: Not on file   Financial Resource Strain: Low Risk  (7/13/2023)    Received from [employer] [facility], [facility]    Financial Resource Strain     Difficulty of Paying Living Expenses: 3     Difficulty of Paying Living Expenses: Not on file   Alcohol Use: Not on file   Transportation Needs: No Transportation Needs (7/13/2023)    Received from [employer] [facility] [employer] [facility], [facility]    Transportation Needs     Lack of Transportation (Medical): 1   Physical Activity: Not on file   Interpersonal Safety: Not on file   Stress: Not on file   Social Connections: Socially Integrated (7/13/2023)    Received from [employer] [facility] [employer] [facility], [facility]    Social Connections     Frequency of Communication with Friends and Family: 0   Health Literacy: Not on file       Functional Status:  Prior to admission patient needed assistance:   Dependent ADLs:: Independent  Dependent IADLs:: Cleaning, Cooking, Laundry, Shopping, Transportation, Meal Preparation       Additional Information:    Assessment completed partially with patient and mostly with son Jaun over the phone.  Patient lives in her house with Jaun, Jaun's girlfriend and SO Dirk. She has been independent with ADLs, requires assist with IADLs, ambulates without devices and no services in the home.  Daughter Dana is primary family contact and family willing to transport at discharge.        Amarilis Ward  RN

## 2024-06-25 NOTE — ED NOTES
Bed: [place] [address]  Expected date:   Expected time:   Means of arrival:   Comments:  Room 10

## 2024-06-25 NOTE — ED TRIAGE NOTES
Pt was at sisters house and fell trying to get into bed, injured right knee with some left knee pain as well. Pt did hit left side of head and has small abrasion, she does take blood thinners, eliquis. 100 mcg of intranasal fentanyl given by EMS.     Triage Assessment (Adult)       Row Name 06/25/24 0301          Triage Assessment    Airway WDL WDL        Respiratory WDL    Respiratory WDL WDL        Skin Circulation/Temperature WDL    Skin Circulation/Temperature WDL WDL        Cardiac WDL    Cardiac WDL WDL        Peripheral/Neurovascular WDL    Peripheral Neurovascular WDL WDL        Cognitive/Neuro/Behavioral WDL    Cognitive/Neuro/Behavioral WDL WDL

## 2024-06-25 NOTE — ED NOTES
EMERGENCY DEPARTMENT SIGN OUT NOTE        ED COURSE AND MEDICAL DECISION MAKING  78-year-old female was brought into the ED after she fell while getting out of bed.  The patient is on Eliquis and she was noted to have a hematoma over the right knee.  Patient was noted to be quite sedated here in the ED after receiving fentanyl by EMS and Dilaudid here in the ED.  At the time of signout the patient was awaiting evaluation to determine if she could ambulate once she is more awake.    Laboratory tests were reviewed.  The patient's creatinine today was 2.4.  This appears to be just above her normal baseline creatinine.  She was also noted to be anemic.  However, this also appears to be at or near her normal baseline.  CBC, BMP, and hepatic panel labs were otherwise reassuring.  Procalcitonin level was normal.    CT scan of the head and cervical spine did not show evidence of acute traumatic injury.  Chest x-ray shows a questionable hiatal hernia but there is no signs of traumatic injury.  X-rays of the knee and femur did not show evidence of fracture or dislocation.    The patient was reevaluated and both she and her members were informed of the lab and imaging results.  The patient was still somewhat sleepy appearing.  However, she was able to answer questions appropriately when asked.  The patient's family were also states that the patient has a history of falling asleep really easily at baseline.  The patient was noted to have a hematoma over the heel aspect of the right knee that was moderately tender to palpation.  An Ace wrap was applied to the hematoma.     An attempt was made to ambulate the patient.  Patient had difficult time taking even a couple of steps with a two-person assist secondary to the pain in her right knee.    The patient was then admitted to the resident physician service for further evaluation and treatment of the painful right knee hematoma and secondary inability to ambulate.     Patient was  signed out to me by Pedro Jones MD at 8:10 AM   10:58 AM I discussed the case with [place] resident, Dr Pond, who accepts the patient.      In brief, Shaila Triana is a 78 year old female who initially presented after a fall from the side of her bed.  Did injure her head.  Is on blood thinners.    At time of sign out, disposition was pending patient waking up and ambulation trial.     FINAL IMPRESSION    1. Leg hematoma, right, initial encounter    2. Fall at home, initial encounter    3. Renal insufficiency    4. Chronic anemia        ED MEDS  Medications   sodium chloride 0.9% BOLUS 1,000 mL (0 mLs Intravenous Stopped 6/25/24 1045)       LAB  Labs Ordered and Resulted from Time of ED Arrival to Time of ED Departure   BASIC METABOLIC PANEL - Abnormal       Result Value    Sodium 131 (*)     Potassium 3.6      Chloride 99      Carbon Dioxide (CO2) 23      Anion Gap 9      Urea Nitrogen 28.2 (*)     Creatinine 2.41 (*)     GFR Estimate 20 (*)     Calcium 7.8 (*)     Glucose 100 (*)    HEPATIC FUNCTION PANEL - Abnormal    Protein Total 6.3 (*)     Albumin 3.2 (*)     Bilirubin Total 0.3      Alkaline Phosphatase 68      AST 16      ALT 13      Bilirubin Direct <0.20     CBC WITH PLATELETS AND DIFFERENTIAL - Abnormal    WBC Count 8.4      RBC Count 3.05 (*)     Hemoglobin 8.2 (*)     Hematocrit 26.2 (*)     MCV 86      MCH 26.9      MCHC 31.3 (*)     RDW 15.9 (*)     Platelet Count 176      % Neutrophils 74      % Lymphocytes 8      % Monocytes 10      % Eosinophils 8      % Basophils 1      % Immature Granulocytes 1      NRBCs per 100 WBC 0      Absolute Neutrophils 6.2      Absolute Lymphocytes 0.6 (*)     Absolute Monocytes 0.8      Absolute Eosinophils 0.6      Absolute Basophils 0.1      Absolute Immature Granulocytes 0.1      Absolute NRBCs 0.0         RADIOLOGY    CT Cervical Spine w/o Contrast   Final Result   IMPRESSION:   HEAD CT:   1.  No CT evidence for acute intracranial process.   2.   Brain atrophy and presumed chronic microvascular ischemic changes as above.      CERVICAL SPINE CT:   1.  No CT evidence for acute fracture or posttraumatic subluxation.   2.  Degenerative changes as highlighted above.                       CT Head w/o Contrast   Final Result   IMPRESSION:   HEAD CT:   1.  No CT evidence for acute intracranial process.   2.  Brain atrophy and presumed chronic microvascular ischemic changes as above.      CERVICAL SPINE CT:   1.  No CT evidence for acute fracture or posttraumatic subluxation.   2.  Degenerative changes as highlighted above.                       XR Chest 1 View   Final Result   IMPRESSION: Heart size is enlarged. Retrocardiac opacity is favored to represent a large hiatal hernia. Widening of the superior mediastinum is unchanged, favored secondary to tortuous vascularity. Hyperlucency in the right lung is likely artifactual due    to rotation. No CHF, lobar consolidation or pneumothorax. Mild thoracolumbar scoliosis. No acute bony abnormalities.      XR Knee Right 1/2 Views   Final Result   IMPRESSION: No visible fracture or dislocation. Masslike density in the medial soft tissues of the distal thigh 9.5 x 6.1 cm in size. Vascular calcification.      XR Femur Left 2 Views   Final Result   IMPRESSION: Anatomic alignment of the left femur without fracture or dislocation. Chondroid calcifications in the distal metaphysis of the left femur, unchanged. Mild degenerative changes left hip and knee joints. Atherosclerotic changes.           DISCHARGE MEDS  New Prescriptions    No medications on file       I, Philomena Kim, am serving as a scribe to document services personally performed by Scarlet Anna DO based on my observation and the provider's statements to me. I, Scarlet Anna DO attest that Philomena Kim is acting in a scribe capacity, has observed my performance of the services and has documented them in accordance with my direction.       Scarlet Anna DO  Emergency  [facility] EMERGENCY DEPARTMENT  [address] [place] [id number]  [phone]     Scarlet Anna DO  06/25/24 1118

## 2024-06-25 NOTE — H&P
[facility]    History and Physical - Hospitalist Service       Date of Admission:  6/25/2024    Assessment & Plan      Shaila Triana is a 78 year old female with PMH significant for HFpEF, atrial fibrillation, CKD4, anemia, hypothyroidism, history of breast cancer, PAUL presenting with a fall found to have subsequent right knee hematoma affecting mobility.     Fall  Right knee hematoma  Normocytic anemia  Patient presents after mechanical fall on 6/25 which resulted in her hitting her head and right knee. She is on elliquis for atrial fibrillation. Work-up in the emergency room revealed a negative head CT and a hematoma of 9.5 x 6.1 cm by the right knee. When attempting to ambulate, she is limited due to pain of the right knee. Will have PT/OT assess mobility with this injury and monitor bleeding.   - PT/OT  - CBC in AM  - Repeat head CT at 0600 (~24 hours post original)    - Hold Elliquis 2.5 mg BID   - Pain control: Tylenol, oxycodone 2.5 mg PRN   - Continue PTA iron    DARIAN on CKD4  Baseline creatine appears to be between 1.7-2.0, noted to be 2.4 on admission with sodium of 131. She was given a 1L bolus in the emergency room which did not change her creatinine. Suspect that this DARIAN is due to fluid overload in the setting of CHF as below  - Hold PTA losartan  - BMP in AM    Acute on chronic HFpEF  Patient noted to have one episode of hypoxia to 89% but is otherwise hanging out in the low 90's. CXR on admission shows enlarged heart without evidence of consolidation or fluid. On exam, patient noted to have 2+ pitting edema bilaterally. She is not on diuretics outpatient. BNP on arrival in 14K, last ECHO showed EF of 55-60% in 3/2024. Concerned that her heart failure is contributing to the DARIAN as above.   - Lasix 20 mg IV daily  - Telemetry  - Intake/output + daily weights   - Continue PTA metoprolol     Chronic conditions  > HLD: continue PTA atorvastatin  > Hypothyroidism: continue  PTA levothyroxine  > GERD: continue PTA pantoprazole BID  > Depression: continue PTA zoloft        Diet: Combination Diet Regular Diet Adult  DVT Prophylaxis: DOAC held with hematoma, SCD ordered   Boucher Catheter: Not present  Fluids: S/p 1L bolus   Lines: None     Cardiac Monitoring: ACTIVE order. Indication: Acute decompensated heart failure (48 hours)  Code Status: Full Code    Clinically Significant Risk Factors Present on Admission          # Hypocalcemia: Lowest Ca = 7.8 mg/dL in last 2 days, will monitor and replace as appropriate     # Hypoalbuminemia: Lowest albumin = 3.2 g/dL at 6/25/2024  3:12 AM, will monitor as appropriate  # Drug Induced Coagulation Defect: home medication list includes an anticoagulant medication    # Hypertension: Noted on problem list      # Anemia: based on hgb <11               # Financial/Environmental Concerns:           Disposition Plan      Expected Discharge Date: 06/26/2024                The patient's care was discussed with the Attending Physician, Dr. Seymour .    Marissa Pond MD  Hospitalist Service  [facility]  ______________________________________________________________________    Chief Complaint     Fall with right knee pain    History of Present Illness   Shaila Triana is a 78 year old female with PMH significant for HFpEF, atrial fibrillation, CKD4, anemia, hypothyroidism, history of breast cancer, PAUL presenting with a fall. She was trying to get into her bed on the morning of 6/25 and fell. She hit her head with no loss of consciousness. She also noted knee pain after the fall. She has been in her normal state of health; denies fever, chest pain, shortness of breath, abdominal pain, nausea/vomiting. She states that she chronically has lower extremity edema, does not think that it is currently any different than her normal.     Past Medical History    Past Medical History:   Diagnosis Date    Anemia     Anemia, unspecified type      Depressive disorder 12/04/2006    Essential hypertension 12/04/2006    Hypothyroidism (acquired) 12/04/2006    Invasive ductal carcinoma of breast (H) 08/06/2013    Left.  ER(+) OH(+) her-2-hina (-).  Lumpectomy and sentinel biopsy followed by radiation.  Adjuvant therapy with tamoxifen completed.    Lung nodules 04/19/2016    LLL on abd/pelvis CT 4/12/16.  No change on chest CT May 2017.  No further imaging needed.    Malignant neoplasm of female breast (H) 03/18/2008    Epic    New onset atrial fibrillation (H) 07/31/2020    Pure hypercholesterolemia 12/04/2006    Sleep apnea 12/04/2006       Past Surgical History   Past Surgical History:   Procedure Laterality Date    APPENDECTOMY      1957    BLEPHAROPLASTY Bilateral 2010    COLONOSCOPY  2005    COLONOSCOPY N/A 2/1/2024    Procedure: Colonoscopy;  Surgeon: Willie Alcazar MD;  Location: [facility]    ESOPHAGOSCOPY, GASTROSCOPY, DUODENOSCOPY (EGD), COMBINED N/A 10/6/2020    Procedure: ESOPHAGOGASTRODUODENOSCOPY, WITH BIOPSY and polypectomy;  Surgeon: Jorge Ramirez MD;  Location: [facility]    ESOPHAGOSCOPY, GASTROSCOPY, DUODENOSCOPY (EGD), COMBINED N/A 2/1/2024    Procedure: ESOPHAGOGASTRODUODENOSCOPY, WITH BIOPSY;  Surgeon: Willie Alcazar MD;  Location: [facility]    HERNIA REPAIR  2013    HYSTERECTOMY, JOSE  1975    LUMPECTOMY BREAST Left 2008    THYROIDECTOMY  Right     Partial    TUBAL LIGATION         Prior to Admission Medications   Prior to Admission Medications   Prescriptions Last Dose Informant Patient Reported? Taking?   albuterol (PROAIR HFA/PROVENTIL HFA/VENTOLIN HFA) 108 (90 Base) MCG/ACT inhaler Unknown at PRN Self No Yes   Sig: Inhale 2 puffs into the lungs every 6 hours as needed for shortness of breath / dyspnea or wheezing   apixaban ANTICOAGULANT (ELIQUIS) 2.5 MG tablet 6/24/2024 at PM  Yes Yes   Sig: Take 2.5 mg by mouth 2 times daily   atorvastatin (LIPITOR) 40 MG tablet 6/24/2024 at HS  No Yes   Sig: Take 1 tablet (40 mg) by mouth every evening for  30 days   calcium carbonate (OS-ANIKA) 1500 (600 Ca) MG tablet 2024 at PM Self Yes Yes   Sig: Take 600 mg by mouth 2 times daily (with meals)   ferrous sulfate (FE TABS) 325 (65 Fe) MG EC tablet 2024 at AM Self Yes Yes   Sig: Take 325 mg by mouth daily   fluticasone (FLONASE) 50 MCG/ACT nasal spray Unknown at PRN  Yes Yes   Sig: Spray 2 sprays in nostril daily as needed for allergies   levothyroxine (SYNTHROID/LEVOTHROID) 137 MCG tablet 2024 at AM  Yes Yes   Sig: Take 1 tablet by mouth daily before breakfast   losartan (COZAAR) 25 MG tablet 2024 at AM  No Yes   Sig: Take 1 tablet (25 mg) by mouth daily for 30 days   metoprolol tartrate (LOPRESSOR) 50 MG tablet 2024 at PM  No Yes   Sig: Take 1 tablet (50 mg) by mouth 2 times daily for 30 days   multivitamin w/minerals (CENTRUM ADULTS) tablet 2024 at AM Self Yes Yes   Sig: Take 1 tablet by mouth daily as needed (supplement)   pantoprazole (PROTONIX) 40 MG EC tablet 2024 at AM Self No Yes   Sig: Take 1 tablet (40 mg) by mouth 2 times daily   sertraline (ZOLOFT) 100 MG tablet 2024 at AM Self Yes Yes   Sig: Take 200 mg by mouth daily   sucralfate (CARAFATE) 1 GM tablet 2024 at PM Self No Yes   Sig: Take 1 tablet (1 g) by mouth 2 times daily (before meals)      Facility-Administered Medications: None        Social History   I have reviewed this patient's social history and updated it with pertinent information if needed.  Social History     Tobacco Use    Smoking status: Never    Smokeless tobacco: Never   Substance Use Topics    Alcohol use: Yes     Comment: occ    Drug use: Never     Family History   I have reviewed this patient's family history and updated it with pertinent information if needed.  Family History   Problem Relation Age of Onset    No Known Problems Mother          age 89 after complications from a fall    Alzheimer Disease Father     Other - See Comments Sister         Polio    Colon Cancer Maternal Aunt      Breast Cancer No family hx of     Ovarian Cancer No family hx of     Prostate Cancer No family hx of      Allergies   Allergies   Allergen Reactions    Codeine Nausea    Dust Mite Extract Other (See Comments)     Per allergy test    Erythromycin Dizziness     Almost passed out    Macrolides And Ketolides     Penicillin [Penicillins] Itching        Physical Exam   Vital Signs: Temp: 98.1  F (36.7  C) Temp src: Oral BP: (!) 141/76 Pulse: 84   Resp: 18 SpO2: 94 %      Weight: 0 lbs 0 oz    General:  Appears in no acute distress, appears fatigued and intermittently dozes off during interview.   Psych:  Oriented to person, place, and time. Able to articulate logical thoughts.   HEENT:  Eyes grossly normal to inspection. Extraocular movements intact. Pupils equal, round, and reactive to light, currently pinpoint. Mucous membranes moist. No ulcers, erythremia, or lesions noted in the oropharynx.  Cardiovascular:  Irregularly irregular rhythm, normal S1 and S2 without murmur.   Respiratory:  Lungs clear to auscultation bilaterally. No wheezing or crackles.   Neuro:  Clear coherent speech. CNII-XII intact. Good  strength.   Musculoskeletal:  2+ pitting edema bilaterally. Bruising of medial aspect of right knee with swelling.   GI:  Soft, non-tender abdomen.     Data     I have personally reviewed the following data over the past 24 hrs:    8.4  \   8.2 (L)   / 176     133 (L) 102 29.2 (H) /  94   3.9 23 2.41 (H) \     ALT: 13 AST: 16 AP: 68 TBILI: 0.3   ALB: 3.2 (L) TOT PROTEIN: 6.3 (L) LIPASE: N/A     Trop: N/A BNP: 14,445 (H)       Imaging results reviewed over the past 24 hrs:   Recent Results (from the past 24 hour(s))   XR Femur Left 2 Views    Narrative    EXAM: XR FEMUR LEFT 2 VIEWS  LOCATION: [facility]  DATE: 6/25/2024    INDICATION: Left lateral femur pain.  COMPARISON: Whole-body PET/CT 8/23/2023.      Impression    IMPRESSION: Anatomic alignment of the left femur without fracture  or dislocation. Chondroid calcifications in the distal metaphysis of the left femur, unchanged. Mild degenerative changes left hip and knee joints. Atherosclerotic changes.    XR Knee Right 1/2 Views    Narrative    EXAM: XR KNEE RIGHT 1/2 VIEWS  LOCATION: [facility]  DATE: 6/25/2024    INDICATION: pain after fall, swelling  COMPARISON: None.      Impression    IMPRESSION: No visible fracture or dislocation. Masslike density in the medial soft tissues of the distal thigh 9.5 x 6.1 cm in size. Vascular calcification.   XR Chest 1 View    Narrative    EXAM: XR CHEST 1 VIEW  LOCATION: [facility]  DATE: 6/25/2024    INDICATION: fall  COMPARISON: 4/30/2024      Impression    IMPRESSION: Heart size is enlarged. Retrocardiac opacity is favored to represent a large hiatal hernia. Widening of the superior mediastinum is unchanged, favored secondary to tortuous vascularity. Hyperlucency in the right lung is likely artifactual due   to rotation. No CHF, lobar consolidation or pneumothorax. Mild thoracolumbar scoliosis. No acute bony abnormalities.   CT Head w/o Contrast    Narrative    EXAM: CT HEAD W/O CONTRAST, CT CERVICAL SPINE W/O CONTRAST  LOCATION: [facility]  DATE: 6/25/2024    INDICATION: Fall, Eliquis, patient struck head.  COMPARISON: CT head 5/4/2024, CT cervical spine 10/17/2022.  TECHNIQUE:   1) Routine CT Head without IV contrast. Multiplanar reformats. Dose reduction techniques were used.  2) Routine CT Cervical Spine without IV contrast. Multiplanar reformats. Dose reduction techniques were used.    FINDINGS:   HEAD CT:   INTRACRANIAL CONTENTS: No intracranial hemorrhage, extraaxial collection, or mass effect.  No CT evidence of acute infarct. Mild presumed chronic small vessel ischemic changes. Mild generalized volume loss. No hydrocephalus.     VISUALIZED ORBITS/SINUSES/MASTOIDS: No intraorbital abnormality. No paranasal sinus mucosal  disease. No middle ear or mastoid effusion.    BONES/SOFT TISSUES: No acute abnormality.    CERVICAL SPINE CT:   VERTEBRA: There is mild chronic height loss along the superior endplate at the C4 level. Additional cervical vertebra are normal in height. Minimal alterations in the lateral alignment are chronic and likely degenerative in nature. No fracture or   posttraumatic subluxation.     CANAL/FORAMINA: Multilevel degenerative changes. Mild central canal stenosis at the C5-C6 level. High-grade foraminal stenosis on the right at C4-C5 and C5-C6 and on the left at C3-C4, C4-C5 and C5-C6.    PARASPINAL: There is enlargement of the left lobe of the thyroid. Incompletely evaluated left-sided pleural effusion. Visualized lung fields are clear.      Impression    IMPRESSION:  HEAD CT:  1.  No CT evidence for acute intracranial process.  2.  Brain atrophy and presumed chronic microvascular ischemic changes as above.    CERVICAL SPINE CT:  1.  No CT evidence for acute fracture or posttraumatic subluxation.  2.  Degenerative changes as highlighted above.                CT Cervical Spine w/o Contrast    Narrative    EXAM: CT HEAD W/O CONTRAST, CT CERVICAL SPINE W/O CONTRAST  LOCATION: [facility]  DATE: 6/25/2024    INDICATION: Fall, Eliquis, patient struck head.  COMPARISON: CT head 5/4/2024, CT cervical spine 10/17/2022.  TECHNIQUE:   1) Routine CT Head without IV contrast. Multiplanar reformats. Dose reduction techniques were used.  2) Routine CT Cervical Spine without IV contrast. Multiplanar reformats. Dose reduction techniques were used.    FINDINGS:   HEAD CT:   INTRACRANIAL CONTENTS: No intracranial hemorrhage, extraaxial collection, or mass effect.  No CT evidence of acute infarct. Mild presumed chronic small vessel ischemic changes. Mild generalized volume loss. No hydrocephalus.     VISUALIZED ORBITS/SINUSES/MASTOIDS: No intraorbital abnormality. No paranasal sinus mucosal disease. No  middle ear or mastoid effusion.    BONES/SOFT TISSUES: No acute abnormality.    CERVICAL SPINE CT:   VERTEBRA: There is mild chronic height loss along the superior endplate at the C4 level. Additional cervical vertebra are normal in height. Minimal alterations in the lateral alignment are chronic and likely degenerative in nature. No fracture or   posttraumatic subluxation.     CANAL/FORAMINA: Multilevel degenerative changes. Mild central canal stenosis at the C5-C6 level. High-grade foraminal stenosis on the right at C4-C5 and C5-C6 and on the left at C3-C4, C4-C5 and C5-C6.    PARASPINAL: There is enlargement of the left lobe of the thyroid. Incompletely evaluated left-sided pleural effusion. Visualized lung fields are clear.      Impression    IMPRESSION:  HEAD CT:  1.  No CT evidence for acute intracranial process.  2.  Brain atrophy and presumed chronic microvascular ischemic changes as above.    CERVICAL SPINE CT:  1.  No CT evidence for acute fracture or posttraumatic subluxation.  2.  Degenerative changes as highlighted above.

## 2024-06-25 NOTE — ED NOTES
Pt was able to stand at bedside and take a few steps forward and back with assist of 2. Pt is drowsy but alert to voice and touch.

## 2024-06-25 NOTE — PHARMACY-ADMISSION MEDICATION HISTORY
Pharmacist Admission Medication History    Admission medication history is complete. The information provided in this note is only as accurate as the sources available at the time of the update.    Information Source(s): Family member and CareEverywhere/SureScripts via phone    Pertinent Information:     Changes made to PTA medication list:  Added: Eliquis, Flonase  Deleted: benzonatate, clopidogrel, famotidine, potassium chloride  Changed: levothyroxine dose    Allergies reviewed with patient and updates made in EHR: unable to assess    Medication History Completed By: Kristen Jernigan [facility] 6/25/2024 2:15 PM    PTA Med List   Medication Sig Note Last Dose    albuterol (PROAIR HFA/PROVENTIL HFA/VENTOLIN HFA) 108 (90 Base) MCG/ACT inhaler Inhale 2 puffs into the lungs every 6 hours as needed for shortness of breath / dyspnea or wheezing  Unknown at PRN    apixaban ANTICOAGULANT (ELIQUIS) 2.5 MG tablet Take 2.5 mg by mouth 2 times daily  6/24/2024 at PM    atorvastatin (LIPITOR) 40 MG tablet Take 1 tablet (40 mg) by mouth every evening for 30 days  6/24/2024 at HS    calcium carbonate (OS-ANIKA) 1500 (600 Ca) MG tablet Take 600 mg by mouth 2 times daily (with meals)  6/24/2024 at PM    ferrous sulfate (FE TABS) 325 (65 Fe) MG EC tablet Take 325 mg by mouth daily  6/24/2024 at AM    fluticasone (FLONASE) 50 MCG/ACT nasal spray Spray 2 sprays in nostril daily as needed for allergies  Unknown at PRN    levothyroxine (SYNTHROID/LEVOTHROID) 137 MCG tablet Take 1 tablet by mouth daily before breakfast  6/24/2024 at AM    losartan (COZAAR) 25 MG tablet Take 1 tablet (25 mg) by mouth daily for 30 days  6/24/2024 at AM    metoprolol tartrate (LOPRESSOR) 50 MG tablet Take 1 tablet (50 mg) by mouth 2 times daily for 30 days 6/25/2024: Family stated dose of 75 mg, but only Rx fills on file are for 50 mg tablets with quantity sufficient for 1 tablet BID.  6/24/2024 at PM    multivitamin w/minerals (CENTRUM ADULTS) tablet Take 1  tablet by mouth daily as needed (supplement)  6/24/2024 at AM    pantoprazole (PROTONIX) 40 MG EC tablet Take 1 tablet (40 mg) by mouth 2 times daily 6/25/2024: Before meals 6/24/2024 at AM    sertraline (ZOLOFT) 100 MG tablet Take 200 mg by mouth daily  6/24/2024 at AM    sucralfate (CARAFATE) 1 GM tablet Take 1 tablet (1 g) by mouth 2 times daily (before meals)  6/24/2024 at PM

## 2024-06-26 NOTE — PROGRESS NOTES
Called the Blood [facility] to see if the unit of blood was ready.  Lab indicates she has a positive antibody screen, so more tests will be run and it will be a few more hours.

## 2024-06-26 NOTE — PLAN OF CARE
Goal Outcome Evaluation:      Plan of Care Reviewed With: patient        Problem: Mobility Impairment  Goal: Optimal Mobility  Outcome: Progressing  Intervention: Optimize Mobility  Recent Flowsheet Documentation  Taken 6/25/2024 1950 by Stephenie Black, RN  Assistive Device Utilized:   gait belt   walker  Activity Management:   activity adjusted per tolerance   activity encouraged   Pt. Up with assist of one to the commode. Pt. States she does have some pain when getting up, but declines as needed pain medication. Ace wrap around right knee.  Right leg elevated on a pillow.     Stephenie Black, RN

## 2024-06-26 NOTE — PROGRESS NOTES
[facility]    Progress Note - Hospitalist Service       Date of Admission:  6/25/2024    Assessment & Plan   Shaila Triana is a 78 year old female with PMH significant for HFpEF, atrial fibrillation, CKD4, anemia, hypothyroidism, history of breast cancer, PAUL presenting with a fall found to have subsequent right knee hematoma affecting mobility.      Fall  Right knee hematoma  Normocytic anemia  Patient presents after mechanical fall on 6/25 which resulted in her hitting her head and right knee. She is on elliquis for atrial fibrillation. Work-up in the emergency room revealed a negative head CT and a hematoma of 9.5 x 6.1 cm by the right knee. When attempting to ambulate, she is limited due to pain of the right knee. Will have PT/OT assess mobility with this injury and monitor bleeding. Repeat head CT negative for intracranial process.  Hb down this morning, will transfuse  - tranfuse 1 unit today, monitor for fluid overload  - repeat Hb this evening after transfusion, transfuse if < 7, low threshold to add on Lasix  - PT/OT  - Hold Elliquis 2.5 mg BID   - Pain control: Tylenol, oxycodone 2.5 mg PRN   - Continue PTA iron     DARIAN on CKD4  Baseline creatine appears to be between 1.7-2.0, noted to be 2.4 on admission with sodium of 131. She was given a 1L bolus in the emergency room which did not change her creatinine. Suspect that this DARIAN is due to fluid overload in the setting of CHF as below  - Hold PTA losartan  - BMP in AM     Acute on chronic HFpEF  Patient noted to have one episode of hypoxia to 89% but is otherwise hanging out in the low 90's. CXR on admission shows enlarged heart without evidence of consolidation or fluid. On exam, patient noted to have 2+ pitting edema bilaterally. She is not on diuretics outpatient. BNP on arrival in 14K, last ECHO showed EF of 55-60% in 3/2024. Concerned that her heart failure is contributing to the DARIAN as above.   - Lasix 20 mg IV daily,  "low threshold to increase if worsening hypoxia  - Telemetry  - Intake/output + daily weights   - Continue PTA metoprolol      Chronic conditions  > HLD: continue PTA atorvastatin  > Hypothyroidism: continue PTA levothyroxine  > GERD: continue PTA pantoprazole BID  > Depression: continue PTA zoloft        Diet: Combination Diet Regular Diet Adult    DVT Prophylaxis: VTE Prophylaxis contraindicated due to acute hematoma  Boucher Catheter: Not present  Fluids: PO  Lines: None     Cardiac Monitoring: ACTIVE order. Indication: Acute decompensated heart failure (48 hours)  Code Status: Full Code      Clinically Significant Risk Factors Present on Admission       # Hypocalcemia: Lowest Ca = 7.5 mg/dL in last 2 days, will monitor and replace as appropriate     # Hypoalbuminemia: Lowest albumin = 3.2 g/dL at 6/25/2024  3:12 AM, will monitor as appropriate  # Drug Induced Coagulation Defect: home medication list includes an anticoagulant medication    # Hypertension: Noted on problem list    # Anemia: based on hgb <11        #Precipitous drop in Hgb/Hct: Lowest Hgb this hospitalization: 5.3 g/dL. Will continue to monitor and treat/transfuse as appropriate.     # Overweight: Estimated body mass index is 29.56 kg/m  as calculated from the following:    Height as of this encounter: 1.575 m (5' 2\").    Weight as of this encounter: 73.3 kg (161 lb 9.6 oz).       # Financial/Environmental Concerns:           Disposition Plan      Expected Discharge Date: 06/27/2024                The patient's care was discussed with the Attending Physician, Dr. Seymour .    Julieth Hardy MD  Hospitalist Service  [facility]  Securely message with [employer] (more info)  Text page via [email or web address] Paging/Directory   ______________________________________________________________________    Interval History   Sleepy on my visit, pinpoint pupils so likely due to pain medication.Hb this morning down significantly in comparison to yesterday, " 5.7. Will transfuse. Hematoma appears larger today per attending physician. Will continue to monitor closely. Otherwise patient feels stable and doing well.    Physical Exam   Vital Signs: Temp: 98  F (36.7  C) Temp src: Oral BP: 114/67 Pulse: 85   Resp: 13 SpO2: 95 % O2 Device: None (Room air)    Weight: 161 lbs 9.55 oz    General: Lethargic and sleepy but AxOx3, no acute distress  Skin: clean, dry, and intact  HEENT: normocephalic, atraumatic, pupils pinpoint but ERRL, EOMI, no conjunctival injection or icterus, ears and nose normal, no LAD or masses  Resp: clear to ausculation bilaterally, no rales or wheezes  Cardio: RRR, S1 and S2 present, no S3 or S4, no rubs or murmurs present  Abdomen: soft, nontender, nondistended, bowel sounds present x 4  R lower extremity: R knee with ACE bandage compressing knee and medial thigh and lower leg. Hematoma with significant ecchymoses surrounding the knee and medial thigh, tender to palpation. Temperature of skin overall normal and skin without pallor,  Psych: calm, logical thought process      Medical Decision Making   Please see A&P for additional details of medical decision making.      Data   ------------------------- PAST 24 HR DATA REVIEWED -----------------------------------------------    I have personally reviewed the following data over the past 24 hrs:    7.8  \   5.3 (LL)   / 138 (L)     133 (L) 101 28.7 (H) /  103 (H)   3.5 23 2.32 (H) \     Trop: N/A BNP: N/A       Imaging results reviewed over the past 24 hrs:   No results found for this or any previous visit (from the past 24 hour(s)).

## 2024-06-26 NOTE — PLAN OF CARE
Problem: Mobility Impairment  Goal: Optimal Mobility  Outcome: Adequate for Care Transition  Intervention: Optimize Mobility  Recent Flowsheet Documentation  Taken 6/26/2024 6336 by Abigail Julio RN  Assistive Device Utilized: gait belt  Activity Management:   activity adjusted per tolerance   up to bedside commode  Positioning/Transfer Devices:   pillows   in use   Goal Outcome Evaluation:       Pt A/O x4. VSS. Assist x1 to the commode. Pt ate her meals well. Right knee is ace wrapped.   Hgb 5.3 today, 1 unit of RBC transfused. Pt is currently resting in bed with no complaints of pain.

## 2024-06-27 PROBLEM — D47.2 MONOCLONAL GAMMOPATHY OF UNKNOWN SIGNIFICANCE (MGUS): Status: ACTIVE | Noted: 2024-01-01

## 2024-06-27 PROBLEM — R29.6 RECURRENT FALLS WHILE WALKING: Status: ACTIVE | Noted: 2024-01-01

## 2024-06-27 PROBLEM — Z86.73 H/O: CVA (CEREBROVASCULAR ACCIDENT): Status: ACTIVE | Noted: 2024-01-01

## 2024-06-27 PROBLEM — R80.9 PROTEINURIA: Chronic | Status: ACTIVE | Noted: 2024-01-01

## 2024-06-27 PROBLEM — C85.90 LYMPHOMA (H): Status: ACTIVE | Noted: 2024-01-01

## 2024-06-27 NOTE — PLAN OF CARE
Goal Outcome Evaluation:      Plan of Care Reviewed With: patient      Writer took over this pt at 2000. Pt alert and oriented times 4. Vitals stable. Pt was getting blood when writer received this pt. Blood was done at 2046. Vitals done per protocol. Pt said she has right knee pain when ambulating but no pain while resting on bed. Bilateral leg swollen, right more than left leg. Right knee has ace wrap on and has bruises all on right hip and thigh. Pt ambulated to bathroom twice. Hemoglobin after transfusion was 7.8. Tele -afib. All cares explained to pt.

## 2024-06-27 NOTE — PROGRESS NOTES
"   06/27/24 0931   Appointment Info   Signing Clinician's Name / Credentials (PT) Brook Beard, PT, DPT   Living Environment   People in Home child(no), adult;other (see comments)  (Son & Son's significant other)   Current Living Arrangements house   Home Accessibility stairs to enter home   Number of Stairs, Main Entrance 6   Stair Railings, Main Entrance other (see comments)  (one railing in the middle of the stairs)   Living Environment Comments able to live on main level   Self-Care   Equipment Currently Used at Home other (see comments)  (pt owns FWW - typically does not use AD in the home. pt uses a wheelchair for longer distances)   Fall history within last six months yes   Number of times patient has fallen within last six months 1  (Per Chart Review)   General Information   Onset of Illness/Injury or Date of Surgery 06/25/24   Referring Physician Marissa Pond MD   Patient/Family Therapy Goals Statement (PT) Return to Home   Pertinent History of Current Problem (include personal factors and/or comorbidities that impact the POC) Per Chart Review -\"78 year old female with PMH significant for HFpEF, atrial fibrillation, CKD4, anemia, hypothyroidism, history of breast cancer, PAUL presenting with a fall found to have subsequent right knee hematoma affecting mobility. \"   Existing Precautions/Restrictions fall   Range of Motion (ROM)   Range of Motion ROM deficits secondary to pain   Strength (Manual Muscle Testing)   Strength (Manual Muscle Testing) Deficits observed during functional mobility   Bed Mobility   Bed Mobility supine-sit   Supine-Sit [place] (Bed Mobility) supervision;verbal cues;contact guard;minimum assist (75% patient effort)   Transfers   Transfers sit-stand transfer   Sit-Stand Transfer   Sit-Stand [place] (Transfers) supervision;verbal cues;contact guard   Gait/Stairs (Locomotion)   [place] Level (Gait) supervision;verbal cues;contact guard;minimum assist (75% patient " effort)   Assistive Device (Gait) walker, front-wheeled   Distance in Feet (Gait) 5   Pattern (Gait) step-through;swing-through   Deviations/Abnormal Patterns (Gait) gait speed decreased;beatriz decreased   Clinical Impression   Criteria for Skilled Therapeutic Intervention Yes, treatment indicated   PT Diagnosis (PT) Impaired Functional Mobility   Influenced by the following impairments weakness, decreased ROM, impaired balance   Functional limitations due to impairments gait, transfers, stairs   Clinical Presentation (PT Evaluation Complexity) stable   Clinical Presentation Rationale pt presents as medically diagnosed   Clinical Decision Making (Complexity) low complexity   Planned Therapy Interventions (PT) balance training;bed mobility training;gait training;home exercise program;ROM (range of motion);stair training;strengthening;transfer training;neuromuscular re-education   Risk & Benefits of therapy have been explained evaluation/treatment results reviewed;patient   PT Total Evaluation Time   PT Eval, Low Complexity Minutes (93695) 10   Physical Therapy Goals   PT Frequency Daily   PT Predicted Duration/Target Date for Goal Attainment 07/03/24   PT Goals Bed Mobility;Transfers;Gait;Stairs   PT: Bed Mobility Modified independent;Supine to/from sit   PT: Transfers Supervision/stand-by assist;Sit to/from stand;Assistive device   PT: Gait Supervision/stand-by assist;Rolling walker;Within precautions;150 feet   PT: Stairs Minimal assist;6 stairs  (one railing)   Interventions   Interventions Quick Adds Gait Training;Therapeutic Activity   Therapeutic Activity   Therapeutic Activities: dynamic activities to improve functional performance Minutes (11712) 9   Symptoms Noted During/After Treatment Fatigue   Treatment Detail/Skilled Intervention sit to/from stand: cueing for safety/technique/hand placement on stable surface/use of grab bars in bathroom for safety, CGA - education on safe hand placement; standing  balance: cueing for safety/technique - mod I with hand hygiene, CGA to SBA with standing balance; sitting balance: cueing for safety/posture, SBA - independent with jarod cares; cues for navigating FWW around obstacles & keeping close through entire transfer; sit to supine: cueing for safety/technique, SBA; bed alarm on and call light within reach at end of session   Gait Training   Gait Training Minutes (05839) 8   Symptoms Noted During/After Treatment (Gait Training) fatigue   Treatment Detail/Skilled Intervention cueing for safety/technique/keeping FWW close/posture - no loss of balance noted however increased fatigue so held stairs this session.   Distance in Feet 110   [place] Level (Gait Training) contact guard   Physical Assistance Level (Gait Training) supervision;verbal cues   Assistive Device (Gait Training) rolling walker   Pattern Analysis (Gait Training) swing-through gait   Gait Analysis Deviations decreased beatriz;decreased step length   Impairments (Gait Analysis/Training) strength decreased;pain   PT Discharge Planning   PT Plan gait with/without FWW as appropriate, LE strengthening, stairs   PT Discharge Recommendation (DC Rec) home with assist;home with home care physical therapy   PT Rationale for DC Rec pending medical management of pain/fatigue & safe stair trial, anticipate pt will be able to complete required functional mobility at home with assist. pt reports son can assist at home.  Home PT for improving strength/balance/activity tolerance.   PT Brief overview of current status CGA gait with ' & sit to/from stand transfers   PT Equipment Needed at Discharge walker, rolling  (pt owns FWW)   Total Session Time   Timed Code Treatment Minutes 17   Total Session Time (sum of timed and untimed services) 27

## 2024-06-27 NOTE — PLAN OF CARE
"  Problem: Skin Injury Risk Increased  Goal: Skin Health and Integrity  Outcome: Progressing  Intervention: Optimize Skin Protection  Recent Flowsheet Documentation  Taken 6/27/2024 0002 by Re Irving, RN  Activity Management:   ambulated to bathroom   back to bed  Head of Bed (HOB) Positioning: HOB at 20-30 degrees     Problem: Risk for Delirium  Goal: Improved Sleep  Outcome: Progressing     Problem: Mobility Impairment  Goal: Optimal Mobility  Intervention: Optimize Mobility  Recent Flowsheet Documentation  Taken 6/27/2024 0002 by Re Irving, RN  Activity Management:   ambulated to bathroom   back to bed   Goal Outcome Evaluation:       Pt A&OX4, pain on activity, Tylenol 650 mg  given for knee pain. Pts right lower extremity bruised and an open area at lower thigh about the size of a quarter, area open to air.  Feet elevated on pillow. Ace wrap around right knee. Pt. Up with assist of one to the commode./75 (BP Location: Right arm)   Pulse 100   Temp 98.1  F (36.7  C) (Oral)   Resp 16   Ht 1.575 m (5' 2\")   Wt 73.3 kg (161 lb 9.6 oz)   LMP  (LMP Unknown)   SpO2 96%   BMI 29.56 kg/m                        "

## 2024-06-27 NOTE — PROGRESS NOTES
06/27/24 0740   Appointment Info   Signing Clinician's Name / Credentials (OT) Nany Joiner OTD OTR/L   Living Environment   People in Home child(no), adult;other (see comments);significant other  (Son's significant other)   Current Living Arrangements house   Home Accessibility stairs to enter home   Number of Stairs, Main Entrance 6   Living Environment Comments Able to live on main level, stairs to laundry in basement but does not go down often, tub/shower   Self-Care   Equipment Currently Used at Home none   Fall history within last six months yes   Number of times patient has fallen within last six months 1   Activity/Exercise/Self-Care Comment Typically ind with all ADLs and splits IADLs with others in home   General Information   Onset of Illness/Injury or Date of Surgery 06/25/24   Referring Physician Rachel Seymour MD   Patient/Family Therapy Goal Statement (OT) To return home, to decrease pain   Additional Occupational Profile Info/Pertinent History of Current Problem Shaila Triana is a 78 year old female with PMH significant for HFpEF, atrial fibrillation, CKD4, anemia, hypothyroidism, history of breast cancer, PAUL presenting with a fall found to have subsequent right knee hematoma affecting mobility.   Existing Precautions/Restrictions fall   Cognitive Status Examination   Orientation Status orientation to person, place and time   Affect/Mental Status (Cognitive) WFL   Follows Commands WFL   Safety Deficit awareness of need for assistance   Visual Perception   Visual Impairment/Limitations WFL   Posture   Posture not impaired   Range of Motion Comprehensive   General Range of Motion no range of motion deficits identified   Strength Comprehensive (MMT)   General Manual Muscle Testing (MMT) Assessment no strength deficits identified   Bed Mobility   Bed Mobility supine-sit   Supine-Sit [place] (Bed Mobility) supervision   Assistive Device (Bed Mobility) bed rails   Transfers   Transfers  sit-stand transfer;toilet transfer   Sit-Stand Transfer   Sit-Stand [place] (Transfers) supervision   Toilet Transfer   [place] Level (Toilet Transfer) contact guard   Balance   Balance Assessment standing dynamic balance   Standing Balance: Dynamic contact guard   Activities of Daily Living   BADL Assessment/Intervention lower body dressing;toileting;grooming   Lower Body Dressing Assessment/Training   [place] Level (Lower Body Dressing) minimum assist (75% patient effort)   Grooming Assessment/Training   [place] Level (Grooming) contact guard assist   Toileting   [place] Level (Toileting) supervision   Clinical Impression   Criteria for Skilled Therapeutic Interventions Met (OT) Yes, treatment indicated   OT Diagnosis Decreased ind with ADLs   Influenced by the following impairments R leg hematoma   OT Problem List-Impairments impacting ADL activity tolerance impaired;pain   Assessment of Occupational Performance 3-5 Performance Deficits   Identified Performance Deficits dressing, toileting, fxl transfers, endurance   Planned Therapy Interventions (OT) ADL retraining;transfer training;progressive activity/exercise;risk factor education   Clinical Decision Making Complexity (OT) problem focused assessment/low complexity   Risk & Benefits of therapy have been explained evaluation/treatment results reviewed;care plan/treatment goals reviewed;risks/benefits reviewed;current/potential barriers reviewed;patient   OT Total Evaluation Time   OT Eval, Low Complexity Minutes (65907) 8   OT Goals   Therapy Frequency (OT) Daily   OT Predicted Duration/Target Date for Goal Attainment 07/04/24   OT Goals Lower Body Dressing;Toilet Transfer/Toileting;Cognition;Transfers   OT: Lower Body Dressing Supervision/stand-by assist   OT: Transfer Supervision/stand-by assist  (tub/shower transfer)   OT: Toilet Transfer/Toileting Modified independent;toilet transfer;cleaning and garment management   OT: Cognitive  Patient/caregiver will verbalize understanding of cognitive assessment results/recommendations as needed for safe discharge planning   Self-Care/Home Management   Self-Care/Home Mgmt/ADL, Compensatory, Meal Prep Minutes (20181) 12   Symptoms Noted During/After Treatment (Meal Preparation/Planning Training) increased pain  (Hematoma pain)   Treatment Detail/Skilled Intervention Eval completed, treatment initiated. Fxl mob to/from bathroom CGA with no AD, Toilet transfer CGA, min A to don new brief. G/h standing at sink CGA, fxl mob ~ 100 feet in hallway CGA, no overt LOB noted. Returned to EOB SBA. Left with call light in reach, all needs met.   OT Discharge Planning   OT Plan Tub/shower transfer, monitor cog, toileting, LB dressing   OT Discharge Recommendation (DC Rec) home with home care occupational therapy   OT Rationale for DC Rec Pt mobilizing CGA-SBA, has assist from son, SO, and son's girlfriend at home, would benefit from home OT eval   OT Brief overview of current status CGA-SBA transfers and mobility, min A LB dressing   Total Session Time   Timed Code Treatment Minutes 12   Total Session Time (sum of timed and untimed services) 20

## 2024-06-27 NOTE — PROGRESS NOTES
[facility]    Progress Note - Hospitalist Service       Date of Admission:  6/25/2024    Assessment & Plan   Shaila Triana is a 78 year old female with PMH significant for HFpEF, atrial fibrillation, CKD4, anemia, hypothyroidism, history of breast cancer, PAUL presenting with a fall found to have subsequent right knee hematoma affecting mobility.      Fall  Right knee hematoma  Normocytic anemia  Patient presents after mechanical fall on 6/25 which resulted in her hitting her head and right knee. She is on elliquis for atrial fibrillation. Work-up in the emergency room revealed a negative head CT and a hematoma of 9.5 x 6.1 cm by the right knee. When attempting to ambulate, she is limited due to pain of the right knee. Will have PT/OT assess mobility with this injury and monitor bleeding. Repeat head CT negative for intracranial process.  Hematoma expanding, plan to trend Hb for now  - Hb recheck at 2pm, 9pm, transfuse if < 7  - PT/OT  - Hold Elliquis 2.5 mg BID   - Pain control: Tylenol 975mg TID scheduled, oxycodone 2.5 mg PRN   - Continue PTA iron     DARIAN on CKD4  Baseline creatine appears to be between 1.7-2.0, noted to be 2.4 on admission with sodium of 131. She was given a 1L bolus in the emergency room which did not change her creatinine. Suspect that this DARIAN is due to fluid overload in the setting of CHF as below  - Hold PTA losartan  - BMP in AM     Acute on chronic HFpEF  Patient noted to have one episode of hypoxia to 89% but is otherwise hanging out in the low 90's. CXR on admission shows enlarged heart without evidence of consolidation or fluid. On exam, patient noted to have 2+ pitting edema bilaterally. She is not on diuretics outpatient. BNP on arrival in 14K, last ECHO showed EF of 55-60% in 3/2024. Concerned that her heart failure is contributing to the DARIAN as above.   - Lasix 20 mg IV daily, low threshold to increase if worsening hypoxia  - Telemetry  -  "Intake/output + daily weights   - Continue PTA metoprolol      Chronic conditions  > HLD: continue PTA atorvastatin  > Hypothyroidism: continue PTA levothyroxine  > GERD: continue PTA pantoprazole BID  > Depression: continue PTA zoloft        Diet: Combination Diet Regular Diet Adult    DVT Prophylaxis: VTE Prophylaxis contraindicated due to acute hematoma  Boucher Catheter: Not present  Fluids: PO  Lines: None     Cardiac Monitoring: ACTIVE order. Indication: Acute decompensated heart failure (48 hours)  Code Status: Full Code      Clinically Significant Risk Factors     # Hypokalemia: Lowest K = 3.1 mmol/L in last 2 days, will replace as needed       # Hypoalbuminemia: Lowest albumin = 3.2 g/dL at 6/25/2024  3:12 AM, will monitor as appropriate     # Hypertension: Noted on problem list           #Precipitous drop in Hgb/Hct: Lowest Hgb this hospitalization: 5.3 g/dL. Will continue to monitor and treat/transfuse as appropriate.     # Overweight: Estimated body mass index is 29.56 kg/m  as calculated from the following:    Height as of this encounter: 1.575 m (5' 2\").    Weight as of this encounter: 73.3 kg (161 lb 9.6 oz)., PRESENT ON ADMISSION     # Financial/Environmental Concerns:           Disposition Plan     Expected Discharge Date: 06/27/2024                The patient's care was discussed with the Attending Physician, Dr. Pablo .    Julieth Hardy MD  Hospitalist Service  [facility]  Securely message with [employer] (more info)  Text page via [employer] Paging/Directory   ______________________________________________________________________    Interval History   Hb yesterday stable after blood transfusion. Pain is better today though hematoma appears larger. Will trend Hb, otherwise will continue to monitor. Stable.    Physical Exam   Vital Signs: Temp: 98.6  F (37  C) Temp src: Oral BP: (!) 145/86 Pulse: 93   Resp: 16 SpO2: 98 % O2 Device: None (Room air)    Weight: 161 lbs 9.55 " oz    General: Alert and oriented x 3, no acute distress  Skin: clean, dry, and intact  HEENT: normocephalic, atraumatic, spontaneous eye movement but pupils pinpoint, no injection or icterus, ears and nose normal, no neck masses  Resp: normal work of breathing, no wheezing  Cardio: irregularly irregular, S1 and S2 present  Abdomen: nondistended, no masses  Extremities: As below  Psych: calm, at baseline.          Medical Decision Making   Please see A&P for additional details of medical decision making.      Data   ------------------------- PAST 24 HR DATA REVIEWED -----------------------------------------------    I have personally reviewed the following data over the past 24 hrs:    7.3  \   7.1 (L)   / 131 (L)     133 (L) 100 26.8 (H) /  93   3.1 (L) 26 2.25 (H) \       Imaging results reviewed over the past 24 hrs:   No results found for this or any previous visit (from the past 24 hour(s)).

## 2024-06-27 NOTE — PLAN OF CARE
"  Problem: Adult Inpatient Plan of Care  Goal: Plan of Care Review  Description: The Plan of Care Review/Shift note should be completed every shift.  The Outcome Evaluation is a brief statement about your assessment that the patient is improving, declining, or no change.  This information will be displayed automatically on your shift  note.  Outcome: Progressing  Goal: Patient-Specific Goal (Individualized)  Description: You can add care plan individualizations to a care plan. Examples of Individualization might be:  \"Parent requests to be called daily at 9am for status\", \"I have a hard time hearing out of my right ear\", or \"Do not touch me to wake me up as it startles  me\".  Outcome: Progressing  Goal: Absence of Hospital-Acquired Illness or Injury  Outcome: Progressing  Goal: Optimal Comfort and Wellbeing  Outcome: Progressing  Goal: Readiness for Transition of Care  Outcome: Progressing     Problem: Skin Injury Risk Increased  Goal: Skin Health and Integrity  Outcome: Progressing     Problem: Risk for Delirium  Goal: Optimal Coping  Outcome: Progressing  Intervention: Optimize Psychosocial Adjustment to Delirium  Recent Flowsheet Documentation  Taken 6/27/2024 1053 by Haritha Maria RN  Supportive Measures: active listening utilized  Goal: Improved Behavioral Control  Outcome: Progressing  Intervention: Minimize Safety Risk  Recent Flowsheet Documentation  Taken 6/27/2024 1053 by Haritha Maria RN  Communication Enhancement Strategies:   call light answered in person   extra time allowed for response   repetition utilized   one-step directions provided  Goal: Improved Attention and Thought Clarity  Outcome: Progressing  Intervention: Maximize Cognitive Function  Recent Flowsheet Documentation  Taken 6/27/2024 1053 by Haritha Maria RN  Reorientation Measures: clock in view  Goal: Improved Sleep  Outcome: Progressing     Problem: Comorbidity Management  Goal: Maintenance of Heart Failure Symptom " Control  Outcome: Progressing     Problem: Fall Injury Risk  Goal: Absence of Fall and Fall-Related Injury  Outcome: Progressing     Problem: Pain Acute  Goal: Optimal Pain Control and Function  Outcome: Progressing  Intervention: Optimize Psychosocial Wellbeing  Recent Flowsheet Documentation  Taken 6/27/2024 1053 by Haritha Maria RN  Supportive Measures: active listening utilized   Goal Outcome Evaluation:  Pt alert and oriented x4 with intermittent confusion and forgetfulness. Pt reports feeling tired today. Pt denies pain, shows signs with activity. Pt RLE bruised and swollen from groin down. Pt also as bruising on Left hip. Pt up A1 with belt and walker to bathroom. Pt voiding spontaneously and passing gas. Vitals stable as recorded. Awaiting Hgb recheck.

## 2024-06-28 NOTE — PROGRESS NOTES
"Care Management Follow Up    Length of Stay (days): 2    Expected Discharge Date: 06/29/2024     Concerns to be Addressed:     Care progression  Patient plan of care discussed at interdisciplinary rounds: Yes    Anticipated Discharge Disposition:  Home with home care for PT/OT     Anticipated Discharge Services:  Home with home care for PT/OT  Anticipated Discharge DME:  NA    Patient/family educated on Medicare website which has current facility and service quality ratings:  NA  Education Provided on the Discharge Plan:  Yes per team  Patient/Family in Agreement with the Plan:  Yes    Referrals Placed by CM/SW:  Yes  Private pay costs discussed: Not applicable    Additional Information:  Met with patient at bedside to discuss rehab discharge rec for home care PT/OT.  Patient in agreement.    Referral sent    Social Hx: \"Live w/son Jaun, Jaun's girlfriend and SO Dirk in her house. Independent with ADLs, needs assist with IADLs, amb without devices. Dtr Dana is primary contact. Family willing to transport.\"    RNCM to follow for medical progression, recommendations, and final discharge plan.     Nina Lin RN     Per provider, Dr. MICHAEL Hardy, patient is not ready. Maybe here 1-2 more days.  "

## 2024-06-28 NOTE — PLAN OF CARE
The patient had a hemoglobin of 7g/dL, transfused 1 unit of blood per protocol. Hgb recheck was 8.4g/dL. no reactions symptoms noted. Lungs sounds clear, oxygen saturation at 97% on room air. Pulse oxymetry in place. The patient admitted feeling stronger, less weak after infusion. Telemetry in place. A-fib noted. Ace wrap to right thigh was removed to assess skin and bruising. Ace wrap reapplied. Potassium protocol, supplement given and recheck was 3.5mmol/L. Strict intake and output as ordered intake was 342ml, output was 700ml    Goal Outcome Evaluation:         Problem: Adult Inpatient Plan of Care  Goal: Optimal Comfort and Wellbeing  Outcome: Progressing     Problem: Pain Acute  Goal: Optimal Pain Control and Function  Outcome: Progressing  Intervention: Prevent or Manage Pain  Recent Flowsheet Documentation  Taken 6/27/2024 1635 by Karley Knowles RN  Medication Review/Management: medications reviewed  Intervention: Optimize Psychosocial Wellbeing  Recent Flowsheet Documentation  Taken 6/27/2024 1635 by Karley Knowles RN  Supportive Measures: active listening utilized     Problem: Anemia  Goal: Anemia Symptom Improvement  Outcome: Progressing  Intervention: Monitor and Manage Anemia  Recent Flowsheet Documentation  Taken 6/27/2024 1635 by Karley Knowles RN  Safety Promotion/Fall Prevention:   clutter free environment maintained   safety round/check completed   nonskid shoes/slippers when out of bed

## 2024-06-28 NOTE — PROGRESS NOTES
[facility]    Progress Note - Hospitalist Service       Date of Admission:  6/25/2024    Assessment & Plan   Shaila Triana is a 78 year old female with PMH significant for HFpEF, atrial fibrillation, CKD4, anemia, hypothyroidism, history of breast cancer, PAUL presenting with a fall found to have subsequent right knee hematoma affecting mobility.      Fall  Right knee hematoma  Normocytic anemia  Patient presents after mechanical fall on 6/25 which resulted in her hitting her head and right knee. She is on elliquis for atrial fibrillation. Work-up in the emergency room revealed a negative head CT and a hematoma of 9.5 x 6.1 cm by the right knee. When attempting to ambulate, she is limited due to pain of the right knee. Will have PT/OT assess mobility with this injury and monitor bleeding. Repeat head CT negative for intracranial process.  Hematoma expanding, plan to trend Hb for now  - Hb recheck at 2pm, transfuse if < 7  - possible discharge later today if Hb stable  - PT/OT  - Hold Elliquis 2.5 mg BID   - Pain control: Tylenol 975mg TID scheduled, oxycodone 2.5 mg PRN   - Continue PTA iron     DARIAN on CKD4  Baseline creatine appears to be between 1.7-2.0, noted to be 2.4 on admission with sodium of 131. She was given a 1L bolus in the emergency room which did not change her creatinine. Overall stable, likely up given extra doses of Lasix in setting of transfusions  - Hold PTA losartan  - BMP in AM     Acute on chronic HFpEF  Patient noted to have one episode of hypoxia to 89% but is otherwise hanging out in the low 90's. CXR on admission shows enlarged heart without evidence of consolidation or fluid. On exam, patient noted to have 2+ pitting edema bilaterally. She is not on diuretics outpatient. BNP on arrival in 14K, last ECHO showed EF of 55-60% in 3/2024. Concerned that her heart failure is contributing to the DARIAN as above.   - Lasix 20 mg IV daily, low threshold to increase if  "worsening hypoxia  - Telemetry  - Intake/output + daily weights   - Continue PTA metoprolol      Chronic conditions  > HLD: continue PTA atorvastatin  > Hypothyroidism: continue PTA levothyroxine  > GERD: continue PTA pantoprazole BID  > Depression: continue PTA zoloft        Diet: Combination Diet Regular Diet Adult    DVT Prophylaxis: VTE Prophylaxis contraindicated due to acute hematoma  Boucher Catheter: Not present  Fluids: PO  Lines: None     Cardiac Monitoring: ACTIVE order. Indication: Acute decompensated heart failure (48 hours)  Code Status: Full Code      Clinically Significant Risk Factors     # Hypokalemia: Lowest K = 3.1 mmol/L in last 2 days, will replace as needed       # Hypoalbuminemia: Lowest albumin = 3.2 g/dL at 6/25/2024  3:12 AM, will monitor as appropriate   # Thrombocytopenia: Lowest platelets = 123 in last 2 days, will monitor for bleeding   # Hypertension: Noted on problem list           #Precipitous drop in Hgb/Hct: Lowest Hgb this hospitalization: 5.3 g/dL. Will continue to monitor and treat/transfuse as appropriate.     # Overweight: Estimated body mass index is 29.56 kg/m  as calculated from the following:    Height as of this encounter: 1.575 m (5' 2\").    Weight as of this encounter: 73.3 kg (161 lb 9.6 oz)., PRESENT ON ADMISSION     # Financial/Environmental Concerns:           Disposition Plan      Expected Discharge Date: 06/29/2024      Destination: home with help/services          The patient's care was discussed with the Attending Physician, Dr. Pablo .    Julieth Hardy MD  Hospitalist Service  [facility]  Securely message with [employer] (more info)  Text page via [employer] Paging/Directory   ______________________________________________________________________    Interval History   Required transfusion yesterday afternoon. Hb stable this morning (8.4 after transfusion -> 7.8). Reports feeling much stronger and less tired today, improvement overall. Will " continue to monitor.    Physical Exam   Vital Signs: Temp: 99  F (37.2  C) Temp src: Oral BP: 133/71 Pulse: 90   Resp: 18 SpO2: 92 % O2 Device: None (Room air)    Weight: 161 lbs 9.55 oz    General: Alert, no acute distress  Skin: clean, dry, and intact  HEENT: normocephalic, atraumatic, spontaneous eye movement, no injection or icterus, ears and nose normal, no neck masses  Resp: normal work of breathing, no wheezing  Cardio: RRR, S1 and S2 present  Abdomen: nondistended, no masses  Extremities: Swelling improved, edema noted but better. Patient sitting up in chair today. Picture as below  Psych: calm, pleasant          Medical Decision Making   Please see A&P for additional details of medical decision making.      Data   ------------------------- PAST 24 HR DATA REVIEWED -----------------------------------------------    I have personally reviewed the following data over the past 24 hrs:    7.3  \   7.8 (L)   / 123 (L)     134 (L) 99 26.8 (H) /  97   3.3 (L) 24 2.23 (H) \       Imaging results reviewed over the past 24 hrs:   No results found for this or any previous visit (from the past 24 hour(s)).

## 2024-06-28 NOTE — DISCHARGE SUMMARY
"[facility]  Discharge Summary - Medicine & Pediatrics       Date of Admission:  6/25/2024  Date of Discharge:  6/28/2024  Discharging Provider: Dr. Samy Rose  Discharge Service: Hospitalist Service    Discharge Diagnoses   Fall  Right knee hematoma  Normocytic anemia  DARIAN on CKD4  Acute on chronic HFpEF  HLD  Hypothyroidism  GERD  Depression      Clinically Significant Risk Factors     # Overweight: Estimated body mass index is 29.56 kg/m  as calculated from the following:    Height as of this encounter: 1.575 m (5' 2\").    Weight as of this encounter: 73.3 kg (161 lb 9.6 oz).       Follow-ups Needed After Discharge   Follow-up Appointments     Follow-up and recommended labs and tests       Follow up with primary care provider, Mervin Ibarra, within 3-5 days for   hospital follow- up.  The following labs/tests are recommended: repeat   CBC.            Unresulted Labs Ordered in the Past 30 Days of this Admission       Date and Time Order Name Status Description    6/26/2024  7:03 PM Prepare red blood cells (unit) Preliminary         These results will be followed up by the patient's PCP    Discharge Disposition   Discharged to home  Condition at discharge: Stable    Hospital Course   Shaila Triana was admitted on 6/25/2024. She has a PMH significant for HFpEF, atrial fibrillation, CKD4, anemia, hypothyroidism, history of breast cancer, PAUL presenting with a fall found to have subsequent right knee hematoma affecting mobility. The following problems were addressed during her hospitalization:    Fall  Right knee hematoma  Normocytic anemia  Patient presented after mechanical fall on 6/25 which resulted in her hitting her head and right knee. She is on elliquis for atrial fibrillation. Work-up in the emergency room revealed a negative head CT, but also revealed a hematoma of 9.5 x 6.1 cm by the right knee. Repeat head CT at 24 hours negative. When attempting to ambulate, she is " limited due to pain of the right knee. Hematoma continued to expand, Hb dropped several times requiring 3 units pRBC transfusion. Hematoma did appear to have stretching and expansion to the point where skin was broken, no michael bleeding though expect that hematoma may start to ooze in next several days. Hb now stable.  - Tylenol for pain  - restart Eliquis, will need close monitoring of CBC at discharge  - compress knee with ACE wraps  - home PT/OT ordered  - recommend follow-up CBC in 3-4 days with PCP     DARIAN on CKD4  Baseline creatine appears to be between 1.7-2.0, noted to be 2.4 on admission with sodium of 131. She was given a 1L bolus in the emergency room which did not change her creatinine. Lasix given in between transfusions due to hx of HFpEF, creatinine now stable.  - recommend outpatient BMP     Acute on chronic HFpEF  Hypoxia  Patient noted to have one episode of hypoxia to 89% but otherwise hanging out in the low 90's. CXR on admission showed enlarged heart without evidence of consolidation or fluid. On exam, patient noted to have 2+ pitting edema bilaterally. She is not on diuretics outpatient. BNP on arrival in 14K, last ECHO showed EF of 55-60% in 3/2024. Initial concern for transfusion reaction given hypoxia, however resolved without intervention. Concerned that her heart failure was contributing, now on room air and stable after diuresis.  - Continue PTA metoprolol      Chronic conditions  > HLD: continue PTA atorvastatin  > Hypothyroidism: continue PTA levothyroxine  > GERD: continue PTA pantoprazole BID  > Depression: continue PTA zoloft    Consultations This Hospital Stay   CARE MANAGEMENT / SOCIAL WORK IP CONSULT  PHYSICAL THERAPY ADULT IP CONSULT  OCCUPATIONAL THERAPY ADULT IP CONSULT    Code Status   Full Code       The patient was discussed with Dr. Samy Hardy MD  [address] [address]  Phone: [phone]  Fax:  [phone]  ______________________________________________________________________    Physical Exam   Vital Signs: Temp: 98  F (36.7  C) Temp src: Oral BP: 130/67 Pulse: 90   Resp: 18 SpO2: 96 % O2 Device: None (Room air)    Weight: 161 lbs 9.55 oz    General: Alert, no acute distress  Skin: clean, dry, and intact  HEENT: normocephalic, atraumatic, spontaneous eye movement, no injection or icterus, ears and nose normal, no neck masses  Resp: normal work of breathing, no wheezing  Cardio: RRR, S1 and S2 present  Abdomen: nondistended, no masses  Extremities: Swelling improved, bilteral pitting edema noted but better. Patient sitting up in chair today. Picture as below  Psych: calm, pleasant      Image from 6/28/2024:      Primary Care Physician   Mervin Ibarra    Discharge Orders      Home Care Referral      Reason for your hospital stay    You were hospitalized after your fall. You were found to have significant bleeding in your knee causing a hematoma that continued to bleed and required blood transfusions. Your bleeding has stabilized, and you are now ok to return home.     Follow-up and recommended labs and tests     Follow up with primary care provider, Mervin Ibarra, within 3-5 days for hospital follow- up.  The following labs/tests are recommended: repeat CBC.     Activity    Your activity upon discharge: activity as tolerated     Diet    Follow this diet upon discharge: Orders Placed This Encounter      Combination Diet Regular Diet Adult       Significant Results and Procedures   Most Recent 3 CBC's:  Recent Labs   Lab Test 06/28/24  1351 06/28/24  0519 06/27/24 2022 06/27/24  1419 06/27/24  0703 06/26/24  1618 06/26/24  0652   WBC  --  7.3  --   --  7.3  --  7.8   HGB 8.3* 7.8* 8.4*   < > 7.1*   < > 5.3*   MCV  --  85  --   --  86  --  87   PLT  --  123*  --   --  131*  --  138*    < > = values in this interval not displayed.     Most Recent 3 BMP's:  Recent Labs   Lab Test 06/28/24  2696  06/28/24  0519 06/27/24 2022 06/27/24  0703 06/26/24  0652   NA  --  134*  --  133* 133*   POTASSIUM 3.7 3.3* 3.5 3.1* 3.5   CHLORIDE  --  99  --  100 101   CO2  --  24  --  26 23   BUN  --  26.8*  --  26.8* 28.7*   CR  --  2.23*  --  2.25* 2.32*   ANIONGAP  --  11  --  7 9   ANIKA  --  7.5*  --  7.5* 7.5*   GLC  --  97  --  93 103*     Most Recent 2 LFT's:  Recent Labs   Lab Test 06/25/24 0312 02/01/24  0606   AST 16 27   ALT 13 7   ALKPHOS 68 56   BILITOTAL 0.3 1.2     Most Recent 3 INR's:  Recent Labs   Lab Test 03/14/24  1959 02/01/24  0606   INR 1.05 1.14     Most Recent INR's and Anticoagulation Dosing History:  Anticoagulation Dose History          Latest Ref Rng & Units 2/1/2024 3/14/2024   Recent Dosing and Labs   INR 0.85 - 1.15 1.14  1.05       Details                 Most Recent 3 Creatinines:  Recent Labs   Lab Test 06/28/24  0519 06/27/24  0703 06/26/24  0652   CR 2.23* 2.25* 2.32*     Most Recent 3 Hemoglobins:  Recent Labs   Lab Test 06/28/24  1351 06/28/24  0519 06/27/24 2022   HGB 8.3* 7.8* 8.4*     Most Recent 3 Troponin's:  Recent Labs   Lab Test 10/05/20  2340 10/05/20  1454 10/05/20  1050   TROPI <0.015 <0.015 <0.015     Most Recent 3 BNP's:  Recent Labs   Lab Test 06/25/24  0312 01/15/24  1757 12/25/23  1308   NTBNPI 14,445* 4,476* 9,070*     Most Recent D-dimer:No lab results found.  Most Recent Cholesterol Panel:  Recent Labs   Lab Test 03/15/24  1009   CHOL 147   LDL 88   HDL 40*   TRIG 97     7-Day Micro Results       No results found for the last 168 hours.          Most Recent TSH and T4:  Recent Labs   Lab Test 03/19/24  0652 01/31/24  0846   TSH 1.97 6.76*   T4  --  0.85*     Most Recent Hemoglobin A1c:  Recent Labs   Lab Test 03/14/24  1959   A1C 4.6     Most Recent 6 glucoses:  Recent Labs   Lab Test 06/28/24  0519 06/27/24  0703 06/26/24  0652 06/25/24  1601 06/25/24  0312 03/22/24  1159   GLC 97 93 103* 94 100* 109*     Most Recent Urinalysis:  Recent Labs   Lab Test  01/15/24  1910   COLOR Yellow   APPEARANCE Slightly Cloudy*   URINEGLC Negative   URINEBILI Negative   URINEKETONE Negative   SG 1.009   UBLD Moderate*   URINEPH 5.0   PROTEIN Negative   NITRITE Negative   LEUKEST Negative   RBCU 9*   WBCU 3     Most Recent ABG:No lab results found.  Most Recent ESR & CRP:No lab results found.  Most Recent Anemia Panel:  Recent Labs   Lab Test 06/28/24  1351 06/28/24  0519 10/05/20  1339 10/05/20  1050   WBC  --  7.3   < > 6.0   HGB 8.3* 7.8*   < > 7.1*   HCT  --  24.2*   < > 26.4*   MCV  --  85   < > 65*   PLT  --  123*   < > 236   IRON  --   --   --  20*   IRONSAT  --   --   --  4*   RETICABSCT  --   --   --  49.9   RETP  --   --   --  1.2   FEB  --   --   --  445*   JARRET  --   --   --  7*    < > = values in this interval not displayed.     Most Recent CPK:No lab results found.,   Results for orders placed or performed during the hospital encounter of 06/25/24   CT Head w/o Contrast    Narrative    EXAM: CT HEAD W/O CONTRAST, CT CERVICAL SPINE W/O CONTRAST  LOCATION: [facility]  DATE: 6/25/2024    INDICATION: Fall, Eliquis, patient struck head.  COMPARISON: CT head 5/4/2024, CT cervical spine 10/17/2022.  TECHNIQUE:   1) Routine CT Head without IV contrast. Multiplanar reformats. Dose reduction techniques were used.  2) Routine CT Cervical Spine without IV contrast. Multiplanar reformats. Dose reduction techniques were used.    FINDINGS:   HEAD CT:   INTRACRANIAL CONTENTS: No intracranial hemorrhage, extraaxial collection, or mass effect.  No CT evidence of acute infarct. Mild presumed chronic small vessel ischemic changes. Mild generalized volume loss. No hydrocephalus.     VISUALIZED ORBITS/SINUSES/MASTOIDS: No intraorbital abnormality. No paranasal sinus mucosal disease. No middle ear or mastoid effusion.    BONES/SOFT TISSUES: No acute abnormality.    CERVICAL SPINE CT:   VERTEBRA: There is mild chronic height loss along the superior endplate at the C4  level. Additional cervical vertebra are normal in height. Minimal alterations in the lateral alignment are chronic and likely degenerative in nature. No fracture or   posttraumatic subluxation.     CANAL/FORAMINA: Multilevel degenerative changes. Mild central canal stenosis at the C5-C6 level. High-grade foraminal stenosis on the right at C4-C5 and C5-C6 and on the left at C3-C4, C4-C5 and C5-C6.    PARASPINAL: There is enlargement of the left lobe of the thyroid. Incompletely evaluated left-sided pleural effusion. Visualized lung fields are clear.      Impression    IMPRESSION:  HEAD CT:  1.  No CT evidence for acute intracranial process.  2.  Brain atrophy and presumed chronic microvascular ischemic changes as above.    CERVICAL SPINE CT:  1.  No CT evidence for acute fracture or posttraumatic subluxation.  2.  Degenerative changes as highlighted above.                CT Cervical Spine w/o Contrast    Narrative    EXAM: CT HEAD W/O CONTRAST, CT CERVICAL SPINE W/O CONTRAST  LOCATION: [facility]  DATE: 6/25/2024    INDICATION: Fall, Eliquis, patient struck head.  COMPARISON: CT head 5/4/2024, CT cervical spine 10/17/2022.  TECHNIQUE:   1) Routine CT Head without IV contrast. Multiplanar reformats. Dose reduction techniques were used.  2) Routine CT Cervical Spine without IV contrast. Multiplanar reformats. Dose reduction techniques were used.    FINDINGS:   HEAD CT:   INTRACRANIAL CONTENTS: No intracranial hemorrhage, extraaxial collection, or mass effect.  No CT evidence of acute infarct. Mild presumed chronic small vessel ischemic changes. Mild generalized volume loss. No hydrocephalus.     VISUALIZED ORBITS/SINUSES/MASTOIDS: No intraorbital abnormality. No paranasal sinus mucosal disease. No middle ear or mastoid effusion.    BONES/SOFT TISSUES: No acute abnormality.    CERVICAL SPINE CT:   VERTEBRA: There is mild chronic height loss along the superior endplate at the C4 level. Additional  cervical vertebra are normal in height. Minimal alterations in the lateral alignment are chronic and likely degenerative in nature. No fracture or   posttraumatic subluxation.     CANAL/FORAMINA: Multilevel degenerative changes. Mild central canal stenosis at the C5-C6 level. High-grade foraminal stenosis on the right at C4-C5 and C5-C6 and on the left at C3-C4, C4-C5 and C5-C6.    PARASPINAL: There is enlargement of the left lobe of the thyroid. Incompletely evaluated left-sided pleural effusion. Visualized lung fields are clear.      Impression    IMPRESSION:  HEAD CT:  1.  No CT evidence for acute intracranial process.  2.  Brain atrophy and presumed chronic microvascular ischemic changes as above.    CERVICAL SPINE CT:  1.  No CT evidence for acute fracture or posttraumatic subluxation.  2.  Degenerative changes as highlighted above.                XR Femur Left 2 Views    Narrative    EXAM: XR FEMUR LEFT 2 VIEWS  LOCATION: [facility]  DATE: 6/25/2024    INDICATION: Left lateral femur pain.  COMPARISON: Whole-body PET/CT 8/23/2023.      Impression    IMPRESSION: Anatomic alignment of the left femur without fracture or dislocation. Chondroid calcifications in the distal metaphysis of the left femur, unchanged. Mild degenerative changes left hip and knee joints. Atherosclerotic changes.    XR Chest 1 View    Narrative    EXAM: XR CHEST 1 VIEW  LOCATION: [facility]  DATE: 6/25/2024    INDICATION: fall  COMPARISON: 4/30/2024      Impression    IMPRESSION: Heart size is enlarged. Retrocardiac opacity is favored to represent a large hiatal hernia. Widening of the superior mediastinum is unchanged, favored secondary to tortuous vascularity. Hyperlucency in the right lung is likely artifactual due   to rotation. No CHF, lobar consolidation or pneumothorax. Mild thoracolumbar scoliosis. No acute bony abnormalities.   XR Knee Right 1/2 Views    Narrative    EXAM: XR KNEE  RIGHT 1/2 VIEWS  LOCATION: [facility]  DATE: 6/25/2024    INDICATION: pain after fall, swelling  COMPARISON: None.      Impression    IMPRESSION: No visible fracture or dislocation. Masslike density in the medial soft tissues of the distal thigh 9.5 x 6.1 cm in size. Vascular calcification.   CT Head w/o Contrast    Narrative    EXAM: CT HEAD W/O CONTRAST  LOCATION: [facility]  DATE: 6/26/2024    INDICATION: Fall on anticoagulation, repeat head imaging 24 hours after event. Follow-up traumatic head injury.  COMPARISON: 6/25/2024  TECHNIQUE: Routine CT Head without IV contrast. Multiplanar reformats. Dose reduction techniques were used.    FINDINGS:  INTRACRANIAL CONTENTS: No intracranial hemorrhage, extraaxial collection, or mass effect.  No CT evidence of acute infarct. Mild presumed chronic small vessel ischemic changes. Mild generalized volume loss. No hydrocephalus.     VISUALIZED ORBITS/SINUSES/MASTOIDS: Prior bilateral cataract surgery. Visualized portions of the orbits are otherwise unremarkable. No paranasal sinus mucosal disease. No middle ear or mastoid effusion.    BONES/SOFT TISSUES: No acute abnormality.        Impression    IMPRESSION:  1.  Stable head CT without acute intracranial hemorrhage or other acute abnormality.  2.  Mild age-related changes as detailed above.       Discharge Medications   Current Discharge Medication List        CONTINUE these medications which have NOT CHANGED    Details   albuterol (PROAIR HFA/PROVENTIL HFA/VENTOLIN HFA) 108 (90 Base) MCG/ACT inhaler Inhale 2 puffs into the lungs every 6 hours as needed for shortness of breath / dyspnea or wheezing  Qty: 18 g, Refills: 0      apixaban ANTICOAGULANT (ELIQUIS) 2.5 MG tablet Take 2.5 mg by mouth 2 times daily      atorvastatin (LIPITOR) 40 MG tablet Take 1 tablet (40 mg) by mouth every evening for 30 days  Qty: 30 tablet, Refills: 0    Associated Diagnoses: TIA (transient  ischemic attack)      calcium carbonate (OS-ANIKA) 1500 (600 Ca) MG tablet Take 600 mg by mouth 2 times daily (with meals)      ferrous sulfate (FE TABS) 325 (65 Fe) MG EC tablet Take 325 mg by mouth daily      fluticasone (FLONASE) 50 MCG/ACT nasal spray Spray 2 sprays in nostril daily as needed for allergies      levothyroxine (SYNTHROID/LEVOTHROID) 137 MCG tablet Take 1 tablet by mouth daily before breakfast      losartan (COZAAR) 25 MG tablet Take 1 tablet (25 mg) by mouth daily for 30 days  Qty: 30 tablet, Refills: 0    Associated Diagnoses: TIA (transient ischemic attack)      metoprolol tartrate (LOPRESSOR) 50 MG tablet Take 1 tablet (50 mg) by mouth 2 times daily for 30 days  Qty: 60 tablet, Refills: 0    Associated Diagnoses: TIA (transient ischemic attack)      multivitamin w/minerals (CENTRUM ADULTS) tablet Take 1 tablet by mouth daily as needed (supplement)      pantoprazole (PROTONIX) 40 MG EC tablet Take 1 tablet (40 mg) by mouth 2 times daily  Qty: 60 tablet, Refills: 0    Associated Diagnoses: Gastrointestinal hemorrhage associated with gastric ulcer      sertraline (ZOLOFT) 100 MG tablet Take 200 mg by mouth daily      sucralfate (CARAFATE) 1 GM tablet Take 1 tablet (1 g) by mouth 2 times daily (before meals)  Qty: 60 tablet, Refills: 0    Associated Diagnoses: Gastrointestinal hemorrhage associated with gastric ulcer           Allergies   Allergies   Allergen Reactions    Codeine Nausea    Dust Mite Extract Other (See Comments)     Per allergy test    Erythromycin Dizziness     Almost passed out    Macrolides And Ketolides     Penicillin [Penicillins] Itching

## 2024-06-28 NOTE — PROGRESS NOTES
Pt discharged this shift. Vitals stable, hemoglobin stable 8.3. Right leg ace wrapped. AVS given and discussed with pt, daughter and significant other. Verbalized understanding. Valuables returned. Transported off unit via wheelchair.

## 2024-06-28 NOTE — PLAN OF CARE
"  Problem: Adult Inpatient Plan of Care  Goal: Plan of Care Review  Description: The Plan of Care Review/Shift note should be completed every shift.  The Outcome Evaluation is a brief statement about your assessment that the patient is improving, declining, or no change.  This information will be displayed automatically on your shift  note.  Outcome: Progressing  Goal: Patient-Specific Goal (Individualized)  Description: You can add care plan individualizations to a care plan. Examples of Individualization might be:  \"Parent requests to be called daily at 9am for status\", \"I have a hard time hearing out of my right ear\", or \"Do not touch me to wake me up as it startles  me\".  Outcome: Progressing  Goal: Absence of Hospital-Acquired Illness or Injury  Outcome: Progressing  Intervention: Prevent Skin Injury  Recent Flowsheet Documentation  Taken 6/28/2024 0928 by Haritha Maria RN  Device Skin Pressure Protection: absorbent pad utilized/changed  Intervention: Prevent Infection  Recent Flowsheet Documentation  Taken 6/28/2024 0928 by Haritha Maria RN  Infection Prevention:   hand hygiene promoted   rest/sleep promoted   single patient room provided  Goal: Optimal Comfort and Wellbeing  Outcome: Progressing  Intervention: Monitor Pain and Promote Comfort  Recent Flowsheet Documentation  Taken 6/28/2024 0930 by Haritha Maria RN  Pain Management Interventions:   distraction   emotional support   pillow support provided   quiet environment facilitated   repositioned   rest  Goal: Readiness for Transition of Care  Outcome: Progressing     Problem: Skin Injury Risk Increased  Goal: Skin Health and Integrity  Outcome: Progressing  Intervention: Plan: Nurse Driven Intervention: Moisture Management  Recent Flowsheet Documentation  Taken 6/28/2024 0928 by Haritha Maria RN  Moisture Interventions:   Encourage regular toileting   No brief in bed   Incontinence pad  Intervention: Plan: Nurse Driven Intervention: " Friction and Shear  Recent Flowsheet Documentation  Taken 6/28/2024 0928 by Haritha Maria RN  Friction/Shear Interventions: HOB 30 degrees or less  Intervention: Optimize Skin Protection  Recent Flowsheet Documentation  Taken 6/28/2024 0928 by Haritha Maria RN  Pressure Reduction Techniques: heels elevated off bed     Problem: Risk for Delirium  Goal: Optimal Coping  Outcome: Progressing  Intervention: Optimize Psychosocial Adjustment to Delirium  Recent Flowsheet Documentation  Taken 6/28/2024 0928 by Haritha Maria RN  Supportive Measures:   active listening utilized   positive reinforcement provided   verbalization of feelings encouraged   decision-making supported   self-care encouraged  Goal: Improved Behavioral Control  Outcome: Progressing  Intervention: Prevent and Manage Agitation  Recent Flowsheet Documentation  Taken 6/28/2024 0928 by Haritha Maria RN  Environment Familiarity/Consistency: daily routine followed  Intervention: Minimize Safety Risk  Recent Flowsheet Documentation  Taken 6/28/2024 0928 by Haritha Maria RN  Communication Enhancement Strategies:   call light answered in person   one-step directions provided  Goal: Improved Attention and Thought Clarity  Outcome: Progressing  Intervention: Maximize Cognitive Function  Recent Flowsheet Documentation  Taken 6/28/2024 0928 by Haritha Maria RN  Sensory Stimulation Regulation:   care clustered   lighting decreased   television on  Reorientation Measures: clock in view  Goal: Improved Sleep  Outcome: Progressing     Problem: Comorbidity Management  Goal: Maintenance of Heart Failure Symptom Control  Outcome: Progressing     Problem: Fall Injury Risk  Goal: Absence of Fall and Fall-Related Injury  Outcome: Progressing     Problem: Pain Acute  Goal: Optimal Pain Control and Function  Outcome: Progressing  Intervention: Develop Pain Management Plan  Recent Flowsheet Documentation  Taken 6/28/2024 0930 by Haritha Maria  RN  Pain Management Interventions:   distraction   emotional support   pillow support provided   quiet environment facilitated   repositioned   rest  Intervention: Prevent or Manage Pain  Recent Flowsheet Documentation  Taken 6/28/2024 0928 by Haritha Maria RN  Sensory Stimulation Regulation:   care clustered   lighting decreased   television on  Intervention: Optimize Psychosocial Wellbeing  Recent Flowsheet Documentation  Taken 6/28/2024 0928 by Haritha Maria RN  Supportive Measures:   active listening utilized   positive reinforcement provided   verbalization of feelings encouraged   decision-making supported   self-care encouraged     Problem: Anemia  Goal: Anemia Symptom Improvement  Outcome: Progressing   Goal Outcome Evaluation:    Pt alert and oriented x4 with forgetfulness. Pt reports pain with movement, 5/10 in RLE. Pain controlled with scheduled tylenol. Pt RLE bruised and swollen from groin down, abrasion on Right inner knee, dressing changed with MD. RLE has Tubigrip applied today. Pt also as bruising on Left hip. Pt up A1 with belt and walker to bathroom. Pt voiding spontaneously and passing gas. Pt requested PRN stool softener, administered. Vitals stable as recorded. Hgb recheck 8.3 this afternoon. MD updated. K+ recheck 3.7.

## 2024-06-28 NOTE — PLAN OF CARE
Problem: Adult Inpatient Plan of Care  Goal: Plan of Care Review  Description: The Plan of Care Review/Shift note should be completed every shift.  The Outcome Evaluation is a brief statement about your assessment that the patient is improving, declining, or no change.  This information will be displayed automatically on your shift  note.  Outcome: Progressing     Problem: Adult Inpatient Plan of Care  Goal: Optimal Comfort and Wellbeing  Outcome: Progressing     Problem: Pain Acute  Goal: Optimal Pain Control and Function  Outcome: Progressing  Intervention: Prevent or Manage Pain  Recent Flowsheet Documentation  Taken 6/28/2024 0001 by Gautam Ramirez RN  Medication Review/Management: medications reviewed  Intervention: Optimize Psychosocial Wellbeing  Recent Flowsheet Documentation  Taken 6/28/2024 0001 by Gautam Ramirez RN  Supportive Measures: active listening utilized     Problem: Anemia  Goal: Anemia Symptom Improvement  Outcome: Progressing  Intervention: Monitor and Manage Anemia  Recent Flowsheet Documentation  Taken 6/28/2024 0001 by Gautam Ramirez RN  Safety Promotion/Fall Prevention:   clutter free environment maintained   safety round/check completed   nonskid shoes/slippers when out of bed   Goal Outcome Evaluation:  Patient slept well between cares. Up to the bathroom and voiding without any problems. Denied pain. Plan of Care reviewed.

## 2024-06-29 NOTE — PLAN OF CARE
Physical Therapy Discharge Summary    Reason for therapy discharge:    Discharged to home with home therapy.    Progress towards therapy goal(s). See goals on Care Plan in [facility] electronic health record for goal details.  Goals partially met.  Barriers to achieving goals:   discharge from facility.    Therapy recommendation(s):    Recommend continued therapies to improve overall strength, balance and mobility.       Brooke Hayward, PT, DPT  6/29/2024

## 2024-06-29 NOTE — PLAN OF CARE
Occupational Therapy Discharge Summary    Reason for therapy discharge:    Discharged to home with home therapy.    Progress towards therapy goal(s). See goals on Care Plan in [facility] electronic health record for goal details.  Goals partially met.  Barriers to achieving goals:   discharge from facility.    Therapy recommendation(s):    Continued therapy is recommended.  Rationale/Recommendations:  Pt will benefit from HC OT to facilitate safe transition home, improve IND w/ ADLs/IADLs.    MAURO Cesar/L. 6/29/2024, 7:17 AM

## 2024-07-09 PROBLEM — J96.01 ACUTE RESPIRATORY FAILURE WITH HYPOXIA (H): Status: ACTIVE | Noted: 2024-01-01

## 2024-07-09 PROBLEM — J81.0 ACUTE PULMONARY EDEMA (H): Status: ACTIVE | Noted: 2024-01-01

## 2024-07-09 PROBLEM — Z79.01 CHRONIC ANTICOAGULATION: Status: ACTIVE | Noted: 2020-10-05

## 2024-07-09 NOTE — ED PROVIDER NOTES
"  History     Chief Complaint   Patient presents with    Generalized Weakness     Fatigue and weakness - transfused 1 wk ago     HPI    Shaila Triana is a 78 year old female who comes in from clinic visit today at the [facility] where she was seen for follow-up of her multiple medical problems.  She is reporting significant weight gain up at least 4 pounds since last week as well as increased peripheral edema.  She has a history of congestive heart failure and of anemia.  She has a history of atrial fibrillation and was on Eliquis for this until she had a Watchman procedure when it was discontinued.  She tells me however it was restarted possibly because of a subsequent stroke.  She has had multiple blood transfusions for anemia including 3 units last week followed by this weight gain.  In the clinic she was assessed by her primary care physician and offered the option of outpatient management of fluid overload with close follow-up or coming to the emergency department.  Currently she feels exertional dyspnea and fatigue.  She has significant ankle edema.  She denies any chest pain.  She is not having any abdominal pain.  She tells me she has been feeling this shortness of breath since \"the beginning of the year.\"  She has had chronic nausea also for at least the past 6 months.  She does not vomit.  She has not had any change in her bowel or bladder function.    I reviewed the record of her [facility] clinic visit today.    I reviewed the record of her hospitalization June 25 to 28 at [facility] where she was seen after a fall with a right knee hematoma and a normocytic anemia and acute kidney injury superimposed on chronic kidney disease.  She also was given a diagnosis of acute on chronic heart failure with preserved ejection fraction.    Allergies:  Allergies   Allergen Reactions    Blood-Group Specific Substance      Patient has a history of a clinically significant antibody against RBC " antigens.  A delay in compatible RBCs may occur.  Unidentified antibody identified at [facility] 6/26/24    Codeine Nausea    Dust Mite Extract Other (See Comments)     Per allergy test    Erythromycin Dizziness     Almost passed out    Macrolides And Ketolides     Penicillin [Penicillins] Itching       Problem List:    Patient Active Problem List    Diagnosis Date Noted    Acute pulmonary edema (H) 07/09/2024     Priority: Medium    Acute respiratory failure with hypoxia (H) 07/09/2024     Priority: Medium    Monoclonal gammopathy of unknown significance (MGUS) 06/27/2024     Priority: Medium    Lymphoma (H) 06/27/2024     Priority: Medium    Renal insufficiency 06/25/2024     Priority: Medium    Chronic anemia 06/25/2024     Priority: Medium    Fall at home, initial encounter 06/25/2024     Priority: Medium    Leg hematoma, right, initial encounter 06/25/2024     Priority: Medium    H/O: CVA (cerebrovascular accident) 04/30/2024     Priority: Medium    Proteinuria 03/18/2024     Priority: Medium    Secondary hypertension 03/15/2024     Priority: Medium    Speech disturbance, unspecified type 03/15/2024     Priority: Medium    TIA (transient ischemic attack) 03/15/2024     Priority: Medium    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) 03/15/2024     Priority: Medium    Chronic diastolic congestive heart failure (H) 01/31/2024     Priority: Medium    Upper GI bleed 01/30/2024     Priority: Medium    Anemia due to blood loss, acute 01/30/2024     Priority: Medium    Recurrent falls while walking 01/16/2024     Priority: Medium    Acute respiratory failure with hypoxia and hypercarbia (H) 12/25/2023     Priority: Medium    Community acquired pneumonia, unspecified laterality 12/25/2023     Priority: Medium    Thrombocytopenia (H24) 12/25/2023     Priority: Medium    Presence of Watchman left atrial appendage closure device 07/20/2022     Priority: Medium     Formatting of this note  might be different from the original.   Dr. Terry Pettit      Anemia 10/05/2020     Priority: Medium    Dizziness 10/05/2020     Priority: Medium    Chest pain, unspecified type 10/05/2020     Priority: Medium    CKD (chronic kidney disease) stage 4, GFR 15-29 ml/min (H) 10/05/2020     Priority: Medium    Elevated lipase 10/05/2020     Priority: Medium    Pancreatic abnormality on CT 10/05/2020     Priority: Medium     Incidental finding on CT 10/5/2020 -  New 8 mm lucency of the pancreas. Likely benign but needs follow-up.      Chronic anticoagulation 10/05/2020     Priority: Medium     Due to atrial fibrillation       Ascending aorta dilatation (H24) 10/05/2020     Priority: Medium     Noted on echo August 2020, stable on CT 10/5/2020.      Chronic atrial fibrillation (H) 07/31/2020     Priority: Medium    Lung nodules 04/19/2016     Priority: Medium     LLL on abd/pelvis CT 4/12/16.  No change on chest CT May 2017.  No further imaging needed.      Invasive ductal carcinoma of breast (H) 08/06/2013     Priority: Medium     Left.  ER(+) OH(+) her-2-hina (-).  Lumpectomy and sentinel biopsy followed by radiation.  Adjuvant therapy with tamoxifen completed.      Malignant neoplasm of female breast (H) 03/18/2008     Priority: Medium     Epic      Depressive disorder 12/04/2006     Priority: Medium    Essential hypertension 12/04/2006     Priority: Medium    Subclinical hypothyroidism 12/04/2006     Priority: Medium    Pure hypercholesterolemia 12/04/2006     Priority: Medium    Sleep apnea 12/04/2006     Priority: Medium        Past Medical History:    Past Medical History:   Diagnosis Date    Anemia     Anemia, unspecified type     Depressive disorder 12/04/2006    Essential hypertension 12/04/2006    Hypothyroidism (acquired) 12/04/2006    Invasive ductal carcinoma of breast (H) 08/06/2013    Lung nodules 04/19/2016    Malignant neoplasm of female breast (H) 03/18/2008    New onset atrial fibrillation (H) 07/31/2020     Pure hypercholesterolemia 2006    Sleep apnea 2006       Past Surgical History:    Past Surgical History:   Procedure Laterality Date    APPENDECTOMY          BLEPHAROPLASTY Bilateral 2010    COLONOSCOPY      COLONOSCOPY N/A 2024    Procedure: Colonoscopy;  Surgeon: Willie Alcazar MD;  Location: [facility]    ESOPHAGOSCOPY, GASTROSCOPY, DUODENOSCOPY (EGD), COMBINED N/A 10/6/2020    Procedure: ESOPHAGOGASTRODUODENOSCOPY, WITH BIOPSY and polypectomy;  Surgeon: Jorge Ramirez MD;  Location: [facility]    ESOPHAGOSCOPY, GASTROSCOPY, DUODENOSCOPY (EGD), COMBINED N/A 2024    Procedure: ESOPHAGOGASTRODUODENOSCOPY, WITH BIOPSY;  Surgeon: Willie Alcazar MD;  Location: [facility]    HERNIA REPAIR  2013    HYSTERECTOMY, JOSE  1975    LUMPECTOMY BREAST Left 2008    THYROIDECTOMY  Right     Partial    TUBAL LIGATION         Family History:    Family History   Problem Relation Age of Onset    No Known Problems Mother          age 89 after complications from a fall    Alzheimer Disease Father     Other - See Comments Sister         Polio    Colon Cancer Maternal Aunt     Breast Cancer No family hx of     Ovarian Cancer No family hx of     Prostate Cancer No family hx of        Social History:  Marital Status:   [4]  Social History     Tobacco Use    Smoking status: Never    Smokeless tobacco: Never   Substance Use Topics    Alcohol use: Yes     Comment: occ    Drug use: Never        Medications:    atorvastatin (LIPITOR) 40 MG tablet  levothyroxine (SYNTHROID/LEVOTHROID) 150 MCG tablet  losartan (COZAAR) 25 MG tablet  metoprolol tartrate (LOPRESSOR) 50 MG tablet  albuterol (PROAIR HFA/PROVENTIL HFA/VENTOLIN HFA) 108 (90 Base) MCG/ACT inhaler  apixaban ANTICOAGULANT (ELIQUIS) 2.5 MG tablet  calcium carbonate (OS-ANIKA) 1500 (600 Ca) MG tablet  ferrous sulfate (FE TABS) 325 (65 Fe) MG EC tablet  fluticasone (FLONASE) 50 MCG/ACT nasal spray  levothyroxine (SYNTHROID/LEVOTHROID) 137 MCG  "tablet  multivitamin w/minerals (CENTRUM ADULTS) tablet  pantoprazole (PROTONIX) 40 MG EC tablet  sertraline (ZOLOFT) 100 MG tablet  sucralfate (CARAFATE) 1 GM tablet          Review of Systems  All other systems are reviewed and are negative    Physical Exam   BP: 138/83  Pulse: 91  Temp: 97.8  F (36.6  C)  Resp: 16  Height: 157.5 cm (5' 2\")  Weight: 78.9 kg (174 lb)  SpO2: 96 %      Physical Exam  Nursing note and vitals were reviewed.  Constitutional: Awake and alert, adequately nourished and developed appearing 78-year-old in no apparent discomfort, who does not appear acutely ill, and who answers questions appropriately and cooperates with examination.  HEENT: Speech is fluent and voice quality is normal.  EOMI.   Neck: Freely mobile.  Cardiovascular: Cardiac examination reveals normal heart rate and regular rhythm without murmur.  Pulmonary/Chest: Breathing is moderately labored with tachypnea.  Breath sounds are symmetric bilaterally.  There are no retractions present and no prolongation of the expiratory phase.  Bibasilar rales are present.  Abdomen: Soft, nontender, no HSM or masses rebound or guarding.  Musculoskeletal: Extremities are warm and well-perfused and with 2+ pretibial pitting edema  Neurological: Alert, oriented, thought content logical, coherent   Skin: Warm, dry, no rashes.  Psychiatric: Affect broad and appropriate.    ED Course        Procedures              EKG Interpretation:      Interpreted by Cyril Crystal MD  Time reviewed: 17:00  Symptoms at time of EKG: dyspnea   Rhythm: Atrial fibrillation  Rate: 85  Axis: normal  Ectopy: none  Conduction: normal  ST Segments/ T Waves: No ST-T wave changes  Q Waves: none  Comparison to prior: Compared to the EKG from March 14, 2024 heart rate is decreased from 105 to 85.  Otherwise no change.    Clinical Impression: Atrial fibrillation with controlled ventricular response with no signs of acute ischemia      Critical Care time:  none           "     Results for orders placed or performed during the hospital encounter of 07/09/24 (from the past 24 hour(s))   CBC with platelets differential    Narrative    The following orders were created for panel order CBC with platelets differential.  Procedure                               Abnormality         Status                     ---------                               -----------         ------                     CBC with platelets and d...[[id number]]  Abnormal            Final result                 Please view results for these tests on the individual orders.   Comprehensive metabolic panel   Result Value Ref Range    Sodium 136 135 - 145 mmol/L    Potassium 3.8 3.4 - 5.3 mmol/L    Carbon Dioxide (CO2) 21 (L) 22 - 29 mmol/L    Anion Gap 10 7 - 15 mmol/L    Urea Nitrogen 37.5 (H) 8.0 - 23.0 mg/dL    Creatinine 2.37 (H) 0.51 - 0.95 mg/dL    GFR Estimate 20 (L) >60 mL/min/1.73m2    Calcium 8.4 (L) 8.8 - 10.2 mg/dL    Chloride 105 98 - 107 mmol/L    Glucose 94 70 - 99 mg/dL    Alkaline Phosphatase 79 40 - 150 U/L    AST 29 0 - 45 U/L    ALT 16 0 - 50 U/L    Protein Total 6.9 6.4 - 8.3 g/dL    Albumin 3.3 (L) 3.5 - 5.2 g/dL    Bilirubin Total 0.7 <=1.2 mg/dL   Nt probnp inpatient (BNP)   Result Value Ref Range    N terminal Pro BNP Inpatient 22,690 (H) 0 - 1,800 pg/mL   CBC with platelets and differential   Result Value Ref Range    WBC Count 6.5 4.0 - 11.0 10e3/uL    RBC Count 2.84 (L) 3.80 - 5.20 10e6/uL    Hemoglobin 8.1 (L) 11.7 - 15.7 g/dL    Hematocrit 25.9 (L) 35.0 - 47.0 %    MCV 91 78 - 100 fL    MCH 28.5 26.5 - 33.0 pg    MCHC 31.3 (L) 31.5 - 36.5 g/dL    RDW 19.4 (H) 10.0 - 15.0 %    Platelet Count 194 150 - 450 10e3/uL    % Neutrophils 76 %    % Lymphocytes 8 %    % Monocytes 9 %    % Eosinophils 5 %    % Basophils 1 %    % Immature Granulocytes 1 %    NRBCs per 100 WBC 0 <1 /100    Absolute Neutrophils 4.9 1.6 - 8.3 10e3/uL    Absolute Lymphocytes 0.5 (L) 0.8 - 5.3 10e3/uL    Absolute Monocytes 0.6 0.0  - 1.3 10e3/uL    Absolute Eosinophils 0.3 0.0 - 0.7 10e3/uL    Absolute Basophils 0.1 0.0 - 0.2 10e3/uL    Absolute Immature Granulocytes 0.1 <=0.4 10e3/uL    Absolute NRBCs 0.0 10e3/uL   XR Chest 2 Views    Narrative    EXAM: XR CHEST 2 VIEWS  LOCATION: [facility]  DATE: 7/9/2024    INDICATION: dyspnea  COMPARISON: Chest x-ray 7/9/2024      Impression    IMPRESSION: Stable small left pleural effusion with adjacent opacities which may reflect atelectasis or pneumonia. Stable minimal right basilar atelectasis/scarring. Mild cardiomegaly. No vascular congestion or overt pulmonary edema.       Medications   furosemide (LASIX) injection 40 mg (40 mg Intravenous $Given 7/9/24 1808)       Assessments & Plan (with Medical Decision Making)     78-year-old female presented with weight gain and dyspnea over the past week after 3 unit blood transfusion last week and with a history of atrial fibrillation as well as chronic congestive heart failure but with preserved ejection fraction.  Her dyspnea is likely a combination of fluid overload, CHF, A-fib.  In the emergency department initially her oxygen saturation was normal but she had episodes where she dipped below 88% on room air and was given O2 by nasal cannula at 2 L.  She did not require higher level of respiratory support.  She received a dose of furosemide 40 mg IV.  Thus far she has not had a diuretic response.  She will be hospitalized for further diuresis and monitoring until she is not requiring oxygen and is breathing more comfortably and is compensated in terms of her CHF and weight.  Her EKG showed chronic A-fib without signs of acute ischemia but her BNP was markedly elevated consistent with our suspicion of CHF and pulmonary edema.  Her chest x-ray showed cardiomegaly and interstitial fluid with no signs of a consolidation, pneumothorax, or other worrisome findings.  This was reviewed by me independently as well as the radiologist.   Her CBC shows persistent anemia but with improved hemoglobin from her most recent.  However her anemia is likely contributing to her dyspnea.  I would not recommend transfusion at this level of hemoglobin given no evidence of ACS or angina and the evidence is already of fluid overload.  I discussed her case with Rebeca Collins of the hospital service and they will assume care and admission.    I have reviewed the nursing notes.    I have reviewed the findings, diagnosis, plan and need for follow up with the patient.           Medical Decision Making  The patient's presentation was of high complexity (a chronic illness severe exacerbation, progression, or side effect of treatment).    The patient's evaluation involved:  review of external note(s) from 1 sources (see separate area of note for details)  review of 1 test result(s) ordered prior to this encounter (echocardiogram from March 14, 2024)  ordering and/or review of 3+ test(s) in this encounter (see separate area of note for details)  discussion of management or test interpretation with another health professional (see separate area of note for details)    The patient's management necessitated high risk (a decision regarding hospitalization).        New Prescriptions    No medications on file       Final diagnoses:   Acute pulmonary edema (H)   Acute respiratory failure with hypoxia (H)   Chronic anemia       7/9/2024   [facility] EMERGENCY DEPT       Cyril Crystal MD  07/09/24 1946

## 2024-07-09 NOTE — ED NOTES
My assessment, based on my focused history, established that Shaila Triana has a potential emergent condition, which requires further testing and/or treatment beyond the capabilities of the current urgent care setting.  This condition was discussed with the patient as being reported of altered balance, decreased hemoglobin, shortness of breath, overall fatigue with multiple recent falls.  Testing may require imaging, and blood testing.  As such, I have recommended that the patient be evaluated and treated in the  ED.     Disclaimer: This note consists of symbols derived from keyboarding, dictation, and/or voice recognition software. As a result, there may be errors in the script that have gone undetected.  Please consider this when interpreting information found in the chart.       Sharlene Florence, NADIR CNP  07/09/24 1551

## 2024-07-09 NOTE — ED TRIAGE NOTES
Reports increased weakness and fatigue, history of anemia, had 3 units transfused last week.  Increased water weight, was seen at clinic and told to come in.     Triage Assessment (Adult)       Row Name 07/09/24 6211          Triage Assessment    Airway WDL WDL        Respiratory WDL    Respiratory WDL WDL        Skin Circulation/Temperature WDL    Skin Circulation/Temperature WDL WDL        Cardiac WDL    Cardiac WDL WDL        Peripheral/Neurovascular WDL    Peripheral Neurovascular WDL WDL        Cognitive/Neuro/Behavioral WDL    Cognitive/Neuro/Behavioral WDL WDL

## 2024-07-10 NOTE — PROGRESS NOTES
07/10/24 1500   Appointment Info   Signing Clinician's Name / Credentials (OT) Carlos Fermin OTR/L   Living Environment   People in Home significant other;child(no), adult   Current Living Arrangements house   Home Accessibility stairs to enter home   Number of Stairs, Main Entrance 6   Stair Railings, Main Entrance railings safe and in good condition   Transportation Anticipated family or friend will provide   Living Environment Comments all needs met on main level, daughter in law able to assist with basement laundry   Self-Care   Equipment Currently Used at Home none   Fall history within last six months yes   Number of times patient has fallen within last six months 1   Activity/Exercise/Self-Care Comment indep with mobility without device, ADL's. shares IADL's with others in home.   Instrumental Activities of Daily Living (IADL)   IADL Comments family can assist, was previously independent   General Information   Onset of Illness/Injury or Date of Surgery 07/10/24   Referring Physician David Abdul   Additional Occupational Profile Info/Pertinent History of Current Problem Shaila Triana is a 78 year old female with past medical history significant for CKD stage 4, chronic normocytic anemia, hyperlipidemia, hypothyroidism, mantle cell lymphoma, GERD, depression, atrial fibrillation on eliquis s/p Watchman placement, PAUL, hx of breast cancer, heart failure, and recent hospitalization  06/25-06/28 for treatment of right knee hematoma secondary to fall, who presents on 7/9/2024 with weight gain, increased peripheral edema, fatigue, and exertional dyspnea.   Existing Precautions/Restrictions fall   Cognitive Status Examination   Orientation Status orientation to person, place and time   Cognitive Status Comments no acute changes noted   Visual Perception   Impact of Vision Impairment on Function (Vision) no acute changes   Pain Assessment   Patient Currently in Pain Yes, see Vital Sign flowsheet   Bed Mobility    Comment (Bed Mobility) Pt declining OOB at this time   Toileting   Comment, (Toileting) reports general weakness   Clinical Impression   Criteria for Skilled Therapeutic Interventions Met (OT) Yes, treatment indicated   OT Diagnosis decreased ADL independence   OT Problem List-Impairments impacting ADL problems related to;activity tolerance impaired;strength   Assessment of Occupational Performance 3-5 Performance Deficits   Identified Performance Deficits dressing, bathing, toileting, grooming   Planned Therapy Interventions (OT) ADL retraining;IADL retraining;transfer training;strengthening;home program guidelines;progressive activity/exercise   Clinical Decision Making Complexity (OT) problem focused assessment/low complexity   Risk & Benefits of therapy have been explained evaluation/treatment results reviewed   Clinical Impression Comments Pt with decreased activity tolerance, would beenfit from skilled OT services to increase safety and independence with ADL   OT Total Evaluation Time   OT Eval, Low Complexity Minutes (96103) 5   OT Goals   Therapy Frequency (OT) 5 times/week   OT Predicted Duration/Target Date for Goal Attainment 07/17/24   OT Goals Hygiene/Grooming   OT: Hygiene/Grooming modified independent;while standing   OT: Lower Body Dressing Modified independent;including set-up/clothing retrieval   OT: Toilet Transfer/Toileting Modified independent;toilet transfer;cleaning and garment management   Interventions   Interventions Quick Adds Therapeutic Activity   Therapeutic Activities   Therapeutic Activity Minutes (10989) 8   Treatment Detail/Skilled Intervention Therapist educated Pt on EC/WS related to ADL completion.  Therapsit encouraged tasking breaks with activity and completing OOB activity as able.  Pt verbalizing understanding of this educaiton during session.  Pt declining OOB at this time due to fatigue.  Therapsit encouraged trial of more activity later in day.   OT Discharge Planning    OT Plan standing ADL, toileting, dressing, reviewing EC/WS   OT Discharge Recommendation (DC Rec) home with home care occupational therapy   OT Rationale for DC Rec Pt moving SBA per PT, able to complete activity as needed, Pt decliing OOB during OT session   Total Session Time   Timed Code Treatment Minutes 8   Total Session Time (sum of timed and untimed services) 13

## 2024-07-10 NOTE — ED NOTES
"[facility]   Admission Handoff    The patient is Shaila Triana, 78 year old who arrived in the ED by CAR from clinic with a complaint of Generalized Weakness (Fatigue and weakness - transfused 1 wk ago)  . The patient's current symptoms are an exacerbation of a chronic illness and during this time the symptoms have remained the same. In the ED, patient was diagnosed with   Final diagnoses:   Acute pulmonary edema (H)   Acute respiratory failure with hypoxia (H)   Chronic anemia         Needed?: No    Allergies:    Allergies   Allergen Reactions    Blood-Group Specific Substance      Patient has a history of a clinically significant antibody against RBC antigens.  A delay in compatible RBCs may occur.  Unidentified antibody identified at [facility] 6/26/24    Codeine Nausea    Dust Mite Extract Other (See Comments)     Per allergy test    Erythromycin Dizziness     Almost passed out    Macrolides And Ketolides     Penicillin [Penicillins] Itching       Past Medical Hx:   Past Medical History:   Diagnosis Date    Anemia     Anemia, unspecified type     Depressive disorder 12/04/2006    Essential hypertension 12/04/2006    Hypothyroidism (acquired) 12/04/2006    Invasive ductal carcinoma of breast (H) 08/06/2013    Left.  ER(+) WA(+) her-2-hina (-).  Lumpectomy and sentinel biopsy followed by radiation.  Adjuvant therapy with tamoxifen completed.    Lung nodules 04/19/2016    LLL on abd/pelvis CT 4/12/16.  No change on chest CT May 2017.  No further imaging needed.    Malignant neoplasm of female breast (H) 03/18/2008    Epic    New onset atrial fibrillation (H) 07/31/2020    Pure hypercholesterolemia 12/04/2006    Sleep apnea 12/04/2006       Initial vitals were: BP: 138/83  Pulse: 91  Temp: 97.8  F (36.6  C)  Resp: 16  Height: 157.5 cm (5' 2\")  Weight: 78.9 kg (174 lb)  SpO2: 96 %   Recent vital Signs: BP (!) 143/116   Pulse 88   Temp 97.8  F (36.6  C) " "(Oral)   Resp 20   Ht 1.575 m (5' 2\")   Wt 78.9 kg (174 lb)   LMP  (LMP Unknown)   SpO2 100%   BMI 31.83 kg/m      Elimination Status: Continent: Yes and wears briefs     Activity Level:  Assist 1    Fall Status: Reason for falls risk:  Mobility  bed/chair alarm on, nonskid shoes/slippers when out of bed, arm band in place, assistive device/personal items within reach, and activity supervised    Baseline Mental status: WDL  Current Mental Status changes: at basesline    Infection present or suspected this encounter: no  Sepsis suspected: No    Isolation type: na    Bariatric equipment needed?: No    In the ED these meds were given:   Medications   furosemide (LASIX) injection 40 mg (40 mg Intravenous $Given 7/9/24 8737)       Drips running?  No    Home pump  No    Current LDAs: Peripheral IV: Site rt lower forearm; Gauge 20  none     Results:   Labs/Imaging  Ordered and Resulted from Time of ED Arrival Up to the Time of Departure from the ED  Results for orders placed or performed during the hospital encounter of 07/09/24 (from the past 24 hour(s))   CBC with platelets differential    Narrative    The following orders were created for panel order CBC with platelets differential.  Procedure                               Abnormality         Status                     ---------                               -----------         ------                     CBC with platelets and d...[[id number]]  Abnormal            Final result                 Please view results for these tests on the individual orders.   Comprehensive metabolic panel   Result Value Ref Range    Sodium 136 135 - 145 mmol/L    Potassium 3.8 3.4 - 5.3 mmol/L    Carbon Dioxide (CO2) 21 (L) 22 - 29 mmol/L    Anion Gap 10 7 - 15 mmol/L    Urea Nitrogen 37.5 (H) 8.0 - 23.0 mg/dL    Creatinine 2.37 (H) 0.51 - 0.95 mg/dL    GFR Estimate 20 (L) >60 mL/min/1.73m2    Calcium 8.4 (L) 8.8 - 10.2 mg/dL    Chloride 105 98 - 107 mmol/L    Glucose 94 70 - 99 mg/dL "    Alkaline Phosphatase 79 40 - 150 U/L    AST 29 0 - 45 U/L    ALT 16 0 - 50 U/L    Protein Total 6.9 6.4 - 8.3 g/dL    Albumin 3.3 (L) 3.5 - 5.2 g/dL    Bilirubin Total 0.7 <=1.2 mg/dL   Nt probnp inpatient (BNP)   Result Value Ref Range    N terminal Pro BNP Inpatient 22,690 (H) 0 - 1,800 pg/mL   CBC with platelets and differential   Result Value Ref Range    WBC Count 6.5 4.0 - 11.0 10e3/uL    RBC Count 2.84 (L) 3.80 - 5.20 10e6/uL    Hemoglobin 8.1 (L) 11.7 - 15.7 g/dL    Hematocrit 25.9 (L) 35.0 - 47.0 %    MCV 91 78 - 100 fL    MCH 28.5 26.5 - 33.0 pg    MCHC 31.3 (L) 31.5 - 36.5 g/dL    RDW 19.4 (H) 10.0 - 15.0 %    Platelet Count 194 150 - 450 10e3/uL    % Neutrophils 76 %    % Lymphocytes 8 %    % Monocytes 9 %    % Eosinophils 5 %    % Basophils 1 %    % Immature Granulocytes 1 %    NRBCs per 100 WBC 0 <1 /100    Absolute Neutrophils 4.9 1.6 - 8.3 10e3/uL    Absolute Lymphocytes 0.5 (L) 0.8 - 5.3 10e3/uL    Absolute Monocytes 0.6 0.0 - 1.3 10e3/uL    Absolute Eosinophils 0.3 0.0 - 0.7 10e3/uL    Absolute Basophils 0.1 0.0 - 0.2 10e3/uL    Absolute Immature Granulocytes 0.1 <=0.4 10e3/uL    Absolute NRBCs 0.0 10e3/uL   XR Chest 2 Views    Narrative    EXAM: XR CHEST 2 VIEWS  LOCATION: [facility]  DATE: 7/9/2024    INDICATION: dyspnea  COMPARISON: Chest x-ray 7/9/2024      Impression    IMPRESSION: Stable small left pleural effusion with adjacent opacities which may reflect atelectasis or pneumonia. Stable minimal right basilar atelectasis/scarring. Mild cardiomegaly. No vascular congestion or overt pulmonary edema.       For the majority of the shift this patient's behavior was Green     Cardiac Rhythm: Normal Sinus  Pt needs tele? No  Skin/wound Issues: None    Code Status: Full Code    Pain control: pt had none    Nausea control: pt had none    Abnormal labs/tests/findings requiring intervention: BNP 26,900; lasix given    Patient tested for COVID 19 prior to admission: NO      OBS brochure/video discussed/provided to patient/family: N/A     Family present during ED course? No     Family Comments/Social Situation comments: pt arrived with boyfriend.  He has since left.    Tasks needing completion: None    Savannah Segura RN

## 2024-07-10 NOTE — PROGRESS NOTES
[facility]    Medicine Progress Note - Hospitalist Service    Date of Admission:  7/9/2024    Assessment & Plan   Shaila Triana is a 78 year old female with past medical history significant for CKD stage 4, chronic normocytic anemia, hyperlipidemia, hypothyroidism, mantle cell lymphoma, GERD, depression, atrial fibrillation on eliquis s/p Watchman placement, PAUL, hx of breast cancer, heart failure, and recent hospitalization  06/25-06/28 for treatment of right knee hematoma secondary to fall, who presents on 7/9/2024 with weight gain, increased peripheral edema, fatigue, and exertional dyspnea.     Acute pulmonary edema   Acute on chronic systolic congestive heart failure (HFrEF 35-40%)  Acute decompensated heart failure  She was recently hospitalized 06/25/24-06/28/24 and during this hospitalization she received 3 units pRBC for acute on chronic anemia. Prior to admission medications include losartan and metoprolol, no prior diuretics. Presented with worsening bilateral lower extremity edema, weight gain, orthopnea, and dyspnea. Most recent JOSE on 05/03/24 with EF 35-40%, moderately decreased LV systolic function, right ventricular enlargement with reduced right ventricular systolic function, severe bi-atrial enlargement,  mild mitral valve regurgitation, moderate tricuspid regurgitation. This EF is decreased from 03/15/2024, had EF of 55-60% at that time. Mitral regurgitation is also new finding compared to 03/2024, and tricuspid regurgitation is worsened. Chest x-ray was read as Stable small left pleural effusion with adjacent opacities which may reflect atelectasis or pneumonia. Stable minimal right basilar atelectasis/scarring. Mild cardiomegaly. No vascular congestion or overt pulmonary edema. However, on personal review interstitial fluid is present consistent with pulmonary edema. BNP elevated at 22,690, increased from 14,445 on 06/25/24. Electrolytes within normal limits.  Received 40 mg furosemide in the ED and 40 mg evening of admission with 5.3 L UOP since admission.    Patient in acute decompensated heart failure which may have been triggered in part by fluid administration during recent hospitalization and lack of diuretics prior to admission.    Diuresis: 60 mg furosemide BID   Held jardiance 10mg daily (initiated this admission)  Continue PTA metoprolol, holding losartan (see below)  Compression stockings  Strict I and O's, daily weights  Cardiac diet  PT/OT/care management consulted  AM Labs - BMP, CBC, Mg    Acute respiratory failure with hypoxia - resolved   Presented with worsening dyspnea and orthopnea, but did not have tachypnea nor hypoxia initially in the ED. While in the ED her oxygen saturation dipped below 88% on room air while at rest, improved with 2L supplemental O2. Does not use oxygen at home.  On admission she has normal respiratory effort with 2L O2 via NC, in no acute distress, hemodynamically stable. Most likely due to acute decompensated heart failure, see evaluation and management below.   continue O2 therapy, titrate to maintain sats > 92%  Albuterol nebulizers available prn    Permanent atrial fibrillation s/p Watchman 7/2022  Chronic anticoagulation   Patient underwent elective Watchman device placed 07/2022 in the setting of recurrent anemia with hx of blood transfusions. 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 Patient was recently hospitalized for a fall  (06/2024) and endorses chronic balance problems and frequent falls. Managed pta with metoprolol 50 mg BID, apixaban 2.5 mg BID. In rate controlled atrial fibrillation on admission. This patient has high risk for cardioembolic events despite chronic anticoagulation and watchman, but is also at high risk for falls and bleeding. No evidence of acute bleeding on admission.   Continue PTA apixaban 2.5 mg BID  Continue PTA  metoprolol 50 mg BID, as above   Fall precautions     Chronic normocytic anemia  Chronically anticoagulated with hx of GI bleeds and acute on chronic blood loss anemia. Does have hx of ckd stage 4. Presented with hemoglobin of 8.1. Baseline Hgb 8-9. MCV on admission 91.   Iron studies, ferratin wnl.   Transfuse for hemoglobin < 7  Watch for signs of bleeding     DARIAN on CKD stage 4  On admission, creatinine 2.37, GFR 20. Creatinine increased from 2.23 and GFR was 22 on 06/28. Baseline creatinine appears to be around 2.0.  Likely secondary to low cardiac output and fluid overload, failure to thrive.   HOLD PTA losartan due to poor renal function   No IVF given acute decompensated heart failure   Monitor I/O, daily weights as above.   Follow BMP  Avoid nephrotoxic agents     Sleep apnea  Chronic, pt does not use CPAP at home    Hypothyroidism   Chronic, managed pta with levothyroxine 150 mcg daily, continue.     Hyperlipidemia  Chronic, managed pta with atorvastatin 40 mg daily, continue.               Diet: Combination Diet Low Saturated Fat Na <2400mg Diet    DVT Prophylaxis: DOAC  Boucher Catheter: Not present  Lines: None     Cardiac Monitoring: None  Code Status: Full Code      Clinically Significant Risk Factors Present on Admission        # Hypokalemia: Lowest K = 3.3 mmol/L in last 2 days, will replace as needed   # Hypocalcemia: Lowest Ca = 8.3 mg/dL in last 2 days, will monitor and replace as appropriate   # Hypomagnesemia: Lowest Mg = 1.5 mg/dL in last 2 days, will replace as needed   # Hypoalbuminemia: Lowest albumin = 3.3 g/dL at 7/9/2024  5:18 PM, will monitor as appropriate  # Drug Induced Coagulation Defect: home medication list includes an anticoagulant medication    # Hypertension: Noted on problem list   # Acute Respiratory Failure: Documented O2 saturation < 91%.  Continue supplemental oxygen as needed   # Anemia: based on hgb <11           # Obesity: Estimated body mass index is 31.01 kg/m  as  "calculated from the following:    Height as of this encounter: 1.575 m (5' 2\").    Weight as of this encounter: 76.9 kg (169 lb 8.5 oz).       # Financial/Environmental Concerns:           Disposition Plan     Medically Ready for Discharge: Anticipated Tomorrow             David Chicas DO  Hospitalist Service  [facility]  Securely message with [employer] (more info)  Text page via [employer] Paging/Directory   ______________________________________________________________________    Interval History   No acute events overnight.  Patient was able to diurese appropriately with IV diuretics.  She notes improvement in her breathing but still feels very fatigued and still notes severely swollen lower extremities which are relatively unchanged.    Physical Exam   Vital Signs: Temp: 97.8  F (36.6  C) Temp src: Oral BP: 132/86 Pulse: 90   Resp: 16 SpO2: 94 % O2 Device: None (Room air) Oxygen Delivery: 1 LPM  Weight: 169 lbs 8.54 oz    Constitutional: awake, alert, cooperative, no apparent distress, and appears stated age  Respiratory: No increased work of breathing, good air exchange, clear to auscultation bilaterally, no crackles or wheezing  Cardiovascular: JVD 5+ centimeters, 3+ edema bilateral lower extremities to the knee, displaced PMI, regular rate and rhythm, normal S1 and S2, no S3 or S4, and no murmur noted  GI: No scars, normal bowel sounds, soft, non-distended, non-tender, no masses palpated, no hepatosplenomegally  Skin: normal skin color, texture, turgor  Musculoskeletal: There is no redness, warmth, or swelling of the joints.  Full range of motion noted.  Motor strength is 5 out of 5 all extremities bilaterally.  Tone is normal.    Medical Decision Making       50 MINUTES SPENT BY ME on the date of service doing chart review, history, exam, documentation & further activities per the note.      Data     I have personally reviewed the following data over the past 24 hrs:    5.7  \   7.3 (L)   " / 182     138 104 37.1 (H) /  87   3.3 (L) 22 2.35 (H) \     ALT: 16 AST: 29 AP: 79 TBILI: 0.7   ALB: 3.3 (L) TOT PROTEIN: 6.9 LIPASE: N/A     Trop: N/A BNP: 22,690 (H)     Ferritin:  246 % Retic:  N/A LDH:  N/A       Imaging results reviewed over the past 24 hrs:   Recent Results (from the past 24 hour(s))   XR Chest 2 Views    Narrative    EXAM: XR CHEST 2 VIEWS  LOCATION: [facility]  DATE: 7/9/2024    INDICATION: dyspnea  COMPARISON: Chest x-ray 7/9/2024      Impression    IMPRESSION: Stable small left pleural effusion with adjacent opacities which may reflect atelectasis or pneumonia. Stable minimal right basilar atelectasis/scarring. Mild cardiomegaly. No vascular congestion or overt pulmonary edema.

## 2024-07-10 NOTE — H&P
[facility]    History and Physical  Hospital Medicine       Date of Admission:  7/9/2024  Date of Service: 7/9/2024     Assessment & Plan   Shaila Triana is a 78 year old female with past medical history significant for CKD stage 4, chronic normocytic anemia, hyperlipidemia, hypothyroidism, mantle cell lymphoma, GERD, depression, atrial fibrillation on eliquis s/p Watchman placement, PAUL, hx of breast cancer, heart failure, and recent hospitalization  06/25-06/28 for treatment of right knee hematoma secondary to fall, who presents on 7/9/2024 with weight gain, increased peripheral edema, fatigue, and exertional dyspnea.     Acute respiratory failure with hypoxia  Presented with worsening dyspnea and orthopnea, but did not have tachypnea nor hypoxia initially in the ED.   While in the ED her oxygen saturation dipped below 88% on room air while at rest, improved with 2L supplemental O2.   Does not use oxygen at home.    On admission she has normal respiratory effort with 2L O2 via NC, in no acute distress, hemodynamically stable.   Most likely due to acute decompensated heart failure, see evaluation and management below.   continue O2 therapy, titrate to maintain sats > 92%  Albuterol nebulizers available prn    Acute pulmonary edema (H)  Chronic diastolic congestive heart failure (H)  Acute decompensated heart failure  She was recently hospitalized 06/25/24-06/28/24 and during this hospitalization she received 3 units pRBC for acute on chronic anemia. Prior to admission medications include losartan and metoprolol, no prior diuretics.     Presented with worsening bilateral lower extremity edema, weight gain, orthopnea, and dyspnea.   Most recent JOSE on 05/03/24 with EF 35-40%, moderately decreased LV systolic function, right ventricular enlargement with reduced right ventricular systolic function, severe bi-atrial enlargement,  mild mitral valve regurgitation, moderate tricuspid  regurgitation.   This EF is decreased from 03/15/2024, had EF of 55-60% at that time. Mitral regurgitation is also new finding compared to 03/2024, and tricuspid regurgitation is worsened.   Chest x-ray was read as Stable small left pleural effusion with adjacent opacities which may reflect atelectasis or pneumonia. Stable minimal right basilar atelectasis/scarring. Mild cardiomegaly. No vascular congestion or overt pulmonary edema. However, on personal review interstitial fluid is present consistent with pulmonary edema.   BNP elevated at 22,690, increased from 14,445 on 06/25/24. Electrolytes within normal limits.   Received 40 mg furosemide in the ED and 40 mg evening of admission with good urine output.      Suspect she is in acute decompensated heart failure which may have been triggered in part by fluid administration during recent hospitalization.    Diuresis: 60 mg furosemide BID   Initiated jardiance 10mg daily   Continue pta metoprolol, holding losartan (see below)  Echocardiogram not ordered on admission as patient had recent JOSE - defer to day team   strict I and O's, daily weights  PT/OT/care management consulted  AM Labs - BMP, CBC, Mg    Permanent atrial fibrillation s/p Watchman 7/2022  Chronic anticoagulation   Patient underwent elective Watchman device placed 07/2022 in the setting of recurrent anemia with hx of blood transfusions.   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   Patient was recently hospitalized for a fall  (06/2024) and endorses chronic balance problems and frequent falls.   Managed pta with metoprolol 50 mg BID, apixaban 2.5 mg BID. In rate controlled atrial fibrillation on admission.   This patient has high risk for cardioembolic events despite chronic anticoagulation and watchman, but is also at high risk for falls and bleeding. No evidence of acute bleeding on admission.   Continue pta apixaban 2.5 mg  "BID  Continue pta metoprolol 50 mg BID, as above   Fall precautions     Chronic normocytic anemia  Chronically anticoagulated with hx of GI bleeds and acute on chronic blood loss anemia. Does have hx of ckd stage 4.   Presented with hemoglobin of 8.1. Baseline Hgb 8-9. MCV on admission 91.   Iron studies, ferratin wnl.   Transfuse for hemoglobin < 7  Watch for signs of bleeding     DARIAN on CKD stage 4  On admission, creatinine 2.37, GFR 20. Creatinine increased from 2.23 and GFR was 22 on 06/28. Baseline creatinine appears to be around 2.0.  Likely secondary to low cardiac output and fluid overload, failure to thrive.   HOLD pta losartan due to poor renal function   No IVF given acute decompensated heart failure   Monitor I/O, daily weights as above.   follow BMP  avoid nephrotoxic agents as able     Sleep apnea  Chronic, pt does not use CPAP at home    Hypothyroidism   Chronic, managed pta with levothyroxine 150 mg daily, continue     Hyperlipidemia  Chronic, managed pta with atorvastatin 40 mg daily, continue          Clinically Significant Risk Factors Present on Admission          # Hypocalcemia: Lowest Ca = 8.4 mg/dL in last 2 days, will monitor and replace as appropriate     # Hypoalbuminemia: Lowest albumin = 3.3 g/dL at 7/9/2024  5:18 PM, will monitor as appropriate    # Drug Induced Coagulation Defect: home medication list includes an anticoagulant medication    # Hypertension: Noted on problem list   # Acute Respiratory Failure: Documented O2 saturation < 91%.  Continue supplemental oxygen as needed   # Anemia: based on hgb <11           # Obesity: Estimated body mass index is 31.57 kg/m  as calculated from the following:    Height as of this encounter: 1.575 m (5' 2\").    Weight as of this encounter: 78.3 kg (172 lb 9.9 oz).         # Financial/Environmental Concerns:                Diet: Combination Diet Low Saturated Fat Na <2400mg Diet  DVT Prophylaxis: DOAC  Boucher Catheter: Not present  Code Status: " Full Code      Disposition Plan     Medically Ready for Discharge: Anticipated in 2-4 Days       The patient's care was discussed with the Attending Physician, Dr. David Abdul and Patient.  I have discussed patient and formulated plan with attending hospitalist physician, Dr. David Barnhart PA-C        Primary Care Physician   Mervin Ibarra [phone]    History is obtained from the patient, and review of old records via the EMR.    History of Present Illness   Shaila Triana is a 78 year old female with past medical history significant for CKD stage 4, chronic normocytic anemia, hyperlipidemia, hypothyroidism, mantle cell lymphoma, GERD, depression, atrial fibrillation on eliquis s/p Watchman placement, PAUL, hx of breast cancer, heart failure, and recent hospitalization 06/25-06/28 for treatment of right knee hematoma secondary to fall, who presents on 7/9/2024 with weight gain, increased peripheral edema, fatigue, and shortness of breath.     Shaila has been hospitalized four times in 2024, most recently she was admitted 06/25-06/28 with right knee hematoma secondary to a fall. She had acute on chronic anemia during that hospitalization and was treated with pRBC transfusion x3. She has many chronic symptoms, but she came to the ED today because she has had increasing shortness of breath, orthopnea, worsening lower extremity edema, and acute weight gain.  She states that for the past week or so she has had dyspnea at rest, and that several times she has been woken up sleep very short of breath.   She states she has been feeling poor since recent hospitalization with excess fatigue and weakness. She notes she has gained at least 4 lbs since last week, with 2lb weight gain on day of hospitalization. She was seen in clinic by her primary care provider on day of admission, who advised she go to the emergency room given her acute dyspnea with worsening edema.   Patient is poor historian, so timing and  severity of acute on chronic symptoms are unclear.     Denies fevers, chills, headache, vision changes, confusion, rhinorrhea, sore throat, pleuritic pain, cough, chest pain, palpitations, abdominal pain, nausea, vomiting, diarrhea, melena, hematochezia, dysuria, urinary frequency or hesitancy, focal weakness.       Review of Systems   The 10 point Review of Systems is negative other than noted in the HPI or here.     Past Medical History    Past Medical History:   Diagnosis Date    Anemia     Anemia, unspecified type     Ascending aorta dilatation (H24) 10/05/2020    Noted on echo August 2020, stable on CT 10/5/2020.      Depressive disorder 12/04/2006    Essential hypertension 12/04/2006    Hypothyroidism (acquired) 12/04/2006    Invasive ductal carcinoma of breast (H) 08/06/2013    Left.  ER(+) WA(+) her-2-hina (-).  Lumpectomy and sentinel biopsy followed by radiation.  Adjuvant therapy with tamoxifen completed.    Lung nodules 04/19/2016    LLL on abd/pelvis CT 4/12/16.  No change on chest CT May 2017.  No further imaging needed.    Malignant neoplasm of female breast (H) 03/18/2008    Epic    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) 03/15/2024    New onset atrial fibrillation (H) 07/31/2020    Pure hypercholesterolemia 12/04/2006    Sleep apnea 12/04/2006    Thrombocytopenia (H24) 12/25/2023    TIA (transient ischemic attack) 03/15/2024    Upper GI bleed 01/30/2024         Past Surgical History   Past Surgical History:   Procedure Laterality Date    APPENDECTOMY      1957    BLEPHAROPLASTY Bilateral 2010    COLONOSCOPY  2005    COLONOSCOPY N/A 2/1/2024    Procedure: Colonoscopy;  Surgeon: Willie Alcazar MD;  Location: [facility]    ESOPHAGOSCOPY, GASTROSCOPY, DUODENOSCOPY (EGD), COMBINED N/A 10/6/2020    Procedure: ESOPHAGOGASTRODUODENOSCOPY, WITH BIOPSY and polypectomy;  Surgeon: Jorge Ramirez MD;  Location: [facility]    ESOPHAGOSCOPY, GASTROSCOPY, DUODENOSCOPY (EGD), COMBINED N/A 2/1/2024     Procedure: ESOPHAGOGASTRODUODENOSCOPY, WITH BIOPSY;  Surgeon: Willie Alcazar MD;  Location: [facility]    HERNIA REPAIR  2013    HYSTERECTOMY, JOSE  1975    LUMPECTOMY BREAST Left 2008    THYROIDECTOMY  Right     Partial    TUBAL LIGATION          Prior to Admission Medications   Prior to Admission Medications   Prescriptions Last Dose Informant Patient Reported? Taking?   albuterol (PROAIR HFA/PROVENTIL HFA/VENTOLIN HFA) 108 (90 Base) MCG/ACT inhaler Past Month at misplaced it at home Self, Spouse/Significant Other No Yes   Sig: Inhale 2 puffs into the lungs every 6 hours as needed for shortness of breath / dyspnea or wheezing   apixaban ANTICOAGULANT (ELIQUIS) 2.5 MG tablet 7/9/2024 at am Spouse/Significant Other Yes Yes   Sig: Take 2.5 mg by mouth 2 times daily   atorvastatin (LIPITOR) 40 MG tablet 7/9/2024 at am Spouse/Significant Other No Yes   Sig: Take 1 tablet (40 mg) by mouth every evening for 30 days   calcium carbonate (OS-ANIKA) 1500 (600 Ca) MG tablet 7/9/2024 at am Spouse/Significant Other Yes Yes   Sig: Take 600 mg by mouth 2 times daily (with meals)   ferrous sulfate (FE TABS) 325 (65 Fe) MG EC tablet 7/9/2024 at am Spouse/Significant Other Yes Yes   Sig: Take 325 mg by mouth daily   fluticasone (FLONASE) 50 MCG/ACT nasal spray Past Month at prn Spouse/Significant Other, Self Yes Yes   Sig: Spray 2 sprays in nostril daily as needed for allergies   levothyroxine (SYNTHROID/LEVOTHROID) 137 MCG tablet 7/9/2024 at am Spouse/Significant Other Yes Yes   Sig: Take 1 tablet by mouth daily before breakfast   levothyroxine (SYNTHROID/LEVOTHROID) 150 MCG tablet new order on 7/8/24 at not picked up from pharmacy yet Spouse/Significant Other Yes Yes   Sig: Take 150 mcg by mouth daily   losartan (COZAAR) 25 MG tablet 7/9/2024 at am Spouse/Significant Other No Yes   Sig: Take 1 tablet (25 mg) by mouth daily for 30 days   metoprolol tartrate (LOPRESSOR) 50 MG tablet 7/9/2024 at am Spouse/Significant Other No Yes   Sig:  Take 1 tablet (50 mg) by mouth 2 times daily for 30 days   multivitamin w/minerals (CENTRUM ADULTS) tablet 2024 at am Spouse/Significant Other Yes Yes   Sig: Take 1 tablet by mouth daily   pantoprazole (PROTONIX) 40 MG EC tablet 2024 at am Spouse/Significant Other No Yes   Sig: Take 1 tablet (40 mg) by mouth 2 times daily   sertraline (ZOLOFT) 100 MG tablet 2024 at am Spouse/Significant Other Yes Yes   Sig: Take 200 mg by mouth daily   sucralfate (CARAFATE) 1 GM tablet 2024 at am Spouse/Significant Other No Yes   Sig: Take 1 tablet (1 g) by mouth 2 times daily (before meals)      Facility-Administered Medications: None     Allergies   Allergies   Allergen Reactions    Blood-Group Specific Substance      Patient has a history of a clinically significant antibody against RBC antigens.  A delay in compatible RBCs may occur.  Unidentified antibody identified at [facility]    Codeine Nausea    Dust Mite Extract Other (See Comments)     Per allergy test    Erythromycin Dizziness     Almost passed out    Macrolides And Ketolides Dizziness     Almost passed out.    Penicillin [Penicillins] Itching       Family History    Family History   Problem Relation Age of Onset    No Known Problems Mother          age 89 after complications from a fall    Alzheimer Disease Father     Other - See Comments Sister         Polio    Colon Cancer Maternal Aunt     Breast Cancer No family hx of     Ovarian Cancer No family hx of     Prostate Cancer No family hx of        Social History   Social History     Socioeconomic History    Marital status:      Spouse name: Not on file    Number of children: Not on file    Years of education: Not on file    Highest education level: Not on file   Occupational History    Not on file   Tobacco Use    Smoking status: Never    Smokeless tobacco: Never   Substance and Sexual Activity    Alcohol use: Yes     Comment: occ    Drug use: Never    Sexual  "activity: Yes     Partners: Male     Birth control/protection: Female Surgical   Other Topics Concern    Not on file   Social History Narrative    Not on file     Social Determinants of Health     Financial Resource Strain: Low Risk  (7/13/2023)    [facility]    Financial Resource Strain     Difficulty of Paying Living Expenses: 3     Difficulty of Paying Living Expenses: Not on file   Food Insecurity: No Food Insecurity (7/13/2023)    [facility], [facility] [employer] [facility]    Food Insecurity     Worried About Running Out of Food in the Last Year: 1   Transportation Needs: No Transportation Needs (7/13/2023)    Received from [facility], [facility] [employer] [facility]    Transportation Needs     Lack of Transportation (Medical): 1   Physical Activity: Not on file   Stress: Not on file   Social Connections: Socially Integrated (7/13/2023)    Received from [facility], [facility] [employer] [facility] [employer] [facility]    Social Connections     Frequency of Communication with Friends and Family: 0   Interpersonal Safety: Not on file   Housing Stability: Low Risk  (7/13/2023)    [facility], [facility] [employer] [facility] [employer] [facility]    Housing Stability     Unable to Pay for Housing in the Last Year: 1     Physical Exam   BP (!) 153/91 (BP Location: Left arm)   Pulse 84   Temp 97.8  F (36.6  C) (Oral)   Resp 20   Ht 1.575 m (5' 2\")   Wt 78.3 kg (172 lb 9.9 oz)   LMP  (LMP Unknown)   SpO2 96%   BMI 31.57 kg/m       Weight: 172 lbs 9.92 oz Body mass index is 31.57 kg/m .     Constitutional: Alert and oriented x4. Slow to answer questions but answering appropriately. Appears chronically ill.  cooperative, no apparent distress, appears " nontoxic.   Eyes: Eyes are clear, anicteric, pupils are reactive.  HENT: Oropharynx is clear and moist. No evidence of cranial trauma.  Lymph/Hematologic: No epitrochlear, axillary, anterior or posterior cervical, or supraclavicular lymphadenopathy is appreciated.  Cardiovascular: irregular rhythm. normal S1 and S2, and no murmur noted. Good peripheral pulses in wrists bilaterally. 2+ pitting bilateral lower extremity edema to knees.  Respiratory: bibasilar crackles, but without wheezing or rhonchi. Respirations unlabored with 2L oxygen supp. via NC.   GI: Soft, not distended, non-tender, active bowel sounds throughout, no hepatomegaly.  Genitourinary: Deferred  Musculoskeletal: Normal muscle bulk and tone without gross deformity  Skin: Warm and dry, no rashes.   Neurologic: Neck supple. Cranial nerves are grossly intact. No focal neurological deficits.  is symmetric.     Data   Data reviewed today:     I have personally reviewed the following data over the past 24 hrs:    6.5  \   8.1 (L)   / 194     136 105 37.5 (H) /  94   3.8 21 (L) 2.37 (H) \     ALT: 16 AST: 29 AP: 79 TBILI: 0.7   ALB: 3.3 (L) TOT PROTEIN: 6.9 LIPASE: N/A     Trop: N/A BNP: 22,690 (H)     Ferritin:  246 % Retic:  N/A LDH:  N/A         Recent Results (from the past 24 hour(s))   XR Chest B Read 2 views    Narrative    For Patients: As a result of the 21st Century Cures Act, medical imaging exams and procedure reports are released immediately into your electronic medical record. You may view this report before your referring provider. If you have questions, please contact your health care provider.    EXAM: XR CHEST 2 VIEWS PA AND LATERAL  LOCATION: [facility]  DATE: 7/9/2024    INDICATION: Vee (dyspnea On Exertion)  COMPARISON: 4/30/2024    Impression    Small left pleural effusion with associated atelectasis. No pneumothorax. Heart size is stable.   XR Chest 2 Views    Narrative    EXAM: XR CHEST 2 VIEWS  LOCATION: [facility]  [facility]  DATE: 7/9/2024    INDICATION: dyspnea  COMPARISON: Chest x-ray 7/9/2024      Impression    IMPRESSION: Stable small left pleural effusion with adjacent opacities which may reflect atelectasis or pneumonia. Stable minimal right basilar atelectasis/scarring. Mild cardiomegaly. No vascular congestion or overt pulmonary edema.       I personally reviewed the EKG tracing showing atrial fibrillation and the chest x-ray image(s) showing cardiomegaly and interstitial fluid

## 2024-07-10 NOTE — PLAN OF CARE
Skin affirmation note    Admitting nurse completed full skin assessment, Fabricio score and Fabricio interventions. This writer agrees with the initial skin assessment findings.    Brooke Zamudio RN on 7/9/2024 at 8:46 PM

## 2024-07-10 NOTE — CONSULTS
Care Management Initial Consult    General Information  Assessment completed with: Shaila Arroyo  Type of CM/SW Visit: Initial Assessment    Primary Care Provider verified and updated as needed: Yes   Readmission within the last 30 days:        Reason for Consult: discharge planning  Advance Care Planning:     None on chart     Communication Assessment  Patient's communication style: spoken language (English or Bilingual)    Hearing Difficulty or Deaf: yes   Wear Glasses or Blind: no    Cognitive  Cognitive/Neuro/Behavioral: WDL                      Living Environment:   People in home: child(no), adult, significant other (son's SO)  Son-Jaun, his SO-Dana & pt's BRONSON avery  Current living Arrangements: house      Able to return to prior arrangements: yes     Family/Social Support:  Care provided by: self, spouse/significant other, child(no)  Provides care for: no one  Marital Status: Lives with Significant Other  Children, Significant Other       Pierre Thompson  Description of Support System: Supportive, Involved    Support Assessment: Adequate family and caregiver support, Adequate social supports    Current Resources:   Patient receiving home care services:  No  Community Resources:  none  Equipment currently used at home: none  Supplies currently used at home:  none    Employment/Financial:  Employment Status: retired        Financial Concerns: none   Referral to Financial Worker: No       Does the patient's insurance plan have a 3 day qualifying hospital stay waiver?  Yes     Which insurance plan 3 day waiver is available? Alternative insurance waiver    Will the waiver be used for post-acute placement? No    Lifestyle & Psychosocial Needs:  Social Determinants of Health     Food Insecurity: No Food Insecurity (7/13/2023)    [email or web address] [employer] [facility], [facility]    Food Insecurity     Worried About Running Out of Food in the Last Year: 1    Depression: Not at risk (6/20/2024)    Received from [facility]    PHQ-2     PHQ-2 TOTAL SCORE: 0   Housing Stability: Low Risk  (7/13/2023)    Received from [facility] [employer] [facility], [facility] [employer] [facility]    Housing Stability     Unable to Pay for Housing in the Last Year: 1   Tobacco Use: Low Risk  (7/9/2024)    Received from [facility] [employer] [facility]    Patient History     Smoking Tobacco Use: Never     Smokeless Tobacco Use: Never     Passive Exposure: Not on file   Financial Resource Strain: Low Risk  (7/13/2023)    [facility] from [facility], [facility] [employer] [facility]    Financial Resource Strain     Difficulty of Paying Living Expenses: 3     Difficulty of Paying Living Expenses: Not on file   Alcohol Use: Not on file   Transportation Needs: No Transportation Needs (7/13/2023)    Received from [facility] [employer] [facility], [facility] [employer] [facility]    Transportation Needs     Lack of Transportation (Medical): 1   Physical Activity: Not on file   Interpersonal Safety: Not on file   Stress: Not on file   Social Connections: Socially Integrated (7/13/2023)    Received from [facility], [facility] [employer] [facility]    Social Connections     Frequency of Communication with Friends and Family: 0   Health Literacy: Not on file     Functional Status:  Prior to admission patient needed assistance:   Dependent ADLs:: Ambulation-walker  Dependent IADLs:: Cleaning, Cooking, Transportation, Shopping, Laundry    Mental Health Status:  Mental Health Status: Current Concern  Mental Health Management: Medication    Chemical Dependency Status:  Chemical Dependency Status: No Current Concerns           Values/Beliefs:  Spiritual, Cultural Beliefs, Advent Practices, Values that affect care:     Description of Beliefs that Will Affect Care: Bahai, feels need to talk to someone      Additional Information:  Care Management received referral for discharge planning due to repeat hospitalization within a 30 day period.  Pt recently discharged from [facility] with an order for home care services.  Per EMR review, [facility] Phone: [phone] Fax: [phone] was to see pt for PT/OT services, however, pt did not answer her phone, so orders were cancelled.    BI reviewed EMR, attended IDT rounds, spoke with medical care team, and then met pt at bedside to introduce self/role, perform assessment, and discuss resources.    Per discussion with pt, prior to hospitalization pt living in her own home with her SO, Pierre, and her adult son, Jaun & his SO, Dana.  Pt shared that Pierre, Jaun & Dana assist with the household chores and that up until recently, pt has been independent with her own I/ADLS, but has been using a cane/walker at home due to many falls.      Therapy team currently recommending Home Care RN, PT/OT & HHA services at discharge.    BI discussed discharge planning, and provided education regarding Medicare.gov; how pt's insurance will cover services upon discharge, Medicare's homebound status in relation to Home Care services and informed that [facility] Phone: [phone] Fax: [phone] will accept pt for cares with new orders.  Pt is in agreement.    Transportation discussed & family will transport at time of discharge.    Care Management team to follow for discharge plans.    JACINTO Valladares

## 2024-07-10 NOTE — PLAN OF CARE
"7/10/24 0715-2631  Problem: Adult Inpatient Plan of Care  Goal: Plan of Care Review  Outcome: Progressing  Goal: Patient-Specific Goal (Individualized)  Outcome: Progressing  Goal: Absence of Hospital-Acquired Illness or Injury  Outcome: Progressing  Intervention: Identify and Manage Fall Risk  Recent Flowsheet Documentation  Taken 7/10/2024 1514 by Diane Taylor, RN  Safety Promotion/Fall Prevention:   activity supervised   clutter free environment maintained   lighting adjusted   mobility aid in reach   nonskid shoes/slippers when out of bed   room near nurse's station  Intervention: Prevent Infection  Recent Flowsheet Documentation  Taken 7/10/2024 1514 by Diane Taylor, RN    Goal: Optimal Comfort and Wellbeing  Outcome: Progressing  Goal: Readiness for Transition of Care  Outcome: Progressing     Goal Outcome Evaluation:  /86 (BP Location: Left arm)   Pulse 90   Temp 97.8  F (36.6  C) (Oral)   Resp 16   Ht 1.575 m (5' 2\")   Wt 76.9 kg (169 lb 8.5 oz)   LMP  (LMP Unknown)   SpO2 94%   BMI 31.01 kg/m           Pt weaned off oxygen today and tolerated well. Potassium and magnesium replaced with redraw scheduled for 7/11 AM. Responding well to lasix with good urine output. HGB redraw this afternoon.                "

## 2024-07-10 NOTE — PROGRESS NOTES
"[facility] HEALTH SERVICES  SPIRITUAL ASSESSMENT Progress Note  [facility] - Med Surg    Referral Source: Patient request on admission    I shared a reflective conversation with Shaila which incorporated elements of illness and family narrative along with spiritual history.    - Shaila was in bed watching TV when I arrived.  We talked about the weather.  Then she spoke of needing oxygen, which she said is new for her, and also commented on doing \"a lot of falling lately.\"  She had bruising on her face.  She said this had happened when she was staying at her daughters and fell trying to get into a bed that was higher than she was used to.      - Shaila said that she has her own house and her daughter does as well.  She sleeps over at her daughter's sometimes.  I noticed on the white board that she had low sodium.  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  She [place] near [facility] and attended the [employer] there..    - Currently she attends [facility] in [place] when she is able.  I asked if she would want me to contact them and she said \"not at this time.\"  She had commented during our visit about appreciating the warm blankets.  I mentioned the quilts that have been provided by the women at [facility].  She said she would welcome one of those.  I provided one for her and also a prayer as we concluded our visit.    Plan:  will monitor patient's ongoing need for support during LOS.    Steven Hanson M.A., [facility]  Staff Chaplain [facility]  Office: [phone]  Pager [phone]    "

## 2024-07-10 NOTE — PROGRESS NOTES
07/10/24 1201   Appointment Info   Signing Clinician's Name / Credentials (PT) Eboni Mccauley, PT   Living Environment   People in Home significant other;child(no), adult  (significant other, son, and DIL)   Current Living Arrangements house   Home Accessibility stairs to enter home   Number of Stairs, Main Entrance 6   Stair Railings, Main Entrance railings safe and in good condition  (railing in middle of stairs)   Living Environment Comments all needs met on main level, daughter in law able to assist with basement laundry   Self-Care   Equipment Currently Used at Home none   Fall history within last six months yes   Number of times patient has fallen within last six months 1  (pt stating 1 fall, chart indicates 6)   Activity/Exercise/Self-Care Comment indep with mobility without device, ADL's. shares IADL's with others in home.   General Information   Onset of Illness/Injury or Date of Surgery 07/09/24   Referring Physician Jessica Barnhart PA-C   Patient/Family Therapy Goals Statement (PT) return home   Pertinent History of Current Problem (include personal factors and/or comorbidities that impact the POC) Shaila Triana is a 78 year old female with past medical history significant for CKD stage 4, chronic normocytic anemia, hyperlipidemia, hypothyroidism, mantle cell lymphoma, GERD, depression, atrial fibrillation on eliquis s/p Watchman placement, PAUL, hx of breast cancer, heart failure, and recent hospitalization  06/25-06/28 for treatment of right knee hematoma secondary to fall, who presents on 7/9/2024 with weight gain, increased peripheral edema, fatigue, and exertional dyspnea.   Existing Precautions/Restrictions fall   General Observations weaned to RA prior to PT arrival, maintained O2 sats >93% on RA throughout session   Cognition   Affect/Mental Status (Cognition) WFL   Pain Assessment   Patient Currently in Pain Yes, see Vital Sign flowsheet  (R knee pain, chronic from fall a few weeks ago)    Range of Motion (ROM)   Range of Motion ROM is WFL   Strength (Manual Muscle Testing)   Strength Comments general LE weakness from reduced activity   Bed Mobility   Comment, (Bed Mobility) supine>sit with mod I, HOB elevated   Transfers   Comment, (Transfers) sit>stand from EOB with 2WW and SBA   Gait/Stairs (Locomotion)   Comment, (Gait/Stairs) amb few steps bed>chair with 2WW and SBA   Balance   Balance no deficits were identified   Clinical Impression   Criteria for Skilled Therapeutic Intervention Yes, treatment indicated   PT Diagnosis (PT) impaired functional mobility   Influenced by the following impairments LE weakness, reduced activity tolerance   Functional limitations due to impairments impaired transfers, gait, stairs   Clinical Presentation (PT Evaluation Complexity) stable   Clinical Presentation Rationale clinical reasoning   Clinical Decision Making (Complexity) low complexity   Planned Therapy Interventions (PT) balance training;gait training;home exercise program;patient/family education;stair training;strengthening;transfer training;progressive activity/exercise;risk factor education;home program guidelines   Risk & Benefits of therapy have been explained evaluation/treatment results reviewed;care plan/treatment goals reviewed;risks/benefits reviewed;current/potential barriers reviewed;participants voiced agreement with care plan;participants included;patient   Clinical Impression Comments Pt participating well in PT evaluation, able to ambulate with 2WW and SBA, very light reliance on 2WW for transfer to chair. Anticipate with clinical progression, pt will be safe to return home once medically stable, pt stating mobility was going well at home after discharge from [facility] with plans to start home care PT this upcoming Friday 7/12. Pt stating feeling comfortable with return home.   PT Total Evaluation Time   PT Ricky Low Complexity Minutes (62960) 15   Physical Therapy Goals   PT Frequency  5x/week   PT Predicted Duration/Target Date for Goal Attainment 07/17/24   PT Goals Transfers;Gait;Stairs   PT: Transfers Supervision/stand-by assist;Bed to/from chair;Assistive device   PT: Gait Supervision/stand-by assist;Standard walker;100 feet   PT: Stairs Minimal assist;6 stairs;Rail on right   PT Discharge Planning   PT Plan Wed 1/5; gait without device (has 2WW if needed but is significant other's walker), LE strengthening, stairs   PT Discharge Recommendation (DC Rec) home with assist;home with home care physical therapy   PT Rationale for DC Rec anticipate with clinical progression pt will be safe to reutrn home with family and home care PT to increase indep in own environment   PT Brief overview of current status supine>sit with SBA, sit>stand with SBA, amb 5 steps bed>chair without device and SBA   PT Equipment Needed at Discharge   (pt has walker)   Total Session Time   Total Session Time (sum of timed and untimed services) 15

## 2024-07-10 NOTE — PLAN OF CARE
Goal Outcome Evaluation:  Problem: Adult Inpatient Plan of Care  Goal: Optimal Comfort and Wellbeing  7/10/2024 0539 by Lata Li RN  Outcome: Progressing  7/10/2024 0023 by Lata Li RN  Outcome: Progressing   Problem: Risk for Delirium  Goal: Improved Sleep  7/10/2024 0539 by Lata Li RN  Outcome: Progressing  7/10/2024 0023 by Lata Li RN  Outcome: Progressing  Patient's purewick failed last evening and patient was incontinent and voided in toilet. She was up with stand by assist and walker. Patient was steady on her feet but does report falling a half a dozen times in the last 6 months. Purewick replaced and she has had >2000 ml output overnight. LS diminished left lower lobe. 02 sat dropped while sleeping reapplied 02 at 1.5L to keep sat >89%. She has had an excoriated area/skin tear on right thigh since last hospital stay at [place]. Area cleansed and mepilex applied.

## 2024-07-10 NOTE — PLAN OF CARE
"[facility] [place]    Patient admitted to room 2308 at approximately 2040 via cart from emergency room. Patient was accompanied by transport tech.     Verbal SBAR report received from Savannah LUCERO prior to patient arrival.     Patient trasferred to bed via self. Patient alert and oriented X 3. The patient is not having any pain.  . Admission vital signs: Blood pressure (!) 149/90, pulse 81, temperature 98.1  F (36.7  C), temperature source Oral, resp. rate 18, height 1.575 m (5' 2\"), weight 78.3 kg (172 lb 9.9 oz), SpO2 94%. Patient was oriented to plan of care, call light, bed controls, tv, telephone, bathroom, and visiting hours.     Risk Assessment    The following safety risks were identified during admission: fall. Yellow risk band applied: YES.     Skin Initial Assessment    This writer admitted this patient and completed a full skin assessment and Fabricio score in the Adult PCS flowsheet.   Photo documentation of skin problem and/or wound competed via [employer] application (located under Media):  N/A    Appropriate interventions initiated as needed.     Secondary skin check completed by Brooke LUCERO.         Education    Patient has a [place] to Observation order: No  Observation education completed and documented: N/A      Lata Li RN                             "

## 2024-07-10 NOTE — MEDICATION SCRIBE - ADMISSION MEDICATION HISTORY
Medication Scribe Admission Medication History    Admission medication history is complete. The information provided in this note is only as accurate as the sources available at the time of the update.    Information Source(s): Patient, Caregiver, and CareEverywhere/SureScripts via  with S/O Pierre by cell phone [phone].    Pertinent Information: Pierre takes care of her medicines at home.  She has misplaced her Albuterol Inhaler and Fluticasone nasal spray some where in the house.  New order for Levothyroxine 150 mcg that has not been picked up from the pharmacy yet.  Still taking the 137 mcg.  Takes Atorvastatin in AM instead of PM.    Changes made to PTA medication list:  Added: Levothyroxine 150 mcg.  Deleted: None  Changed: MVI from every day prn to daily.    Allergies reviewed with patient and updates made in EHR: yes, no change.    Medication History Completed By: Yessi Henry 7/9/2024 9:16 PM    PTA Med List   Medication Sig Note Last Dose    albuterol (PROAIR HFA/PROVENTIL HFA/VENTOLIN HFA) 108 (90 Base) MCG/ACT inhaler Inhale 2 puffs into the lungs every 6 hours as needed for shortness of breath / dyspnea or wheezing  Past Month at misplaced it at home    apixaban ANTICOAGULANT (ELIQUIS) 2.5 MG tablet Take 2.5 mg by mouth 2 times daily  7/9/2024 at am    atorvastatin (LIPITOR) 40 MG tablet Take 1 tablet (40 mg) by mouth every evening for 30 days 7/9/2024: Gets in AM instead of PM. 7/9/2024 at am    calcium carbonate (OS-ANIKA) 1500 (600 Ca) MG tablet Take 600 mg by mouth 2 times daily (with meals)  7/9/2024 at am    ferrous sulfate (FE TABS) 325 (65 Fe) MG EC tablet Take 325 mg by mouth daily  7/9/2024 at am    fluticasone (FLONASE) 50 MCG/ACT nasal spray Spray 2 sprays in nostril daily as needed for allergies  Past Month at prn    levothyroxine (SYNTHROID/LEVOTHROID) 137 MCG tablet Take 1 tablet by mouth daily before breakfast  7/9/2024 at am    levothyroxine (SYNTHROID/LEVOTHROID) 150 MCG tablet Take 150  mcg by mouth daily  new order on 7/8/24 at not picked up from pharmacy yet    losartan (COZAAR) 25 MG tablet Take 1 tablet (25 mg) by mouth daily for 30 days  7/9/2024 at am    metoprolol tartrate (LOPRESSOR) 50 MG tablet Take 1 tablet (50 mg) by mouth 2 times daily for 30 days  7/9/2024 at am    multivitamin w/minerals (CENTRUM ADULTS) tablet Take 1 tablet by mouth daily  7/9/2024 at am    pantoprazole (PROTONIX) 40 MG EC tablet Take 1 tablet (40 mg) by mouth 2 times daily  7/9/2024 at am    sertraline (ZOLOFT) 100 MG tablet Take 200 mg by mouth daily  7/9/2024 at am    sucralfate (CARAFATE) 1 GM tablet Take 1 tablet (1 g) by mouth 2 times daily (before meals)  7/9/2024 at am

## 2024-07-11 NOTE — PLAN OF CARE
Goal Outcome Evaluation:  Problem: Adult Inpatient Plan of Care  Goal: Optimal Comfort and Wellbeing  Outcome: Progressing  Updated Dr. Wilhelm: Patient is requesting medication for headache 6/10. Could she have order for Tylenol.   Patient given Tylenol per order.

## 2024-07-11 NOTE — PROGRESS NOTES
[facility]    Medicine Progress Note - Hospitalist Service    Date of Admission:  7/9/2024    Assessment & Plan   Shaila Triana is a 78 year old female with past medical history significant for CKD stage 4, chronic normocytic anemia, hyperlipidemia, hypothyroidism, mantle cell lymphoma, GERD, depression, atrial fibrillation on eliquis s/p Watchman placement, PAUL, hx of breast cancer, heart failure, and recent hospitalization  06/25-06/28 for treatment of right knee hematoma secondary to fall, who presents on 7/9/2024 with weight gain, increased peripheral edema, fatigue, and exertional dyspnea.     Acute pulmonary edema   Acute on chronic systolic congestive heart failure (HFrEF 35-40%)  Acute decompensated heart failure  She was recently hospitalized 06/25/24-06/28/24 and during this hospitalization she received 3 units pRBC for acute on chronic anemia. Prior to admission medications include losartan and metoprolol, no prior diuretics. Presented with worsening bilateral lower extremity edema, weight gain, orthopnea, and dyspnea. Most recent JOSE on 05/03/24 with EF 35-40%, moderately decreased LV systolic function, right ventricular enlargement with reduced right ventricular systolic function, severe bi-atrial enlargement,  mild mitral valve regurgitation, moderate tricuspid regurgitation. This EF is decreased from 03/15/2024, had EF of 55-60% at that time. Mitral regurgitation is also new finding compared to 03/2024, and tricuspid regurgitation is worsened. Chest x-ray was read as Stable small left pleural effusion with adjacent opacities which may reflect atelectasis or pneumonia. Stable minimal right basilar atelectasis/scarring. Mild cardiomegaly. No vascular congestion or overt pulmonary edema. However, on personal review interstitial fluid is present consistent with pulmonary edema. BNP elevated at 22,690, increased from 14,445 on 06/25/24. Electrolytes within normal limits.  Received 40 mg furosemide in the ED and 40 mg evening of admission with 5.3 L UOP.    Patient in acute decompensated heart failure which may have been triggered in part by fluid administration during recent hospitalization and lack of diuretics prior to admission.  Patient has had 7 L urine output since admission.  Patient remains 20 pounds up from dry right (~145 pounds).  Will continue to diurese patient with IV diuretics and monitor renal function.  BNP improved to 18,495.  Diuresis: Decrease from 60 mg to 40 mg furosemide IV BID   Holding jardiance 10mg daily (initiated this admission)  Continue PTA metoprolol, holding losartan (see below)  Compression stockings  Strict I and O's, daily weights  Cardiac diet  PT/OT/care management consulted  AM Labs - BMP, CBC, Mg    Acute respiratory failure with hypoxia - resolved   Presented with worsening dyspnea and orthopnea, but did not have tachypnea nor hypoxia initially in the ED. While in the ED her oxygen saturation dipped below 88% on room air while at rest, improved with 2L supplemental O2. Does not use oxygen at home.  On admission she has normal respiratory effort with 2L O2 via NC, in no acute distress, hemodynamically stable. Most likely due to acute decompensated heart failure, see evaluation and management below.   Albuterol nebulizers available prn    Permanent atrial fibrillation s/p Watchman 7/2022  Chronic anticoagulation   Patient underwent elective Watchman device placed 07/2022 in the setting of recurrent anemia with hx of blood transfusions. 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 Patient was recently hospitalized for a fall  (06/2024) and endorses chronic balance problems and frequent falls. Managed pta with metoprolol 50 mg BID, apixaban 2.5 mg BID. In rate controlled atrial fibrillation on admission. This patient has high risk for cardioembolic events despite chronic  anticoagulation and watchman, but is also at high risk for falls and bleeding. No evidence of acute bleeding on admission.   Continue PTA apixaban 2.5 mg BID  Continue PTA metoprolol 50 mg BID, as above   Fall precautions     Chronic normocytic anemia  Chronically anticoagulated with hx of GI bleeds and acute on chronic blood loss anemia. Does have hx of ckd stage 4. Presented with hemoglobin of 8.1. Baseline Hgb 8-9. MCV on admission 91.   Iron studies, ferratin wnl.   Transfuse for hemoglobin < 7  Watch for signs of bleeding     DARIAN on CKD stage 4  On admission, creatinine 2.37, GFR 20. Creatinine increased from 2.23 and GFR was 22 on 06/28. Baseline creatinine appears to be around 2.0.  Likely secondary to low cardiac output and fluid overload, failure to thrive.   Holding PTA losartan due to poor renal function   No IVF given acute decompensated heart failure   Monitor I/O, daily weights as above.   Follow BMP  Avoid nephrotoxic agents     Sleep apnea  Chronic, pt does not use CPAP at home    Hypothyroidism   Chronic, managed pta with levothyroxine 150 mcg daily, continue.  TSH 9.65 (7/5/2024).  Patient is currently on little higher levothyroxine than recommended for her weight 1.6 mg/kg (~125 mcg) however given her elevated TSH we will continue with higher dose that has been previously prescribed.      Hyperlipidemia  Chronic, managed pta with atorvastatin 40 mg daily, continue.               Diet: Combination Diet Low Saturated Fat Na <2400mg Diet    DVT Prophylaxis: DOAC  Boucher Catheter: Not present  Lines: None     Cardiac Monitoring: None  Code Status: Full Code      Clinically Significant Risk Factors        # Hypokalemia: Lowest K = 3.3 mmol/L in last 2 days, will replace as needed   # Hypocalcemia: Lowest Ca = 8.1 mg/dL in last 2 days, will monitor and replace as appropriate   # Hypomagnesemia: Lowest Mg = 1.5 mg/dL in last 2 days, will replace as needed   # Hypoalbuminemia: Lowest albumin = 3.3 g/dL at  "7/9/2024  5:18 PM, will monitor as appropriate       # Hypertension: Noted on problem list                # Obesity: Estimated body mass index is 31.01 kg/m  as calculated from the following:    Height as of this encounter: 1.575 m (5' 2\").    Weight as of this encounter: 76.9 kg (169 lb 8.5 oz)., PRESENT ON ADMISSION       # Financial/Environmental Concerns: none         Disposition Plan     Medically Ready for Discharge: Anticipated Tomorrow             David Chicas DO  Hospitalist Service  [facility]  Securely message with [employer] (more info)  Text page via [facility] Paging/Directory   ______________________________________________________________________    Interval History   No acute events overnight.  Weaned off oxygen yesterday morning and has not remained stable overnight.  Patient swelling significantly improved.  Discussed about continued IV diuretics today and continue to monitor her renal function.  Other acute concerns or complaints    Physical Exam   Vital Signs: Temp: 97.9  F (36.6  C) Temp src: Oral BP: (!) 147/95 Pulse: 86   Resp: 16 SpO2: 95 % O2 Device: None (Room air)    Weight: 169 lbs 8.54 oz    Constitutional: awake, alert, cooperative, no apparent distress, and appears stated age  Respiratory: No increased work of breathing, good air exchange, clear to auscultation bilaterally, no crackles or wheezing  Cardiovascular: 1+ edema bilateral lower extremities to the knee, displaced PMI, regular rate and rhythm, normal S1 and S2, no S3 or S4, and no murmur noted  GI: No scars, normal bowel sounds, soft, non-distended, non-tender, no masses palpated, no hepatosplenomegally  Skin: normal skin color, texture, turgor  Musculoskeletal: There is no redness, warmth, or swelling of the joints.  Full range of motion noted.  Motor strength is 5 out of 5 all extremities bilaterally.  Tone is normal.    Medical Decision Making       50 MINUTES SPENT BY ME on the date of service doing " chart review, history, exam, documentation & further activities per the note.      Data     I have personally reviewed the following data over the past 24 hrs:    5.9  \   7.7 (L)   / 181     138 102 38.9 (H) /  97   3.5 26 2.49 (H) \     Trop: N/A BNP: 18,495 (H)       Imaging results reviewed over the past 24 hrs:   No results found for this or any previous visit (from the past 24 hour(s)).

## 2024-07-11 NOTE — PLAN OF CARE
"7/11/24  [id number]    Problem: Adult Inpatient Plan of Care  Goal: Plan of Care Review  Outcome: Progressing  Goal: Patient-Specific Goal (Individualized)  Outcome: Progressing  Goal: Optimal Comfort and Wellbeing  Outcome: Progressing  Goal: Readiness for Transition of Care  Outcome: Progressing    Problem: Gas Exchange Impaired  Goal: Optimal Gas Exchange  Outcome: Progressing      Goal Outcome Evaluation:  BP (!) 143/82 (BP Location: Left arm)   Pulse 86   Temp 98.1  F (36.7  C) (Oral)   Resp 16   Ht 1.575 m (5' 2\")   Wt 76.9 kg (169 lb 8.5 oz)   LMP  (LMP Unknown)   SpO2 94%   BMI 31.01 kg/m      Pt reports feeling better today overall. Ambulates frequently to bathroom, chair, and back to bed. Denies SOB with exertion. SpO2 <94% on RA. Potassium and mag. lab draw scheduled for tomorrow AM. Good output: 3.2 L this shift with 2 undocumented occurences. Tolerated cardiac diet and compression socks throughout the day.                       "

## 2024-07-12 NOTE — PROGRESS NOTES
New Patient Oncology Nurse Navigator Note     Referring provider:     David Abdul DO        Referring Clinic/Organization: [facility]      Referred to (specialty:) Hematology/Oncology     Requested provider (if applicable): NA    Date Referral Received: July 12, 2024     Evaluation for:  C83.18 (ICD-10-CM) - 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)      Clinical History (per Nurse review of records provided):      Patient recently hospitalized from 7/9/24 to 7/12/24 for weight gain, increased peripheral edema, fatigue and exertional dyspena.     Was noted to have:  Hx of Mantle Cell Lymphoma  Previous CT chest with evidence of prominent right axillary lymph nodes are again seen though have decreased in size from the comparison study. For example, a 9 mm right axillary lymph node was previously 13 mm. No recent follow up with Heme/Onc and has been stable on surveillance.   Heme/Onc referral placed given lack of follow-up        Patient last seen in follow up on 4/16/24 by Dr. Anthony Keita with [facility]:         Records Location: Bookmarked.     Records Needed:   Imaging pushed from [facility]      Additional testing needed prior to consult: ?    Payor: [employer] / Plan: [employer]ICARE [employer]VANTAGE / Product Type: HMO /     July 12, 2024  Referral received and reviewed.  Called patient this afternoon in regards to referral.   Patient did not answer her phone so a detailed message was left for her reqesting her to call back.     July 18, 2024  Called patient this morning again in follow up of message left for her last week. Patient was not aware of the referral but was agreeable to being set up with Hematology/Oncology at [facility] which is closer to home for her. Per patient she is not able to schedule an appointment in July but August would work. Slot put on HOLD for 8/13/24 with Dr. Driver. Patient transferred to [facility] but got disconnected and  unable to reconnect with her. Referral sent to [employer] for processing.     Kita MURON, RN   Oncology Nurse Navigator   [facility]   [phone] / [phone]

## 2024-07-12 NOTE — PLAN OF CARE
Physical Therapy Discharge Summary    Reason for therapy discharge:    Discharged to home with home therapy.    Progress towards therapy goal(s). See goals on Care Plan in [facility] electronic health record for goal details.  Goals partially met.  Barriers to achieving goals:   discharge from facility.    Therapy recommendation(s):    Continued therapy is recommended.  Rationale/Recommendations:  Recommend continued home care PT to increase indep and safety in own environment.

## 2024-07-12 NOTE — PLAN OF CARE
Pt did well over night and slept off/on as normal for her. No complaints of SOB. Purewick in place and she had approx 3000 mL urine output this shift. 2+ pitting edema in bilat lower extremities which are also very tender.   VSS.

## 2024-07-12 NOTE — DISCHARGE SUMMARY
"[facility]  Hospitalist Discharge Summary      Date of Admission:  7/9/2024  Date of Discharge:  7/12/2024  Discharging Provider: David Chicas DO  Discharge Service: Hospitalist Service    Discharge Diagnoses   Acute pulmonary edema   Acute on chronic systolic congestive heart failure (HFrEF 35-40%)  Acute decompensated heart failure  Acute respiratory failure with hypoxia - resolved   Permanent atrial fibrillation s/p Watchman 7/2022  Chronic anticoagulation   DARIAN on CKD stage 4   Chronic normocytic anemia   Sleep apnea  Hypothyroidism  Hyperlipidemia       Clinically Significant Risk Factors     # Overweight: Estimated body mass index is 26.53 kg/m  as calculated from the following:    Height as of this encounter: 1.575 m (5' 2\").    Weight as of this encounter: 65.8 kg (145 lb 1 oz).       Follow-ups Needed After Discharge   Follow-up Appointments     Follow-up and recommended labs and tests       Follow up with primary care provider, Mervin Ibarra, within 7 days for   hospital follow- up.  No follow up labs or test are needed.            Discharge Disposition   Discharged to home  Condition at discharge: Stable    Hospital Course   Shaila Triana is a 78 year old female with past medical history significant for CKD stage 4, chronic normocytic anemia, hyperlipidemia, hypothyroidism, mantle cell lymphoma, GERD, depression, atrial fibrillation on eliquis s/p Watchman placement, PAUL, hx of breast cancer, heart failure, and recent hospitalization  06/25-06/28 for treatment of right knee hematoma secondary to fall, who presents on 7/9/2024 with weight gain, increased peripheral edema, fatigue, and exertional dyspnea.     Acute pulmonary edema   Acute on chronic systolic congestive heart failure (HFrEF 35-40%)  Acute decompensated heart failure  She was recently hospitalized 06/25/24-06/28/24 and during this hospitalization she received 3 units pRBC for acute on chronic anemia. Prior to " admission medications include losartan and metoprolol, no prior diuretics. Presented with worsening bilateral lower extremity edema, weight gain, orthopnea, and dyspnea. Most recent JOSE on 05/03/24 with EF 35-40%, moderately decreased LV systolic function, right ventricular enlargement with reduced right ventricular systolic function, severe bi-atrial enlargement,  mild mitral valve regurgitation, moderate tricuspid regurgitation. This EF is decreased from 03/15/2024, had EF of 55-60% at that time. Mitral regurgitation is also new finding compared to 03/2024, and tricuspid regurgitation is worsened. Chest x-ray was read as Stable small left pleural effusion with adjacent opacities which may reflect atelectasis or pneumonia. Stable minimal right basilar atelectasis/scarring. Mild cardiomegaly. No vascular congestion or overt pulmonary edema. However, on personal review interstitial fluid is present consistent with pulmonary edema. BNP elevated at 22,690, increased from 14,445 on 06/25/24. Electrolytes within normal limits. Received 40 mg furosemide in the ED and 40 mg evening of admission with 5.3 L UOP.    Patient in acute decompensated heart failure which may have been triggered in part by fluid administration during recent hospitalization and lack of diuretics prior to admission.  Patient has had 7 L urine output since admission.  Patient remains 20 pounds up from dry right (~145 pounds).  Will continue to diurese patient with IV diuretics and monitor renal function.  BNP improved to 18,495 ?  14,272. Patient diuresed back down to dry weight 145 lbs since admission.   Started on PO Lasix 40 mg daily    Holding jardiance 10mg daily (initiated this admission)  Continue PTA metoprolol, holding losartan (see below)  Cardiac diet  HHC RN/PT/HHA ordered  Repeat BMP 7/15  Follow up with PCP 7/15 to review labs (holding losartan) and discuss initiation of Jardiance if renal function has improved.     Acute respiratory  failure with hypoxia - resolved   Presented with worsening dyspnea and orthopnea, but did not have tachypnea nor hypoxia initially in the ED. While in the ED her oxygen saturation dipped below 88% on room air while at rest, improved with 2L supplemental O2. Does not use oxygen at home.  On admission she has normal respiratory effort with 2L O2 via NC, in no acute distress, hemodynamically stable. Most likely due to acute decompensated heart failure, see evaluation and management below.     Permanent atrial fibrillation s/p Watchman 7/2022  Chronic anticoagulation   Patient underwent elective Watchman device placed 07/2022 in the setting of recurrent anemia with hx of blood transfusions. 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 Patient was recently hospitalized for a fall  (06/2024) and endorses chronic balance problems and frequent falls. Managed pta with metoprolol 50 mg BID, apixaban 2.5 mg BID. In rate controlled atrial fibrillation on admission. This patient has high risk for cardioembolic events despite chronic anticoagulation and watchman, but is also at high risk for falls and bleeding. No evidence of acute bleeding on admission.   Continue PTA apixaban 2.5 mg BID  Continue PTA metoprolol 50 mg BID, as above     Chronic normocytic anemia  Chronically anticoagulated with hx of GI bleeds and acute on chronic blood loss anemia. Does have hx of ckd stage 4. Presented with hemoglobin of 8.1. Baseline Hgb 8-9. MCV on admission 91.   Iron studies, ferratin wnl.   No interventions    DARIAN on CKD stage 4  On admission, creatinine 2.37, GFR 20. Creatinine increased from 2.23 and GFR was 22 on 06/28. Baseline creatinine appears to be around 2.0.  Likely secondary to low cardiac output and fluid overload, failure to thrive.   Holding PTA losartan due to poor renal function   No IVF given acute decompensated heart failure   Monitor I/O, daily  weights as above.   Follow BMP  Avoid nephrotoxic agents     Hx of Mantle Cell Lymphoma  Previous CT chest with evidence of prominent right axillary lymph nodes are again seen though have decreased in size from the comparison study. For example, a 9 mm right axillary lymph node was previously 13 mm. No recent follow up with Heme/Onc and has been stable on surveillance.   Heme/Onc referral placed given lack of follow-up      Sleep apnea  Chronic, pt does not use CPAP at home    Hypothyroidism   Chronic, managed pta with levothyroxine 150 mcg daily, continue.  TSH 9.65 (7/5/2024).  Patient is currently on little higher levothyroxine than recommended for her weight 1.6 mg/kg (~125 mcg) however given her elevated TSH we will continue with higher dose that has been previously prescribed.    Hyperlipidemia  Chronic, managed pta with atorvastatin 40 mg daily, continue.         Consultations This Hospital Stay   OCCUPATIONAL THERAPY ADULT IP CONSULT  CARE MANAGEMENT / SOCIAL WORK IP CONSULT  PHYSICAL THERAPY ADULT IP CONSULT  SPIRITUAL HEALTH SERVICES IP CONSULT    Code Status   Full Code    Time Spent on this Encounter   IDavid DO, personally saw the patient today and spent greater than 30 minutes discharging this patient.       David Chicas DO  [facility] MEDICAL SURGICAL  [address] [address]  Phone: [phone]  Fax: [phone]  ______________________________________________________________________    Physical Exam   Vital Signs: Temp: 97.8  F (36.6  C) Temp src: Oral BP: 137/89 Pulse: 94   Resp: 16 SpO2: 92 % O2 Device: None (Room air)    Weight: 145 lbs 1 oz    Constitutional: awake, alert, cooperative, no apparent distress, and appears stated age  Respiratory: No increased work of breathing, good air exchange, clear to auscultation bilaterally, no crackles or wheezing  Cardiovascular: 1+ edema bilateral lower extremities to the knee, displaced PMI, regular rate and  rhythm, normal S1 and S2, no S3 or S4, and no murmur noted  GI: No scars, normal bowel sounds, soft, non-distended, non-tender, no masses palpated, no hepatosplenomegally  Skin: normal skin color, texture, turgor  Musculoskeletal: There is no redness, warmth, or swelling of the joints.  Full range of motion noted.  Motor strength is 5 out of 5 all extremities bilaterally.  Tone is normal.       Primary Care Physician   Mervin Ibarra    Discharge Orders      Basic metabolic panel     Adult Oncology/Hematology [facility] Referral      Home Care Referral      Reason for your hospital stay    Acute decompensated congestive heart failure with reduced ejection fraction, acute respiratory failure with hypoxia, DARIAN     Follow-up and recommended labs and tests     Follow up with primary care provider, Mervin Ibarra, within 7 days for hospital follow- up.  No follow up labs or test are needed.     Activity    Your activity upon discharge: activity as tolerated     Diet    Follow this diet upon discharge:  Combination Diet Low Saturated Fat Na <2400mg Diet       Significant Results and Procedures   Most Recent 3 CBC's:  Recent Labs   Lab Test 07/12/24  0542 07/11/24  0521 07/10/24  1810 07/10/24  0519   WBC 5.5 5.9  --  5.7   HGB 8.2* 7.7* 8.6* 7.3*   MCV 89 90  --  92    181  --  182     Most Recent 3 BMP's:  Recent Labs   Lab Test 07/12/24  0542 07/11/24  0521 07/10/24  1810 07/10/24  0519    138  --  138   POTASSIUM 3.4 3.5 3.8 3.3*   CHLORIDE 97* 102  --  104   CO2 31* 26  --  22   BUN 38.8* 38.9*  --  37.1*   CR 2.50* 2.49*  --  2.35*   ANIONGAP 11 10  --  12   ANIKA 8.3* 8.1*  --  8.3*   GLC 88 97  --  87     Most Recent 3 BNP's:  Recent Labs   Lab Test 07/12/24  0542 07/11/24  0521 07/09/24  1718   NTBNPI 14,272* 18,495* 22,690*   ,   Results for orders placed or performed during the hospital encounter of 07/09/24   XR Chest 2 Views    Narrative    EXAM: XR CHEST 2 VIEWS  LOCATION: [facility]  DATE: 7/9/2024    INDICATION: dyspnea  COMPARISON: Chest x-ray 7/9/2024      Impression    IMPRESSION: Stable small left pleural effusion with adjacent opacities which may reflect atelectasis or pneumonia. Stable minimal right basilar atelectasis/scarring. Mild cardiomegaly. No vascular congestion or overt pulmonary edema.       Discharge Medications   Current Discharge Medication List        START taking these medications    Details   furosemide (LASIX) 40 MG tablet Take 1 tablet (40 mg) by mouth daily  Qty: 30 tablet, Refills: 0    Associated Diagnoses: Congestive heart failure, unspecified HF chronicity, unspecified heart failure type (H)           CONTINUE these medications which have CHANGED    Details   losartan (COZAAR) 25 MG tablet Take 1 tablet (25 mg) by mouth daily  Qty: 30 tablet, Refills: 0    Associated Diagnoses: TIA (transient ischemic attack)           CONTINUE these medications which have NOT CHANGED    Details   albuterol (PROAIR HFA/PROVENTIL HFA/VENTOLIN HFA) 108 (90 Base) MCG/ACT inhaler Inhale 2 puffs into the lungs every 6 hours as needed for shortness of breath / dyspnea or wheezing  Qty: 18 g, Refills: 0      apixaban ANTICOAGULANT (ELIQUIS) 2.5 MG tablet Take 2.5 mg by mouth 2 times daily      atorvastatin (LIPITOR) 40 MG tablet Take 1 tablet (40 mg) by mouth every evening for 30 days  Qty: 30 tablet, Refills: 0    Associated Diagnoses: TIA (transient ischemic attack)      calcium carbonate (OS-ANIKA) 1500 (600 Ca) MG tablet Take 600 mg by mouth 2 times daily (with meals)      ferrous sulfate (FE TABS) 325 (65 Fe) MG EC tablet Take 325 mg by mouth daily      fluticasone (FLONASE) 50 MCG/ACT nasal spray Spray 2 sprays in nostril daily as needed for allergies      !! levothyroxine (SYNTHROID/LEVOTHROID) 137 MCG tablet Take 1 tablet by mouth daily before breakfast      !! levothyroxine (SYNTHROID/LEVOTHROID) 150 MCG tablet Take 150 mcg by mouth daily      metoprolol tartrate  (LOPRESSOR) 50 MG tablet Take 1 tablet (50 mg) by mouth 2 times daily for 30 days  Qty: 60 tablet, Refills: 0    Associated Diagnoses: TIA (transient ischemic attack)      multivitamin w/minerals (CENTRUM ADULTS) tablet Take 1 tablet by mouth daily      pantoprazole (PROTONIX) 40 MG EC tablet Take 1 tablet (40 mg) by mouth 2 times daily  Qty: 60 tablet, Refills: 0    Associated Diagnoses: Gastrointestinal hemorrhage associated with gastric ulcer      sertraline (ZOLOFT) 100 MG tablet Take 200 mg by mouth daily      sucralfate (CARAFATE) 1 GM tablet Take 1 tablet (1 g) by mouth 2 times daily (before meals)  Qty: 60 tablet, Refills: 0    Associated Diagnoses: Gastrointestinal hemorrhage associated with gastric ulcer       !! - Potential duplicate medications found. Please discuss with provider.        Allergies   Allergies   Allergen Reactions    Blood-Group Specific Substance      Patient has a history of a clinically significant antibody against RBC antigens.  A delay in compatible RBCs may occur.  Unidentified antibody identified at [facility] 6/26/24    Codeine Nausea    Dust Mite Extract Other (See Comments)     Per allergy test    Erythromycin Dizziness     Almost passed out    Macrolides And Ketolides Dizziness     Almost passed out.    Penicillin [Penicillins] Itching

## 2024-07-12 NOTE — PROGRESS NOTES
[facility] DISCHARGE NOTE    Patient discharged to home at 12:41 PM via wheel chair. Accompanied by significant other and staff. Discharge instructions reviewed with patient, opportunity offered to ask questions. Prescriptions sent to patients preferred pharmacy. All belongings sent with patient.    Zoila Cuellar RN

## 2024-07-12 NOTE — DISCHARGE INSTRUCTIONS
Follow up with PCP 7/15 to review labs (holding losartan) and discuss initiation of Jardiance if renal function has improved.

## 2024-07-12 NOTE — PLAN OF CARE
Occupational Therapy Discharge Summary    Reason for therapy discharge:    Discharged to home with home therapy.    Progress towards therapy goal(s). See goals on Care Plan in [facility] electronic health record for goal details.  Goals partially met.  Barriers to achieving goals:   discharge from facility.    Therapy recommendation(s):    Continued therapy is recommended.  Rationale/Recommendations:  home OT for increased safety and ind in own environment.

## 2024-07-12 NOTE — PROGRESS NOTES
Care Management Discharge Note    Discharge Date: 07/12/2024     Discharge Disposition: Home Care    Discharge Services: None    Discharge DME: None    Discharge Transportation: family or friend will provide    Private pay costs discussed: Not applicable    Does the patient's insurance plan have a 3 day qualifying hospital stay waiver?  No    Patient/family educated on Medicare website which has current facility and service quality ratings: yes    Education Provided on the Discharge Plan: Yes  Persons Notified of Discharge Plans: patient  Patient/Family in Agreement with the Plan: yes    Handoff Referral Completed: Yes    Additional Information:    Per IDT rounds, patient is medically ready for discharge today.     Patient will discharge home with [facility] Phone: [phone] Fax: [phone] for PT/OT/RN/HHA services.      ANNA LEA RN

## 2024-07-12 NOTE — PROGRESS NOTES
"SPIRITUAL HEALTH SERVICES  [facility] - Med Surg    Referral Source: Follow up visit    - Shaila welcomed a follow up visit today.  She was dressed and \"ready to go home today.\"  She stated she is feeling much better and also that she was very thankful for the quilt that had been provided for her.    - I offered a blessing for her transition back home.      Steven Hanson M.A., [facility]  Staff   [facility]  Office: [phone]    "

## 2024-10-26 NOTE — TELEPHONE ENCOUNTER
"Nurse Triage SBAR    Is this a 2nd Level Triage? NO    Situation: Headache    Background: Has been having pain in her both of temples since this morning. Wondering if she is having any aneurysm.     Assessment: Pain has been occurring since this morning and rates it as 8-10/10. Pain is aching feeling. States she feels nauseated but has not vomited. Has neck pain but is able to touch her chin to her chest still. Reports intermittent shortness of breath and dizziness. Denies vision changes, eye pain, chest pain, unilateral weakness/numbness, or fever.    Protocol Recommended Disposition:   Go to ED Now (Or PCP Triage)    Recommendation: Go to ED now       Reason for Disposition   [1] SEVERE headache (e.g., excruciating) AND [2] \"worst headache\" of life    Additional Information   Negative: Difficult to awaken or acting confused (e.g., disoriented, slurred speech)   Negative: [1] Weakness of the face, arm or leg on one side of the body AND [2] new-onset   Negative: [1] Numbness of the face, arm or leg on one side of the body AND [2] new-onset   Negative: [1] Loss of speech or garbled speech AND [2] new-onset   Negative: Passed out (i.e., lost consciousness, collapsed and was not responding)   Negative: Sounds like a life-threatening emergency to the triager   Negative: Unable to walk, or can only walk with assistance (e.g., requires support)   Negative: Stiff neck (can't touch chin to chest)   Negative: Severe pain in one eye   Negative: [1] Other family members (or people in same household) with headaches AND [2] possibility of carbon monoxide exposure    Protocols used: Headache-A-AH    "

## 2024-10-26 NOTE — ED TRIAGE NOTES
Pt states that she has a headache and sinus pressure that started this am.      Triage Assessment (Adult)       Row Name 10/26/24 1522          Triage Assessment    Airway WDL WDL        Respiratory WDL    Respiratory WDL WDL        Peripheral/Neurovascular WDL    Peripheral Neurovascular WDL WDL

## 2024-10-26 NOTE — ED PROVIDER NOTES
History     Chief Complaint   Patient presents with    Headache    Sinusitis     HPI    Shaila Tirana is a 78 year old female who comes in from home with her  with a headache.  She awoke with this this morning.  It is bitemporal and frontal and much more severe than headaches she has had in the past.  She generally does not get headaches.  She took some acetaminophen for it without relief.  She says her ear is full.  She thinks maybe her sinuses are congested but she has not had any nasal congestion or postnasal drip.  She is not coughing.  She does not have a sore throat.  She has not had fevers or chills but maybe is a bit achy.  She is on Eliquis for history of atrial fibrillation.  She had a Watchman device inserted but had a stroke after this and so she was put back on anticoagulation.  Currently she denies that she has had any new loss of vision, double vision, speech difficulty, swallowing difficulty, weakness or numbness in the upper or lower extremities.  She has not noticed any clumsiness or imbalance.  She is not having any new respiratory symptoms but says she has had shortness of breath for many years that has not changed.  She is not having chest pain, abdominal pain, bowel or bladder symptoms.  She has had a past history of chronic anemia as well as upper GI bleeding with transfusion requirement and thrombocytopenia of uncertain etiology.    Allergies:  Allergies   Allergen Reactions    Blood-Group Specific Substance      Patient has a history of a clinically significant antibody against RBC antigens.  A delay in compatible RBCs may occur.  Unidentified antibody identified at [facility] 6/26/24    Codeine Nausea    Dust Mite Extract Other (See Comments)     Per allergy test    Erythromycin Dizziness     Almost passed out    Macrolides And Ketolides Dizziness     Almost passed out.    Penicillin [Penicillins] Itching       Problem List:    Patient Active Problem List     Diagnosis Date Noted    Acute pulmonary edema (H) 07/09/2024     Priority: Medium    Acute respiratory failure with hypoxia (H) 07/09/2024     Priority: Medium    Monoclonal gammopathy of unknown significance (MGUS) 06/27/2024     Priority: Medium    Lymphoma (H) 06/27/2024     Priority: Medium    Renal insufficiency 06/25/2024     Priority: Medium    Chronic anemia 06/25/2024     Priority: Medium    Fall at home, initial encounter 06/25/2024     Priority: Medium    Leg hematoma, right, initial encounter 06/25/2024     Priority: Medium    H/O: CVA (cerebrovascular accident) 04/30/2024     Priority: Medium    Proteinuria 03/18/2024     Priority: Medium    Secondary hypertension 03/15/2024     Priority: Medium    Speech disturbance, unspecified type 03/15/2024     Priority: Medium    TIA (transient ischemic attack) 03/15/2024     Priority: Medium    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) 03/15/2024     Priority: Medium    Chronic diastolic congestive heart failure (H) 01/31/2024     Priority: Medium    Upper GI bleed 01/30/2024     Priority: Medium    Anemia due to blood loss, acute 01/30/2024     Priority: Medium    Recurrent falls while walking 01/16/2024     Priority: Medium    Acute respiratory failure with hypoxia and hypercarbia (H) 12/25/2023     Priority: Medium    Community acquired pneumonia, unspecified laterality 12/25/2023     Priority: Medium    Thrombocytopenia (H) 12/25/2023     Priority: Medium    Presence of Watchman left atrial appendage closure device 07/20/2022     Priority: Medium     Formatting of this note might be different from the original.   Dr. Terry Pettit      Anemia 10/05/2020     Priority: Medium    Dizziness 10/05/2020     Priority: Medium    Chest pain, unspecified type 10/05/2020     Priority: Medium    CKD (chronic kidney disease) stage 4, GFR 15-29 ml/min (H) 10/05/2020     Priority: Medium    Elevated lipase 10/05/2020     Priority: Medium    Pancreatic  abnormality on CT 10/05/2020     Priority: Medium     Incidental finding on CT 10/5/2020 -  New 8 mm lucency of the pancreas. Likely benign but needs follow-up.      Chronic anticoagulation 10/05/2020     Priority: Medium     Due to atrial fibrillation       Ascending aorta dilatation (H) 10/05/2020     Priority: Medium     Noted on echo August 2020, stable on CT 10/5/2020.      Chronic atrial fibrillation (H) 07/31/2020     Priority: Medium    Lung nodules 04/19/2016     Priority: Medium     LLL on abd/pelvis CT 4/12/16.  No change on chest CT May 2017.  No further imaging needed.      Invasive ductal carcinoma of breast (H) 08/06/2013     Priority: Medium     Left.  ER(+) MN(+) her-2-hina (-).  Lumpectomy and sentinel biopsy followed by radiation.  Adjuvant therapy with tamoxifen completed.      Malignant neoplasm of female breast (H) 03/18/2008     Priority: Medium     Epic      Depressive disorder 12/04/2006     Priority: Medium    Essential hypertension 12/04/2006     Priority: Medium    Subclinical hypothyroidism 12/04/2006     Priority: Medium    Pure hypercholesterolemia 12/04/2006     Priority: Medium    Sleep apnea 12/04/2006     Priority: Medium        Past Medical History:    Past Medical History:   Diagnosis Date    Anemia     Anemia, unspecified type     Ascending aorta dilatation (H) 10/05/2020    Depressive disorder 12/04/2006    Essential hypertension 12/04/2006    Hypothyroidism (acquired) 12/04/2006    Invasive ductal carcinoma of breast (H) 08/06/2013    Lung nodules 04/19/2016    Malignant neoplasm of female breast (H) 03/18/2008    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) 03/15/2024    New onset atrial fibrillation (H) 07/31/2020    Pure hypercholesterolemia 12/04/2006    Sleep apnea 12/04/2006    Thrombocytopenia (H) 12/25/2023    TIA (transient ischemic attack) 03/15/2024    Upper GI bleed 01/30/2024       Past Surgical History:    Past Surgical History:   Procedure Laterality  Date    APPENDECTOMY          BLEPHAROPLASTY Bilateral 2010    COLONOSCOPY      COLONOSCOPY N/A 2024    Procedure: Colonoscopy;  Surgeon: Willie Alcazar MD;  Location: [facility]    ESOPHAGOSCOPY, GASTROSCOPY, DUODENOSCOPY (EGD), COMBINED N/A 10/6/2020    Procedure: ESOPHAGOGASTRODUODENOSCOPY, WITH BIOPSY and polypectomy;  Surgeon: Jorge Ramirez MD;  Location: [facility]    ESOPHAGOSCOPY, GASTROSCOPY, DUODENOSCOPY (EGD), COMBINED N/A 2024    Procedure: ESOPHAGOGASTRODUODENOSCOPY, WITH BIOPSY;  Surgeon: Willie Alcazar MD;  Location: [facility]    HERNIA REPAIR  2013    HYSTERECTOMY, JOSE  1975    LUMPECTOMY BREAST Left 2008    THYROIDECTOMY  Right     Partial    TUBAL LIGATION         Family History:    Family History   Problem Relation Age of Onset    No Known Problems Mother          age 89 after complications from a fall    Alzheimer Disease Father     Other - See Comments Sister         Polio    Colon Cancer Maternal Aunt     Breast Cancer No family hx of     Ovarian Cancer No family hx of     Prostate Cancer No family hx of        Social History:  Marital Status:   [4]  Social History     Tobacco Use    Smoking status: Never    Smokeless tobacco: Never   Substance Use Topics    Alcohol use: Yes     Comment: occ    Drug use: Never        Medications:    albuterol (PROAIR HFA/PROVENTIL HFA/VENTOLIN HFA) 108 (90 Base) MCG/ACT inhaler  apixaban ANTICOAGULANT (ELIQUIS) 2.5 MG tablet  atorvastatin (LIPITOR) 40 MG tablet  calcium carbonate (OS-ANIKA) 1500 (600 Ca) MG tablet  ferrous sulfate (FE TABS) 325 (65 Fe) MG EC tablet  fluticasone (FLONASE) 50 MCG/ACT nasal spray  furosemide (LASIX) 40 MG tablet  levothyroxine (SYNTHROID/LEVOTHROID) 137 MCG tablet  levothyroxine (SYNTHROID/LEVOTHROID) 150 MCG tablet  losartan (COZAAR) 25 MG tablet  metoprolol tartrate (LOPRESSOR) 50 MG tablet  multivitamin w/minerals (CENTRUM ADULTS) tablet  pantoprazole (PROTONIX) 40 MG EC tablet  sertraline (ZOLOFT) 100 MG  "tablet  sucralfate (CARAFATE) 1 GM tablet      Review of Systems    All other systems are reviewed and are negative    Physical Exam   BP: 118/87  Pulse: 89  Temp: 98.3  F (36.8  C)  Resp: 16  Height: 157.5 cm (5' 2\")  SpO2: 94 %      Physical Exam    Nursing note and vitals were reviewed.  Constitutional: Awake and alert, adequately nourished and developed appearing 78-year-old in no apparent discomfort, who does not appear acutely ill, and who answers questions appropriately and cooperates with examination.  HEENT: Atraumatic and normocephalic.  Speech fluent.  Voice quality normal.  PERRL.  EOMI.   Neck: Freely mobile.  No adenopathy or meningismus.  Cardiovascular: Cardiac examination reveals normal heart rate and regular rhythm without murmur.  Pulmonary/Chest: Breathing is unlabored.  Breath sounds are clear and equal bilaterally.  There no retractions, tachypnea, rales, wheezes, or rhonchi.  Abdomen: Soft, nontender, no HSM or masses rebound or guarding.  Musculoskeletal: Extremities are warm and well-perfused and without edema  Neurological: Alert, oriented, thought content logical, coherent.  Cranial nerve testing is normal.  Motor strength is intact in the upper and lower extremities.  Cerebellar testing is normal.  Sensation is intact in the face and in the extremities.  Motor tone is normal.  No upper or lower extremity drift or ataxia is present.  Skin: Warm, dry, no rashes.  Psychiatric: Affect broad and appropriate.    ED Course        Procedures              Critical Care time:  none              Results for orders placed or performed during the hospital encounter of 10/26/24 (from the past 24 hours)   CBC with platelets differential    Narrative    The following orders were created for panel order CBC with platelets differential.  Procedure                               Abnormality         Status                     ---------                               -----------         ------                   "   CBC with platelets and d...[[id number]]  Abnormal            Final result                 Please view results for these tests on the individual orders.   Comprehensive metabolic panel   Result Value Ref Range    Sodium 137 135 - 145 mmol/L    Potassium 3.4 3.4 - 5.3 mmol/L    Carbon Dioxide (CO2) 25 22 - 29 mmol/L    Anion Gap 11 7 - 15 mmol/L    Urea Nitrogen 52.3 (H) 8.0 - 23.0 mg/dL    Creatinine 3.02 (H) 0.51 - 0.95 mg/dL    GFR Estimate 15 (L) >60 mL/min/1.73m2    Calcium 8.5 (L) 8.8 - 10.4 mg/dL    Chloride 101 98 - 107 mmol/L    Glucose 107 (H) 70 - 99 mg/dL    Alkaline Phosphatase 83 40 - 150 U/L    AST 18 0 - 45 U/L    ALT 9 0 - 50 U/L    Protein Total 7.6 6.4 - 8.3 g/dL    Albumin 3.7 3.5 - 5.2 g/dL    Bilirubin Total 0.5 <=1.2 mg/dL   CBC with platelets and differential   Result Value Ref Range    WBC Count 6.9 4.0 - 11.0 10e3/uL    RBC Count 3.26 (L) 3.80 - 5.20 10e6/uL    Hemoglobin 8.9 (L) 11.7 - 15.7 g/dL    Hematocrit 28.3 (L) 35.0 - 47.0 %    MCV 87 78 - 100 fL    MCH 27.3 26.5 - 33.0 pg    MCHC 31.4 (L) 31.5 - 36.5 g/dL    RDW 14.8 10.0 - 15.0 %    Platelet Count 128 (L) 150 - 450 10e3/uL    % Neutrophils 77 %    % Lymphocytes 6 %    % Monocytes 10 %    % Eosinophils 6 %    % Basophils 1 %    % Immature Granulocytes 0 %    NRBCs per 100 WBC 0 <1 /100    Absolute Neutrophils 5.3 1.6 - 8.3 10e3/uL    Absolute Lymphocytes 0.4 (L) 0.8 - 5.3 10e3/uL    Absolute Monocytes 0.7 0.0 - 1.3 10e3/uL    Absolute Eosinophils 0.4 0.0 - 0.7 10e3/uL    Absolute Basophils 0.1 0.0 - 0.2 10e3/uL    Absolute Immature Granulocytes 0.0 <=0.4 10e3/uL    Absolute NRBCs 0.0 10e3/uL   Symptomatic Influenza A/B, RSV, & SARS-CoV2 PCR (COVID-19) Nose    Specimen: Nose; Swab   Result Value Ref Range    Influenza A PCR Negative Negative    Influenza B PCR Negative Negative    RSV PCR Negative Negative    SARS CoV2 PCR Negative Negative    Narrative    Testing was performed using the [employer] Xpress CoV2/Flu/RSV Assay on the [employer]  GeneXpert Instrument. This test should be ordered for the detection of SARS-CoV2, influenza, and RSV viruses in individuals with signs and symptoms of respiratory tract infection. This test is for in vitro diagnostic use under the [place] FDA for laboratories certified under CLIA to perform high or moderate complexity testing. This test has been [place] FDA cleared. A negative result does not rule out the presence of PCR inhibitors in the specimen or target RNA in concentration below the limit of detection for the assay. If only one viral target is positive but coinfection with multiple targets is suspected, the sample should be re-tested with another FDA cleared, approved, or authorized test, if coninfection would change clinical management. This test was validated by the [facility]. These laboratories are certified under the Clinical Laboratory Improvement Amendments of 1988 (CLIA-88) as qualified to perfom high complexity laboratory testing.   CT Head w/o Contrast    Narrative    EXAM: CT HEAD W/O CONTRAST  LOCATION: [facility]  DATE: 10/26/2024    INDICATION: Headache; Eliquis.  COMPARISON: Head CT 6/26/2024.  TECHNIQUE: Routine CT Head without IV contrast. Multiplanar reformats. Dose reduction techniques were used.    FINDINGS:  INTRACRANIAL CONTENTS: No intracranial hemorrhage, extraaxial collection, or mass effect. No CT evidence of acute infarct. Mild presumed chronic small vessel ischemic changes. Mild generalized volume loss. No hydrocephalus.     VISUALIZED ORBITS/SINUSES/MASTOIDS: No intraorbital abnormality. No paranasal sinus mucosal disease. No middle ear or mastoid effusion.    BONES/SOFT TISSUES: No acute abnormality.      Impression    IMPRESSION:  1.  No CT evidence for acute intracranial process.  2.  Brain atrophy and presumed chronic microvascular ischemic changes as above.       Medications   HYDROmorphone (PF) (DILAUDID) injection 0.3 mg (0.3 mg  Intravenous $Given 10/26/24 0129)       Assessments & Plan (with Medical Decision Making)     78-year-old female with a history of prior strokes on Eliquis for atrial fibrillation with chronic renal insufficiency presented with headache unusual for her and without other neurologic symptoms without symptoms of acute illness.  She thought it could be sinuses but she is not having any nasal congestion or rhinorrhea, fever, sore throat, cough.  She had a normal neurologic examination.  She had normal vital signs including a normal blood pressure with no hypertension.  Because of her unusual headache and her Eliquis use she underwent CT scan of the brain to assess for intracranial bleeding and none was seen.  Do not have a concern for an embolic stroke given the absence of other symptoms and her current anticoagulation.  Testing for COVID was negative.  Laboratory studies were reassuring with her CBC at her baseline and her renal function also at baseline without electrolyte abnormality.  At this time I do not recommend further workup given the reassuring results and normal vital signs.  Her symptoms improved with a small dose of hydromorphone.  She cannot take anti-inflammatories given her poor kidney function and anticoagulation use.  Nevertheless I am reluctant to discharge her with a prescription for narcotic for the same reasons and given her age and comorbidities.  She will continue to use acetaminophen if pain cannot be managed she can return for reevaluation.  She should certainly be seen if new concerning symptoms develop such as fevers, vomiting, focal neurologic symptoms.  She and her  expressed understanding and their questions were answered.    I have reviewed the nursing notes.    I have reviewed the findings, diagnosis, plan and need for follow up with the patient.         New Prescriptions    No medications on file       Final diagnoses:   Acute nonintractable headache, unspecified headache type        10/26/2024   [facility] EMERGENCY DEPT       Cyril Crystal MD  10/26/24 [id number]

## 2024-10-26 NOTE — DISCHARGE INSTRUCTIONS
Your test results today are all reassuring.  There is no sign of bleeding in your brain.  Your anemia is not worsening.  Your virus tests are negative but you still could be coming down with a virus and it can be too soon to tell.  You can take acetaminophen 650 mg 4 times per day if needed for pain.  Return to be seen if your pain recurs or new concerning symptoms develop such as fevers, vomiting, weakness or numbness in 1 arm or 1 leg.

## (undated) DEVICE — CLIP HEMOSTASIS ASSURANCE W16 MM BX00711884

## (undated) DEVICE — ENDO FORCEP ENDOJAW BIOPSY 2.8MMX230CM FB-220U

## (undated) RX ORDER — PROPOFOL 10 MG/ML
INJECTION, EMULSION INTRAVENOUS
Status: DISPENSED
Start: 2020-10-06

## (undated) RX ORDER — LIDOCAINE HYDROCHLORIDE 10 MG/ML
INJECTION, SOLUTION EPIDURAL; INFILTRATION; INTRACAUDAL; PERINEURAL
Status: DISPENSED
Start: 2020-10-06

## (undated) RX ORDER — PROPOFOL 10 MG/ML
INJECTION, EMULSION INTRAVENOUS
Status: DISPENSED
Start: 2024-01-01